# Patient Record
Sex: FEMALE | Race: WHITE | NOT HISPANIC OR LATINO | Employment: UNEMPLOYED | URBAN - NONMETROPOLITAN AREA
[De-identification: names, ages, dates, MRNs, and addresses within clinical notes are randomized per-mention and may not be internally consistent; named-entity substitution may affect disease eponyms.]

---

## 2018-01-28 ENCOUNTER — HOSPITAL ENCOUNTER (EMERGENCY)
Facility: HOSPITAL | Age: 68
Discharge: HOME | End: 2018-01-28
Attending: FAMILY MEDICINE | Admitting: FAMILY MEDICINE

## 2018-01-28 ENCOUNTER — APPOINTMENT (OUTPATIENT)
Dept: GENERAL RADIOLOGY | Facility: HOSPITAL | Age: 68
End: 2018-01-28

## 2018-01-28 VITALS
TEMPERATURE: 97.6 F | BODY MASS INDEX: 50.98 KG/M2 | WEIGHT: 270 LBS | OXYGEN SATURATION: 98 % | HEART RATE: 85 BPM | RESPIRATION RATE: 18 BRPM | SYSTOLIC BLOOD PRESSURE: 167 MMHG | HEIGHT: 61 IN | DIASTOLIC BLOOD PRESSURE: 94 MMHG

## 2018-01-28 DIAGNOSIS — M10.062 ACUTE IDIOPATHIC GOUT OF LEFT KNEE: Primary | ICD-10-CM

## 2018-01-28 LAB
ALBUMIN SERPL-MCNC: 4.3 G/DL (ref 3.4–4.8)
ALBUMIN/GLOB SERPL: 1.3 G/DL (ref 1.5–2.5)
ALP SERPL-CCNC: 88 U/L (ref 35–104)
ALT SERPL W P-5'-P-CCNC: 22 U/L (ref 10–36)
ANION GAP SERPL CALCULATED.3IONS-SCNC: 10.3 MMOL/L (ref 3.6–11.2)
AST SERPL-CCNC: 27 U/L (ref 10–30)
BASOPHILS # BLD AUTO: 0.03 10*3/MM3 (ref 0–0.3)
BASOPHILS NFR BLD AUTO: 0.2 % (ref 0–2)
BILIRUB SERPL-MCNC: 1.2 MG/DL (ref 0.2–1.8)
BUN BLD-MCNC: 17 MG/DL (ref 7–21)
BUN/CREAT SERPL: 24.3 (ref 7–25)
CALCIUM SPEC-SCNC: 9.4 MG/DL (ref 7.7–10)
CHLORIDE SERPL-SCNC: 105 MMOL/L (ref 99–112)
CO2 SERPL-SCNC: 25.7 MMOL/L (ref 24.3–31.9)
CREAT BLD-MCNC: 0.7 MG/DL (ref 0.43–1.29)
CRP SERPL-MCNC: 5.4 MG/DL (ref 0–0.99)
DEPRECATED RDW RBC AUTO: 42.2 FL (ref 37–54)
EOSINOPHIL # BLD AUTO: 0.07 10*3/MM3 (ref 0–0.7)
EOSINOPHIL NFR BLD AUTO: 0.4 % (ref 0–7)
ERYTHROCYTE [DISTWIDTH] IN BLOOD BY AUTOMATED COUNT: 13 % (ref 11.5–14.5)
ERYTHROCYTE [SEDIMENTATION RATE] IN BLOOD: 29 MM/HR (ref 0–30)
GFR SERPL CREATININE-BSD FRML MDRD: 83 ML/MIN/1.73
GLOBULIN UR ELPH-MCNC: 3.4 GM/DL
GLUCOSE BLD-MCNC: 142 MG/DL (ref 70–110)
HCT VFR BLD AUTO: 41.1 % (ref 37–47)
HGB BLD-MCNC: 14 G/DL (ref 12–16)
IMM GRANULOCYTES # BLD: 0.04 10*3/MM3 (ref 0–0.03)
IMM GRANULOCYTES NFR BLD: 0.2 % (ref 0–0.5)
LYMPHOCYTES # BLD AUTO: 3.47 10*3/MM3 (ref 1–3)
LYMPHOCYTES NFR BLD AUTO: 21.3 % (ref 16–46)
MCH RBC QN AUTO: 30.8 PG (ref 27–33)
MCHC RBC AUTO-ENTMCNC: 34.1 G/DL (ref 33–37)
MCV RBC AUTO: 90.5 FL (ref 80–94)
MONOCYTES # BLD AUTO: 0.8 10*3/MM3 (ref 0.1–0.9)
MONOCYTES NFR BLD AUTO: 4.9 % (ref 0–12)
NEUTROPHILS # BLD AUTO: 11.9 10*3/MM3 (ref 1.4–6.5)
NEUTROPHILS NFR BLD AUTO: 73 % (ref 40–75)
OSMOLALITY SERPL CALC.SUM OF ELEC: 285.2 MOSM/KG (ref 273–305)
PLATELET # BLD AUTO: 212 10*3/MM3 (ref 130–400)
PMV BLD AUTO: 10.4 FL (ref 6–10)
POTASSIUM BLD-SCNC: 3.7 MMOL/L (ref 3.5–5.3)
PROT SERPL-MCNC: 7.7 G/DL (ref 6–8)
RBC # BLD AUTO: 4.54 10*6/MM3 (ref 4.2–5.4)
SODIUM BLD-SCNC: 141 MMOL/L (ref 135–153)
URATE SERPL-MCNC: 7.4 MG/DL (ref 2.4–5.7)
WBC NRBC COR # BLD: 16.31 10*3/MM3 (ref 4.5–12.5)

## 2018-01-28 PROCEDURE — 96372 THER/PROPH/DIAG INJ SC/IM: CPT

## 2018-01-28 PROCEDURE — 85025 COMPLETE CBC W/AUTO DIFF WBC: CPT | Performed by: PHYSICIAN ASSISTANT

## 2018-01-28 PROCEDURE — 80053 COMPREHEN METABOLIC PANEL: CPT | Performed by: PHYSICIAN ASSISTANT

## 2018-01-28 PROCEDURE — 73560 X-RAY EXAM OF KNEE 1 OR 2: CPT | Performed by: RADIOLOGY

## 2018-01-28 PROCEDURE — 25010000002 KETOROLAC TROMETHAMINE PER 15 MG: Performed by: PHYSICIAN ASSISTANT

## 2018-01-28 PROCEDURE — 85652 RBC SED RATE AUTOMATED: CPT | Performed by: PHYSICIAN ASSISTANT

## 2018-01-28 PROCEDURE — 86140 C-REACTIVE PROTEIN: CPT | Performed by: PHYSICIAN ASSISTANT

## 2018-01-28 PROCEDURE — 99284 EMERGENCY DEPT VISIT MOD MDM: CPT

## 2018-01-28 PROCEDURE — 84550 ASSAY OF BLOOD/URIC ACID: CPT | Performed by: PHYSICIAN ASSISTANT

## 2018-01-28 PROCEDURE — 25010000002 METHYLPREDNISOLONE PER 125 MG: Performed by: PHYSICIAN ASSISTANT

## 2018-01-28 PROCEDURE — 36415 COLL VENOUS BLD VENIPUNCTURE: CPT

## 2018-01-28 PROCEDURE — 73560 X-RAY EXAM OF KNEE 1 OR 2: CPT

## 2018-01-28 RX ORDER — INDOMETHACIN 25 MG/1
25 CAPSULE ORAL 3 TIMES DAILY PRN
Qty: 15 CAPSULE | Refills: 0 | Status: ON HOLD | OUTPATIENT
Start: 2018-01-28 | End: 2020-10-27

## 2018-01-28 RX ORDER — HYDROCODONE BITARTRATE AND ACETAMINOPHEN 7.5; 325 MG/1; MG/1
1 TABLET ORAL ONCE
Status: COMPLETED | OUTPATIENT
Start: 2018-01-28 | End: 2018-01-28

## 2018-01-28 RX ORDER — PREDNISONE 20 MG/1
20 TABLET ORAL DAILY
Qty: 5 TABLET | Refills: 0 | OUTPATIENT
Start: 2018-01-28 | End: 2019-12-28

## 2018-01-28 RX ORDER — METHYLPREDNISOLONE SODIUM SUCCINATE 125 MG/2ML
125 INJECTION, POWDER, LYOPHILIZED, FOR SOLUTION INTRAMUSCULAR; INTRAVENOUS ONCE
Status: COMPLETED | OUTPATIENT
Start: 2018-01-28 | End: 2018-01-28

## 2018-01-28 RX ORDER — KETOROLAC TROMETHAMINE 30 MG/ML
60 INJECTION, SOLUTION INTRAMUSCULAR; INTRAVENOUS ONCE
Status: COMPLETED | OUTPATIENT
Start: 2018-01-28 | End: 2018-01-28

## 2018-01-28 RX ORDER — ALLOPURINOL 100 MG/1
100 TABLET ORAL
Status: ON HOLD | COMMUNITY
End: 2020-10-27

## 2018-01-28 RX ORDER — OXYCODONE AND ACETAMINOPHEN 10; 325 MG/1; MG/1
1 TABLET ORAL EVERY 6 HOURS PRN
Status: ON HOLD | COMMUNITY
End: 2020-12-01 | Stop reason: SDUPTHER

## 2018-01-28 RX ORDER — DIAZEPAM 10 MG/1
5 TABLET ORAL 2 TIMES DAILY PRN
Status: ON HOLD | COMMUNITY
End: 2020-11-27

## 2018-01-28 RX ADMIN — HYDROCODONE BITARTRATE AND ACETAMINOPHEN 1 TABLET: 7.5; 325 TABLET ORAL at 13:22

## 2018-01-28 RX ADMIN — KETOROLAC TROMETHAMINE 60 MG: 60 INJECTION, SOLUTION INTRAMUSCULAR at 12:32

## 2018-01-28 RX ADMIN — METHYLPREDNISOLONE SODIUM SUCCINATE 125 MG: 125 INJECTION, POWDER, FOR SOLUTION INTRAMUSCULAR; INTRAVENOUS at 12:31

## 2019-12-28 ENCOUNTER — HOSPITAL ENCOUNTER (EMERGENCY)
Facility: HOSPITAL | Age: 69
Discharge: HOME | End: 2019-12-28
Attending: EMERGENCY MEDICINE | Admitting: EMERGENCY MEDICINE

## 2019-12-28 ENCOUNTER — APPOINTMENT (OUTPATIENT)
Dept: GENERAL RADIOLOGY | Facility: HOSPITAL | Age: 69
End: 2019-12-28

## 2019-12-28 VITALS
BODY MASS INDEX: 56.15 KG/M2 | WEIGHT: 286 LBS | HEIGHT: 60 IN | RESPIRATION RATE: 19 BRPM | OXYGEN SATURATION: 97 % | SYSTOLIC BLOOD PRESSURE: 148 MMHG | DIASTOLIC BLOOD PRESSURE: 76 MMHG | TEMPERATURE: 97.8 F | HEART RATE: 79 BPM

## 2019-12-28 DIAGNOSIS — J06.9 UPPER RESPIRATORY TRACT INFECTION, UNSPECIFIED TYPE: Primary | ICD-10-CM

## 2019-12-28 LAB
FLUAV AG NPH QL: NEGATIVE
FLUBV AG NPH QL IA: NEGATIVE

## 2019-12-28 PROCEDURE — 71045 X-RAY EXAM CHEST 1 VIEW: CPT

## 2019-12-28 PROCEDURE — 94640 AIRWAY INHALATION TREATMENT: CPT

## 2019-12-28 PROCEDURE — 99284 EMERGENCY DEPT VISIT MOD MDM: CPT

## 2019-12-28 PROCEDURE — 87804 INFLUENZA ASSAY W/OPTIC: CPT | Performed by: NURSE PRACTITIONER

## 2019-12-28 RX ORDER — ACETAMINOPHEN 325 MG/1
650 TABLET ORAL ONCE
Status: COMPLETED | OUTPATIENT
Start: 2019-12-28 | End: 2019-12-28

## 2019-12-28 RX ORDER — BENZONATATE 100 MG/1
100 CAPSULE ORAL ONCE
Status: COMPLETED | OUTPATIENT
Start: 2019-12-28 | End: 2019-12-28

## 2019-12-28 RX ORDER — BENZONATATE 100 MG/1
100 CAPSULE ORAL 3 TIMES DAILY PRN
Qty: 15 CAPSULE | Refills: 0 | Status: SHIPPED | OUTPATIENT
Start: 2019-12-28 | End: 2019-12-28 | Stop reason: SDUPTHER

## 2019-12-28 RX ORDER — IPRATROPIUM BROMIDE AND ALBUTEROL SULFATE 2.5; .5 MG/3ML; MG/3ML
3 SOLUTION RESPIRATORY (INHALATION) ONCE
Status: COMPLETED | OUTPATIENT
Start: 2019-12-28 | End: 2019-12-28

## 2019-12-28 RX ORDER — BENZONATATE 100 MG/1
100 CAPSULE ORAL 3 TIMES DAILY PRN
Qty: 15 CAPSULE | Refills: 0 | Status: ON HOLD | OUTPATIENT
Start: 2019-12-28 | End: 2020-10-27

## 2019-12-28 RX ORDER — BENZONATATE 100 MG/1
100 CAPSULE ORAL 3 TIMES DAILY PRN
Qty: 15 CAPSULE | Refills: 0 | Status: SHIPPED | OUTPATIENT
Start: 2019-12-28 | End: 2019-12-28

## 2019-12-28 RX ORDER — ALBUTEROL SULFATE 90 UG/1
2 AEROSOL, METERED RESPIRATORY (INHALATION) ONCE
Status: COMPLETED | OUTPATIENT
Start: 2019-12-28 | End: 2019-12-28

## 2019-12-28 RX ORDER — IPRATROPIUM BROMIDE AND ALBUTEROL SULFATE 2.5; .5 MG/3ML; MG/3ML
3 SOLUTION RESPIRATORY (INHALATION) ONCE
Status: DISCONTINUED | OUTPATIENT
Start: 2019-12-28 | End: 2019-12-28

## 2019-12-28 RX ADMIN — IPRATROPIUM BROMIDE AND ALBUTEROL SULFATE 3 ML: .5; 3 SOLUTION RESPIRATORY (INHALATION) at 22:46

## 2019-12-28 RX ADMIN — ALBUTEROL SULFATE 2 PUFF: 90 AEROSOL, METERED RESPIRATORY (INHALATION) at 22:58

## 2019-12-28 RX ADMIN — BENZONATATE 100 MG: 100 CAPSULE ORAL at 22:59

## 2019-12-28 RX ADMIN — ACETAMINOPHEN 650 MG: 325 TABLET ORAL at 22:32

## 2020-10-27 ENCOUNTER — APPOINTMENT (OUTPATIENT)
Dept: GENERAL RADIOLOGY | Facility: HOSPITAL | Age: 70
End: 2020-10-27

## 2020-10-27 ENCOUNTER — HOSPITAL ENCOUNTER (INPATIENT)
Facility: HOSPITAL | Age: 70
LOS: 8 days | Discharge: LEFT BEFORE DISCHARGE | End: 2020-11-04
Attending: FAMILY MEDICINE | Admitting: INTERNAL MEDICINE

## 2020-10-27 ENCOUNTER — APPOINTMENT (OUTPATIENT)
Dept: ULTRASOUND IMAGING | Facility: HOSPITAL | Age: 70
End: 2020-10-27

## 2020-10-27 DIAGNOSIS — M10.9 ACUTE GOUT OF RIGHT KNEE, UNSPECIFIED CAUSE: ICD-10-CM

## 2020-10-27 DIAGNOSIS — N39.0 ACUTE UTI: ICD-10-CM

## 2020-10-27 DIAGNOSIS — I48.91 ATRIAL FIBRILLATION WITH RVR (HCC): Primary | ICD-10-CM

## 2020-10-27 LAB
A-A DO2: 44.4 MMHG (ref 0–300)
ALBUMIN SERPL-MCNC: 3.98 G/DL (ref 3.5–5.2)
ALBUMIN/GLOB SERPL: 1.1 G/DL
ALP SERPL-CCNC: 83 U/L (ref 39–117)
ALT SERPL W P-5'-P-CCNC: 17 U/L (ref 1–33)
ANION GAP SERPL CALCULATED.3IONS-SCNC: 12 MMOL/L (ref 5–15)
APTT PPP: 32.1 SECONDS (ref 25.6–35.3)
ARTERIAL PATENCY WRIST A: ABNORMAL
AST SERPL-CCNC: 20 U/L (ref 1–32)
ATMOSPHERIC PRESS: 732 MMHG
BACTERIA UR QL AUTO: ABNORMAL /HPF
BASE EXCESS BLDA CALC-SCNC: -0.5 MMOL/L (ref 0–2)
BASOPHILS # BLD AUTO: 0.05 10*3/MM3 (ref 0–0.2)
BASOPHILS NFR BLD AUTO: 0.4 % (ref 0–1.5)
BDY SITE: ABNORMAL
BILIRUB SERPL-MCNC: 0.9 MG/DL (ref 0–1.2)
BILIRUB UR QL STRIP: NEGATIVE
BODY TEMPERATURE: 0 C
BUN SERPL-MCNC: 23 MG/DL (ref 8–23)
BUN/CREAT SERPL: 23.7 (ref 7–25)
CALCIUM SPEC-SCNC: 9.6 MG/DL (ref 8.6–10.5)
CHLORIDE SERPL-SCNC: 106 MMOL/L (ref 98–107)
CLARITY UR: ABNORMAL
CO2 BLDA-SCNC: 25.9 MMOL/L (ref 22–33)
CO2 SERPL-SCNC: 21 MMOL/L (ref 22–29)
COHGB MFR BLD: 1.4 % (ref 0–5)
COLOR UR: ABNORMAL
CREAT SERPL-MCNC: 0.97 MG/DL (ref 0.57–1)
CRP SERPL-MCNC: 7.51 MG/DL (ref 0–0.5)
DEPRECATED RDW RBC AUTO: 46.3 FL (ref 37–54)
EOSINOPHIL # BLD AUTO: 0.05 10*3/MM3 (ref 0–0.4)
EOSINOPHIL NFR BLD AUTO: 0.4 % (ref 0.3–6.2)
ERYTHROCYTE [DISTWIDTH] IN BLOOD BY AUTOMATED COUNT: 13.6 % (ref 12.3–15.4)
ERYTHROCYTE [SEDIMENTATION RATE] IN BLOOD: 34 MM/HR (ref 0–30)
GFR SERPL CREATININE-BSD FRML MDRD: 57 ML/MIN/1.73
GLOBULIN UR ELPH-MCNC: 3.5 GM/DL
GLUCOSE BLDC GLUCOMTR-MCNC: 227 MG/DL (ref 70–130)
GLUCOSE SERPL-MCNC: 225 MG/DL (ref 65–99)
GLUCOSE UR STRIP-MCNC: NEGATIVE MG/DL
HBA1C MFR BLD: 8.4 % (ref 4.8–5.6)
HCO3 BLDA-SCNC: 24.7 MMOL/L (ref 20–26)
HCT VFR BLD AUTO: 36.5 % (ref 34–46.6)
HCT VFR BLD CALC: 36.8 % (ref 38–51)
HGB BLD-MCNC: 11.6 G/DL (ref 12–15.9)
HGB BLDA-MCNC: 12 G/DL (ref 13.5–17.5)
HGB UR QL STRIP.AUTO: NEGATIVE
HOLD SPECIMEN: NORMAL
HOLD SPECIMEN: NORMAL
HYALINE CASTS UR QL AUTO: ABNORMAL /LPF
IMM GRANULOCYTES # BLD AUTO: 0.04 10*3/MM3 (ref 0–0.05)
IMM GRANULOCYTES NFR BLD AUTO: 0.3 % (ref 0–0.5)
INHALED O2 CONCENTRATION: 21 %
INR PPP: 1.25 (ref 0.9–1.1)
KETONES UR QL STRIP: NEGATIVE
LEUKOCYTE ESTERASE UR QL STRIP.AUTO: ABNORMAL
LYMPHOCYTES # BLD AUTO: 1.77 10*3/MM3 (ref 0.7–3.1)
LYMPHOCYTES NFR BLD AUTO: 14.4 % (ref 19.6–45.3)
Lab: ABNORMAL
Lab: ABNORMAL
MAGNESIUM SERPL-MCNC: 1.5 MG/DL (ref 1.6–2.4)
MCH RBC QN AUTO: 29.5 PG (ref 26.6–33)
MCHC RBC AUTO-ENTMCNC: 31.8 G/DL (ref 31.5–35.7)
MCV RBC AUTO: 92.9 FL (ref 79–97)
METHGB BLD QL: 0.4 % (ref 0–3)
MODALITY: ABNORMAL
MONOCYTES # BLD AUTO: 0.68 10*3/MM3 (ref 0.1–0.9)
MONOCYTES NFR BLD AUTO: 5.5 % (ref 5–12)
NEUTROPHILS NFR BLD AUTO: 79 % (ref 42.7–76)
NEUTROPHILS NFR BLD AUTO: 9.69 10*3/MM3 (ref 1.7–7)
NITRITE UR QL STRIP: POSITIVE
NOTE: ABNORMAL
NOTIFIED BY: ABNORMAL
NOTIFIED WHO: ABNORMAL
NRBC BLD AUTO-RTO: 0 /100 WBC (ref 0–0.2)
NT-PROBNP SERPL-MCNC: 1479 PG/ML (ref 0–900)
OXYHGB MFR BLDV: 84.8 % (ref 94–99)
PCO2 BLDA: 41.5 MM HG (ref 35–45)
PCO2 TEMP ADJ BLD: ABNORMAL MM[HG]
PH BLDA: 7.38 PH UNITS (ref 7.35–7.45)
PH UR STRIP.AUTO: <=5 [PH] (ref 5–8)
PH, TEMP CORRECTED: ABNORMAL
PLATELET # BLD AUTO: 154 10*3/MM3 (ref 140–450)
PMV BLD AUTO: 11.1 FL (ref 6–12)
PO2 BLDA: 52.6 MM HG (ref 83–108)
PO2 TEMP ADJ BLD: ABNORMAL MM[HG]
POTASSIUM SERPL-SCNC: 4.3 MMOL/L (ref 3.5–5.2)
PROT SERPL-MCNC: 7.5 G/DL (ref 6–8.5)
PROT UR QL STRIP: ABNORMAL
PROTHROMBIN TIME: 15.5 SECONDS (ref 11.9–14.1)
QT INTERVAL: 302 MS
QTC INTERVAL: 457 MS
RBC # BLD AUTO: 3.93 10*6/MM3 (ref 3.77–5.28)
RBC # UR: ABNORMAL /HPF
REF LAB TEST METHOD: ABNORMAL
SAO2 % BLDCOA: 86.4 % (ref 94–99)
SARS-COV-2 RDRP RESP QL NAA+PROBE: NOT DETECTED
SODIUM SERPL-SCNC: 139 MMOL/L (ref 136–145)
SP GR UR STRIP: 1.02 (ref 1–1.03)
SQUAMOUS #/AREA URNS HPF: ABNORMAL /HPF
TROPONIN T SERPL-MCNC: <0.01 NG/ML (ref 0–0.03)
TSH SERPL DL<=0.05 MIU/L-ACNC: 0.57 UIU/ML (ref 0.27–4.2)
URATE SERPL-MCNC: 6.3 MG/DL (ref 2.4–5.7)
UROBILINOGEN UR QL STRIP: ABNORMAL
VENTILATOR MODE: ABNORMAL
WBC # BLD AUTO: 12.28 10*3/MM3 (ref 3.4–10.8)
WBC UR QL AUTO: ABNORMAL /HPF
WHOLE BLOOD HOLD SPECIMEN: NORMAL
WHOLE BLOOD HOLD SPECIMEN: NORMAL

## 2020-10-27 PROCEDURE — 84484 ASSAY OF TROPONIN QUANT: CPT | Performed by: PHYSICIAN ASSISTANT

## 2020-10-27 PROCEDURE — 99285 EMERGENCY DEPT VISIT HI MDM: CPT

## 2020-10-27 PROCEDURE — 71045 X-RAY EXAM CHEST 1 VIEW: CPT

## 2020-10-27 PROCEDURE — 93971 EXTREMITY STUDY: CPT | Performed by: RADIOLOGY

## 2020-10-27 PROCEDURE — 83050 HGB METHEMOGLOBIN QUAN: CPT

## 2020-10-27 PROCEDURE — 87186 SC STD MICRODIL/AGAR DIL: CPT | Performed by: HOSPITALIST

## 2020-10-27 PROCEDURE — 82375 ASSAY CARBOXYHB QUANT: CPT

## 2020-10-27 PROCEDURE — 25010000002 VANCOMYCIN 5 G RECONSTITUTED SOLUTION: Performed by: HOSPITALIST

## 2020-10-27 PROCEDURE — 73562 X-RAY EXAM OF KNEE 3: CPT

## 2020-10-27 PROCEDURE — 85730 THROMBOPLASTIN TIME PARTIAL: CPT | Performed by: HOSPITALIST

## 2020-10-27 PROCEDURE — 84484 ASSAY OF TROPONIN QUANT: CPT | Performed by: HOSPITALIST

## 2020-10-27 PROCEDURE — 82805 BLOOD GASES W/O2 SATURATION: CPT

## 2020-10-27 PROCEDURE — 73562 X-RAY EXAM OF KNEE 3: CPT | Performed by: RADIOLOGY

## 2020-10-27 PROCEDURE — 94799 UNLISTED PULMONARY SVC/PX: CPT

## 2020-10-27 PROCEDURE — 84443 ASSAY THYROID STIM HORMONE: CPT | Performed by: HOSPITALIST

## 2020-10-27 PROCEDURE — 93010 ELECTROCARDIOGRAM REPORT: CPT | Performed by: INTERNAL MEDICINE

## 2020-10-27 PROCEDURE — 36600 WITHDRAWAL OF ARTERIAL BLOOD: CPT

## 2020-10-27 PROCEDURE — 83036 HEMOGLOBIN GLYCOSYLATED A1C: CPT | Performed by: HOSPITALIST

## 2020-10-27 PROCEDURE — 85610 PROTHROMBIN TIME: CPT | Performed by: HOSPITALIST

## 2020-10-27 PROCEDURE — 84550 ASSAY OF BLOOD/URIC ACID: CPT | Performed by: PHYSICIAN ASSISTANT

## 2020-10-27 PROCEDURE — 86140 C-REACTIVE PROTEIN: CPT | Performed by: HOSPITALIST

## 2020-10-27 PROCEDURE — 87040 BLOOD CULTURE FOR BACTERIA: CPT | Performed by: HOSPITALIST

## 2020-10-27 PROCEDURE — 80053 COMPREHEN METABOLIC PANEL: CPT | Performed by: PHYSICIAN ASSISTANT

## 2020-10-27 PROCEDURE — 87635 SARS-COV-2 COVID-19 AMP PRB: CPT | Performed by: PHYSICIAN ASSISTANT

## 2020-10-27 PROCEDURE — 99223 1ST HOSP IP/OBS HIGH 75: CPT | Performed by: HOSPITALIST

## 2020-10-27 PROCEDURE — 81001 URINALYSIS AUTO W/SCOPE: CPT | Performed by: PHYSICIAN ASSISTANT

## 2020-10-27 PROCEDURE — 71045 X-RAY EXAM CHEST 1 VIEW: CPT | Performed by: RADIOLOGY

## 2020-10-27 PROCEDURE — 25010000002 CEFTRIAXONE PER 250 MG: Performed by: PHYSICIAN ASSISTANT

## 2020-10-27 PROCEDURE — 83880 ASSAY OF NATRIURETIC PEPTIDE: CPT | Performed by: PHYSICIAN ASSISTANT

## 2020-10-27 PROCEDURE — 93005 ELECTROCARDIOGRAM TRACING: CPT | Performed by: PHYSICIAN ASSISTANT

## 2020-10-27 PROCEDURE — 25010000002 DIGOXIN PER 500 MCG: Performed by: PHYSICIAN ASSISTANT

## 2020-10-27 PROCEDURE — 36415 COLL VENOUS BLD VENIPUNCTURE: CPT

## 2020-10-27 PROCEDURE — 83735 ASSAY OF MAGNESIUM: CPT | Performed by: HOSPITALIST

## 2020-10-27 PROCEDURE — 25010000002 HYDROMORPHONE PER 4 MG: Performed by: FAMILY MEDICINE

## 2020-10-27 PROCEDURE — 85025 COMPLETE CBC W/AUTO DIFF WBC: CPT | Performed by: PHYSICIAN ASSISTANT

## 2020-10-27 PROCEDURE — 85652 RBC SED RATE AUTOMATED: CPT | Performed by: PHYSICIAN ASSISTANT

## 2020-10-27 PROCEDURE — 87086 URINE CULTURE/COLONY COUNT: CPT | Performed by: HOSPITALIST

## 2020-10-27 PROCEDURE — 93971 EXTREMITY STUDY: CPT

## 2020-10-27 PROCEDURE — 25010000002 BUTORPHANOL PER 1 MG: Performed by: FAMILY MEDICINE

## 2020-10-27 PROCEDURE — 87077 CULTURE AEROBIC IDENTIFY: CPT | Performed by: HOSPITALIST

## 2020-10-27 PROCEDURE — 82962 GLUCOSE BLOOD TEST: CPT

## 2020-10-27 RX ORDER — HYDROMORPHONE HYDROCHLORIDE 1 MG/ML
0.5 INJECTION, SOLUTION INTRAMUSCULAR; INTRAVENOUS; SUBCUTANEOUS ONCE
Status: COMPLETED | OUTPATIENT
Start: 2020-10-27 | End: 2020-10-27

## 2020-10-27 RX ORDER — PAROXETINE 10 MG/1
10 TABLET, FILM COATED ORAL EVERY MORNING
Status: ON HOLD | COMMUNITY
End: 2022-09-21

## 2020-10-27 RX ORDER — HEPARIN SODIUM 10000 [USP'U]/100ML
8 INJECTION, SOLUTION INTRAVENOUS
Status: DISCONTINUED | OUTPATIENT
Start: 2020-10-27 | End: 2020-10-28

## 2020-10-27 RX ORDER — ALLOPURINOL 100 MG/1
100 TABLET ORAL DAILY
Status: ON HOLD | COMMUNITY
End: 2022-09-21

## 2020-10-27 RX ORDER — OXYBUTYNIN CHLORIDE 15 MG/1
15 TABLET, EXTENDED RELEASE ORAL DAILY
COMMUNITY
End: 2020-12-01 | Stop reason: HOSPADM

## 2020-10-27 RX ORDER — SODIUM CHLORIDE 0.9 % (FLUSH) 0.9 %
10 SYRINGE (ML) INJECTION AS NEEDED
Status: DISCONTINUED | OUTPATIENT
Start: 2020-10-27 | End: 2020-11-04 | Stop reason: HOSPADM

## 2020-10-27 RX ORDER — PAROXETINE HYDROCHLORIDE 20 MG/1
10 TABLET, FILM COATED ORAL EVERY MORNING
Status: CANCELLED | OUTPATIENT
Start: 2020-10-28

## 2020-10-27 RX ORDER — OXYCODONE AND ACETAMINOPHEN 10; 325 MG/1; MG/1
1 TABLET ORAL EVERY 6 HOURS PRN
Status: CANCELLED | OUTPATIENT
Start: 2020-10-27

## 2020-10-27 RX ORDER — MAGNESIUM SULFATE HEPTAHYDRATE 40 MG/ML
4 INJECTION, SOLUTION INTRAVENOUS AS NEEDED
Status: DISCONTINUED | OUTPATIENT
Start: 2020-10-27 | End: 2020-11-04 | Stop reason: HOSPADM

## 2020-10-27 RX ORDER — NICOTINE POLACRILEX 4 MG
15 LOZENGE BUCCAL
Status: DISCONTINUED | OUTPATIENT
Start: 2020-10-27 | End: 2020-11-04 | Stop reason: HOSPADM

## 2020-10-27 RX ORDER — HEPARIN SODIUM 5000 [USP'U]/ML
5000 INJECTION, SOLUTION INTRAVENOUS; SUBCUTANEOUS AS NEEDED
Status: DISCONTINUED | OUTPATIENT
Start: 2020-10-27 | End: 2020-10-28

## 2020-10-27 RX ORDER — MORPHINE SULFATE 2 MG/ML
2 INJECTION, SOLUTION INTRAMUSCULAR; INTRAVENOUS ONCE
Status: DISCONTINUED | OUTPATIENT
Start: 2020-10-27 | End: 2020-10-27

## 2020-10-27 RX ORDER — SODIUM CHLORIDE 0.9 % (FLUSH) 0.9 %
10 SYRINGE (ML) INJECTION EVERY 12 HOURS SCHEDULED
Status: DISCONTINUED | OUTPATIENT
Start: 2020-10-27 | End: 2020-11-04 | Stop reason: HOSPADM

## 2020-10-27 RX ORDER — DIGOXIN 0.25 MG/ML
250 INJECTION INTRAMUSCULAR; INTRAVENOUS ONCE
Status: COMPLETED | OUTPATIENT
Start: 2020-10-27 | End: 2020-10-27

## 2020-10-27 RX ORDER — HEPARIN SODIUM 5000 [USP'U]/ML
2500 INJECTION, SOLUTION INTRAVENOUS; SUBCUTANEOUS AS NEEDED
Status: DISCONTINUED | OUTPATIENT
Start: 2020-10-27 | End: 2020-10-28

## 2020-10-27 RX ORDER — COLCHICINE 0.6 MG/1
0.6 TABLET ORAL ONCE
Status: COMPLETED | OUTPATIENT
Start: 2020-10-27 | End: 2020-10-27

## 2020-10-27 RX ORDER — MAGNESIUM SULFATE HEPTAHYDRATE 40 MG/ML
2 INJECTION, SOLUTION INTRAVENOUS AS NEEDED
Status: DISCONTINUED | OUTPATIENT
Start: 2020-10-27 | End: 2020-11-04 | Stop reason: HOSPADM

## 2020-10-27 RX ORDER — DILTIAZEM HYDROCHLORIDE 5 MG/ML
10 INJECTION INTRAVENOUS ONCE
Status: COMPLETED | OUTPATIENT
Start: 2020-10-27 | End: 2020-10-27

## 2020-10-27 RX ORDER — OXYCODONE AND ACETAMINOPHEN 10; 325 MG/1; MG/1
1 TABLET ORAL EVERY 12 HOURS PRN
Status: DISCONTINUED | OUTPATIENT
Start: 2020-10-27 | End: 2020-10-28

## 2020-10-27 RX ORDER — MAGNESIUM SULFATE 1 G/100ML
1 INJECTION INTRAVENOUS AS NEEDED
Status: DISCONTINUED | OUTPATIENT
Start: 2020-10-27 | End: 2020-11-04 | Stop reason: HOSPADM

## 2020-10-27 RX ORDER — LISINOPRIL 20 MG/1
20 TABLET ORAL DAILY
COMMUNITY
End: 2020-11-27

## 2020-10-27 RX ORDER — DEXTROSE MONOHYDRATE 25 G/50ML
25 INJECTION, SOLUTION INTRAVENOUS
Status: DISCONTINUED | OUTPATIENT
Start: 2020-10-27 | End: 2020-11-04 | Stop reason: HOSPADM

## 2020-10-27 RX ORDER — HEPARIN SODIUM 5000 [USP'U]/ML
5000 INJECTION, SOLUTION INTRAVENOUS; SUBCUTANEOUS EVERY 8 HOURS SCHEDULED
Status: DISCONTINUED | OUTPATIENT
Start: 2020-10-27 | End: 2020-10-27

## 2020-10-27 RX ORDER — HEPARIN SODIUM 5000 [USP'U]/ML
5000 INJECTION, SOLUTION INTRAVENOUS; SUBCUTANEOUS ONCE
Status: COMPLETED | OUTPATIENT
Start: 2020-10-27 | End: 2020-10-28

## 2020-10-27 RX ADMIN — COLCHICINE 0.6 MG: 0.6 TABLET, FILM COATED ORAL at 22:43

## 2020-10-27 RX ADMIN — SODIUM CHLORIDE 2 G: 900 INJECTION INTRAVENOUS at 16:42

## 2020-10-27 RX ADMIN — OXYCODONE HYDROCHLORIDE AND ACETAMINOPHEN 1 TABLET: 10; 325 TABLET ORAL at 22:32

## 2020-10-27 RX ADMIN — BUTORPHANOL TARTRATE 2 MG: 2 INJECTION, SOLUTION INTRAMUSCULAR; INTRAVENOUS at 13:18

## 2020-10-27 RX ADMIN — DILTIAZEM HYDROCHLORIDE 10 MG: 5 INJECTION INTRAVENOUS at 12:31

## 2020-10-27 RX ADMIN — VANCOMYCIN HYDROCHLORIDE 2000 MG: 5 INJECTION, POWDER, LYOPHILIZED, FOR SOLUTION INTRAVENOUS at 22:43

## 2020-10-27 RX ADMIN — SODIUM CHLORIDE 15 MG/HR: 900 INJECTION, SOLUTION INTRAVENOUS at 22:14

## 2020-10-27 RX ADMIN — SODIUM CHLORIDE 5 MG/HR: 900 INJECTION, SOLUTION INTRAVENOUS at 14:01

## 2020-10-27 RX ADMIN — HYDROMORPHONE HYDROCHLORIDE 0.5 MG: 1 INJECTION, SOLUTION INTRAMUSCULAR; INTRAVENOUS; SUBCUTANEOUS at 16:42

## 2020-10-27 RX ADMIN — DIGOXIN 250 MCG: 0.25 INJECTION INTRAMUSCULAR; INTRAVENOUS at 15:48

## 2020-10-27 RX ADMIN — SODIUM CHLORIDE, PRESERVATIVE FREE 10 ML: 5 INJECTION INTRAVENOUS at 21:10

## 2020-10-27 RX ADMIN — SODIUM CHLORIDE 500 ML: 9 INJECTION, SOLUTION INTRAVENOUS at 13:18

## 2020-10-28 ENCOUNTER — APPOINTMENT (OUTPATIENT)
Dept: GENERAL RADIOLOGY | Facility: HOSPITAL | Age: 70
End: 2020-10-28

## 2020-10-28 ENCOUNTER — APPOINTMENT (OUTPATIENT)
Dept: CARDIOLOGY | Facility: HOSPITAL | Age: 70
End: 2020-10-28

## 2020-10-28 LAB
A-A DO2: 123.2 MMHG (ref 0–300)
APTT PPP: 41.1 SECONDS (ref 25.6–35.3)
ARTERIAL PATENCY WRIST A: ABNORMAL
ATMOSPHERIC PRESS: 729 MMHG
BASE EXCESS BLDA CALC-SCNC: 1.2 MMOL/L (ref 0–2)
BDY SITE: ABNORMAL
BODY TEMPERATURE: 0 C
CO2 BLDA-SCNC: 26.6 MMOL/L (ref 22–33)
COHGB MFR BLD: 1.7 % (ref 0–5)
GAS FLOW AIRWAY: 3 LPM
GLUCOSE BLDC GLUCOMTR-MCNC: 194 MG/DL (ref 70–130)
GLUCOSE BLDC GLUCOMTR-MCNC: 199 MG/DL (ref 70–130)
GLUCOSE BLDC GLUCOMTR-MCNC: 216 MG/DL (ref 70–130)
GLUCOSE BLDC GLUCOMTR-MCNC: 229 MG/DL (ref 70–130)
HCO3 BLDA-SCNC: 25.5 MMOL/L (ref 20–26)
HCT VFR BLD CALC: 33.9 % (ref 38–51)
HGB BLDA-MCNC: 11 G/DL (ref 13.5–17.5)
INHALED O2 CONCENTRATION: 32 %
Lab: ABNORMAL
Lab: ABNORMAL
METHGB BLD QL: 0.2 % (ref 0–3)
MODALITY: ABNORMAL
NOTE: ABNORMAL
NOTIFIED BY: ABNORMAL
NOTIFIED WHO: ABNORMAL
OXYHGB MFR BLDV: 87.3 % (ref 94–99)
PCO2 BLDA: 38.2 MM HG (ref 35–45)
PCO2 TEMP ADJ BLD: ABNORMAL MM[HG]
PH BLDA: 7.43 PH UNITS (ref 7.35–7.45)
PH, TEMP CORRECTED: ABNORMAL
PO2 BLDA: 52.6 MM HG (ref 83–108)
PO2 TEMP ADJ BLD: ABNORMAL MM[HG]
QT INTERVAL: 286 MS
QTC INTERVAL: 410 MS
SAO2 % BLDCOA: 89 % (ref 94–99)
TROPONIN T SERPL-MCNC: <0.01 NG/ML (ref 0–0.03)
VENTILATOR MODE: ABNORMAL

## 2020-10-28 PROCEDURE — 73610 X-RAY EXAM OF ANKLE: CPT | Performed by: RADIOLOGY

## 2020-10-28 PROCEDURE — 82805 BLOOD GASES W/O2 SATURATION: CPT

## 2020-10-28 PROCEDURE — 25010000002 MORPHINE PER 10 MG: Performed by: HOSPITALIST

## 2020-10-28 PROCEDURE — 25010000002 HEPARIN (PORCINE) PER 1000 UNITS: Performed by: HOSPITALIST

## 2020-10-28 PROCEDURE — 99233 SBSQ HOSP IP/OBS HIGH 50: CPT | Performed by: INTERNAL MEDICINE

## 2020-10-28 PROCEDURE — 25010000002 CEFTRIAXONE PER 250 MG: Performed by: INTERNAL MEDICINE

## 2020-10-28 PROCEDURE — 85730 THROMBOPLASTIN TIME PARTIAL: CPT | Performed by: INTERNAL MEDICINE

## 2020-10-28 PROCEDURE — 25010000002 VANCOMYCIN: Performed by: HOSPITALIST

## 2020-10-28 PROCEDURE — 73610 X-RAY EXAM OF ANKLE: CPT

## 2020-10-28 PROCEDURE — 93306 TTE W/DOPPLER COMPLETE: CPT

## 2020-10-28 PROCEDURE — 84484 ASSAY OF TROPONIN QUANT: CPT | Performed by: HOSPITALIST

## 2020-10-28 PROCEDURE — 93306 TTE W/DOPPLER COMPLETE: CPT | Performed by: INTERNAL MEDICINE

## 2020-10-28 PROCEDURE — 82375 ASSAY CARBOXYHB QUANT: CPT

## 2020-10-28 PROCEDURE — 36600 WITHDRAWAL OF ARTERIAL BLOOD: CPT

## 2020-10-28 PROCEDURE — 94799 UNLISTED PULMONARY SVC/PX: CPT

## 2020-10-28 PROCEDURE — 25010000002 MAGNESIUM SULFATE IN D5W 1G/100ML (PREMIX) 1-5 GM/100ML-% SOLUTION: Performed by: HOSPITALIST

## 2020-10-28 PROCEDURE — 83050 HGB METHEMOGLOBIN QUAN: CPT

## 2020-10-28 PROCEDURE — 82962 GLUCOSE BLOOD TEST: CPT

## 2020-10-28 RX ORDER — PAROXETINE HYDROCHLORIDE 20 MG/1
10 TABLET, FILM COATED ORAL DAILY
Status: DISCONTINUED | OUTPATIENT
Start: 2020-10-28 | End: 2020-11-04 | Stop reason: HOSPADM

## 2020-10-28 RX ORDER — ALLOPURINOL 100 MG/1
100 TABLET ORAL DAILY
Status: DISCONTINUED | OUTPATIENT
Start: 2020-10-28 | End: 2020-11-04 | Stop reason: HOSPADM

## 2020-10-28 RX ORDER — ACETAMINOPHEN 325 MG/1
650 TABLET ORAL ONCE
Status: COMPLETED | OUTPATIENT
Start: 2020-10-28 | End: 2020-10-28

## 2020-10-28 RX ORDER — MAGNESIUM SULFATE 1 G/100ML
1 INJECTION INTRAVENOUS
Status: COMPLETED | OUTPATIENT
Start: 2020-10-28 | End: 2020-10-28

## 2020-10-28 RX ORDER — OXYCODONE AND ACETAMINOPHEN 10; 325 MG/1; MG/1
1 TABLET ORAL EVERY 6 HOURS PRN
Status: DISCONTINUED | OUTPATIENT
Start: 2020-10-28 | End: 2020-11-04 | Stop reason: HOSPADM

## 2020-10-28 RX ORDER — DIAZEPAM 5 MG/1
5 TABLET ORAL NIGHTLY PRN
Status: DISCONTINUED | OUTPATIENT
Start: 2020-10-28 | End: 2020-11-01

## 2020-10-28 RX ORDER — CHOLECALCIFEROL (VITAMIN D3) 125 MCG
5 CAPSULE ORAL NIGHTLY
Status: DISCONTINUED | OUTPATIENT
Start: 2020-10-28 | End: 2020-11-04 | Stop reason: HOSPADM

## 2020-10-28 RX ORDER — LISINOPRIL 10 MG/1
20 TABLET ORAL DAILY
Status: CANCELLED | OUTPATIENT
Start: 2020-10-28

## 2020-10-28 RX ORDER — OXYBUTYNIN CHLORIDE 5 MG/1
15 TABLET, EXTENDED RELEASE ORAL DAILY
Status: DISCONTINUED | OUTPATIENT
Start: 2020-10-28 | End: 2020-11-04 | Stop reason: HOSPADM

## 2020-10-28 RX ORDER — MORPHINE SULFATE 2 MG/ML
2 INJECTION, SOLUTION INTRAMUSCULAR; INTRAVENOUS ONCE
Status: COMPLETED | OUTPATIENT
Start: 2020-10-28 | End: 2020-10-28

## 2020-10-28 RX ADMIN — METOPROLOL TARTRATE 12.5 MG: 25 TABLET, FILM COATED ORAL at 21:01

## 2020-10-28 RX ADMIN — OXYCODONE HYDROCHLORIDE AND ACETAMINOPHEN 1 TABLET: 10; 325 TABLET ORAL at 14:11

## 2020-10-28 RX ADMIN — HEPARIN SODIUM 8 UNITS/KG/HR: 10000 INJECTION, SOLUTION INTRAVENOUS at 00:13

## 2020-10-28 RX ADMIN — Medication 5 MG: at 20:58

## 2020-10-28 RX ADMIN — OXYCODONE HYDROCHLORIDE AND ACETAMINOPHEN 1 TABLET: 10; 325 TABLET ORAL at 21:36

## 2020-10-28 RX ADMIN — APIXABAN 5 MG: 5 TABLET, FILM COATED ORAL at 12:14

## 2020-10-28 RX ADMIN — CEFTRIAXONE 1 G: 1 INJECTION, POWDER, FOR SOLUTION INTRAMUSCULAR; INTRAVENOUS at 15:26

## 2020-10-28 RX ADMIN — HEPARIN SODIUM 5000 UNITS: 5000 INJECTION INTRAVENOUS; SUBCUTANEOUS at 00:09

## 2020-10-28 RX ADMIN — PAROXETINE HYDROCHLORIDE 10 MG: 20 TABLET, FILM COATED ORAL at 12:14

## 2020-10-28 RX ADMIN — SODIUM CHLORIDE, PRESERVATIVE FREE 10 ML: 5 INJECTION INTRAVENOUS at 20:58

## 2020-10-28 RX ADMIN — APIXABAN 5 MG: 5 TABLET, FILM COATED ORAL at 20:58

## 2020-10-28 RX ADMIN — SODIUM CHLORIDE 15 MG/HR: 900 INJECTION, SOLUTION INTRAVENOUS at 15:19

## 2020-10-28 RX ADMIN — METOPROLOL TARTRATE 12.5 MG: 25 TABLET, FILM COATED ORAL at 12:14

## 2020-10-28 RX ADMIN — ALLOPURINOL 100 MG: 100 TABLET ORAL at 12:14

## 2020-10-28 RX ADMIN — SODIUM CHLORIDE, PRESERVATIVE FREE 10 ML: 5 INJECTION INTRAVENOUS at 08:16

## 2020-10-28 RX ADMIN — ACETAMINOPHEN 650 MG: 325 TABLET ORAL at 13:02

## 2020-10-28 RX ADMIN — OXYCODONE HYDROCHLORIDE AND ACETAMINOPHEN 1 TABLET: 10; 325 TABLET ORAL at 08:16

## 2020-10-28 RX ADMIN — MORPHINE SULFATE 2 MG: 2 INJECTION, SOLUTION INTRAMUSCULAR; INTRAVENOUS at 05:15

## 2020-10-28 RX ADMIN — VANCOMYCIN HYDROCHLORIDE 1500 MG: 10 INJECTION, POWDER, LYOPHILIZED, FOR SOLUTION INTRAVENOUS at 23:30

## 2020-10-28 RX ADMIN — MAGNESIUM SULFATE IN DEXTROSE 1 G: 10 INJECTION, SOLUTION INTRAVENOUS at 13:10

## 2020-10-28 RX ADMIN — OXYBUTYNIN CHLORIDE 15 MG: 5 TABLET, EXTENDED RELEASE ORAL at 12:14

## 2020-10-28 RX ADMIN — MAGNESIUM SULFATE IN DEXTROSE 1 G: 10 INJECTION, SOLUTION INTRAVENOUS at 14:15

## 2020-10-28 RX ADMIN — SODIUM CHLORIDE 15 MG/HR: 900 INJECTION, SOLUTION INTRAVENOUS at 04:49

## 2020-10-28 RX ADMIN — MAGNESIUM SULFATE IN DEXTROSE 1 G: 10 INJECTION, SOLUTION INTRAVENOUS at 12:09

## 2020-10-29 LAB
ANION GAP SERPL CALCULATED.3IONS-SCNC: 10.5 MMOL/L (ref 5–15)
BACTERIA SPEC AEROBE CULT: ABNORMAL
BH CV ECHO MEAS - ACS: 1.3 CM
BH CV ECHO MEAS - AO MAX PG (FULL): 3.2 MMHG
BH CV ECHO MEAS - AO MAX PG: 6.6 MMHG
BH CV ECHO MEAS - AO MEAN PG (FULL): 2 MMHG
BH CV ECHO MEAS - AO MEAN PG: 4 MMHG
BH CV ECHO MEAS - AO ROOT AREA (BSA CORRECTED): 1.4
BH CV ECHO MEAS - AO ROOT AREA: 7.1 CM^2
BH CV ECHO MEAS - AO ROOT DIAM: 3 CM
BH CV ECHO MEAS - AO V2 MAX: 128.5 CM/SEC
BH CV ECHO MEAS - AO V2 MEAN: 92.5 CM/SEC
BH CV ECHO MEAS - AO V2 VTI: 20 CM
BH CV ECHO MEAS - ASC AORTA: 3.3 CM
BH CV ECHO MEAS - BSA(HAYCOCK): 2.4 M^2
BH CV ECHO MEAS - BSA: 2.2 M^2
BH CV ECHO MEAS - BZI_BMI: 52.2 KILOGRAMS/M^2
BH CV ECHO MEAS - BZI_METRIC_HEIGHT: 154.9 CM
BH CV ECHO MEAS - BZI_METRIC_WEIGHT: 125.2 KG
BH CV ECHO MEAS - EDV(CUBED): 164.6 ML
BH CV ECHO MEAS - EDV(MOD-SP2): 55.8 ML
BH CV ECHO MEAS - EDV(MOD-SP4): 49.6 ML
BH CV ECHO MEAS - EDV(TEICH): 146.2 ML
BH CV ECHO MEAS - EF(CUBED): 56.6 %
BH CV ECHO MEAS - EF(MOD-SP2): 60 %
BH CV ECHO MEAS - EF(MOD-SP4): 42.1 %
BH CV ECHO MEAS - EF(TEICH): 47.7 %
BH CV ECHO MEAS - ESV(CUBED): 71.5 ML
BH CV ECHO MEAS - ESV(MOD-SP2): 22.3 ML
BH CV ECHO MEAS - ESV(MOD-SP4): 28.7 ML
BH CV ECHO MEAS - ESV(TEICH): 76.4 ML
BH CV ECHO MEAS - FS: 24.3 %
BH CV ECHO MEAS - IVS/LVPW: 1.1
BH CV ECHO MEAS - IVSD: 0.93 CM
BH CV ECHO MEAS - LA DIMENSION: 3.2 CM
BH CV ECHO MEAS - LA/AO: 1.1
BH CV ECHO MEAS - LV DIASTOLIC VOL/BSA (35-75): 22.9 ML/M^2
BH CV ECHO MEAS - LV MASS(C)D: 181.5 GRAMS
BH CV ECHO MEAS - LV MASS(C)DI: 83.8 GRAMS/M^2
BH CV ECHO MEAS - LV MAX PG: 3.4 MMHG
BH CV ECHO MEAS - LV MEAN PG: 2 MMHG
BH CV ECHO MEAS - LV SYSTOLIC VOL/BSA (12-30): 13.2 ML/M^2
BH CV ECHO MEAS - LV V1 MAX: 92.4 CM/SEC
BH CV ECHO MEAS - LV V1 MEAN: 59.3 CM/SEC
BH CV ECHO MEAS - LV V1 VTI: 14.5 CM
BH CV ECHO MEAS - LVIDD: 5.5 CM
BH CV ECHO MEAS - LVIDS: 4.2 CM
BH CV ECHO MEAS - LVLD AP2: 6.5 CM
BH CV ECHO MEAS - LVLD AP4: 7.7 CM
BH CV ECHO MEAS - LVLS AP2: 6 CM
BH CV ECHO MEAS - LVLS AP4: 6.4 CM
BH CV ECHO MEAS - LVPWD: 0.84 CM
BH CV ECHO MEAS - MR MAX PG: 69.2 MMHG
BH CV ECHO MEAS - MR MAX VEL: 416 CM/SEC
BH CV ECHO MEAS - MV DEC SLOPE: 614.5 CM/SEC^2
BH CV ECHO MEAS - MV DEC TIME: 0.19 SEC
BH CV ECHO MEAS - MV E MAX VEL: 116 CM/SEC
BH CV ECHO MEAS - PA MAX PG: 3.1 MMHG
BH CV ECHO MEAS - PA MEAN PG: 2 MMHG
BH CV ECHO MEAS - PA V2 MAX: 87.5 CM/SEC
BH CV ECHO MEAS - PA V2 MEAN: 61.7 CM/SEC
BH CV ECHO MEAS - PA V2 VTI: 13.2 CM
BH CV ECHO MEAS - RAP SYSTOLE: 10 MMHG
BH CV ECHO MEAS - RVSP: 43.6 MMHG
BH CV ECHO MEAS - SI(AO): 65.3 ML/M^2
BH CV ECHO MEAS - SI(CUBED): 43 ML/M^2
BH CV ECHO MEAS - SI(MOD-SP2): 15.5 ML/M^2
BH CV ECHO MEAS - SI(MOD-SP4): 9.6 ML/M^2
BH CV ECHO MEAS - SI(TEICH): 32.2 ML/M^2
BH CV ECHO MEAS - SV(AO): 141.4 ML
BH CV ECHO MEAS - SV(CUBED): 93.1 ML
BH CV ECHO MEAS - SV(MOD-SP2): 33.5 ML
BH CV ECHO MEAS - SV(MOD-SP4): 20.9 ML
BH CV ECHO MEAS - SV(TEICH): 69.8 ML
BH CV ECHO MEAS - TR MAX VEL: 290 CM/SEC
BUN SERPL-MCNC: 32 MG/DL (ref 8–23)
BUN/CREAT SERPL: 27.4 (ref 7–25)
CALCIUM SPEC-SCNC: 8.8 MG/DL (ref 8.6–10.5)
CHLORIDE SERPL-SCNC: 104 MMOL/L (ref 98–107)
CO2 SERPL-SCNC: 21.5 MMOL/L (ref 22–29)
CREAT SERPL-MCNC: 1.17 MG/DL (ref 0.57–1)
GFR SERPL CREATININE-BSD FRML MDRD: 46 ML/MIN/1.73
GLUCOSE BLDC GLUCOMTR-MCNC: 173 MG/DL (ref 70–130)
GLUCOSE BLDC GLUCOMTR-MCNC: 179 MG/DL (ref 70–130)
GLUCOSE BLDC GLUCOMTR-MCNC: 190 MG/DL (ref 70–130)
GLUCOSE BLDC GLUCOMTR-MCNC: 200 MG/DL (ref 70–130)
GLUCOSE SERPL-MCNC: 207 MG/DL (ref 65–99)
LV EF 2D ECHO EST: 60 %
MAGNESIUM SERPL-MCNC: 2.2 MG/DL (ref 1.6–2.4)
MAXIMAL PREDICTED HEART RATE: 149 BPM
POTASSIUM SERPL-SCNC: 4.5 MMOL/L (ref 3.5–5.2)
SODIUM SERPL-SCNC: 136 MMOL/L (ref 136–145)
STRESS TARGET HR: 127 BPM

## 2020-10-29 PROCEDURE — 82962 GLUCOSE BLOOD TEST: CPT

## 2020-10-29 PROCEDURE — 80048 BASIC METABOLIC PNL TOTAL CA: CPT | Performed by: INTERNAL MEDICINE

## 2020-10-29 PROCEDURE — 83735 ASSAY OF MAGNESIUM: CPT | Performed by: INTERNAL MEDICINE

## 2020-10-29 PROCEDURE — 99233 SBSQ HOSP IP/OBS HIGH 50: CPT | Performed by: INTERNAL MEDICINE

## 2020-10-29 PROCEDURE — 25010000002 CEFEPIME PER 500 MG: Performed by: INTERNAL MEDICINE

## 2020-10-29 PROCEDURE — 94799 UNLISTED PULMONARY SVC/PX: CPT

## 2020-10-29 PROCEDURE — 99232 SBSQ HOSP IP/OBS MODERATE 35: CPT | Performed by: NURSE PRACTITIONER

## 2020-10-29 PROCEDURE — 25010000002 VANCOMYCIN: Performed by: HOSPITALIST

## 2020-10-29 RX ORDER — L.ACID,PARA/B.BIFIDUM/S.THERM 8B CELL
1 CAPSULE ORAL DAILY
Status: DISCONTINUED | OUTPATIENT
Start: 2020-10-29 | End: 2020-11-04 | Stop reason: HOSPADM

## 2020-10-29 RX ADMIN — APIXABAN 5 MG: 5 TABLET, FILM COATED ORAL at 21:23

## 2020-10-29 RX ADMIN — OXYCODONE HYDROCHLORIDE AND ACETAMINOPHEN 1 TABLET: 10; 325 TABLET ORAL at 11:25

## 2020-10-29 RX ADMIN — Medication: at 21:24

## 2020-10-29 RX ADMIN — METOPROLOL TARTRATE 12.5 MG: 25 TABLET, FILM COATED ORAL at 09:15

## 2020-10-29 RX ADMIN — Medication 1 CAPSULE: at 17:11

## 2020-10-29 RX ADMIN — OXYBUTYNIN CHLORIDE 15 MG: 5 TABLET, EXTENDED RELEASE ORAL at 09:15

## 2020-10-29 RX ADMIN — PAROXETINE HYDROCHLORIDE 10 MG: 20 TABLET, FILM COATED ORAL at 09:15

## 2020-10-29 RX ADMIN — DILTIAZEM HYDROCHLORIDE 30 MG: 30 TABLET, FILM COATED ORAL at 17:11

## 2020-10-29 RX ADMIN — Medication 5 MG: at 21:23

## 2020-10-29 RX ADMIN — APIXABAN 5 MG: 5 TABLET, FILM COATED ORAL at 09:15

## 2020-10-29 RX ADMIN — CEFEPIME 2 G: 2 INJECTION, POWDER, FOR SOLUTION INTRAVENOUS at 18:31

## 2020-10-29 RX ADMIN — DILTIAZEM HYDROCHLORIDE 30 MG: 30 TABLET, FILM COATED ORAL at 23:35

## 2020-10-29 RX ADMIN — SODIUM CHLORIDE, PRESERVATIVE FREE 10 ML: 5 INJECTION INTRAVENOUS at 21:24

## 2020-10-29 RX ADMIN — ALLOPURINOL 100 MG: 100 TABLET ORAL at 09:15

## 2020-10-29 RX ADMIN — CEFEPIME 2 G: 2 INJECTION, POWDER, FOR SOLUTION INTRAVENOUS at 11:26

## 2020-10-29 RX ADMIN — OXYCODONE HYDROCHLORIDE AND ACETAMINOPHEN 1 TABLET: 10; 325 TABLET ORAL at 05:55

## 2020-10-29 RX ADMIN — Medication: at 17:10

## 2020-10-29 RX ADMIN — SODIUM CHLORIDE 5 MG/HR: 900 INJECTION, SOLUTION INTRAVENOUS at 09:15

## 2020-10-29 RX ADMIN — SODIUM CHLORIDE, PRESERVATIVE FREE 10 ML: 5 INJECTION INTRAVENOUS at 09:15

## 2020-10-29 RX ADMIN — OXYCODONE HYDROCHLORIDE AND ACETAMINOPHEN 1 TABLET: 10; 325 TABLET ORAL at 21:23

## 2020-10-29 RX ADMIN — VANCOMYCIN HYDROCHLORIDE 1500 MG: 10 INJECTION, POWDER, LYOPHILIZED, FOR SOLUTION INTRAVENOUS at 21:30

## 2020-10-30 ENCOUNTER — APPOINTMENT (OUTPATIENT)
Dept: GENERAL RADIOLOGY | Facility: HOSPITAL | Age: 70
End: 2020-10-30

## 2020-10-30 LAB
ANION GAP SERPL CALCULATED.3IONS-SCNC: 10.6 MMOL/L (ref 5–15)
BUN SERPL-MCNC: 29 MG/DL (ref 8–23)
BUN/CREAT SERPL: 29 (ref 7–25)
CALCIUM SPEC-SCNC: 8.8 MG/DL (ref 8.6–10.5)
CHLORIDE SERPL-SCNC: 105 MMOL/L (ref 98–107)
CO2 SERPL-SCNC: 20.4 MMOL/L (ref 22–29)
CREAT SERPL-MCNC: 1 MG/DL (ref 0.57–1)
DEPRECATED RDW RBC AUTO: 49.4 FL (ref 37–54)
ERYTHROCYTE [DISTWIDTH] IN BLOOD BY AUTOMATED COUNT: 13.5 % (ref 12.3–15.4)
GFR SERPL CREATININE-BSD FRML MDRD: 55 ML/MIN/1.73
GLUCOSE BLDC GLUCOMTR-MCNC: 137 MG/DL (ref 70–130)
GLUCOSE BLDC GLUCOMTR-MCNC: 149 MG/DL (ref 70–130)
GLUCOSE BLDC GLUCOMTR-MCNC: 154 MG/DL (ref 70–130)
GLUCOSE BLDC GLUCOMTR-MCNC: 167 MG/DL (ref 70–130)
GLUCOSE SERPL-MCNC: 150 MG/DL (ref 65–99)
HCT VFR BLD AUTO: 36.7 % (ref 34–46.6)
HGB BLD-MCNC: 10.9 G/DL (ref 12–15.9)
MCH RBC QN AUTO: 29.5 PG (ref 26.6–33)
MCHC RBC AUTO-ENTMCNC: 29.7 G/DL (ref 31.5–35.7)
MCV RBC AUTO: 99.5 FL (ref 79–97)
PLATELET # BLD AUTO: 139 10*3/MM3 (ref 140–450)
PMV BLD AUTO: 11.2 FL (ref 6–12)
POTASSIUM SERPL-SCNC: 4.4 MMOL/L (ref 3.5–5.2)
RBC # BLD AUTO: 3.69 10*6/MM3 (ref 3.77–5.28)
SODIUM SERPL-SCNC: 136 MMOL/L (ref 136–145)
WBC # BLD AUTO: 11.87 10*3/MM3 (ref 3.4–10.8)

## 2020-10-30 PROCEDURE — 99232 SBSQ HOSP IP/OBS MODERATE 35: CPT | Performed by: NURSE PRACTITIONER

## 2020-10-30 PROCEDURE — 99233 SBSQ HOSP IP/OBS HIGH 50: CPT | Performed by: INTERNAL MEDICINE

## 2020-10-30 PROCEDURE — 25010000002 CEFEPIME PER 500 MG: Performed by: INTERNAL MEDICINE

## 2020-10-30 PROCEDURE — 71045 X-RAY EXAM CHEST 1 VIEW: CPT | Performed by: RADIOLOGY

## 2020-10-30 PROCEDURE — 82962 GLUCOSE BLOOD TEST: CPT

## 2020-10-30 PROCEDURE — 80048 BASIC METABOLIC PNL TOTAL CA: CPT | Performed by: INTERNAL MEDICINE

## 2020-10-30 PROCEDURE — 25010000002 DIGOXIN PER 500 MCG: Performed by: NURSE PRACTITIONER

## 2020-10-30 PROCEDURE — 85027 COMPLETE CBC AUTOMATED: CPT | Performed by: INTERNAL MEDICINE

## 2020-10-30 PROCEDURE — 71045 X-RAY EXAM CHEST 1 VIEW: CPT

## 2020-10-30 RX ORDER — DIGOXIN 0.25 MG/ML
500 INJECTION INTRAMUSCULAR; INTRAVENOUS ONCE
Status: COMPLETED | OUTPATIENT
Start: 2020-10-30 | End: 2020-10-30

## 2020-10-30 RX ADMIN — Medication: at 21:37

## 2020-10-30 RX ADMIN — OXYCODONE HYDROCHLORIDE AND ACETAMINOPHEN 1 TABLET: 10; 325 TABLET ORAL at 21:37

## 2020-10-30 RX ADMIN — OXYBUTYNIN CHLORIDE 15 MG: 5 TABLET, EXTENDED RELEASE ORAL at 08:19

## 2020-10-30 RX ADMIN — APIXABAN 5 MG: 5 TABLET, FILM COATED ORAL at 21:37

## 2020-10-30 RX ADMIN — OXYCODONE HYDROCHLORIDE AND ACETAMINOPHEN 1 TABLET: 10; 325 TABLET ORAL at 09:05

## 2020-10-30 RX ADMIN — PAROXETINE HYDROCHLORIDE 10 MG: 20 TABLET, FILM COATED ORAL at 08:19

## 2020-10-30 RX ADMIN — SODIUM CHLORIDE, PRESERVATIVE FREE 10 ML: 5 INJECTION INTRAVENOUS at 08:16

## 2020-10-30 RX ADMIN — SODIUM CHLORIDE, PRESERVATIVE FREE 10 ML: 5 INJECTION INTRAVENOUS at 21:38

## 2020-10-30 RX ADMIN — DIGOXIN 500 MCG: 0.25 INJECTION INTRAMUSCULAR; INTRAVENOUS at 15:10

## 2020-10-30 RX ADMIN — DILTIAZEM HYDROCHLORIDE 30 MG: 30 TABLET, FILM COATED ORAL at 12:18

## 2020-10-30 RX ADMIN — Medication: at 12:17

## 2020-10-30 RX ADMIN — APIXABAN 5 MG: 5 TABLET, FILM COATED ORAL at 08:19

## 2020-10-30 RX ADMIN — METOPROLOL TARTRATE 12.5 MG: 25 TABLET, FILM COATED ORAL at 08:20

## 2020-10-30 RX ADMIN — ALLOPURINOL 100 MG: 100 TABLET ORAL at 08:19

## 2020-10-30 RX ADMIN — Medication 1 CAPSULE: at 08:19

## 2020-10-30 RX ADMIN — Medication: at 17:49

## 2020-10-30 RX ADMIN — DIAZEPAM 5 MG: 5 TABLET ORAL at 23:09

## 2020-10-30 RX ADMIN — DILTIAZEM HYDROCHLORIDE 30 MG: 30 TABLET, FILM COATED ORAL at 17:49

## 2020-10-30 RX ADMIN — METOPROLOL TARTRATE 25 MG: 25 TABLET, FILM COATED ORAL at 21:37

## 2020-10-30 RX ADMIN — DILTIAZEM HYDROCHLORIDE 30 MG: 30 TABLET, FILM COATED ORAL at 05:47

## 2020-10-30 RX ADMIN — Medication: at 08:19

## 2020-10-30 RX ADMIN — OXYCODONE HYDROCHLORIDE AND ACETAMINOPHEN 1 TABLET: 10; 325 TABLET ORAL at 15:10

## 2020-10-30 RX ADMIN — CEFEPIME 2 G: 2 INJECTION, POWDER, FOR SOLUTION INTRAVENOUS at 19:00

## 2020-10-30 RX ADMIN — DILTIAZEM HYDROCHLORIDE 30 MG: 30 TABLET, FILM COATED ORAL at 23:09

## 2020-10-30 RX ADMIN — CEFEPIME 2 G: 2 INJECTION, POWDER, FOR SOLUTION INTRAVENOUS at 05:47

## 2020-10-30 RX ADMIN — Medication 5 MG: at 21:37

## 2020-10-30 RX ADMIN — OXYCODONE HYDROCHLORIDE AND ACETAMINOPHEN 1 TABLET: 10; 325 TABLET ORAL at 03:12

## 2020-10-31 LAB
ANION GAP SERPL CALCULATED.3IONS-SCNC: 9.2 MMOL/L (ref 5–15)
BUN SERPL-MCNC: 22 MG/DL (ref 8–23)
BUN/CREAT SERPL: 27.2 (ref 7–25)
CALCIUM SPEC-SCNC: 9.3 MG/DL (ref 8.6–10.5)
CHLORIDE SERPL-SCNC: 103 MMOL/L (ref 98–107)
CO2 SERPL-SCNC: 25.8 MMOL/L (ref 22–29)
CREAT SERPL-MCNC: 0.81 MG/DL (ref 0.57–1)
CRP SERPL-MCNC: 17.39 MG/DL (ref 0–0.5)
DEPRECATED RDW RBC AUTO: 45.5 FL (ref 37–54)
ERYTHROCYTE [DISTWIDTH] IN BLOOD BY AUTOMATED COUNT: 13.2 % (ref 12.3–15.4)
GFR SERPL CREATININE-BSD FRML MDRD: 70 ML/MIN/1.73
GLUCOSE BLDC GLUCOMTR-MCNC: 147 MG/DL (ref 70–130)
GLUCOSE BLDC GLUCOMTR-MCNC: 148 MG/DL (ref 70–130)
GLUCOSE BLDC GLUCOMTR-MCNC: 172 MG/DL (ref 70–130)
GLUCOSE BLDC GLUCOMTR-MCNC: 201 MG/DL (ref 70–130)
GLUCOSE SERPL-MCNC: 167 MG/DL (ref 65–99)
HCT VFR BLD AUTO: 35.5 % (ref 34–46.6)
HGB BLD-MCNC: 11.1 G/DL (ref 12–15.9)
MCH RBC QN AUTO: 29.4 PG (ref 26.6–33)
MCHC RBC AUTO-ENTMCNC: 31.3 G/DL (ref 31.5–35.7)
MCV RBC AUTO: 93.9 FL (ref 79–97)
PLATELET # BLD AUTO: 186 10*3/MM3 (ref 140–450)
PMV BLD AUTO: 10.1 FL (ref 6–12)
POTASSIUM SERPL-SCNC: 4.4 MMOL/L (ref 3.5–5.2)
RBC # BLD AUTO: 3.78 10*6/MM3 (ref 3.77–5.28)
SODIUM SERPL-SCNC: 138 MMOL/L (ref 136–145)
VANCOMYCIN SERPL-MCNC: 7.4 MCG/ML (ref 5–40)
WBC # BLD AUTO: 10.53 10*3/MM3 (ref 3.4–10.8)

## 2020-10-31 PROCEDURE — 85027 COMPLETE CBC AUTOMATED: CPT | Performed by: INTERNAL MEDICINE

## 2020-10-31 PROCEDURE — 25010000002 CEFEPIME PER 500 MG: Performed by: INTERNAL MEDICINE

## 2020-10-31 PROCEDURE — 99233 SBSQ HOSP IP/OBS HIGH 50: CPT | Performed by: INTERNAL MEDICINE

## 2020-10-31 PROCEDURE — 94799 UNLISTED PULMONARY SVC/PX: CPT

## 2020-10-31 PROCEDURE — 86140 C-REACTIVE PROTEIN: CPT | Performed by: INTERNAL MEDICINE

## 2020-10-31 PROCEDURE — 25010000002 VANCOMYCIN: Performed by: HOSPITALIST

## 2020-10-31 PROCEDURE — 82962 GLUCOSE BLOOD TEST: CPT

## 2020-10-31 PROCEDURE — 80202 ASSAY OF VANCOMYCIN: CPT

## 2020-10-31 PROCEDURE — 80048 BASIC METABOLIC PNL TOTAL CA: CPT | Performed by: INTERNAL MEDICINE

## 2020-10-31 RX ADMIN — OXYCODONE HYDROCHLORIDE AND ACETAMINOPHEN 1 TABLET: 10; 325 TABLET ORAL at 04:09

## 2020-10-31 RX ADMIN — Medication: at 11:26

## 2020-10-31 RX ADMIN — DILTIAZEM HYDROCHLORIDE 30 MG: 30 TABLET, FILM COATED ORAL at 16:36

## 2020-10-31 RX ADMIN — APIXABAN 5 MG: 5 TABLET, FILM COATED ORAL at 08:42

## 2020-10-31 RX ADMIN — CEFEPIME 2 G: 2 INJECTION, POWDER, FOR SOLUTION INTRAVENOUS at 05:30

## 2020-10-31 RX ADMIN — VANCOMYCIN HYDROCHLORIDE 1500 MG: 10 INJECTION, POWDER, LYOPHILIZED, FOR SOLUTION INTRAVENOUS at 11:25

## 2020-10-31 RX ADMIN — Medication 5 MG: at 20:19

## 2020-10-31 RX ADMIN — OXYCODONE HYDROCHLORIDE AND ACETAMINOPHEN 1 TABLET: 10; 325 TABLET ORAL at 23:37

## 2020-10-31 RX ADMIN — Medication 1 CAPSULE: at 08:42

## 2020-10-31 RX ADMIN — DILTIAZEM HYDROCHLORIDE 30 MG: 30 TABLET, FILM COATED ORAL at 05:26

## 2020-10-31 RX ADMIN — DILTIAZEM HYDROCHLORIDE 30 MG: 30 TABLET, FILM COATED ORAL at 11:33

## 2020-10-31 RX ADMIN — Medication: at 16:38

## 2020-10-31 RX ADMIN — METRONIDAZOLE 500 MG: 500 INJECTION, SOLUTION INTRAVENOUS at 17:17

## 2020-10-31 RX ADMIN — METOPROLOL TARTRATE 25 MG: 25 TABLET, FILM COATED ORAL at 20:18

## 2020-10-31 RX ADMIN — Medication: at 08:43

## 2020-10-31 RX ADMIN — OXYBUTYNIN CHLORIDE 15 MG: 5 TABLET, EXTENDED RELEASE ORAL at 08:42

## 2020-10-31 RX ADMIN — METOPROLOL TARTRATE 25 MG: 25 TABLET, FILM COATED ORAL at 08:42

## 2020-10-31 RX ADMIN — OXYCODONE HYDROCHLORIDE AND ACETAMINOPHEN 1 TABLET: 10; 325 TABLET ORAL at 10:17

## 2020-10-31 RX ADMIN — DILTIAZEM HYDROCHLORIDE 30 MG: 30 TABLET, FILM COATED ORAL at 23:37

## 2020-10-31 RX ADMIN — ALLOPURINOL 100 MG: 100 TABLET ORAL at 08:42

## 2020-10-31 RX ADMIN — PAROXETINE HYDROCHLORIDE 10 MG: 20 TABLET, FILM COATED ORAL at 08:42

## 2020-10-31 RX ADMIN — OXYCODONE HYDROCHLORIDE AND ACETAMINOPHEN 1 TABLET: 10; 325 TABLET ORAL at 16:37

## 2020-10-31 RX ADMIN — CEFEPIME 2 G: 2 INJECTION, POWDER, FOR SOLUTION INTRAVENOUS at 16:37

## 2020-10-31 RX ADMIN — SODIUM CHLORIDE, PRESERVATIVE FREE 10 ML: 5 INJECTION INTRAVENOUS at 08:42

## 2020-10-31 RX ADMIN — SODIUM CHLORIDE, PRESERVATIVE FREE 10 ML: 5 INJECTION INTRAVENOUS at 20:19

## 2020-10-31 RX ADMIN — APIXABAN 5 MG: 5 TABLET, FILM COATED ORAL at 20:18

## 2020-10-31 RX ADMIN — METRONIDAZOLE 500 MG: 500 INJECTION, SOLUTION INTRAVENOUS at 11:25

## 2020-10-31 RX ADMIN — DIAZEPAM 5 MG: 5 TABLET ORAL at 20:18

## 2020-10-31 RX ADMIN — Medication: at 20:19

## 2020-11-01 LAB
BACTERIA SPEC AEROBE CULT: NORMAL
BACTERIA SPEC AEROBE CULT: NORMAL
GLUCOSE BLDC GLUCOMTR-MCNC: 151 MG/DL (ref 70–130)
GLUCOSE BLDC GLUCOMTR-MCNC: 183 MG/DL (ref 70–130)

## 2020-11-01 PROCEDURE — 99232 SBSQ HOSP IP/OBS MODERATE 35: CPT | Performed by: INTERNAL MEDICINE

## 2020-11-01 PROCEDURE — 82962 GLUCOSE BLOOD TEST: CPT

## 2020-11-01 PROCEDURE — 99233 SBSQ HOSP IP/OBS HIGH 50: CPT | Performed by: INTERNAL MEDICINE

## 2020-11-01 PROCEDURE — 25010000002 VANCOMYCIN: Performed by: HOSPITALIST

## 2020-11-01 PROCEDURE — 25010000002 CEFEPIME PER 500 MG: Performed by: INTERNAL MEDICINE

## 2020-11-01 PROCEDURE — 94799 UNLISTED PULMONARY SVC/PX: CPT

## 2020-11-01 RX ORDER — AMIODARONE HYDROCHLORIDE 200 MG/1
200 TABLET ORAL
Status: DISCONTINUED | OUTPATIENT
Start: 2020-11-01 | End: 2020-11-03

## 2020-11-01 RX ORDER — DILTIAZEM HYDROCHLORIDE 180 MG/1
180 CAPSULE, COATED, EXTENDED RELEASE ORAL
Status: DISCONTINUED | OUTPATIENT
Start: 2020-11-01 | End: 2020-11-04 | Stop reason: HOSPADM

## 2020-11-01 RX ORDER — DIAZEPAM 5 MG/1
5 TABLET ORAL EVERY 12 HOURS PRN
Status: DISCONTINUED | OUTPATIENT
Start: 2020-11-01 | End: 2020-11-04 | Stop reason: HOSPADM

## 2020-11-01 RX ADMIN — Medication 1 CAPSULE: at 08:09

## 2020-11-01 RX ADMIN — Medication: at 18:28

## 2020-11-01 RX ADMIN — DILTIAZEM HYDROCHLORIDE 180 MG: 180 CAPSULE, EXTENDED RELEASE ORAL at 18:39

## 2020-11-01 RX ADMIN — DILTIAZEM HYDROCHLORIDE 30 MG: 30 TABLET, FILM COATED ORAL at 05:22

## 2020-11-01 RX ADMIN — CEFEPIME 2 G: 2 INJECTION, POWDER, FOR SOLUTION INTRAVENOUS at 05:22

## 2020-11-01 RX ADMIN — APIXABAN 5 MG: 5 TABLET, FILM COATED ORAL at 08:10

## 2020-11-01 RX ADMIN — Medication: at 21:04

## 2020-11-01 RX ADMIN — CEFEPIME 2 G: 2 INJECTION, POWDER, FOR SOLUTION INTRAVENOUS at 18:38

## 2020-11-01 RX ADMIN — OXYCODONE HYDROCHLORIDE AND ACETAMINOPHEN 1 TABLET: 10; 325 TABLET ORAL at 16:15

## 2020-11-01 RX ADMIN — OXYCODONE HYDROCHLORIDE AND ACETAMINOPHEN 1 TABLET: 10; 325 TABLET ORAL at 08:15

## 2020-11-01 RX ADMIN — OXYBUTYNIN CHLORIDE 15 MG: 5 TABLET, EXTENDED RELEASE ORAL at 08:09

## 2020-11-01 RX ADMIN — DIAZEPAM 5 MG: 5 TABLET ORAL at 21:03

## 2020-11-01 RX ADMIN — METRONIDAZOLE 500 MG: 500 INJECTION, SOLUTION INTRAVENOUS at 10:51

## 2020-11-01 RX ADMIN — Medication 5 MG: at 21:03

## 2020-11-01 RX ADMIN — APIXABAN 5 MG: 5 TABLET, FILM COATED ORAL at 21:03

## 2020-11-01 RX ADMIN — METOPROLOL TARTRATE 37.5 MG: 25 TABLET, FILM COATED ORAL at 21:02

## 2020-11-01 RX ADMIN — METRONIDAZOLE 500 MG: 500 INJECTION, SOLUTION INTRAVENOUS at 18:38

## 2020-11-01 RX ADMIN — METOPROLOL TARTRATE 25 MG: 25 TABLET, FILM COATED ORAL at 08:09

## 2020-11-01 RX ADMIN — VANCOMYCIN HYDROCHLORIDE 1500 MG: 10 INJECTION, POWDER, LYOPHILIZED, FOR SOLUTION INTRAVENOUS at 08:08

## 2020-11-01 RX ADMIN — DILTIAZEM HYDROCHLORIDE 30 MG: 30 TABLET, FILM COATED ORAL at 12:46

## 2020-11-01 RX ADMIN — AMIODARONE HYDROCHLORIDE 200 MG: 200 TABLET ORAL at 18:38

## 2020-11-01 RX ADMIN — SODIUM CHLORIDE, PRESERVATIVE FREE 10 ML: 5 INJECTION INTRAVENOUS at 08:10

## 2020-11-01 RX ADMIN — METRONIDAZOLE 500 MG: 500 INJECTION, SOLUTION INTRAVENOUS at 01:42

## 2020-11-01 RX ADMIN — ALLOPURINOL 100 MG: 100 TABLET ORAL at 08:09

## 2020-11-01 RX ADMIN — SODIUM CHLORIDE, PRESERVATIVE FREE 10 ML: 5 INJECTION INTRAVENOUS at 21:03

## 2020-11-01 NOTE — PROGRESS NOTES
Patient Identification:  Name:  Anika Neri  Age:  70 y.o.  Sex:  female  :  1950  MRN:  [id number]  Visit Number:  [id number]    Chief Complaint:   Palpitations and atrial fibrillation    Subjective:    Patient has remained well and reports no chest pain or shortness of breath.  She still has complaints for leg pain.  Her heart rhythm has remained stable    ----------------------------------------------------------------------------------------------------------------------  Current Hospital Meds:  allopurinol, 100 mg, Oral, Daily  apixaban, 5 mg, Oral, Q12H  cefepime, 2 g, Intravenous, Q12H  dilTIAZem, 30 mg, Oral, Q6H  lactobacillus acidophilus, 1 capsule, Oral, Daily  magic barrier cream, , Topical, 4x Daily  melatonin, 5 mg, Oral, Nightly  metoprolol tartrate, 37.5 mg, Oral, Q12H  metroNIDAZOLE, 500 mg, Intravenous, Q8H  oxybutynin XL, 15 mg, Oral, Daily  PARoxetine, 10 mg, Oral, Daily  sodium chloride, 10 mL, Intravenous, Q12H  vancomycin, 1,500 mg, Intravenous, Q24H      Pharmacy Consult,       ----------------------------------------------------------------------------------------------------------------------  Vital Signs:  Temp:  [97.5 °F (36.4 °C)-98.1 °F (36.7 °C)] 97.7 °F (36.5 °C)  Heart Rate:  [] 79  Resp:  [14-23] 20  BP: (107-172)/() 111/53      10/30/20  0400 10/31/20  04020   Weight: 125 kg (276 lb 8 oz) 126 kg (277 lb 8 oz) 126 kg (278 lb 4.8 oz)     Body mass index is 52.58 kg/m².    Intake/Output Summary (Last 24 hours) at 2020 1616  Last data filed at 2020 1051  Gross per 24 hour   Intake 2396 ml   Output 2800 ml   Net -404 ml     Diet Regular; Cardiac  ----------------------------------------------------------------------------------------------------------------------  Physical exam:  Constitutional:  Well-developed and well-nourished.  No respiratory distress.      HENT:  Head:  Normocephalic and atraumatic.  Mouth:  Moist mucous membranes.     Eyes:  Conjunctivae and EOM are normal.  Pupils are equal, round, and reactive to light.  No scleral icterus.    Neck:  Neck supple.  No JVD present.    Cardiovascular:  Normal rate, regular rhythm and normal heart sounds with no murmur.  Pulmonary/Chest:  No respiratory distress, no wheezes, no crackles, with normal breath sounds and good air movement.  Abdominal:  Soft.  Bowel sounds are normal.  No distension and no tenderness.   Musculoskeletal:  No edema, no tenderness, and no deformity.  No red or swollen joints anywhere.    Neurological:  Alert and oriented to person, place, and time.  No cranial nerve deficit.  No tongue deviation.  No facial droop.  No slurred speech.   Skin:  Skin is warm and dry. No rash noted. No pallor.   Peripheral vascular:  Strong pulses in all 4 extremities with no clubbing, no cyanosis, no edema.  ----------------------------------------------------------------------------------------------------------------------  Tele: Sinus rhythm at 80  ----------------------------------------------------------------------------------------------------------------------  Results from last 7 days   Lab Units 10/28/20  0507 10/27/20  2212 10/27/20  1504   TROPONIN T ng/mL <0.010 <0.010 <0.010     Results from last 7 days   Lab Units 10/31/20  0806 10/30/20  0148 10/27/20  2212 10/27/20  1216   CRP mg/dL 17.39*  --  7.51*  --    WBC 10*3/mm3 10.53 11.87*  --  12.28*   HEMOGLOBIN g/dL 11.1* 10.9*  --  11.6*   HEMATOCRIT % 35.5 36.7  --  36.5   MCV fL 93.9 99.5*  --  92.9   MCHC g/dL 31.3* 29.7*  --  31.8   PLATELETS 10*3/mm3 186 139*  --  154   INR   --   --  1.25*  --      Results from last 7 days   Lab Units 10/28/20  0435   PH, ARTERIAL pH units 7.432   PO2 ART mm Hg 52.6*   PCO2, ARTERIAL mm Hg 38.2   HCO3 ART mmol/L 25.5     Results from last 7 days   Lab Units 10/31/20  0806 10/30/20  0148 10/29/20  1015 10/29/20  0117 10/27/20  2212 10/27/20  1248   SODIUM mmol/L 138 136 136  --   --  139    POTASSIUM mmol/L 4.4 4.4 4.5  --   --  4.3   MAGNESIUM mg/dL  --   --   --  2.2 1.5*  --    CHLORIDE mmol/L 103 105 104  --   --  106   CO2 mmol/L 25.8 20.4* 21.5*  --   --  21.0*   BUN mg/dL 22 29* 32*  --   --  23   CREATININE mg/dL 0.81 1.00 1.17*  --   --  0.97   EGFR IF NONAFRICN AM mL/min/1.73 70 55* 46*  --   --  57*   CALCIUM mg/dL 9.3 8.8 8.8  --   --  9.6   GLUCOSE mg/dL 167* 150* 207*  --   --  225*   ALBUMIN g/dL  --   --   --   --   --  3.98   BILIRUBIN mg/dL  --   --   --   --   --  0.9   ALK PHOS U/L  --   --   --   --   --  83   AST (SGOT) U/L  --   --   --   --   --  20   ALT (SGPT) U/L  --   --   --   --   --  17   Estimated Creatinine Clearance: 79.5 mL/min (by C-G formula based on SCr of 0.81 mg/dL).      Blood Culture   Date Value Ref Range Status   10/27/2020 No growth at 4 days  Preliminary   10/27/2020 No growth at 4 days  Preliminary     Urine Culture   Date Value Ref Range Status   10/27/2020 >100,000 CFU/mL Klebsiella aerogenes (A)  Final     Cardiovascular system:  BP Readings from Last 3 Encounters:   11/01/20 111/53   12/28/19 148/76   01/28/18 167/94     Lactate trend      PROBnp trend  Results from last 7 days   Lab Units 10/27/20  1248   PROBNP pg/mL 1,479.0*     Cardiac enzymes with trend   Results from last 7 days   Lab Units 10/28/20  0507 10/27/20  2212 10/27/20  1504   TROPONIN T ng/mL <0.010 <0.010 <0.010     EKG  ECG 12 Lead   Final Result   Test Reason : Afib with RVR   Blood Pressure : **/** mmHG   Vent. Rate : 124 BPM     Atrial Rate : 133 BPM      P-R Int : 000 ms          QRS Dur : 086 ms       QT Int : 286 ms       P-R-T Axes : 000 035 071 degrees      QTc Int : 410 ms      Atrial fibrillation with rapid ventricular response   Nonspecific T wave abnormality   Abnormal ECG   When compared with ECG of 27-OCT-2020 12:00,   Nonspecific T wave abnormality now evident in Lateral leads   Confirmed by Hesham Diane ([id number]) on 10/28/2020 9:52:03 AM      Referred By:  SARINA            Confirmed By:Hesham Diane      ECG 12 Lead   Final Result   Test Reason : tachycardia   Blood Pressure : **/** mmHG   Vent. Rate : 138 BPM     Atrial Rate : 144 BPM      P-R Int : 000 ms          QRS Dur : 080 ms       QT Int : 302 ms       P-R-T Axes : 000 026 060 degrees      QTc Int : 457 ms      Atrial fibrillation with rapid ventricular response   Low voltage QRS   Abnormal ECG   No previous ECGs available   Confirmed by Hesham Diane ([id number]) on 10/27/2020 1:39:33 PM      Referred By:  SARINA           Confirmed By:Hesham Diane        ECHO  Results for orders placed during the hospital encounter of 10/27/20   Adult Transthoracic Echo Complete W/ Cont if Necessary Per Protocol    Narrative · Estimated left ventricular EF = 60% Left ventricular ejection fraction   appears to be 56 - 60%. Left ventricular systolic function is normal.  · Moderate to severe aortic sclerosis without stenosis  · Mild mitral valve regurgitation is present.  · No previous study to compare  · Mild tricuspid valve regurgitation is present.  · Estimated right ventricular systolic pressure from tricuspid   regurgitation is mildly elevated (35-45 mmHg).                   I have personally looked at the labs and they are summarized above.  ----------------------------------------------------------------------------------------------------------------------  Imaging Results (Last 24 Hours)     ** No results found for the last 24 hours. **        ----------------------------------------------------------------------------------------------------------------------    Assessment:  Atrial fibrillation with rapid ventricular response which was not previously responding to triple therapy has finally remained stable after elective cardioversion.  Leg pain possibly due to gout for which patient is taking allopurinol      Plan:  We can now increase Cardizem and switch to  mg once a day  Continue metoprolol 37-1/2 twice a  day  Amiodarone 200 mg once a day for rhythm control        Adriel Guevara MD  11/01/20  16:16 EST        Director, Cardiac Cath Lab

## 2020-11-01 NOTE — PROGRESS NOTES
[facility] HOSPITALIST PROGRESS NOTE     Patient Identification:  Name:  Anika Neri  Age:  70 y.o.  Sex:  female  :  1950  MRN:  [id number]  Visit Number:  [id number]  ROOM: [place] [id number]     Primary Care Provider:  Provider, No Known    Length of stay in inpatient status:  5    Subjective     Chief Compliant:    Chief Complaint   Patient presents with   • Leg Pain     History of Presenting Illness:    Patient remains ill though stable, no acute events overnight, still complaining of knee pain, still wearing 4LNC, labs now showing increased CRP to 17, patient has frequently been refusing labs draws, Abx and testing, has continued to defer arthrocentesis which I believe is important to rule out septic joint, she was asking to be discharged today and we discussed her not being medically stable, requires further workup and broad spectrum Abx at this time due to multiple infectious etiologies, she reported she wanted to sign out AMA but later alerted nursing staff that she was going to stay because she didn't have a ride, we discussed how her continued refusal of warranted medical workup and monitoring is hindering her overall care and it made it difficult to give her a definitive timeline on when she could possibly be discharged. HR has still been elevated and cards hasn't seen since Friday but I have increased metoprolol dose today, she denies any fevers or chills.   Objective     Current Hospital Meds:allopurinol, 100 mg, Oral, Daily  apixaban, 5 mg, Oral, Q12H  cefepime, 2 g, Intravenous, Q12H  dilTIAZem, 30 mg, Oral, Q6H  lactobacillus acidophilus, 1 capsule, Oral, Daily  magic barrier cream, , Topical, 4x Daily  melatonin, 5 mg, Oral, Nightly  metoprolol tartrate, 37.5 mg, Oral, Q12H  metroNIDAZOLE, 500 mg, Intravenous, Q8H  oxybutynin XL, 15 mg, Oral, Daily  PARoxetine, 10 mg, Oral, Daily  sodium chloride, 10 mL, Intravenous, Q12H  vancomycin, 1,500 mg, Intravenous, Q24H    Pharmacy Consult,        Current Antimicrobial Therapy:  Anti-Infectives (From admission, onward)    Ordered     Dose/Rate Route Frequency Start Stop    10/31/20 0848  metroNIDAZOLE (FLAGYL) 500 mg/100mL IVPB     Ordering Provider: Ezekiel Roe MD    500 mg  over 60 Minutes Intravenous Every 8 Hours 10/31/20 1000 20 0959    10/29/20 1759  cefepime (MAXIPIME) 2 g/100 mL 0.9% NS (mbp)     Estefany He, PharmD reviewed the order on 10/30/20 1213.   Ordering Provider: Ezekiel Roe MD    2 g  over 4 Hours Intravenous Every 12 Hours 10/30/20 0600 20 1759    10/29/20 1008  cefepime (MAXIPIME) 2 g/100 mL 0.9% NS (mbp)     Ronald Meza, PharmD reviewed the order on 10/29/20 1759.   Ordering Provider: Ezekiel Roe MD    2 g  over 4 Hours Intravenous Every 8 Hours 10/29/20 1800 10/29/20 2200    10/29/20 1008  cefepime (MAXIPIME) 2 g/100 mL 0.9% NS (mbp)     Ordering Provider: Ezekiel Roe MD    2 g  200 mL/hr over 30 Minutes Intravenous Once 10/29/20 1100 10/29/20 1156    10/28/20 0438  vancomycin 1500 mg/500 mL 0.9% NS IVPB ([facility])     Estefany He, PharmD let the order  on 10/30/20 1213.   Ordering Provider: Wayne Macedo MD    1,500 mg  over 120 Minutes Intravenous Every 24 Hours 10/28/20 2230 20 0859    10/27/20 2214  vancomycin 2000 mg/500 mL 0.9% NS IVPB ([facility])     Ordering Provider: Wayne Macedo MD    2,000 mg  over 120 Minutes Intravenous Once 10/27/20 2300 10/28/20 0043    10/27/20 1543  cefTRIAXone (ROCEPHIN) 2 g/100 mL 0.9% NS IVPB ([facility])     Ordering Provider: Oksana Pace PA    2 g  over 30 Minutes Intravenous Once 10/27/20 1545 10/27/20 1712        Current Diuretic Therapy:  Diuretics (From admission, onward)    None        ----------------------------------------------------------------------------------------------------------------------  Vital Signs:  Temp:  [97.5 °F (36.4 °C)-98.2 °F (36.8 °C)] 98.1 °F (36.7 °C)  Heart Rate:  [] 116  Resp:   [12-26] 21  BP: (107-172)/() 149/99  SpO2:  [91 %-98 %] 94 %  on  Flow (L/min):  [4] 4;   Device (Oxygen Therapy): nasal cannula;humidified  Body mass index is 52.58 kg/m².    Wt Readings from Last 3 Encounters:   11/01/20 126 kg (278 lb 4.8 oz)   12/28/19 130 kg (286 lb)   01/28/18 122 kg (270 lb)     Intake & Output (last 3 days)       10/29 0701 - 10/30 0700 10/30 0701 - 10/31 0700 10/31 0701 - 11/01 0700 11/01 0701 - 11/02 0700    P.O.  1176 1040     I.V. (mL/kg) 820.5 (6.6) 207.4 (1.6) 996 (7.9)     IV Piggyback    500    Total Intake(mL/kg) 820.5 (6.6) 1383.4 (11) 2036 (16.2) 500 (4)    Urine (mL/kg/hr) 900 (0.3) 2200 (0.7) 3650 (1.2)     Total Output 900 2200 3650     Net -79.5 -816.6 -1614 +500                Diet Regular; Cardiac  ----------------------------------------------------------------------------------------------------------------------  Physical exam:  Constitutional:  Elderly, well nourished, obese, no acute distress.      HENT:  Head:  Normocephalic and atraumatic.  Mouth:  Moist mucous membranes.    Eyes:  Conjunctivae and EOM are normal. No scleral icterus.    Neck:  Neck supple.  No JVD present.    Cardiovascular:  tachycardic still, regular rhythm and normal heart sounds  Pulmonary/Chest:  No respiratory distress, no wheezes, stable on 2-4LNC  Abdominal:  Soft. No distension and no tenderness.   Musculoskeletal:  R knee swollen, tense, no erythema, tender still, unchanged  Neurological:  Alert and oriented to person, place, and time.  No gross focal deficits  Skin:  Skin is warm and dry. No rash noted. No pallor.   Peripheral vascular:  no clubbing, no cyanosis, no edema.  ----------------------------------------------------------------------------------------------------------------------  Results from last 7 days   Lab Units 10/31/20  0806 10/30/20  0148 10/27/20  2212 10/27/20  1216   CRP mg/dL 17.39*  --  7.51*  --    WBC 10*3/mm3 10.53 11.87*  --  12.28*   HEMOGLOBIN g/dL 11.1*  10.9*  --  11.6*   HEMATOCRIT % 35.5 36.7  --  36.5   MCV fL 93.9 99.5*  --  92.9   MCHC g/dL 31.3* 29.7*  --  31.8   PLATELETS 10*3/mm3 186 139*  --  154   INR   --   --  1.25*  --      Results from last 7 days   Lab Units 10/28/20  0435   PH, ARTERIAL pH units 7.432   PO2 ART mm Hg 52.6*   PCO2, ARTERIAL mm Hg 38.2   HCO3 ART mmol/L 25.5     Results from last 7 days   Lab Units 10/31/20  0806 10/30/20  0148 10/29/20  1015 10/29/20  0117 10/27/20  2212 10/27/20  1248   SODIUM mmol/L 138 136 136  --   --  139   POTASSIUM mmol/L 4.4 4.4 4.5  --   --  4.3   MAGNESIUM mg/dL  --   --   --  2.2 1.5*  --    CHLORIDE mmol/L 103 105 104  --   --  106   CO2 mmol/L 25.8 20.4* 21.5*  --   --  21.0*   BUN mg/dL 22 29* 32*  --   --  23   CREATININE mg/dL 0.81 1.00 1.17*  --   --  0.97   EGFR IF NONAFRICN AM mL/min/1.73 70 55* 46*  --   --  57*   CALCIUM mg/dL 9.3 8.8 8.8  --   --  9.6   GLUCOSE mg/dL 167* 150* 207*  --   --  225*   ALBUMIN g/dL  --   --   --   --   --  3.98   BILIRUBIN mg/dL  --   --   --   --   --  0.9   ALK PHOS U/L  --   --   --   --   --  83   AST (SGOT) U/L  --   --   --   --   --  20   ALT (SGPT) U/L  --   --   --   --   --  17   Estimated Creatinine Clearance: 79.5 mL/min (by C-G formula based on SCr of 0.81 mg/dL).  No results found for: AMMONIA  Results from last 7 days   Lab Units 10/28/20  0507 10/27/20  2212 10/27/20  1504   TROPONIN T ng/mL <0.010 <0.010 <0.010     Results from last 7 days   Lab Units 10/27/20  1248   PROBNP pg/mL 1,479.0*         Glucose   Date/Time Value Ref Range Status   10/31/2020 2023 172 (H) 70 - 130 mg/dL Final   10/31/2020 1629 201 (H) 70 - 130 mg/dL Final   10/31/2020 1123 148 (H) 70 - 130 mg/dL Final   10/31/2020 0540 147 (H) 70 - 130 mg/dL Final   10/30/2020 2001 137 (H) 70 - 130 mg/dL Final   10/30/2020 1748 149 (H) 70 - 130 mg/dL Final   10/30/2020 1215 154 (H) 70 - 130 mg/dL Final   10/30/2020 0554 167 (H) 70 - 130 mg/dL Final     Lab Results   Component Value Date     TSH 0.574 10/27/2020     No results found for: PREGTESTUR, PREGSERUM, HCG, HCGQUANT  Pain Management Panel     There is no flowsheet data to display.        Brief Urine Lab Results  (Last result in the past 365 days)      Color   Clarity   Blood   Leuk Est   Nitrite   Protein   CREAT   Urine HCG        10/27/20 1517 Dark Yellow Cloudy Negative Small (1+) Positive Trace             Blood Culture   Date Value Ref Range Status   10/27/2020 No growth at 4 days  Preliminary   10/27/2020 No growth at 4 days  Preliminary     Urine Culture   Date Value Ref Range Status   10/27/2020 >100,000 CFU/mL Klebsiella aerogenes (A)  Final     No results found for: WOUNDCX  No results found for: STOOLCX  No results found for: RESPCX  No results found for: AFBCX  Results from last 7 days   Lab Units 10/31/20  0806 10/27/20  2212 10/27/20  1216   SED RATE mm/hr  --   --  34*   CRP mg/dL 17.39* 7.51*  --      I have personally looked at the labs and they are summarized above.  ----------------------------------------------------------------------------------------------------------------------  Detailed radiology reports for the last 24 hours:    Imaging Results (Last 24 Hours)     ** No results found for the last 24 hours. **        Assessment & Plan    70F Morbidly Obese PMH HTN, DM, gout and osteoarthritis who presents with complaints of severe right knee and ankle pain x 1 day.    #Acute onset R knee pain/swelling in setting of Hx Gout  - Patient w/ acute knee pain and swelling, Uric Acid 6.3, CRP 7  - Knee Xray showed extensive degenerative changes, multiple spurs, chondrocalcinosis  - Ortho consulted for possible aspiration to rule out septic joint  - Continue Colchicine as started on admission  - Continue IV Vanc and Cefepime  - Patient has deferred arthrocentesis at this time, continue to follow clinically, still swollen and tender, now CRP 17.  Discussed again need for arthrocentesis which she has deferred, she expressed this AM  that she wanted to leave and was going to sign out AMA, for now has decided to stay as she does not have a ride.    #New onset Afib w/ RVR (CHADsVasc = 4) - Persisting  #HTN  #Possible Mild CHF  #Acute Hypoxic Respiratory Failure 2/2 mild CHF vs tachy induced pulm edema vs undiagnosed DAVE/OHS  - HR up to 140's on admission, EKG showing Afib, CXR w/ mild CHF changes, trops negative, TSH NML, proBNP 1479  - Echo showed LVEF 56-60%, NML LV function, mod-severe Aortic sclerosis w/o stenosis, mild MR, mild TR, RVSP 35-45mmHg  - Cardiology consulted; Following, have added low dose metoprolol, have not seen this weekend.  - Continue PO Cardizem, PO Metoprolol which I have titrated up to 37.5mg BID today.  - Continue Eliquis  - Holding home ACEI due to lower Bps, continue to monitor on tele, wean oxygen as able.    #Acute Uncomplicated UTI, treating for MDR organism  - UA c/f infection, cultures grew Klebsiella sp R to first gen cephalosporins; Continue Cefepime x 7 days    #PNA, Bacterial, treating for CAP but covered for MDR organisms   - Patient noted to have increased oxygen requirement, CXR showed RLL PNA  - Continue Vanc, Cefepime now Flagyl    #NIDDM Type II, uncontrolled, unknown complications  - Hgb A1c = 8.4%  - Holding home oral agents, cover FSBG and SSI for now    #Anxiety/Depression  - Continue home SSRI, continue home Valium but make qhs and with holding parameters.    #Chronic Pain  - Continue home Percocet w/ holding parameters, confirmed per pharmacy    #Morbid Obesity  - BMI 52, complicates all aspects of care    F: Oral  E: Monitor & Replace PRN  N: DM Diet  Ppx: on Eliquis  Code: Full Code    Dispo: Pending clinical improvement, PT/OT following, difficult to say due to frequent refusal of labs, Abx and diagnostic workup    *This patient is considered high risk due to Afib w/ RVR, acute UTI, possible septic knee vs gout flare.    VTE Prophylaxis:   Mechanical Order History:     None      Pharmalogical  Order History:      Ordered     Dose Route Frequency Stop    10/28/20 1049  apixaban (ELIQUIS) tablet 5 mg      5 mg PO Every 12 Hours Scheduled --    10/27/20 2155  heparin (porcine) 5000 UNIT/ML injection 5,000 Units      5,000 Units IV Once 10/28/20 0009    10/27/20 2155  heparin 69346 units/250 mL (100 units/mL) in 0.45 % NaCl infusion  10 mL/hr,   Status:  Discontinued      8 Units/kg/hr IV Titrated 10/28/20 1049    10/27/20 1938  heparin (porcine) 5000 UNIT/ML injection 5,000 Units  Status:  Discontinued      5,000 Units SC Every 8 Hours Scheduled 10/27/20 2155    10/27/20 2155  heparin (porcine) 5000 UNIT/ML injection 5,000 Units  Status:  Discontinued      5,000 Units IV As Needed 10/28/20 1049    10/27/20 2155  heparin (porcine) 5000 UNIT/ML injection 2,500 Units  Status:  Discontinued      2,500 Units IV As Needed 10/28/20 1049              Ezekiel Roe MD  [facility]  11/01/20  09:49 EST

## 2020-11-01 NOTE — NURSING NOTE
"Patient refused paxil this am.  Also refused to allow me to apply magic barrier cream to ccx/btx.  Patient states \"I will not let them take anymore blood from me either.\"  Patient educated on importance of medication, wound care, and lab work.  Patient states \"I don't care\" and continues to refuse.  Dr. Roe notified.    "

## 2020-11-01 NOTE — PLAN OF CARE
Goal Outcome Evaluation:  Plan of Care Reviewed With: patient  Progress: improving  Outcome Summary: VSS. Pt is on 4L NC. PRN pain med given as needed for right knee pain. Patient remains in afib. Will continue to monitor.

## 2020-11-01 NOTE — NURSING NOTE
Patient refused labs and accucheck this morning. Patient was educated on the importance of lab work and glucose monitoring. She verbalized understanding and still continued to refuse. Will try again later.

## 2020-11-02 LAB
ANION GAP SERPL CALCULATED.3IONS-SCNC: 8.9 MMOL/L (ref 5–15)
BUN SERPL-MCNC: 19 MG/DL (ref 8–23)
BUN/CREAT SERPL: 23.5 (ref 7–25)
CALCIUM SPEC-SCNC: 9.1 MG/DL (ref 8.6–10.5)
CHLORIDE SERPL-SCNC: 106 MMOL/L (ref 98–107)
CO2 SERPL-SCNC: 24.1 MMOL/L (ref 22–29)
CREAT SERPL-MCNC: 0.81 MG/DL (ref 0.57–1)
CRP SERPL-MCNC: 5.96 MG/DL (ref 0–0.5)
DEPRECATED RDW RBC AUTO: 45 FL (ref 37–54)
ERYTHROCYTE [DISTWIDTH] IN BLOOD BY AUTOMATED COUNT: 13.2 % (ref 12.3–15.4)
GFR SERPL CREATININE-BSD FRML MDRD: 70 ML/MIN/1.73
GLUCOSE BLDC GLUCOMTR-MCNC: 138 MG/DL (ref 70–130)
GLUCOSE BLDC GLUCOMTR-MCNC: 145 MG/DL (ref 70–130)
GLUCOSE BLDC GLUCOMTR-MCNC: 167 MG/DL (ref 70–130)
GLUCOSE BLDC GLUCOMTR-MCNC: 188 MG/DL (ref 70–130)
GLUCOSE SERPL-MCNC: 155 MG/DL (ref 65–99)
HCT VFR BLD AUTO: 35.8 % (ref 34–46.6)
HGB BLD-MCNC: 11.4 G/DL (ref 12–15.9)
MCH RBC QN AUTO: 29.7 PG (ref 26.6–33)
MCHC RBC AUTO-ENTMCNC: 31.8 G/DL (ref 31.5–35.7)
MCV RBC AUTO: 93.2 FL (ref 79–97)
PLATELET # BLD AUTO: 199 10*3/MM3 (ref 140–450)
PMV BLD AUTO: 10 FL (ref 6–12)
POTASSIUM SERPL-SCNC: 4 MMOL/L (ref 3.5–5.2)
QT INTERVAL: 332 MS
QTC INTERVAL: 426 MS
RBC # BLD AUTO: 3.84 10*6/MM3 (ref 3.77–5.28)
SODIUM SERPL-SCNC: 139 MMOL/L (ref 136–145)
WBC # BLD AUTO: 9.52 10*3/MM3 (ref 3.4–10.8)

## 2020-11-02 PROCEDURE — 82962 GLUCOSE BLOOD TEST: CPT

## 2020-11-02 PROCEDURE — 85027 COMPLETE CBC AUTOMATED: CPT | Performed by: INTERNAL MEDICINE

## 2020-11-02 PROCEDURE — 93005 ELECTROCARDIOGRAM TRACING: CPT | Performed by: PHYSICIAN ASSISTANT

## 2020-11-02 PROCEDURE — 25010000002 CEFEPIME PER 500 MG: Performed by: INTERNAL MEDICINE

## 2020-11-02 PROCEDURE — 97162 PT EVAL MOD COMPLEX 30 MIN: CPT

## 2020-11-02 PROCEDURE — 99233 SBSQ HOSP IP/OBS HIGH 50: CPT | Performed by: PHYSICIAN ASSISTANT

## 2020-11-02 PROCEDURE — 80048 BASIC METABOLIC PNL TOTAL CA: CPT | Performed by: INTERNAL MEDICINE

## 2020-11-02 PROCEDURE — 97165 OT EVAL LOW COMPLEX 30 MIN: CPT

## 2020-11-02 PROCEDURE — 25010000002 CEFTRIAXONE PER 250 MG: Performed by: NURSE PRACTITIONER

## 2020-11-02 PROCEDURE — 25010000002 VANCOMYCIN 5 G RECONSTITUTED SOLUTION: Performed by: PHYSICIAN ASSISTANT

## 2020-11-02 PROCEDURE — 93010 ELECTROCARDIOGRAM REPORT: CPT | Performed by: INTERNAL MEDICINE

## 2020-11-02 PROCEDURE — 86140 C-REACTIVE PROTEIN: CPT | Performed by: INTERNAL MEDICINE

## 2020-11-02 RX ORDER — METOPROLOL TARTRATE 5 MG/5ML
5 INJECTION INTRAVENOUS ONCE
Status: COMPLETED | OUTPATIENT
Start: 2020-11-02 | End: 2020-11-02

## 2020-11-02 RX ORDER — FAMOTIDINE 20 MG/1
20 TABLET, FILM COATED ORAL DAILY
Status: DISCONTINUED | OUTPATIENT
Start: 2020-11-02 | End: 2020-11-04 | Stop reason: HOSPADM

## 2020-11-02 RX ADMIN — OXYCODONE HYDROCHLORIDE AND ACETAMINOPHEN 1 TABLET: 10; 325 TABLET ORAL at 03:06

## 2020-11-02 RX ADMIN — METRONIDAZOLE 500 MG: 500 INJECTION, SOLUTION INTRAVENOUS at 10:15

## 2020-11-02 RX ADMIN — Medication: at 12:32

## 2020-11-02 RX ADMIN — METOPROLOL TARTRATE 37.5 MG: 25 TABLET, FILM COATED ORAL at 21:23

## 2020-11-02 RX ADMIN — DOXYCYCLINE 100 MG: 100 INJECTION, POWDER, LYOPHILIZED, FOR SOLUTION INTRAVENOUS at 16:46

## 2020-11-02 RX ADMIN — Medication: at 20:05

## 2020-11-02 RX ADMIN — OXYCODONE HYDROCHLORIDE AND ACETAMINOPHEN 1 TABLET: 10; 325 TABLET ORAL at 09:55

## 2020-11-02 RX ADMIN — PAROXETINE HYDROCHLORIDE 10 MG: 20 TABLET, FILM COATED ORAL at 09:54

## 2020-11-02 RX ADMIN — CEFTRIAXONE 2 G: 2 INJECTION, POWDER, FOR SOLUTION INTRAMUSCULAR; INTRAVENOUS at 15:37

## 2020-11-02 RX ADMIN — METRONIDAZOLE 500 MG: 500 INJECTION, SOLUTION INTRAVENOUS at 02:11

## 2020-11-02 RX ADMIN — VANCOMYCIN HYDROCHLORIDE 1500 MG: 5 INJECTION, POWDER, LYOPHILIZED, FOR SOLUTION INTRAVENOUS at 12:32

## 2020-11-02 RX ADMIN — DILTIAZEM HYDROCHLORIDE 180 MG: 180 CAPSULE, EXTENDED RELEASE ORAL at 09:58

## 2020-11-02 RX ADMIN — METOPROLOL TARTRATE 5 MG: 1 INJECTION, SOLUTION INTRAVENOUS at 20:04

## 2020-11-02 RX ADMIN — OXYBUTYNIN CHLORIDE 15 MG: 5 TABLET, EXTENDED RELEASE ORAL at 09:54

## 2020-11-02 RX ADMIN — SODIUM CHLORIDE, PRESERVATIVE FREE 10 ML: 5 INJECTION INTRAVENOUS at 20:04

## 2020-11-02 RX ADMIN — Medication 5 MG: at 20:04

## 2020-11-02 RX ADMIN — OXYCODONE HYDROCHLORIDE AND ACETAMINOPHEN 1 TABLET: 10; 325 TABLET ORAL at 20:04

## 2020-11-02 RX ADMIN — Medication 1 CAPSULE: at 09:54

## 2020-11-02 RX ADMIN — CEFEPIME 2 G: 2 INJECTION, POWDER, FOR SOLUTION INTRAVENOUS at 06:22

## 2020-11-02 RX ADMIN — APIXABAN 5 MG: 5 TABLET, FILM COATED ORAL at 20:04

## 2020-11-02 RX ADMIN — AMIODARONE HYDROCHLORIDE 200 MG: 200 TABLET ORAL at 09:59

## 2020-11-02 RX ADMIN — FAMOTIDINE 20 MG: 20 TABLET, FILM COATED ORAL at 12:32

## 2020-11-02 RX ADMIN — APIXABAN 5 MG: 5 TABLET, FILM COATED ORAL at 09:55

## 2020-11-02 RX ADMIN — ALLOPURINOL 100 MG: 100 TABLET ORAL at 09:54

## 2020-11-02 RX ADMIN — METOPROLOL TARTRATE 37.5 MG: 25 TABLET, FILM COATED ORAL at 09:59

## 2020-11-02 NOTE — PLAN OF CARE
Goal Outcome Evaluation:  Plan of Care Reviewed With: patient  Progress: no change    Patient has had complaints of knee pain this shift, relieved by prn pain medication. Patient is currently resting comfortably in bed. Took O2 off patient around 1100 this morning and O2 saturation has stayed above 95%. Patient has no complaints at this time. No s/s of acute distress noted. Will continue to monitor and provide care.

## 2020-11-02 NOTE — THERAPY DISCHARGE NOTE
Acute Care - Physical Therapy Initial Evaluation/Discharge  RANDI Padilla     Patient Name: Anika Neri  : 1950  MRN: [id number]  Today's Date: 2020   Onset of Illness/Injury or Date of Surgery: 10/27/20     Referring Physician: Bhupinder      Admit Date: 10/27/2020    Visit Dx:    ICD-10-CM ICD-9-CM   1. Atrial fibrillation with RVR (CMS/[facility])  I48.91 427.31   2. Acute gout of right knee, unspecified cause  M10.9 274.01   3. Acute UTI  N39.0 599.0     Patient Active Problem List   Diagnosis   • A-fib (CMS/[facility])     Past Medical History:   Diagnosis Date   • Arthritis    • Diabetes mellitus (CMS/[facility])    • Gout    • Hypertension      Past Surgical History:   Procedure Laterality Date   • CHOLECYSTECTOMY            PT Assessment (last 12 hours)      PT Evaluation and Treatment     Row Name 20 1313          Physical Therapy Time and Intention    Subjective Information  no complaints  -CT     Document Type  discharge evaluation/summary  -CT     Mode of Treatment  physical therapy  -CT     Patient Effort  good  -CT     Comment  Pt agreeable to therapy evaluation but declines need for further therapy interventions and treatment in future. Pt reports she has not issues with mobility and just wants to go home. Pt lives at home alone and uses a rollatory occasionally for mobility.   -CT     Row Name 20 1313          General Information    Patient Profile Reviewed  yes  -CT     Onset of Illness/Injury or Date of Surgery  10/27/20 admit date  -CT     Referring Physician  Bhupinder  -CT     Patient Observations  alert;cooperative;agree to therapy  -CT     Prior Level of Function  independent:  -CT     Equipment Currently Used at Home  rollator  -CT     Existing Precautions/Restrictions  fall  -CT     Equipment Issued to Patient  gait belt  -CT     Risks Reviewed  patient:;LOB;nausea/vomiting;dizziness;increased discomfort;change in vital signs;increased drainage;lines disloged  -CT     Benefits Reviewed   patient:;improve function;increase independence;increase strength;increase balance;decrease pain;decrease risk of DVT;improve skin integrity;increase knowledge  -CT     Row Name 11/02/20 1313          Previous Level of Function/Home Environm    Household Ambulation, Premorbid Functional Level  needs assistive device for safe performance  -CT     Community Ambulation, Premorbid Functional Level  needs assistive device for safe performance  -CT     Row Name 11/02/20 1313          Living Environment    Current Living Arrangements  home/apartment/condo  -CT     Row Name 11/02/20 1313          Home Use of Assistive/Adaptive Equipment    Equipment Currently Used at Home  rollator  -CT     Row Name 11/02/20 1313          Cognition    Follows Commands (Cognition)  WNL  -CT     Row Name 11/02/20 1313          Range of Motion Comprehensive    Comment, General Range of Motion  limited d/t obesity but functional for pt  -CT     Row Name 11/02/20 1313          Strength Comprehensive (MMT)    Comment, General Manual Muscle Testing (MMT) Assessment  not formally tested but functional for transfer  -CT     Row Name 11/02/20 1313          Bed Mobility    Bed Mobility  bed mobility (all) activities  -CT     All Activities, [place] (Bed Mobility)  standby assist  -CT     Assistive Device (Bed Mobility)  bed rails  -CT     Row Name 11/02/20 1313          Transfers    Transfers  sit-stand transfer;stand-sit transfer;bed-chair transfer  -CT     Bed-Chair [place] (Transfers)  standby assist  -CT     Assistive Device (Bed-Chair Transfers)  walker, front-wheeled rollator  -CT     Sit-Stand [place] (Transfers)  standby assist  -CT     Stand-Sit [place] (Transfers)  standby assist  -CT     Row Name 11/02/20 1313          Sit-Stand Transfer    Assistive Device (Sit-Stand Transfers)  walker, 4-wheeled rollator  -CT     Row Name 11/02/20 1313          Stand-Sit Transfer    Assistive Device (Stand-Sit Transfers)  walker,  "front-wheeled rollator  -CT     Row Name 11/02/20 1313          Gait/Stairs (Locomotion)    Comment (Gait/Stairs)  declined ambulation but reports \"I have walk in the room\"  -CT     Row Name             Wound 10/29/20 0840 midline coccyx Fissure    Wound - Properties Group Placement Date: 10/29/20  -KA Placement Time: 0840  -KA Present on Hospital Admission: Y  -WILFREDO, pt reports she has always kept a \"spot\" here off and on  Orientation: midline  -KA Location: coccyx  -KA Primary Wound Type: Fissure  -KA    Retired Wound - Properties Group Date first assessed: 10/29/20  -KA Time first assessed: 0840  -KA Present on Hospital Admission: Y  MIKY, pt reports she has always kept a \"spot\" here off and on  Location: coccyx  -KA Primary Wound Type: Fissure  -KA    Row Name 11/02/20 1313          Coping    Observed Emotional State  calm;cooperative  -CT     Row Name 11/02/20 1313          Plan of Care Review    Plan of Care Reviewed With  patient  -CT     Row Name 11/02/20 1313          Positioning and Restraints    Pre-Treatment Position  in bed  -CT     Post Treatment Position  chair  -CT     In Chair  sitting;call light within reach;encouraged to call for assist;with nsg  -CT     Row Name 11/02/20 1313          Therapy Assessment/Plan (PT)    Patient/Family Therapy Goals Statement (PT)  Pt only wishes to be discharged from hospital and declines need for therapy  -CT     Functional Level at Time of Evaluation (PT)  standby assist  -CT     Criteria for Skilled Interventions Met (PT)  no;patient/family refuse skilled intervention at this time  -CT       User Key  (r) = Recorded By, (t) = Taken By, (c) = Cosigned By    Initials Name Provider Type    CT Maxine Gant, PT Physical Therapist    Bonny Ramos, RN Registered Nurse          Physical Therapy Education                 Title: PT OT SLP Therapies (Resolved)     Topic: Physical Therapy (Resolved)     Point: Mobility training (Resolved)     Learning Progress Summary  "          Patient Acceptance, E,TB, VU by CT at 11/2/2020 1327                   Point: Home exercise program (Resolved)     Learning Progress Summary           Patient Acceptance, E,TB, VU by CT at 11/2/2020 1327                   Point: Body mechanics (Resolved)     Learning Progress Summary           Patient Acceptance, E,TB, VU by CT at 11/2/2020 1327                   Point: Precautions (Resolved)     Learning Progress Summary           Patient Acceptance, E,TB, VU by CT at 11/2/2020 1327                               User Key     Initials Effective Dates Name Provider Type Discipline    CT 04/03/18 -  Maxine Gant, PT Physical Therapist PT                PT Recommendation and Plan  Anticipated Discharge Disposition (PT): home  Therapy Frequency (PT): evaluation only  Plan of Care Reviewed With: patient         Time Calculation:   PT Charges     Row Name 11/02/20 1330             Time Calculation    PT Received On  11/02/20  -CT        User Key  (r) = Recorded By, (t) = Taken By, (c) = Cosigned By    Initials Name Provider Type    CT Maxine Gant, MIS Physical Therapist        Therapy Charges for Today     Code Description Service Date Service Provider Modifiers Qty    20733799414 HC PT EVAL MOD COMPLEXITY 4 11/2/2020 Maxine Gant, PT GP 1               PT Discharge Summary  Anticipated Discharge Disposition (PT): home    Maxine Gant PT  11/2/2020

## 2020-11-02 NOTE — CONSULTS
INFECTIOUS DISEASE CONSULTATION REPORT        Patient Identification:  Name:  Anika Neri  Age:  70 y.o.  Sex:  female  :  1950  MRN:  [id number]   Visit Number:  [id number]  Primary Care Physician:  Provider, No Known       LOS: 6 days        Subjective       Subjective     History of present illness:      Thank you Dr. Romano for allowing us to participate in the care of your patient.  As you well know, Ms. Anika Neri is a 70 y.o. female with past medical history significant for diabetes, obesity, gout, osteoarthritis, who presented to [facility] Emergency Department on 10/27/2020 for severe right knee pain and ankle pain, urinary frequency.  Patient currently on 4 L per nasal cannula with no apparent distress.  Patient reports she does not wear oxygen at home.  Upon discussing knee patient has good range of motion with knee able to bend knee however is painful with movement.  Patient reports she was recommended to have knee replacement surgery to the right knee and she has gout flares at times.  Afebrile, no diarrhea.  WBC normal.  CRP improving at 5.96.  Urinalysis positive with culture finalizing as Klebsiella aerogenes.  Blood cultures show no growth thus far.  COVID-19 PCR negative.  Most recent chest x-ray from 10/30/2020 reports right-sided consolidation.  X-ray of the right ankle reports arthritic change and soft tissue swelling.  X-ray of the right knee reports no acute fracture, extensive degenerative osteoarthritis.  Venous duplex of the right lower extremity reports no evidence of DVT.      Infectious Disease consultation was requested for antimicrobial management.      ---------------------------------------------------------------------------------------------------------------------     Review Of Systems:    Constitutional: no fever, chills and night sweats. No appetite change or unexpected weight change. No fatigue.  Eyes: no eye drainage, itching or  redness.  HEENT: no mouth sores, dysphagia or nose bleed.  Respiratory: Positive shortness of breath, cough. No production of sputum.  Cardiovascular: no chest pain, no palpitations, no orthopnea.  Gastrointestinal: no nausea, vomiting or diarrhea. No abdominal pain, hematemesis or rectal bleeding.  Genitourinary: no dysuria or polyuria.  Hematologic/lymphatic: no lymph node abnormalities, no easy bruising or easy bleeding.  Musculoskeletal: Right knee pain.  Skin: No rash and no itching.  Neurological: no loss of consciousness, no seizure, no headache.  Behavioral/Psych: no depression or suicidal ideation.  Endocrine: no hot flashes.  Immunologic: negative.    ---------------------------------------------------------------------------------------------------------------------     Past Medical History    Past Medical History:   Diagnosis Date   • Arthritis    • Diabetes mellitus (CMS/[facility])    • Gout    • Hypertension        Past Surgical History    Past Surgical History:   Procedure Laterality Date   • CHOLECYSTECTOMY         Family History    History reviewed. No pertinent family history.    Social History    Social History     Tobacco Use   • Smoking status: Never Smoker   • Smokeless tobacco: Never Used   Substance Use Topics   • Alcohol use: No   • Drug use: No       Allergies    Patient has no known allergies.  ---------------------------------------------------------------------------------------------------------------------     Home Medications:    Prior to Admission Medications     Prescriptions Last Dose Informant Patient Reported? Taking?    allopurinol (ZYLOPRIM) 100 MG tablet 10/27/2020 Self Yes Yes    Take 100 mg by mouth Daily.    diazePAM (VALIUM) 10 MG tablet 10/27/2020 Self Yes Yes    Take 5 mg by mouth 2 (Two) Times a Day As Needed for Anxiety or Sedation.    lisinopril (PRINIVIL,ZESTRIL) 20 MG tablet 10/27/2020 Self Yes Yes    Take 20 mg by mouth Daily.    oxybutynin XL (DITROPAN XL) 15 MG 24 hr  tablet 10/27/2020 Self Yes Yes    Take 15 mg by mouth Daily.    oxyCODONE-acetaminophen (PERCOCET)  MG per tablet 10/27/2020 Self Yes Yes    Take 1 tablet by mouth Every 6 (Six) Hours As Needed for Moderate Pain .    PARoxetine (PAXIL) 10 MG tablet 10/27/2020 Self Yes Yes    Take 10 mg by mouth Every Morning.    SITagliptin (JANUVIA) 100 MG tablet 10/27/2020 Self Yes Yes    Take 100 mg by mouth Daily.        ---------------------------------------------------------------------------------------------------------------------    Objective       Objective     Hospital Scheduled Meds:  allopurinol, 100 mg, Oral, Daily  amiodarone, 200 mg, Oral, Q24H  apixaban, 5 mg, Oral, Q12H  cefTRIAXone, 2 g, Intravenous, Q24H  dilTIAZem CD, 180 mg, Oral, Q24H  doxycycline, 100 mg, Intravenous, Q12H  famotidine, 20 mg, Oral, Daily  lactobacillus acidophilus, 1 capsule, Oral, Daily  magic barrier cream, , Topical, 4x Daily  melatonin, 5 mg, Oral, Nightly  metoprolol tartrate, 37.5 mg, Oral, Q12H  oxybutynin XL, 15 mg, Oral, Daily  PARoxetine, 10 mg, Oral, Daily  sodium chloride, 10 mL, Intravenous, Q12H      Pharmacy Consult,       ---------------------------------------------------------------------------------------------------------------------   Vital Signs:  Temp:  [97 °F (36.1 °C)-98 °F (36.7 °C)] 97.8 °F (36.6 °C)  Heart Rate:  [] 99  Resp:  [20] 20  BP: (111-171)/(53-85) 171/85  Mean Arterial Pressure (Non-Invasive) for the past 24 hrs (Last 3 readings):   Noninvasive MAP (mmHg)   11/02/20 0700 108   11/02/20 0259 82   11/01/20 2101 86     SpO2 Percentage    11/02/20 0259 11/02/20 0700 11/02/20 1114   SpO2: 90% 91% 94%     SpO2:  [90 %-94 %] 94 %  on  Flow (L/min):  [4] 4;   Device (Oxygen Therapy): room air    Body mass index is 53.77 kg/m².  Wt Readings from Last 3 Encounters:   11/02/20 129 kg (284 lb 9.6 oz)   12/28/19 130 kg (286 lb)   01/28/18 122 kg (270 lb)      ---------------------------------------------------------------------------------------------------------------------     Physical Exam:    Constitutional:  Well-developed and well-nourished.  No respiratory distress.  Morbidly obese.     HENT:  Head: Normocephalic and atraumatic.  Mouth:  Moist mucous membranes.    Eyes:  Conjunctivae and EOM are normal.  No scleral icterus.  Neck:  Neck supple.  No JVD present.    Cardiovascular:  Normal rate, regular rhythm and normal heart sounds with no murmur. No edema.  Pulmonary/Chest:  No respiratory distress, no wheezes, no crackles, with normal breath sounds and good air movement.  Abdominal:  Soft.  Bowel sounds are normal.  No distension and no tenderness.   Musculoskeletal: Right knee pain with movement.  Neurological:  Alert and oriented to person, place, and time.  No facial droop.  No slurred speech.   Skin:  Skin is warm and dry.  No rash noted.  No pallor.   Psychiatric:  Normal mood and affect.  Behavior is normal.    ---------------------------------------------------------------------------------------------------------------------    Results from last 7 days   Lab Units 10/28/20  0507 10/27/20  2212 10/27/20  1504   TROPONIN T ng/mL <0.010 <0.010 <0.010     Results from last 7 days   Lab Units 10/27/20  1248   PROBNP pg/mL 1,479.0*       Results from last 7 days   Lab Units 10/28/20  0435   PH, ARTERIAL pH units 7.432   PO2 ART mm Hg 52.6*   PCO2, ARTERIAL mm Hg 38.2   HCO3 ART mmol/L 25.5     Results from last 7 days   Lab Units 11/02/20  0119 10/31/20  0806 10/30/20  0148 10/27/20  2212   CRP mg/dL 5.96* 17.39*  --  7.51*   WBC 10*3/mm3 9.52 10.53 11.87*  --    HEMOGLOBIN g/dL 11.4* 11.1* 10.9*  --    HEMATOCRIT % 35.8 35.5 36.7  --    MCV fL 93.2 93.9 99.5*  --    MCHC g/dL 31.8 31.3* 29.7*  --    PLATELETS 10*3/mm3 199 186 139*  --    INR   --   --   --  1.25*     Results from last 7 days   Lab Units 11/02/20  0119 10/31/20  0806 10/30/20  0148   10/29/20  0117 10/27/20  2212 10/27/20  1248   SODIUM mmol/L 139 138 136   < >  --   --  139   POTASSIUM mmol/L 4.0 4.4 4.4   < >  --   --  4.3   MAGNESIUM mg/dL  --   --   --   --  2.2 1.5*  --    CHLORIDE mmol/L 106 103 105   < >  --   --  106   CO2 mmol/L 24.1 25.8 20.4*   < >  --   --  21.0*   BUN mg/dL 19 22 29*   < >  --   --  23   CREATININE mg/dL 0.81 0.81 1.00   < >  --   --  0.97   EGFR IF NONAFRICN AM mL/min/1.73 70 70 55*   < >  --   --  57*   CALCIUM mg/dL 9.1 9.3 8.8   < >  --   --  9.6   GLUCOSE mg/dL 155* 167* 150*   < >  --   --  225*   ALBUMIN g/dL  --   --   --   --   --   --  3.98   BILIRUBIN mg/dL  --   --   --   --   --   --  0.9   ALK PHOS U/L  --   --   --   --   --   --  83   AST (SGOT) U/L  --   --   --   --   --   --  20   ALT (SGPT) U/L  --   --   --   --   --   --  17    < > = values in this interval not displayed.   Estimated Creatinine Clearance: 80.8 mL/min (by C-G formula based on SCr of 0.81 mg/dL).  No results found for: AMMONIA    Glucose   Date/Time Value Ref Range Status   11/02/2020 1112 188 (H) 70 - 130 mg/dL Final   11/02/2020 0813 145 (H) 70 - 130 mg/dL Final   11/01/2020 1933 183 (H) 70 - 130 mg/dL Final   11/01/2020 1059 151 (H) 70 - 130 mg/dL Final   10/31/2020 2023 172 (H) 70 - 130 mg/dL Final   10/31/2020 1629 201 (H) 70 - 130 mg/dL Final   10/31/2020 1123 148 (H) 70 - 130 mg/dL Final   10/31/2020 0540 147 (H) 70 - 130 mg/dL Final     Lab Results   Component Value Date    HGBA1C 8.40 (H) 10/27/2020     Lab Results   Component Value Date    TSH 0.574 10/27/2020       Blood Culture   Date Value Ref Range Status   10/27/2020 No growth at 5 days  Final   10/27/2020 No growth at 5 days  Final     Urine Culture   Date Value Ref Range Status   10/27/2020 >100,000 CFU/mL Klebsiella aerogenes (A)  Final     No results found for: WOUNDCX  No results found for: STOOLCX  No results found for: RESPCX  Pain Management Panel     There is no flowsheet data to display.        I have  personally reviewed the above laboratory results.   ---------------------------------------------------------------------------------------------------------------------  Imaging Results (Last 7 Days)     Procedure Component Value Units Date/Time    XR Chest 1 View [[id number]] Collected: 10/30/20 1141     Updated: 10/30/20 1204    Narrative:      XR CHEST 1 VW-     CLINICAL INDICATION: hypoxia, evaluate CHF status; I48.91-Unspecified  atrial fibrillation; M10.9-Gout, unspecified; N39.0-Urinary tract  infection, site not specified        COMPARISON: 10/27/2020      TECHNIQUE: Single frontal view of the chest.     FINDINGS:     Right-sided consolidation  The cardiac silhouette is normal. The pulmonary vasculature is  unremarkable.  There is no evidence of an acute osseous abnormality.   There are no suspicious-appearing parenchymal soft tissue nodules.          Impression:      Right-sided consolidation     This report was finalized on 10/30/2020 11:41 AM by Dr. Sarbjit John MD.       XR Ankle 3+ View Right [[id number]] Collected: 10/28/20 2031     Updated: 10/28/20 2034    Narrative:      EXAMINATION: XR ANKLE 3+ VW RIGHT-      CLINICAL INDICATION: right ankle pain; I48.91-Unspecified atrial  fibrillation; M10.9-Gout, unspecified; N39.0-Urinary tract infection,  site not specified        COMPARISON: None available     FINDINGS: 3 views of the right ankle demonstrate diffuse soft tissue  swelling     There is arthritic change     No fracture is seen       Impression:      Arthritic change and soft tissue swelling      This report was finalized on 10/28/2020 8:32 PM by Dr. Sarbjit John MD.       XR Knee 3 View Right [[id number]] Collected: 10/27/20 1405     Updated: 10/27/20 1417    Narrative:      EXAMINATION: XR KNEE 3 VW RIGHT-      CLINICAL INDICATION: knee pain        COMPARISON: None immediately available     FINDINGS: 3 views of the right knee show extensive degenerative  osteoarthritis.     There is  chondrocalcinosis     Multiple spurs are present       Impression:      1. No acute fracture  2. Extensive degenerative osteoarthritis      This report was finalized on 10/27/2020 2:05 PM by Dr. Sarbjit John MD.       US Venous Doppler Lower Extremity Right (duplex) [[id number]] Collected: 10/27/20 1312     Updated: 10/27/20 1314    Narrative:      EXAM:    US Duplex Right Lower Extremity Veins     EXAM DATE:    10/27/2020 12:13 PM     CLINICAL HISTORY:    leg swelling and pain     TECHNIQUE:    Real-time duplex ultrasound scan of the right lower extremity veins  integrating B-mode two-dimensional vascular structure, Doppler spectral  analysis, color flow Doppler imaging and compression.     COMPARISON:    No relevant prior studies available.     FINDINGS:    Deep veins:  Unremarkable.  No DVT in the visualized common femoral,  femoral, proximal deep femoral or popliteal veins.  The veins  demonstrate normal color flow, are normally compressible, with normal  phasic flow and/or augmentation response.    Soft tissues:  No acute findings.  No popliteal cyst.       Impression:        Normal right lower extremity duplex venous ultrasound.     This report was finalized on 10/27/2020 1:12 PM by Dr. Jesus Chavez MD.       XR Chest 1 View [[id number]] Collected: 10/27/20 1256     Updated: 10/27/20 1259    Narrative:      XR CHEST 1 VW-     CLINICAL INDICATION: tachycardia        COMPARISON: 12/28/2019      TECHNIQUE: Single frontal view of the chest.     FINDINGS:     There is no focal alveolar infiltrate or effusion.  Mild CHF  There is no evidence of an acute osseous abnormality.   There are no suspicious-appearing parenchymal soft tissue nodules.          Impression:      Mild CHF     This report was finalized on 10/27/2020 12:57 PM by Dr. Sarbjit John MD.           I have personally reviewed the above radiology results.    ---------------------------------------------------------------------------------------------------------------------      Assessment & Plan        Assessment/Plan       ASSESSMENT:    1.  Septic arthritis versus gout  2.  Pneumonia  3.  UTI    PLAN:    Patient presents with severe right knee pain and ankle pain, urinary frequency.  Patient currently on 4 L per nasal cannula with no apparent distress.  Patient reports she does not wear oxygen at home.  Upon discussing knee patient has good range of motion with knee able to bend knee however is painful with movement.  Patient reports she was recommended to have knee replacement surgery to the right knee and she has gout flares at times.  Afebrile, no diarrhea.  WBC normal.  CRP improving at 5.96.  Urinalysis positive with culture finalizing as Klebsiella aerogenes.  Blood cultures show no growth thus far.  COVID-19 PCR negative.  Most recent chest x-ray from 10/30/2020 reports right-sided consolidation.  X-ray of the right ankle reports arthritic change and soft tissue swelling.  X-ray of the right knee reports no acute fracture, extensive degenerative osteoarthritis.  Venous duplex of the right lower extremity reports no evidence of DVT.    Due to patient's range of motion abilities it is less likely that patient is suffering from septic arthritis, but rather gout flareup along with extensive osteoarthritis.  Recommend orthopedic reevaluation.      Based on finalization of culture results antibiotic therapy was changed to Rocephin 2 g IV every 24 hours and doxycycline 100 mg IV every 12 hours.  Flagyl was discontinued.  We will continue to follow closely and adjust antibiotic therapy as needed.    Again, thank you Dr. Romano for allowing us to participate in the care of your patient and please feel free to call for any questions you may have.        Code Status:   Code Status and Medical Interventions:   Ordered at: 10/27/20 9104     Code Status:    CPR      Medical Interventions (Level of Support Prior to Arrest):    Full           Yomaira Gao, MILLER  11/02/20  13:35 EST

## 2020-11-02 NOTE — PLAN OF CARE
Goal Outcome Evaluation:  Plan of Care Reviewed With: patient  Progress: no change  Outcome Summary: Pt resting in bed. Pt complained of generalized pain and PRN pain meds administered. VSS. No acute events. No other complaints. Will continue to monitor and follow plan of care.

## 2020-11-02 NOTE — PROGRESS NOTES
Patient Identification:  Name:  Anika Neri  Age:  70 y.o.  Sex:  female  :  1950  MRN:  [id number]  Visit Number:  [id number]  Primary Care Provider:  Provider, No Known    Length of stay:  6    Subjective/Interval History/Consultants/Procedures     Chief complaint: Follow-up  New onset atrial fibrillation, pneumonia, UTI, gout vs. Septic arthritis    HPI/Hospital course:     Patient is a 70 y.o. female with history of HTN, DM, obesity, gout and osteoarthritis who presents with complaints of severe right knee and ankle pain that started on the day of presentation.  She was found to have new onset Afib with RVR in the ED in addition to UTI and right knee pain with concern for gout flair vs. Septic arthritis.  Please see admitting H&P for further details.     Consultants:   Consults     Date and Time Order Name Status Description    2020 1042 Inpatient Infectious Diseases Consult      10/27/2020 2156 Inpatient Orthopedic Surgery Consult Completed     10/27/2020 1939 Inpatient Cardiology Consult Completed           Subjective:      Mrs. Neri is sitting on her rolling walker combing her hair. She reports her knee pain has significantly improved.  She denies chest pain, dyspnea, cough, and wheeze.  She is on room air and doing well.     Discussed with AM ALEJANDRO Perkins with reports of dyspnea yesterday and refusal to work with therapy today.  She is on room air to my evaluation with her rolling walker.  ----------------------------------------------------------------------------------------------------------------------  Current [facility] Meds:  allopurinol, 100 mg, Oral, Daily  amiodarone, 200 mg, Oral, Q24H  apixaban, 5 mg, Oral, Q12H  cefepime, 2 g, Intravenous, Q12H  dilTIAZem CD, 180 mg, Oral, Q24H  famotidine, 20 mg, Oral, Daily  lactobacillus acidophilus, 1 capsule, Oral, Daily  magic barrier cream, , Topical, 4x Daily  melatonin, 5 mg, Oral, Nightly  metoprolol tartrate, 37.5 mg, Oral,  Q12H  metroNIDAZOLE, 500 mg, Intravenous, Q8H  oxybutynin XL, 15 mg, Oral, Daily  PARoxetine, 10 mg, Oral, Daily  sodium chloride, 10 mL, Intravenous, Q12H  vancomycin, 1,500 mg, Intravenous, Q24H      Pharmacy Consult,       ----------------------------------------------------------------------------------------------------------------------      Objective     Vital Signs:  Temp:  [97 °F (36.1 °C)-98 °F (36.7 °C)] 97.8 °F (36.6 °C)  Heart Rate:  [] 99  Resp:  [20] 20  BP: (111-171)/(53-85) 171/85      10/31/20  0400 11/01/20  0400 11/02/20  0500   Weight: 126 kg (277 lb 8 oz) 126 kg (278 lb 4.8 oz) 129 kg (284 lb 9.6 oz)     Body mass index is 53.77 kg/m².    Intake/Output Summary (Last 24 hours) at 11/2/2020 1121  Last data filed at 11/2/2020 0400  Gross per 24 hour   Intake 720 ml   Output 1025 ml   Net -305 ml     No intake/output data recorded.  Diet Regular; Cardiac  ----------------------------------------------------------------------------------------------------------------------    Physical Exam  Vitals signs and nursing note reviewed.   Constitutional:       General: She is awake.      Appearance: Normal appearance. She is well-developed. She is obese.   HENT:      Head: Normocephalic and atraumatic.   Eyes:      General: Lids are normal.         Right eye: No foreign body.         Left eye: No foreign body.   Neck:      Musculoskeletal: Normal range of motion.   Cardiovascular:      Rate and Rhythm: Normal rate.      Heart sounds: S1 normal and S2 normal.   Pulmonary:      Breath sounds: No decreased breath sounds, wheezing, rhonchi or rales.   Chest:      Chest wall: No swelling.   Abdominal:      General: Bowel sounds are normal.      Palpations: Abdomen is soft.   Musculoskeletal:      Right lower leg: Edema present.      Left lower leg: Edema present.      Comments: Right knee without significant redness or swelling    Skin:     General: Skin is warm and dry.   Neurological:      Mental  Status: She is alert.   Psychiatric:         Attention and Perception: Attention normal.         Behavior: Behavior is cooperative.              ----------------------------------------------------------------------------------------------------------------------  Tele:        ----------------------------------------------------------------------------------------------------------------------  Results from last 7 days   Lab Units 10/28/20  0507 10/27/20  2212 10/27/20  1504 10/27/20  1248   TROPONIN T ng/mL <0.010 <0.010 <0.010 <0.010   PROBNP pg/mL  --   --   --  1,479.0*     Results from last 7 days   Lab Units 11/02/20  0119 10/31/20  0806 10/30/20  0148 10/27/20  2212   CRP mg/dL 5.96* 17.39*  --  7.51*   WBC 10*3/mm3 9.52 10.53 11.87*  --    HEMOGLOBIN g/dL 11.4* 11.1* 10.9*  --    HEMATOCRIT % 35.8 35.5 36.7  --    MCV fL 93.2 93.9 99.5*  --    MCHC g/dL 31.8 31.3* 29.7*  --    PLATELETS 10*3/mm3 199 186 139*  --    INR   --   --   --  1.25*     Results from last 7 days   Lab Units 10/28/20  0435   PH, ARTERIAL pH units 7.432   PO2 ART mm Hg 52.6*   PCO2, ARTERIAL mm Hg 38.2   HCO3 ART mmol/L 25.5     Results from last 7 days   Lab Units 11/02/20  0119 10/31/20  0806 10/30/20  0148  10/29/20  0117 10/27/20  2212 10/27/20  1248   SODIUM mmol/L 139 138 136   < >  --   --  139   POTASSIUM mmol/L 4.0 4.4 4.4   < >  --   --  4.3   MAGNESIUM mg/dL  --   --   --   --  2.2 1.5*  --    CHLORIDE mmol/L 106 103 105   < >  --   --  106   CO2 mmol/L 24.1 25.8 20.4*   < >  --   --  21.0*   BUN mg/dL 19 22 29*   < >  --   --  23   CREATININE mg/dL 0.81 0.81 1.00   < >  --   --  0.97   EGFR IF NONAFRICN AM mL/min/1.73 70 70 55*   < >  --   --  57*   CALCIUM mg/dL 9.1 9.3 8.8   < >  --   --  9.6   GLUCOSE mg/dL 155* 167* 150*   < >  --   --  225*   ALBUMIN g/dL  --   --   --   --   --   --  3.98   BILIRUBIN mg/dL  --   --   --   --   --   --  0.9   ALK PHOS U/L  --   --   --   --   --   --  83   AST (SGOT) U/L  --   --   --    --   --   --  20   ALT (SGPT) U/L  --   --   --   --   --   --  17    < > = values in this interval not displayed.   Estimated Creatinine Clearance: 80.8 mL/min (by C-G formula based on SCr of 0.81 mg/dL).  No results found for: AMMONIA      Blood Culture   Date Value Ref Range Status   10/27/2020 No growth at 5 days  Final   10/27/2020 No growth at 5 days  Final     Urine Culture   Date Value Ref Range Status   10/27/2020 >100,000 CFU/mL Klebsiella aerogenes (A)  Final       Microbiology Results (last 10 days)     Procedure Component Value - Date/Time    Blood Culture - Blood, Hand, Left [[id number]] Collected: 10/27/20 2227    Lab Status: Final result Specimen: Blood from Hand, Left Updated: 11/01/20 2145     Blood Culture No growth at 5 days    Blood Culture - Blood, Arm, Left [[id number]] Collected: 10/27/20 2226    Lab Status: Final result Specimen: Blood from Arm, Left Updated: 11/01/20 2145     Blood Culture No growth at 5 days    COVID-19, [facility] IN-HOUSE,NP Swab (NO TRANSPORT MEDIA) 2 HR TAT - Swab, Nasopharynx [[id number]]  (Normal) Collected: 10/27/20 1753    Lab Status: Final result Specimen: Swab from Nasopharynx Updated: 10/27/20 1817     COVID19 Not Detected    Narrative:      Fact sheet for providers: https://www.fda.gov/media/525788/download     Fact sheet for patients: https://www.fda.gov/media/283200/download    Urine Culture - Urine, Urine, Clean Catch [[id number]]  (Abnormal)  (Susceptibility) Collected: 10/27/20 1517    Lab Status: Final result Specimen: Urine, Clean Catch Updated: 10/29/20 0349     Urine Culture >100,000 CFU/mL Klebsiella aerogenes    Susceptibility      Klebsiella aerogenes     CHRISTOPHE     Cefazolin Resistant     Cefepime Susceptible     Ceftazidime Susceptible     Ceftriaxone Susceptible     Gentamicin Susceptible     Levofloxacin Susceptible     Nitrofurantoin Resistant     Piperacillin + Tazobactam Susceptible     Tetracycline Resistant     Trimethoprim + Sulfamethoxazole  Resistant                            No results found for: WOUNDCX  No results found for: STOOLCX  ----------------------------------------------------------------------------------------------------------------------  Imaging Results (Last 24 Hours)     ** No results found for the last 24 hours. **        ----------------------------------------------------------------------------------------------------------------------   I have reviewed the above laboratory values for 11/02/20    Assessment/Plan     Active Hospital Problems    Diagnosis POA   • A-fib (CMS/[facility]) [I48.91] Yes         ASSESSMENT/PLAN:  · Acute onset R knee pain/swelling in setting of Hx Gout:    -Knee Xray showed extensive degenerative changes, multiple spurs, chondrocalcinosis  - Ortho consulted for possible aspiration to rule out septic joint; however, she has deferred arthrocentesis in spite of multiple discussions.  She considered signing out AMA previously during admission but resigned herself to stay. Continue Colchicine as started on admission. Continue IV Vanc and Cefepime. Ortho & ID input appreciated.   -C-RP improved significantly on 11/2 with current IV abx regimen. Consult ID for further guidance as she is likely nearing discharge and never agreed for arthroscentesis.     · New onset Afib w/ RVR (CHADsVasc = 4):   -Cardiology on board.    -- HR up to 140's on admission, EKG showing Afib, CXR w/ mild CHF changes, trops negative, TSH NML, proBNP 1479  -Low dose Metoprolol on board cpuled with Metoprolol that has been titrated up throughout admission.   -Amiodarone initiated on 11/1/2020 with EKG on 11/2/2020 ordered to eval QTc.   - Continue Eliquis.   - Echocardiogram report reviewed.       · Essential HTN:   -Continue Metoprolol & Cardizem. Continue vitals per hospital protocol. Home ACEI held due to lower blood presssurs during admission.     · Acute Hypoxic Respiratory Failure 2/2 mild CHF vs tachy induced pulm edema vs undiagnosed  DAVE/OHS:  - HR up to 140's on admission, EKG showing Afib, CXR w/ mild CHF changes, trops negative, TSH NML, proBNP 1479  - Echo showed LVEF 56-60%, NML LV function, mod-severe Aortic sclerosis w/o stenosis, mild MR, mild TR, RVSP 35-45mmHg  - Cardiology consulted; Following, have added low dose metoprolol, did not see over weeked.  -Orders placed to titrate oxygen saturation greater than or equal to 90%.        · Acute Uncomplicated UTI, treating for MDR organism:   - UA c/f infection, cultures grew Klebsiella sp R to first gen cephalosporins; Continue Cefepime x 7 days.     · PNA, Bacterial, treating for CAP but covered for MDR organisms:    - Patient noted to have increased oxygen requirement, CXR showed RLL PNA  - Continue Vanc, Cefepime now Flagyl.    · NIDDM Type II, uncontrolled, unknown complications  - Hgb A1c = 8.4%  - Holding home oral agents, cover FSBG and SSI for now    · Anxiety/Depression  - Continue home SSRI, continue home Valium but make qhs and with holding parameters.    · Chronic Pain  - Continue home Percocet w/ holding parameters, confirmed per pharmacy    · Morbid Obesity  - BMI 52, complicates all aspects of care         -----------  -DVT prophylaxis: Eliquis  -GI prophylaxis: Pepcid/Lactobacillus  -Activity: PT/OT  -Disposition: Remains hospitalized for further abx  -Diet:   Dietary Orders (From admission, onward)     Start     Ordered    10/27/20 1939  Diet Regular; Cardiac  Diet Effective Now     Question Answer Comment   Diet Texture / Consistency Regular    Common Modifiers Cardiac        10/27/20 1938                -Home supplemental O2: N/A: Checking room air sat today      The patient is high risk due to the following diagnoses/reasons:  UTI, pneumonia, gout vs. Septic arthritis, new afib    Elif Robles PA-C  11/02/20  11:21 EST  Pager # [phone]

## 2020-11-02 NOTE — THERAPY EVALUATION
Acute Care - Occupational Therapy Initial Evaluation   [place]     Patient Name: Anika Neri  : 1950  MRN: [id number]  Today's Date: 2020  Onset of Illness/Injury or Date of Surgery: 10/27/20     Referring Physician: Bhupinder    Admit Date: 10/27/2020       ICD-10-CM ICD-9-CM   1. Atrial fibrillation with RVR (CMS/[facility])  I48.91 427.31   2. Acute gout of right knee, unspecified cause  M10.9 274.01   3. Acute UTI  N39.0 599.0     Patient Active Problem List   Diagnosis   • A-fib (CMS/[facility])     Past Medical History:   Diagnosis Date   • Arthritis    • Diabetes mellitus (CMS/[facility])    • Gout    • Hypertension      Past Surgical History:   Procedure Laterality Date   • CHOLECYSTECTOMY            OT ASSESSMENT FLOWSHEET (last 12 hours)      OT Evaluation and Treatment     Row Name 20 1323                   OT Time and Intention    Subjective Information  complains of;weakness;fatigue;pain  -LM        Document Type  evaluation  -LM        Mode of Treatment  occupational therapy  -LM        Patient Effort  good  -LM        Comment  Patient agreeable to OT evaluation, declined OT services  -LM           General Information    Patient Profile Reviewed  yes  -LM        Onset of Illness/Injury or Date of Surgery  10/27/20  -LM        Prior Level of Function  independent:  -LM        Equipment Currently Used at Home  rollator  -LM        Pertinent History of Current Functional Problem  Admitted with afib, right knee and ankle pain.  PMH:  htn, dm, obesity, gout, oa.  -LM        Existing Precautions/Restrictions  fall  -LM        Benefits Reviewed  patient:;improve function;increase strength;increase balance;increase independence  -LM           Previous Level of Function/Home Environm    BADLs, Premorbid Functional Level  needs assistive device for safe performance  -LM           Living Environment    Current Living Arrangements  home/apartment/condo  -LM        Lives With  child(mak), adult  -LM            "Cognition    Affect/Mental Status (Cognitive)  WFL  -LM        Orientation Status (Cognition)  oriented x 4  -LM        Follows Commands (Cognition)  WFL  -LM           Range of Motion (ROM)    Range of Motion  bilateral upper extremities;ROM is WFL  -LM           Strength (Manual Muscle Testing)    Strength (Manual Muscle Testing)  strength is WFL;bilateral upper extremities  -LM           Activities of Daily Living    BADL Assessment/Intervention  bathing;upper body dressing;lower body dressing;grooming;feeding;toileting  -LM           Bathing Assessment/Intervention    [place] Level (Bathing)  minimum assist (75% patient effort)  -LM           Upper Body Dressing Assessment/Training    [place] Level (Upper Body Dressing)  set up  -LM           Lower Body Dressing Assessment/Training    [place] Level (Lower Body Dressing)  minimum assist (75% patient effort)  -LM           Grooming Assessment/Training    [place] Level (Grooming)  set up  -LM           Self-Feeding Assessment/Training    [place] Level (Feeding)  independent  -LM           Toileting Assessment/Training    [place] Level (Toileting)  minimum assist (75% patient effort);moderate assist (50% patient effort)  -LM           Wound 10/29/20 0840 midline coccyx Fissure    Wound - Properties Group Placement Date: 10/29/20  -KA Placement Time: 0840 -KA Present on Hospital Admission: MIMI BOLAÑOS pt reports she has always kept a \"spot\" here off and on  Orientation: midline  - Location: coccyx  -KA Primary Wound Type: Fissure  -KA    Retired Wound - Properties Group Date first assessed: 10/29/20  -KA Time first assessed: 0840 -KA Present on Hospital Admission: MIMI BOLAÑOS, pt reports she has always kept a \"spot\" here off and on  Location: coccyx  -KA Primary Wound Type: Fissure  -KA       Plan of Care Review    Plan of Care Reviewed With  patient  -LM           Positioning and Restraints    Post Treatment Position  chair  -LM        In " "Chair  call light within reach;encouraged to call for assist;notified nsg  -LM           Therapy Assessment/Plan (OT)    OT Diagnosis  debility  -LM        Criteria for Skilled Therapeutic Interventions Met (OT)  patient/family refuse skilled intervention at this time patient declined services, stating \"ready to go home\"  -LM           Evaluation Complexity (OT)    Overall Complexity of Evaluation (OT)  low complexity  -LM           Therapy Plan Review/Discharge Plan (OT)    Anticipated Discharge Disposition (OT)  home with assist  -LM          User Key  (r) = Recorded By, (t) = Taken By, (c) = Cosigned By    Initials Name Effective Dates    LM Melyssa Zendejas OT 03/14/16 -     Bonny Ramos RN 04/29/19 -                OT Recommendation and Plan     Plan of Care Review  Plan of Care Reviewed With: patient  Plan of Care Reviewed With: patient        Time Calculation:     Therapy Charges for Today     Code Description Service Date Service Provider Modifiers Qty    [id number]  OT EVAL LOW COMPLEXITY 4 11/2/2020 Melyssa Zendejas OT GO 1               Melyssa Zendejas OT  11/2/2020  "

## 2020-11-03 LAB
ANION GAP SERPL CALCULATED.3IONS-SCNC: 10.4 MMOL/L (ref 5–15)
ANION GAP SERPL CALCULATED.3IONS-SCNC: 11.3 MMOL/L (ref 5–15)
BUN SERPL-MCNC: 15 MG/DL (ref 8–23)
BUN SERPL-MCNC: 17 MG/DL (ref 8–23)
BUN/CREAT SERPL: 22.4 (ref 7–25)
BUN/CREAT SERPL: 23.4 (ref 7–25)
CALCIUM SPEC-SCNC: 10.1 MG/DL (ref 8.6–10.5)
CALCIUM SPEC-SCNC: 9.6 MG/DL (ref 8.6–10.5)
CHLORIDE SERPL-SCNC: 103 MMOL/L (ref 98–107)
CHLORIDE SERPL-SCNC: 103 MMOL/L (ref 98–107)
CO2 SERPL-SCNC: 23.7 MMOL/L (ref 22–29)
CO2 SERPL-SCNC: 25.6 MMOL/L (ref 22–29)
CREAT SERPL-MCNC: 0.64 MG/DL (ref 0.57–1)
CREAT SERPL-MCNC: 0.76 MG/DL (ref 0.57–1)
CRP SERPL-MCNC: 3.52 MG/DL (ref 0–0.5)
DEPRECATED RDW RBC AUTO: 45.1 FL (ref 37–54)
ERYTHROCYTE [DISTWIDTH] IN BLOOD BY AUTOMATED COUNT: 13.2 % (ref 12.3–15.4)
GFR SERPL CREATININE-BSD FRML MDRD: 75 ML/MIN/1.73
GFR SERPL CREATININE-BSD FRML MDRD: 91 ML/MIN/1.73
GLUCOSE BLDC GLUCOMTR-MCNC: 142 MG/DL (ref 70–130)
GLUCOSE BLDC GLUCOMTR-MCNC: 143 MG/DL (ref 70–130)
GLUCOSE BLDC GLUCOMTR-MCNC: 143 MG/DL (ref 70–130)
GLUCOSE BLDC GLUCOMTR-MCNC: 170 MG/DL (ref 70–130)
GLUCOSE SERPL-MCNC: 146 MG/DL (ref 65–99)
GLUCOSE SERPL-MCNC: 162 MG/DL (ref 65–99)
HCT VFR BLD AUTO: 35.9 % (ref 34–46.6)
HGB BLD-MCNC: 11.6 G/DL (ref 12–15.9)
INR PPP: 1.37 (ref 0.9–1.1)
MCH RBC QN AUTO: 30.1 PG (ref 26.6–33)
MCHC RBC AUTO-ENTMCNC: 32.3 G/DL (ref 31.5–35.7)
MCV RBC AUTO: 93 FL (ref 79–97)
PLATELET # BLD AUTO: 213 10*3/MM3 (ref 140–450)
PMV BLD AUTO: 9.9 FL (ref 6–12)
POTASSIUM SERPL-SCNC: 3.8 MMOL/L (ref 3.5–5.2)
POTASSIUM SERPL-SCNC: 3.8 MMOL/L (ref 3.5–5.2)
PROTHROMBIN TIME: 16.6 SECONDS (ref 11.9–14.1)
RBC # BLD AUTO: 3.86 10*6/MM3 (ref 3.77–5.28)
SODIUM SERPL-SCNC: 138 MMOL/L (ref 136–145)
SODIUM SERPL-SCNC: 139 MMOL/L (ref 136–145)
WBC # BLD AUTO: 10.36 10*3/MM3 (ref 3.4–10.8)

## 2020-11-03 PROCEDURE — 85027 COMPLETE CBC AUTOMATED: CPT | Performed by: INTERNAL MEDICINE

## 2020-11-03 PROCEDURE — 25010000002 CEFTRIAXONE PER 250 MG: Performed by: NURSE PRACTITIONER

## 2020-11-03 PROCEDURE — 80048 BASIC METABOLIC PNL TOTAL CA: CPT | Performed by: INTERNAL MEDICINE

## 2020-11-03 PROCEDURE — 25010000002 DIGOXIN PER 500 MCG: Performed by: INTERNAL MEDICINE

## 2020-11-03 PROCEDURE — 99232 SBSQ HOSP IP/OBS MODERATE 35: CPT | Performed by: FAMILY MEDICINE

## 2020-11-03 PROCEDURE — 99232 SBSQ HOSP IP/OBS MODERATE 35: CPT | Performed by: INTERNAL MEDICINE

## 2020-11-03 PROCEDURE — 85610 PROTHROMBIN TIME: CPT | Performed by: INTERNAL MEDICINE

## 2020-11-03 PROCEDURE — 86140 C-REACTIVE PROTEIN: CPT | Performed by: INTERNAL MEDICINE

## 2020-11-03 PROCEDURE — 82962 GLUCOSE BLOOD TEST: CPT

## 2020-11-03 RX ORDER — DIGOXIN 0.25 MG/ML
500 INJECTION INTRAMUSCULAR; INTRAVENOUS ONCE
Status: COMPLETED | OUTPATIENT
Start: 2020-11-03 | End: 2020-11-03

## 2020-11-03 RX ORDER — AMIODARONE HYDROCHLORIDE 200 MG/1
400 TABLET ORAL EVERY 12 HOURS SCHEDULED
Status: DISCONTINUED | OUTPATIENT
Start: 2020-11-03 | End: 2020-11-04 | Stop reason: HOSPADM

## 2020-11-03 RX ADMIN — Medication: at 17:14

## 2020-11-03 RX ADMIN — CEFTRIAXONE 2 G: 2 INJECTION, POWDER, FOR SOLUTION INTRAMUSCULAR; INTRAVENOUS at 14:41

## 2020-11-03 RX ADMIN — Medication 5 MG: at 21:06

## 2020-11-03 RX ADMIN — SODIUM CHLORIDE, PRESERVATIVE FREE 10 ML: 5 INJECTION INTRAVENOUS at 21:05

## 2020-11-03 RX ADMIN — Medication: at 21:05

## 2020-11-03 RX ADMIN — Medication: at 08:30

## 2020-11-03 RX ADMIN — DOXYCYCLINE 100 MG: 100 INJECTION, POWDER, LYOPHILIZED, FOR SOLUTION INTRAVENOUS at 04:26

## 2020-11-03 RX ADMIN — APIXABAN 5 MG: 5 TABLET, FILM COATED ORAL at 08:29

## 2020-11-03 RX ADMIN — OXYCODONE HYDROCHLORIDE AND ACETAMINOPHEN 1 TABLET: 10; 325 TABLET ORAL at 17:12

## 2020-11-03 RX ADMIN — DIGOXIN 500 MCG: 250 INJECTION, SOLUTION INTRAMUSCULAR; INTRAVENOUS; PARENTERAL at 12:05

## 2020-11-03 RX ADMIN — METOPROLOL TARTRATE 37.5 MG: 25 TABLET, FILM COATED ORAL at 08:29

## 2020-11-03 RX ADMIN — OXYBUTYNIN CHLORIDE 15 MG: 5 TABLET, EXTENDED RELEASE ORAL at 08:29

## 2020-11-03 RX ADMIN — AMIODARONE HYDROCHLORIDE 200 MG: 200 TABLET ORAL at 08:24

## 2020-11-03 RX ADMIN — DOXYCYCLINE 100 MG: 100 INJECTION, POWDER, LYOPHILIZED, FOR SOLUTION INTRAVENOUS at 15:31

## 2020-11-03 RX ADMIN — SODIUM CHLORIDE, PRESERVATIVE FREE 10 ML: 5 INJECTION INTRAVENOUS at 08:30

## 2020-11-03 RX ADMIN — FAMOTIDINE 20 MG: 20 TABLET, FILM COATED ORAL at 08:21

## 2020-11-03 RX ADMIN — ALLOPURINOL 100 MG: 100 TABLET ORAL at 08:29

## 2020-11-03 RX ADMIN — METOPROLOL TARTRATE 37.5 MG: 25 TABLET, FILM COATED ORAL at 21:05

## 2020-11-03 RX ADMIN — DILTIAZEM HYDROCHLORIDE 180 MG: 180 CAPSULE, EXTENDED RELEASE ORAL at 08:29

## 2020-11-03 RX ADMIN — AMIODARONE HYDROCHLORIDE 400 MG: 200 TABLET ORAL at 21:06

## 2020-11-03 RX ADMIN — PAROXETINE HYDROCHLORIDE 10 MG: 20 TABLET, FILM COATED ORAL at 08:21

## 2020-11-03 RX ADMIN — APIXABAN 5 MG: 5 TABLET, FILM COATED ORAL at 21:06

## 2020-11-03 RX ADMIN — Medication 1 CAPSULE: at 08:29

## 2020-11-03 RX ADMIN — OXYCODONE HYDROCHLORIDE AND ACETAMINOPHEN 1 TABLET: 10; 325 TABLET ORAL at 08:20

## 2020-11-03 NOTE — PLAN OF CARE
Goal Outcome Evaluation:  Plan of Care Reviewed With: patient  Progress: no change  Outcome Summary: Pt resting in bed this shift with complaints of pain in right knee. PRN pain medicine administered as ordered. Pt O2 sat has been WDL this shift on RA. Pt had high HR at beginning of shift, 120s-130s. Dr. Kruger aware and IV metoprolol given, as well as 2100 metoprolol tablet. Pt has been 90s-100s since meds were administered. No s/s of acute distress noted. Will cont to monitor and follow plan of care.

## 2020-11-03 NOTE — PROGRESS NOTES
Inpatient Progress Note  Anika Neri  Date: 11/03/20  MRN: [id number]      Subjective: Pt seen today for follow up on right knee pain by request of ID.  Pt states that she just has gout in her knee and she will be ok.  She explains it is better than what it was and she also is scheduled for a knee replacement by her doctors in [place].         Objective:    Vitals:    11/03/20 0247 11/03/20 0500 11/03/20 0637 11/03/20 0824   BP: 146/85  159/98 131/74   BP Location: Right arm  Right arm    Patient Position: Lying  Lying    Pulse: 104  99 107   Resp: 20  20    Temp: 97.7 °F (36.5 °C)  98 °F (36.7 °C)    TempSrc: Oral  Oral    SpO2: 94%  94%    Weight:  130 kg (285 lb 9.6 oz)     Height:              Physical Exam:  Constitutional:  Well-developed and well-nourished.  No respiratory distress.      HENT:  Head: Normocephalic and atraumatic.  Mouth:  Moist mucous membranes.    Eyes:  Conjunctivae and EOM are normal.  Pupils are equal, round, and reactive to light.  No scleral icterus.  Neck:  Neck supple.  No JVD present.    Cardiovascular:  Normal rate, regular rhythm and normal heart sounds with no murmur.  Pulmonary/Chest:  No respiratory distress, no wheezes, no crackles, with normal breath sounds and good air movement.  Abdominal:  Soft.  Bowel sounds are normal.  No distension and no tenderness.   Musculoskeletal:  No berna and no deformity.  No red or swollen joints anywhere. Pain is noted to the palpation to the right medial knee    Neurological:  Alert and oriented to person, place, and time.  No cranial nerve deficit.  No tongue deviation.  No facial droop.  No slurred speech.   Skin:  Skin is warm and dry.  No rash noted.  No pallor.   Psychiatric:  Normal mood and affect.  Behavior is normal.  Judgment and thought content normal.   Peripheral vascular:  No edema and strong pulses on all 4 extremities.      Labs:    Results from last 7 days   Lab Units 11/03/20  0200 11/02/20  0119 10/31/20  0806   10/27/20  2212   CRP mg/dL 3.52* 5.96* 17.39*  --  7.51*   WBC 10*3/mm3 10.36 9.52 10.53   < >  --    HEMOGLOBIN g/dL 11.6* 11.4* 11.1*   < >  --    HEMATOCRIT % 35.9 35.8 35.5   < >  --    MCV fL 93.0 93.2 93.9   < >  --    MCHC g/dL 32.3 31.8 31.3*   < >  --    PLATELETS 10*3/mm3 213 199 186   < >  --    INR   --   --   --   --  1.25*    < > = values in this interval not displayed.     Results from last 7 days   Lab Units 10/28/20  0435   PH, ARTERIAL pH units 7.432   PO2 ART mm Hg 52.6*   PCO2, ARTERIAL mm Hg 38.2   HCO3 ART mmol/L 25.5     Results from last 7 days   Lab Units 11/03/20  0200 11/02/20  0119 10/31/20  0806  10/29/20  0117 10/27/20  2212 10/27/20  1248   SODIUM mmol/L 138 139 138   < >  --   --  139   POTASSIUM mmol/L 3.8 4.0 4.4   < >  --   --  4.3   MAGNESIUM mg/dL  --   --   --   --  2.2 1.5*  --    CHLORIDE mmol/L 103 106 103   < >  --   --  106   CO2 mmol/L 23.7 24.1 25.8   < >  --   --  21.0*   BUN mg/dL 17 19 22   < >  --   --  23   CREATININE mg/dL 0.76 0.81 0.81   < >  --   --  0.97   EGFR IF NONAFRICN AM mL/min/1.73 75 70 70   < >  --   --  57*   CALCIUM mg/dL 9.6 9.1 9.3   < >  --   --  9.6   GLUCOSE mg/dL 146* 155* 167*   < >  --   --  225*   ALBUMIN g/dL  --   --   --   --   --   --  3.98   BILIRUBIN mg/dL  --   --   --   --   --   --  0.9   ALK PHOS U/L  --   --   --   --   --   --  83   AST (SGOT) U/L  --   --   --   --   --   --  20   ALT (SGPT) U/L  --   --   --   --   --   --  17    < > = values in this interval not displayed.   Estimated Creatinine Clearance: 82.2 mL/min (by C-G formula based on SCr of 0.76 mg/dL).  No results found for: AMMONIA  Results from last 7 days   Lab Units 10/28/20  0507 10/27/20  2212 10/27/20  1504   TROPONIN T ng/mL <0.010 <0.010 <0.010     Results from last 7 days   Lab Units 10/27/20  1248   PROBNP pg/mL 1,479.0*     No results found for: HGBA1C  Lab Results   Component Value Date    TSH 0.574 10/27/2020         Pain Management Panel     There is no  flowsheet data to display.          Blood Culture   Date Value Ref Range Status   10/27/2020 No growth at 5 days  Final   10/27/2020 No growth at 5 days  Final     Urine Culture   Date Value Ref Range Status   10/27/2020 >100,000 CFU/mL Klebsiella aerogenes (A)  Final     No results found for: WOUNDCX  No results found for: STOOLCX              Radiology:  Imaging Results (Last 72 Hours)     ** No results found for the last 72 hours. **            Assessment: Right knee pain with OA and possible gout          Plan: Pt is to continue her current treatment.  No emergent orthopedic need is warranted at this time.  She is to follow up with her family care provider and her orthopedic surgeons in [place].          Jesús Chavez, APRN November 3, 2020 09:59 EST

## 2020-11-03 NOTE — PROGRESS NOTES
Discharge Planning Assessment   [place]     Patient Name: Anika Neri  MRN: [id number]  Today's Date: 11/3/2020    Admit Date: 10/27/2020    Discharge Needs Assessment    No documentation.       Discharge Plan     Row Name 11/03/20 0846       Plan    Plan  CM spoke with pt on this date via phone for f/u. She is kind to speak with, states she is feeling some better, since admission and feels she is ready to go home. She lives in [place], here visiting with dtr, Esther, plans to return back to dtr's residence at [place] with dtr providing transportation.Pt has her own Rollator at bedside and has amb. in her room. She denies any anticipated dc needs and is desiring to go home. CM will cont. to follow and assist as needed.        Continued Care and Services - Admitted Since 10/27/2020    Coordination has not been started for this encounter.         Demographic Summary    No documentation.       Functional Status    No documentation.       Psychosocial    No documentation.       Abuse/Neglect    No documentation.       Legal    No documentation.       Substance Abuse    No documentation.       Patient Forms    No documentation.           Anali Fink RN

## 2020-11-03 NOTE — PROGRESS NOTES
TODAY'S DATE   11/03/20    Chief Complaint: Atrial fibrillation with fast ventricular rate, asymptomatic    SUBJECTIVE:  This is a 70-year-old white female who was admitted for painful knee being investigated at this point.  Incidental finding of atrial fibrillation with fast ventricular rate.  Echocardiogram showed ejection fraction of 60%.  Left atrial size was normal.  Patient has been put on the Eliquis.  He has been put on amiodarone 200 mg daily, metoprolol 37.5 mg twice a day and Cardizem 180 mg daily.  This morning EKG showed ventricular rate between 1 20-1 40.  Blood pressure between 1 20-1 40 systolic.  Patient is known to have hypertension and hyperlipidemia.  She is a non-smoker nondrinker.  She denies any history of CVA or myocardial infarct  PHYSICAL EXAM:    General Appearance:    Alert, cooperative, in no acute distress   Head:    Normocephalic, without obvious abnormality, atraumatic       Neck:   Supple, trachea midline, no JVD   Lungs:     Clear to auscultation,respirations regular, even and                  unlabored    Heart:   Irregular and fast with no murmur   Chest Wall:    No abnormalities observed   Abdomen:     Normal bowel sounds,  non-distended   Extremities:   Moves all extremities well, no edema, no cyanosis, no             redness   Pulses:   Pulses palpable and equal bilaterally   Skin:   No bleeding, bruising or rash   Neurologic:   A & O x 3            No Known Allergies    Past Medical History:   Diagnosis Date   • Arthritis    • Diabetes mellitus (CMS/HCC)    • Gout    • Hypertension        Past Surgical History:   Procedure Laterality Date   • CHOLECYSTECTOMY         Social History     Socioeconomic History   • Marital status:      Spouse name: Not on file   • Number of children: Not on file   • Years of education: Not on file   • Highest education level: Not on file   Tobacco Use   • Smoking status: Never Smoker   • Smokeless tobacco: Never Used   Substance and Sexual  Activity   • Alcohol use: No   • Drug use: No   • Sexual activity: Defer       History reviewed. No pertinent family history.    Patient Active Problem List   Diagnosis   • A-fib (CMS/HCC)        Vital Signs (last 24 hours)       11/02 0700  -  11/03 0659 11/03 0700  -  11/03 1125   Most Recent    Temp (°F) 97.5 -  98.1      98.3     98.3 (36.8)    Heart Rate 95 -  116    107 -  110     110    Resp   20      20     20    /61 -  171/85    131/74 -  144/91     144/91    SpO2 (%) 91 -  95      95     95          LABS:    Results from last 7 days   Lab Units 11/03/20  0200 11/02/20  0119 10/31/20  0806 10/30/20  0148   WBC 10*3/mm3 10.36 9.52 10.53 11.87*   HEMOGLOBIN g/dL 11.6* 11.4* 11.1* 10.9*   PLATELETS 10*3/mm3 213 199 186 139*     Results from last 7 days   Lab Units 11/03/20  0200 11/02/20  0119 10/31/20  0806 10/30/20  0148 10/29/20  1015 10/27/20  1248   SODIUM mmol/L 138 139 138 136 136 139   POTASSIUM mmol/L 3.8 4.0 4.4 4.4 4.5 4.3   CHLORIDE mmol/L 103 106 103 105 104 106   CO2 mmol/L 23.7 24.1 25.8 20.4* 21.5* 21.0*   BUN mg/dL 17 19 22 29* 32* 23   CREATININE mg/dL 0.76 0.81 0.81 1.00 1.17* 0.97   CALCIUM mg/dL 9.6 9.1 9.3 8.8 8.8 9.6   GLUCOSE mg/dL 146* 155* 167* 150* 207* 225*     Results from last 7 days   Lab Units 10/27/20  1248   BILIRUBIN mg/dL 0.9   ALK PHOS U/L 83   AST (SGOT) U/L 20   ALT (SGPT) U/L 17     Results from last 7 days   Lab Units 10/29/20  0117 10/27/20  2212   MAGNESIUM mg/dL 2.2 1.5*     Results from last 7 days   Lab Units 10/27/20  2212   INR  1.25*     Results from last 7 days   Lab Units 10/28/20  0507 10/27/20  2212 10/27/20  1504   TROPONIN T ng/mL <0.010 <0.010 <0.010       No results found for: HGBA1C  Glucose   Date/Time Value Ref Range Status   11/03/2020 0713 142 (H) 70 - 130 mg/dL Final   11/02/2020 1922 167 (H) 70 - 130 mg/dL Final   11/02/2020 1635 138 (H) 70 - 130 mg/dL Final   11/02/2020 1112 188 (H) 70 - 130 mg/dL Final   11/02/2020 0813 145 (H) 70 - 130  mg/dL Final   11/01/2020 1933 183 (H) 70 - 130 mg/dL Final   11/01/2020 1059 151 (H) 70 - 130 mg/dL Final   10/31/2020 2023 172 (H) 70 - 130 mg/dL Final       Hospital Medications:    allopurinol, 100 mg, Oral, Daily  amiodarone, 200 mg, Oral, Q24H  apixaban, 5 mg, Oral, Q12H  cefTRIAXone, 2 g, Intravenous, Q24H  dilTIAZem CD, 180 mg, Oral, Q24H  doxycycline, 100 mg, Intravenous, Q12H  famotidine, 20 mg, Oral, Daily  lactobacillus acidophilus, 1 capsule, Oral, Daily  magic barrier cream, , Topical, 4x Daily  melatonin, 5 mg, Oral, Nightly  metoprolol tartrate, 37.5 mg, Oral, Q12H  oxybutynin XL, 15 mg, Oral, Daily  PARoxetine, 10 mg, Oral, Daily  sodium chloride, 10 mL, Intravenous, Q12H      Pharmacy Consult,         A/P: #1 atrial fibrillation with fast ventricular rate, echocardiogram showed ejection fraction 60%.  Left atrial size was normal.  There were no significant valvular lesions seen.  Durations of the atrial fibrillation is unknown since the patient is completely asymptomatic.  I would increase the amiodarone to 400 mg twice a day.  Digoxin 0.5 mg IV 1 dose would be given.  Patient may be a candidate for JAZMÍN/cardioversion      Remington Munguia MD

## 2020-11-03 NOTE — PROGRESS NOTES
[facility] HOSPITALIST PROGRESS NOTE     Patient Identification:  Name:  Anika Neri  Age:  70 y.o.  Sex:  female  :  1950  MRN:  [id number]  Visit Number:  [id number]  ROOM: [address] [place] [address]     Primary Care Provider:  Provider, No Known    Length of stay in inpatient status:  7    Subjective     Chief Compliant:    Chief Complaint   Patient presents with   • Leg Pain       History of Presenting Illness: 70-year-old female with history of hypertension, diabetes mellitus, obesity, osteoarthritis, gout.  Patient presented with severe right knee pain and ankle pain.  Patient also had found to have new onset atrial fibrillation with RVR.  Patient did have a right lung consolidation in the bases well.  Patient states that the knee is much improved.  Patient does continue to have A. fib with RVR though she is asymptomatic.  Patient is anxious to go home at this time denies any new difficulties.  States she has only minimal cough that is nonproductive and no shortness of breath.  Objective     Current Hospital Meds:allopurinol, 100 mg, Oral, Daily  amiodarone, 400 mg, Oral, Q12H  apixaban, 5 mg, Oral, Q12H  cefTRIAXone, 2 g, Intravenous, Q24H  dilTIAZem CD, 180 mg, Oral, Q24H  doxycycline, 100 mg, Intravenous, Q12H  famotidine, 20 mg, Oral, Daily  lactobacillus acidophilus, 1 capsule, Oral, Daily  magic barrier cream, , Topical, 4x Daily  melatonin, 5 mg, Oral, Nightly  metoprolol tartrate, 37.5 mg, Oral, Q12H  oxybutynin XL, 15 mg, Oral, Daily  PARoxetine, 10 mg, Oral, Daily  sodium chloride, 10 mL, Intravenous, Q12H    Pharmacy Consult,       ----------------------------------------------------------------------------------------------------------------------  Vital Signs:  Temp:  [97.5 °F (36.4 °C)-98.3 °F (36.8 °C)] 98.3 °F (36.8 °C)  Heart Rate:  [] 115  Resp:  [20] 20  BP: (131-159)/(74-98) 159/82  SpO2:  [93 %-95 %] 95 %  on   ;   Device (Oxygen Therapy): room air  Body mass index is 53.96  kg/m².    Wt Readings from Last 3 Encounters:   11/03/20 130 kg (285 lb 9.6 oz)   12/28/19 130 kg (286 lb)   01/28/18 122 kg (270 lb)     Intake & Output (last 3 days)       10/31 0701 - 11/01 0700 11/01 0701 - 11/02 0700 11/02 0701 - 11/03 0700 11/03 0701 - 11/04 0700    P.O. 1040 960 600     I.V. (mL/kg) 996 (7.9)       IV Piggyback  600      Total Intake(mL/kg) 2036 (16.2) 1560 (12.1) 600 (4.6)     Urine (mL/kg/hr) 3650 (1.2) 1175 (0.4) 2450 (0.8)     Stool   0     Total Output 3650 1175 2450     Net -1614 +385 -1850             Urine Unmeasured Occurrence  3 x 3 x     Stool Unmeasured Occurrence   0 x         Diet Regular; Cardiac  ----------------------------------------------------------------------------------------------------------------------  Physical exam:  Constitutional:   Morbidly obese female no acute distress  HEENT: Normocephalic atraumatic  Neck: Supple   Cardiovascular: Irregular irregular, ventricular rate approximately 120  Pulmonary/Chest: Decreased breath sounds but clear  Abdominal: Positive bowel sounds soft.   Musculoskeletal: No arthropathy  Neurological: No focal deficits  Skin: No rash  Peripheral vascular:  Genitourinary:  ----------------------------------------------------------------------------------------------------------------------    Last echocardiogram:  Results for orders placed during the hospital encounter of 10/27/20   Adult Transthoracic Echo Complete W/ Cont if Necessary Per Protocol    Narrative · Estimated left ventricular EF = 60% Left ventricular ejection fraction   appears to be 56 - 60%. Left ventricular systolic function is normal.  · Moderate to severe aortic sclerosis without stenosis  · Mild mitral valve regurgitation is present.  · No previous study to compare  · Mild tricuspid valve regurgitation is present.  · Estimated right ventricular systolic pressure from tricuspid   regurgitation is mildly elevated (35-45 mmHg).         ----------------------------------------------------------------------------------------------------------------------  Results from last 7 days   Lab Units 11/03/20  0200 11/02/20  0119 10/31/20  0806  10/27/20  2212   CRP mg/dL 3.52* 5.96* 17.39*  --  7.51*   WBC 10*3/mm3 10.36 9.52 10.53   < >  --    HEMOGLOBIN g/dL 11.6* 11.4* 11.1*   < >  --    HEMATOCRIT % 35.9 35.8 35.5   < >  --    MCV fL 93.0 93.2 93.9   < >  --    MCHC g/dL 32.3 31.8 31.3*   < >  --    PLATELETS 10*3/mm3 213 199 186   < >  --    INR   --   --   --   --  1.25*    < > = values in this interval not displayed.     Results from last 7 days   Lab Units 10/28/20  0435   PH, ARTERIAL pH units 7.432   PO2 ART mm Hg 52.6*   PCO2, ARTERIAL mm Hg 38.2   HCO3 ART mmol/L 25.5     Results from last 7 days   Lab Units 11/03/20  0200 11/02/20  0119 10/31/20  0806  10/29/20  0117 10/27/20  2212   SODIUM mmol/L 138 139 138   < >  --   --    POTASSIUM mmol/L 3.8 4.0 4.4   < >  --   --    MAGNESIUM mg/dL  --   --   --   --  2.2 1.5*   CHLORIDE mmol/L 103 106 103   < >  --   --    CO2 mmol/L 23.7 24.1 25.8   < >  --   --    BUN mg/dL 17 19 22   < >  --   --    CREATININE mg/dL 0.76 0.81 0.81   < >  --   --    EGFR IF NONAFRICN AM mL/min/1.73 75 70 70   < >  --   --    CALCIUM mg/dL 9.6 9.1 9.3   < >  --   --    GLUCOSE mg/dL 146* 155* 167*   < >  --   --     < > = values in this interval not displayed.   Estimated Creatinine Clearance: 82.2 mL/min (by C-G formula based on SCr of 0.76 mg/dL).  No results found for: AMMONIA  Results from last 7 days   Lab Units 10/28/20  0507 10/27/20  2212 10/27/20  1504   TROPONIN T ng/mL <0.010 <0.010 <0.010             Glucose   Date/Time Value Ref Range Status   11/03/2020 1112 170 (H) 70 - 130 mg/dL Final   11/03/2020 0713 142 (H) 70 - 130 mg/dL Final   11/02/2020 1922 167 (H) 70 - 130 mg/dL Final   11/02/2020 1635 138 (H) 70 - 130 mg/dL Final   11/02/2020 1112 188 (H) 70 - 130 mg/dL Final   11/02/2020 0813 145 (H) 70 -  130 mg/dL Final   11/01/2020 1933 183 (H) 70 - 130 mg/dL Final   11/01/2020 1059 151 (H) 70 - 130 mg/dL Final     Lab Results   Component Value Date    TSH 0.574 10/27/2020     No results found for: PREGTESTUR, PREGSERUM, HCG, HCGQUANT  Pain Management Panel     There is no flowsheet data to display.        Brief Urine Lab Results  (Last result in the past 365 days)      Color   Clarity   Blood   Leuk Est   Nitrite   Protein   CREAT   Urine HCG        10/27/20 1517 Dark Yellow Cloudy Negative Small (1+) Positive Trace             Blood Culture   Date Value Ref Range Status   10/27/2020 No growth at 5 days  Final   10/27/2020 No growth at 5 days  Final     Results from last 7 days   Lab Units 10/27/20  1517   NITRITE UA  Positive*   WBC UA /HPF 13-20*   BACTERIA UA /HPF 2+*   SQUAM EPITHEL UA /HPF 0-2   URINECX  >100,000 CFU/mL Klebsiella aerogenes*     Urine Culture   Date Value Ref Range Status   10/27/2020 >100,000 CFU/mL Klebsiella aerogenes (A)  Final     No results found for: WOUNDCX  No results found for: STOOLCX  Results from last 7 days   Lab Units 11/03/20  0200 11/02/20  0119 10/31/20  0806 10/27/20  2212   CRP mg/dL 3.52* 5.96* 17.39* 7.51*       I have personally looked at the labs and they are summarized above.  ----------------------------------------------------------------------------------------------------------------------  Detailed radiology reports for the last 24 hours:    Imaging Results (Last 24 Hours)     ** No results found for the last 24 hours. **        Final impressions for the last 30 days of radiology reports:    Xr Knee 3 View Right    Result Date: 10/27/2020  1. No acute fracture 2. Extensive degenerative osteoarthritis  This report was finalized on 10/27/2020 2:05 PM by Dr. Sarbjit John MD.      Xr Ankle 3+ View Right    Result Date: 10/28/2020  Arthritic change and soft tissue swelling  This report was finalized on 10/28/2020 8:32 PM by Dr. Sarbjit John MD.      Xr Chest 1  View    Result Date: 10/30/2020  Right-sided consolidation  This report was finalized on 10/30/2020 11:41 AM by Dr. Sarbjit John MD.      Xr Chest 1 View    Result Date: 10/27/2020  Mild CHF  This report was finalized on 10/27/2020 12:57 PM by Dr. Sarbjit John MD.      Us Venous Doppler Lower Extremity Right (duplex)    Result Date: 10/27/2020    Normal right lower extremity duplex venous ultrasound.  This report was finalized on 10/27/2020 1:12 PM by Dr. Jesus Chavez MD.      I have personally looked at the radiology images and read the final radiology report.    Assessment & Plan    Atrial fibrillation with RVR--patient amiodarone has been increased this morning and patient also given digoxin.  Patient was given 5 mg of Lopressor IV x1 as well.  Patient is currently on Cardizem and Lopressor as well.  Continue to monitor for now appreciate cardiologist input    Gout with acute flareup of the right knee--improved    UTI--Rocephin    Right lower lobe consolidation--Rocephin and doxycycline.    VTE Prophylaxis:   Mechanical Order History:     None      Pharmalogical Order History:      Ordered     Dose Route Frequency Stop    10/28/20 1049  apixaban (ELIQUIS) tablet 5 mg      5 mg PO Every 12 Hours Scheduled --    10/27/20 2155  heparin (porcine) 5000 UNIT/ML injection 5,000 Units      5,000 Units IV Once 10/28/20 0009    10/27/20 2155  heparin 79806 units/250 mL (100 units/mL) in 0.45 % NaCl infusion  10 mL/hr,   Status:  Discontinued      8 Units/kg/hr IV Titrated 10/28/20 1049    10/27/20 1938  heparin (porcine) 5000 UNIT/ML injection 5,000 Units  Status:  Discontinued      5,000 Units SC Every 8 Hours Scheduled 10/27/20 2155    10/27/20 2155  heparin (porcine) 5000 UNIT/ML injection 5,000 Units  Status:  Discontinued      5,000 Units IV As Needed 10/28/20 1049    10/27/20 2155  heparin (porcine) 5000 UNIT/ML injection 2,500 Units  Status:  Discontinued      2,500 Units IV As Needed 10/28/20 1049                 The patient is high risk due to the following diagnoses/reasons: A. fib with RVR    Jairon Knox MD  [facility]  11/03/20  12:17 EST

## 2020-11-03 NOTE — PLAN OF CARE
"Goal Outcome Evaluation:  Plan of Care Reviewed With: patient  Progress: no change    Patient has been resting in bed today. Patients heart rate has been in the 130-140's, MD and cardiology aware (see orders). Cardiology suggested intervention (cardioversion and JAZMÍN). When going to COVID swab patient, patient stated she was not having the procedure. Patient was reeducated and was asked if she would like Dr. Munguia to come back in and discuss the surgery further and patient declined. Patient stated she was probably going to go home tomorrow regardless because she is \"ready to go\". Patient is showing no s/s of distress. Patient has no other complaints at this time. Will continue to monitor and provide care.   "

## 2020-11-03 NOTE — PROGRESS NOTES
PROGRESS NOTE         Patient Identification:  Name:  Anika Neri  Age:  70 y.o.  Sex:  female  :  1950  MRN:  [id number]  Visit Number:  [id number]  Primary Care Provider:  Provider, No Known         LOS: 7 days       ----------------------------------------------------------------------------------------------------------------------  Subjective       Chief Complaints:    Leg Pain        Interval History:      Patient resting in bed this morning.  Reports generalized pain and severe right knee pain this morning.  Afebrile, no diarrhea.  WBC normal.  CRP improving at 3.52.    Review of Systems:    Constitutional: no fever, chills and night sweats. No appetite change or unexpected weight change. No fatigue.  Eyes: no eye drainage, itching or redness.  HEENT: no mouth sores, dysphagia or nose bleed.  Respiratory: Positive shortness of breath, cough. No production of sputum.  Cardiovascular: no chest pain, no palpitations, no orthopnea.  Gastrointestinal: no nausea, vomiting or diarrhea. No abdominal pain, hematemesis or rectal bleeding.  Genitourinary: no dysuria or polyuria.  Hematologic/lymphatic: no lymph node abnormalities, no easy bruising or easy bleeding.  Musculoskeletal: Right knee pain.  Skin: No rash and no itching.  Neurological: no loss of consciousness, no seizure, no headache.  Behavioral/Psych: no depression or suicidal ideation.  Endocrine: no hot flashes.  Immunologic: negative.    ----------------------------------------------------------------------------------------------------------------------      Objective       Current [facility] Meds:  allopurinol, 100 mg, Oral, Daily  amiodarone, 200 mg, Oral, Q24H  apixaban, 5 mg, Oral, Q12H  cefTRIAXone, 2 g, Intravenous, Q24H  dilTIAZem CD, 180 mg, Oral, Q24H  doxycycline, 100 mg, Intravenous, Q12H  famotidine, 20 mg, Oral, Daily  lactobacillus acidophilus, 1 capsule, Oral, Daily  magic barrier cream, , Topical, 4x Daily  melatonin,  5 mg, Oral, Nightly  metoprolol tartrate, 37.5 mg, Oral, Q12H  oxybutynin XL, 15 mg, Oral, Daily  PARoxetine, 10 mg, Oral, Daily  sodium chloride, 10 mL, Intravenous, Q12H      Pharmacy Consult,       ----------------------------------------------------------------------------------------------------------------------    Vital Signs:  Temp:  [97.5 °F (36.4 °C)-98.1 °F (36.7 °C)] 98 °F (36.7 °C)  Heart Rate:  [] 107  Resp:  [20] 20  BP: (131-171)/(61-98) 131/74  Mean Arterial Pressure (Non-Invasive) for the past 24 hrs (Last 3 readings):   Noninvasive MAP (mmHg)   11/03/20 0637 119   11/03/20 0247 117   11/02/20 1847 111     SpO2 Percentage    11/02/20 1847 11/03/20 0247 11/03/20 0637   SpO2: 93% 94% 94%     SpO2:  [93 %-95 %] 94 %  on   ;   Device (Oxygen Therapy): room air    Body mass index is 53.96 kg/m².  Wt Readings from Last 3 Encounters:   11/03/20 130 kg (285 lb 9.6 oz)   12/28/19 130 kg (286 lb)   01/28/18 122 kg (270 lb)        Intake/Output Summary (Last 24 hours) at 11/3/2020 0957  Last data filed at 11/3/2020 0400  Gross per 24 hour   Intake 600 ml   Output 2450 ml   Net -1850 ml     Diet Regular; Cardiac  ----------------------------------------------------------------------------------------------------------------------      Physical Exam:    Constitutional:  Well-developed and well-nourished.  No respiratory distress.  Morbidly obese.     HENT:  Head: Normocephalic and atraumatic.  Mouth:  Moist mucous membranes.    Eyes:  Conjunctivae and EOM are normal.  No scleral icterus.  Neck:  Neck supple.  No JVD present.    Cardiovascular:  Normal rate, regular rhythm and normal heart sounds with no murmur. No edema.  Pulmonary/Chest:  No respiratory distress, no wheezes, no crackles, with normal breath sounds and good air movement.  Abdominal:  Soft.  Bowel sounds are normal.  No distension and no tenderness.   Musculoskeletal: Right knee pain with movement.  Neurological:  Alert and oriented to  person, place, and time.  No facial droop.  No slurred speech.   Skin:  Skin is warm and dry.  No rash noted.  No pallor.   Psychiatric:  Normal mood and affect.  Behavior is normal.  ----------------------------------------------------------------------------------------------------------------------  Results from last 7 days   Lab Units 10/28/20  0507 10/27/20  2212 10/27/20  1504   TROPONIN T ng/mL <0.010 <0.010 <0.010     Results from last 7 days   Lab Units 10/27/20  1248   PROBNP pg/mL 1,479.0*       Results from last 7 days   Lab Units 10/28/20  0435   PH, ARTERIAL pH units 7.432   PO2 ART mm Hg 52.6*   PCO2, ARTERIAL mm Hg 38.2   HCO3 ART mmol/L 25.5     Results from last 7 days   Lab Units 11/03/20  0200 11/02/20  0119 10/31/20  0806  10/27/20  2212   CRP mg/dL 3.52* 5.96* 17.39*  --  7.51*   WBC 10*3/mm3 10.36 9.52 10.53   < >  --    HEMOGLOBIN g/dL 11.6* 11.4* 11.1*   < >  --    HEMATOCRIT % 35.9 35.8 35.5   < >  --    MCV fL 93.0 93.2 93.9   < >  --    MCHC g/dL 32.3 31.8 31.3*   < >  --    PLATELETS 10*3/mm3 213 199 186   < >  --    INR   --   --   --   --  1.25*    < > = values in this interval not displayed.     Results from last 7 days   Lab Units 11/03/20  0200 11/02/20  0119 10/31/20  0806  10/29/20  0117 10/27/20  2212 10/27/20  1248   SODIUM mmol/L 138 139 138   < >  --   --  139   POTASSIUM mmol/L 3.8 4.0 4.4   < >  --   --  4.3   MAGNESIUM mg/dL  --   --   --   --  2.2 1.5*  --    CHLORIDE mmol/L 103 106 103   < >  --   --  106   CO2 mmol/L 23.7 24.1 25.8   < >  --   --  21.0*   BUN mg/dL 17 19 22   < >  --   --  23   CREATININE mg/dL 0.76 0.81 0.81   < >  --   --  0.97   EGFR IF NONAFRICN AM mL/min/1.73 75 70 70   < >  --   --  57*   CALCIUM mg/dL 9.6 9.1 9.3   < >  --   --  9.6   GLUCOSE mg/dL 146* 155* 167*   < >  --   --  225*   ALBUMIN g/dL  --   --   --   --   --   --  3.98   BILIRUBIN mg/dL  --   --   --   --   --   --  0.9   ALK PHOS U/L  --   --   --   --   --   --  83   AST (SGOT) U/L   --   --   --   --   --   --  20   ALT (SGPT) U/L  --   --   --   --   --   --  17    < > = values in this interval not displayed.   Estimated Creatinine Clearance: 82.2 mL/min (by C-G formula based on SCr of 0.76 mg/dL).  No results found for: AMMONIA    Glucose   Date/Time Value Ref Range Status   11/03/2020 0713 142 (H) 70 - 130 mg/dL Final   11/02/2020 1922 167 (H) 70 - 130 mg/dL Final   11/02/2020 1635 138 (H) 70 - 130 mg/dL Final   11/02/2020 1112 188 (H) 70 - 130 mg/dL Final   11/02/2020 0813 145 (H) 70 - 130 mg/dL Final   11/01/2020 1933 183 (H) 70 - 130 mg/dL Final   11/01/2020 1059 151 (H) 70 - 130 mg/dL Final   10/31/2020 2023 172 (H) 70 - 130 mg/dL Final     Lab Results   Component Value Date    HGBA1C 8.40 (H) 10/27/2020     Lab Results   Component Value Date    TSH 0.574 10/27/2020       Blood Culture   Date Value Ref Range Status   10/27/2020 No growth at 5 days  Final   10/27/2020 No growth at 5 days  Final     Urine Culture   Date Value Ref Range Status   10/27/2020 >100,000 CFU/mL Klebsiella aerogenes (A)  Final     No results found for: WOUNDCX  No results found for: STOOLCX  No results found for: RESPCX  Pain Management Panel     There is no flowsheet data to display.            ----------------------------------------------------------------------------------------------------------------------  Imaging Results (Last 24 Hours)     ** No results found for the last 24 hours. **          ----------------------------------------------------------------------------------------------------------------------    Assessment/Plan       Assessment/Plan     ASSESSMENT:    1.  Septic arthritis versus gout  2.  Pneumonia  3.  UTI    PLAN:      Patient resting in bed this morning.  Reports generalized pain and severe right knee pain this morning.  Afebrile, no diarrhea.  WBC normal.  CRP improving at 3.52.    Urinalysis positive with culture finalizing as Klebsiella aerogenes.  Blood cultures show no growth  thus far.  COVID-19 PCR negative.  Most recent chest x-ray from 10/30/2020 reports right-sided consolidation.  X-ray of the right ankle reports arthritic change and soft tissue swelling.  X-ray of the right knee reports no acute fracture, extensive degenerative osteoarthritis.  Venous duplex of the right lower extremity reports no evidence of DVT.    Due to patient's range of motion abilities it is less likely that patient is suffering from septic arthritis, but rather gout flareup along with extensive osteoarthritis.  Recommend orthopedic reevaluation.       Recommend to continue Rocephin 2 g IV every 24 hours and doxycycline 100 mg IV every 12 hours.  We will continue to follow closely and adjust antibiotic therapy as needed.    Code Status:   Code Status and Medical Interventions:   Ordered at: 10/27/20 2502     Code Status:    CPR     Medical Interventions (Level of Support Prior to Arrest):    Full       Yomaira Gao, MILLER  11/03/20  09:57 EST

## 2020-11-03 NOTE — NURSING NOTE
Notified by telemetry that pt's HR was staying up in 120s-130s, occasionally comes down but comes right back up. Dr. Slick COOK aware. See orders. Will cont to monitor and follow plan of care.

## 2020-11-03 NOTE — NURSING NOTE
Attempted to go into patients room and obtain a covid swab for procedure tomorrow and patient stated that she was not having the procedure and didn't want to discuss it further with me. I suggested she talk to the cardiologist further about it and she declined. Dr. Munguia is aware and stated to cancel the NPO diet and cardioversion and to tell the patient we will do the best we can.

## 2020-11-04 VITALS
RESPIRATION RATE: 20 BRPM | TEMPERATURE: 98.2 F | BODY MASS INDEX: 51.88 KG/M2 | OXYGEN SATURATION: 96 % | DIASTOLIC BLOOD PRESSURE: 93 MMHG | HEART RATE: 92 BPM | SYSTOLIC BLOOD PRESSURE: 153 MMHG | WEIGHT: 274.8 LBS | HEIGHT: 61 IN

## 2020-11-04 LAB
ANION GAP SERPL CALCULATED.3IONS-SCNC: 10.2 MMOL/L (ref 5–15)
BASOPHILS # BLD AUTO: 0.05 10*3/MM3 (ref 0–0.2)
BASOPHILS NFR BLD AUTO: 0.5 % (ref 0–1.5)
BUN SERPL-MCNC: 13 MG/DL (ref 8–23)
BUN/CREAT SERPL: 22 (ref 7–25)
CALCIUM SPEC-SCNC: 9.8 MG/DL (ref 8.6–10.5)
CHLORIDE SERPL-SCNC: 102 MMOL/L (ref 98–107)
CO2 SERPL-SCNC: 25.8 MMOL/L (ref 22–29)
CREAT SERPL-MCNC: 0.59 MG/DL (ref 0.57–1)
CRP SERPL-MCNC: 2.33 MG/DL (ref 0–0.5)
DEPRECATED RDW RBC AUTO: 44.5 FL (ref 37–54)
EOSINOPHIL # BLD AUTO: 0.29 10*3/MM3 (ref 0–0.4)
EOSINOPHIL NFR BLD AUTO: 3 % (ref 0.3–6.2)
ERYTHROCYTE [DISTWIDTH] IN BLOOD BY AUTOMATED COUNT: 13.2 % (ref 12.3–15.4)
GFR SERPL CREATININE-BSD FRML MDRD: 100 ML/MIN/1.73
GLUCOSE BLDC GLUCOMTR-MCNC: 155 MG/DL (ref 70–130)
GLUCOSE SERPL-MCNC: 151 MG/DL (ref 65–99)
HCT VFR BLD AUTO: 39 % (ref 34–46.6)
HGB BLD-MCNC: 12.4 G/DL (ref 12–15.9)
IMM GRANULOCYTES # BLD AUTO: 0.05 10*3/MM3 (ref 0–0.05)
IMM GRANULOCYTES NFR BLD AUTO: 0.5 % (ref 0–0.5)
LYMPHOCYTES # BLD AUTO: 2.33 10*3/MM3 (ref 0.7–3.1)
LYMPHOCYTES NFR BLD AUTO: 24.4 % (ref 19.6–45.3)
MCH RBC QN AUTO: 29.7 PG (ref 26.6–33)
MCHC RBC AUTO-ENTMCNC: 31.8 G/DL (ref 31.5–35.7)
MCV RBC AUTO: 93.3 FL (ref 79–97)
MONOCYTES # BLD AUTO: 0.64 10*3/MM3 (ref 0.1–0.9)
MONOCYTES NFR BLD AUTO: 6.7 % (ref 5–12)
NEUTROPHILS NFR BLD AUTO: 6.18 10*3/MM3 (ref 1.7–7)
NEUTROPHILS NFR BLD AUTO: 64.9 % (ref 42.7–76)
NRBC BLD AUTO-RTO: 0 /100 WBC (ref 0–0.2)
PLATELET # BLD AUTO: 229 10*3/MM3 (ref 140–450)
PMV BLD AUTO: 9.8 FL (ref 6–12)
POTASSIUM SERPL-SCNC: 3.9 MMOL/L (ref 3.5–5.2)
RBC # BLD AUTO: 4.18 10*6/MM3 (ref 3.77–5.28)
SODIUM SERPL-SCNC: 138 MMOL/L (ref 136–145)
WBC # BLD AUTO: 9.54 10*3/MM3 (ref 3.4–10.8)

## 2020-11-04 PROCEDURE — 82962 GLUCOSE BLOOD TEST: CPT

## 2020-11-04 PROCEDURE — 93010 ELECTROCARDIOGRAM REPORT: CPT | Performed by: INTERNAL MEDICINE

## 2020-11-04 PROCEDURE — 80048 BASIC METABOLIC PNL TOTAL CA: CPT | Performed by: INTERNAL MEDICINE

## 2020-11-04 PROCEDURE — 85025 COMPLETE CBC W/AUTO DIFF WBC: CPT | Performed by: FAMILY MEDICINE

## 2020-11-04 PROCEDURE — 86140 C-REACTIVE PROTEIN: CPT | Performed by: INTERNAL MEDICINE

## 2020-11-04 PROCEDURE — 99232 SBSQ HOSP IP/OBS MODERATE 35: CPT | Performed by: INTERNAL MEDICINE

## 2020-11-04 PROCEDURE — 99239 HOSP IP/OBS DSCHRG MGMT >30: CPT | Performed by: STUDENT IN AN ORGANIZED HEALTH CARE EDUCATION/TRAINING PROGRAM

## 2020-11-04 PROCEDURE — 93005 ELECTROCARDIOGRAM TRACING: CPT | Performed by: STUDENT IN AN ORGANIZED HEALTH CARE EDUCATION/TRAINING PROGRAM

## 2020-11-04 RX ORDER — METOPROLOL TARTRATE 50 MG/1
50 TABLET, FILM COATED ORAL EVERY 12 HOURS SCHEDULED
Status: DISCONTINUED | OUTPATIENT
Start: 2020-11-04 | End: 2020-11-04 | Stop reason: HOSPADM

## 2020-11-04 RX ADMIN — Medication 1 CAPSULE: at 08:29

## 2020-11-04 RX ADMIN — PAROXETINE HYDROCHLORIDE 10 MG: 20 TABLET, FILM COATED ORAL at 08:29

## 2020-11-04 RX ADMIN — METOPROLOL TARTRATE 37.5 MG: 25 TABLET, FILM COATED ORAL at 08:29

## 2020-11-04 RX ADMIN — Medication: at 08:29

## 2020-11-04 RX ADMIN — FAMOTIDINE 20 MG: 20 TABLET, FILM COATED ORAL at 08:29

## 2020-11-04 RX ADMIN — OXYBUTYNIN CHLORIDE 15 MG: 5 TABLET, EXTENDED RELEASE ORAL at 08:29

## 2020-11-04 RX ADMIN — DOXYCYCLINE 100 MG: 100 INJECTION, POWDER, LYOPHILIZED, FOR SOLUTION INTRAVENOUS at 03:36

## 2020-11-04 RX ADMIN — AMIODARONE HYDROCHLORIDE 400 MG: 200 TABLET ORAL at 08:29

## 2020-11-04 RX ADMIN — APIXABAN 5 MG: 5 TABLET, FILM COATED ORAL at 08:29

## 2020-11-04 RX ADMIN — ALLOPURINOL 100 MG: 100 TABLET ORAL at 08:29

## 2020-11-04 RX ADMIN — DILTIAZEM HYDROCHLORIDE 180 MG: 180 CAPSULE, EXTENDED RELEASE ORAL at 08:29

## 2020-11-04 RX ADMIN — OXYCODONE HYDROCHLORIDE AND ACETAMINOPHEN 1 TABLET: 10; 325 TABLET ORAL at 08:29

## 2020-11-04 NOTE — NURSING NOTE
Spoke with J Luis ALLEN [address] south to preop patient for todays cardioversion procedure. J Luis ALLEN stated that the patient is refusing the cardioversion procedure and did not sign consent. J Luis ALLEN also stated that if the patient became agreeable for the cardioversion later on that he would contact the cath lab so we can proceed with cardioversion procedure.

## 2020-11-04 NOTE — PROGRESS NOTES
PROGRESS NOTE         Patient Identification:  Name:  Anika Neri  Age:  70 y.o.  Sex:  female  :  1950  MRN:  [id number]  Visit Number:  [id number]  Primary Care Provider:  Provider, No Known         LOS: 8 days       ----------------------------------------------------------------------------------------------------------------------  Subjective       Chief Complaints:    Leg Pain        Interval History:      Patient resting comfortably in bed.  Reports continued right knee pain.  Afebrile, no diarrhea.  WBC normal.  CRP improving at 2.23.    Review of Systems:    Constitutional: no fever, chills and night sweats. No appetite change or unexpected weight change. No fatigue.  Eyes: no eye drainage, itching or redness.  HEENT: no mouth sores, dysphagia or nose bleed.  Respiratory: Positive shortness of breath, cough. No production of sputum.  Cardiovascular: no chest pain, no palpitations, no orthopnea.  Gastrointestinal: no nausea, vomiting or diarrhea. No abdominal pain, hematemesis or rectal bleeding.  Genitourinary: no dysuria or polyuria.  Hematologic/lymphatic: no lymph node abnormalities, no easy bruising or easy bleeding.  Musculoskeletal: Right knee pain.  Skin: No rash and no itching.  Neurological: no loss of consciousness, no seizure, no headache.  Behavioral/Psych: no depression or suicidal ideation.  Endocrine: no hot flashes.  Immunologic: negative.    ----------------------------------------------------------------------------------------------------------------------      Objective       Current [facility] Meds:  allopurinol, 100 mg, Oral, Daily  amiodarone, 400 mg, Oral, Q12H  apixaban, 5 mg, Oral, Q12H  cefTRIAXone, 2 g, Intravenous, Q24H  dilTIAZem CD, 180 mg, Oral, Q24H  doxycycline, 100 mg, Intravenous, Q12H  famotidine, 20 mg, Oral, Daily  lactobacillus acidophilus, 1 capsule, Oral, Daily  magic barrier cream, , Topical, 4x Daily  melatonin, 5 mg, Oral, Nightly  metoprolol  tartrate, 50 mg, Oral, Q12H  oxybutynin XL, 15 mg, Oral, Daily  PARoxetine, 10 mg, Oral, Daily  sodium chloride, 10 mL, Intravenous, Q12H      Pharmacy Consult,       ----------------------------------------------------------------------------------------------------------------------    Vital Signs:  Temp:  [97.7 °F (36.5 °C)-98.4 °F (36.9 °C)] 98.2 °F (36.8 °C)  Heart Rate:  [] 92  Resp:  [18-20] 20  BP: (135-176)/(82-93) 153/93  Mean Arterial Pressure (Non-Invasive) for the past 24 hrs (Last 3 readings):   Noninvasive MAP (mmHg)   11/04/20 0950 113   11/04/20 0644 131   11/04/20 0306 136     SpO2 Percentage    11/04/20 0306 11/04/20 0644 11/04/20 0950   SpO2: 95% 97% 96%     SpO2:  [91 %-97 %] 96 %  on   ;   Device (Oxygen Therapy): room air    Body mass index is 51.92 kg/m².  Wt Readings from Last 3 Encounters:   11/04/20 125 kg (274 lb 12.8 oz)   12/28/19 130 kg (286 lb)   01/28/18 122 kg (270 lb)        Intake/Output Summary (Last 24 hours) at 11/4/2020 1025  Last data filed at 11/4/2020 0905  Gross per 24 hour   Intake 693.94 ml   Output 1450 ml   Net -756.06 ml     Diet Regular; Cardiac  ----------------------------------------------------------------------------------------------------------------------      Physical Exam:    Constitutional:  Well-developed and well-nourished.  No respiratory distress.  Morbidly obese.     HENT:  Head: Normocephalic and atraumatic.  Mouth:  Moist mucous membranes.    Eyes:  Conjunctivae and EOM are normal.  No scleral icterus.  Neck:  Neck supple.  No JVD present.    Cardiovascular:  Normal rate, regular rhythm and normal heart sounds with no murmur. No edema.  Pulmonary/Chest:  No respiratory distress, no wheezes, no crackles, with normal breath sounds and good air movement.  Abdominal:  Soft.  Bowel sounds are normal.  No distension and no tenderness.   Musculoskeletal: Right knee pain with movement.  Neurological:  Alert and oriented to person, place, and time.  No  facial droop.  No slurred speech.   Skin:  Skin is warm and dry.  No rash noted.  No pallor.   Psychiatric:  Normal mood and affect.  Behavior is normal.  ----------------------------------------------------------------------------------------------------------------------                Results from last 7 days   Lab Units 11/04/20 0408 11/03/20 1330 11/03/20 0200 11/02/20  0119   CRP mg/dL 2.33*  --  3.52* 5.96*   WBC 10*3/mm3 9.54  --  10.36 9.52   HEMOGLOBIN g/dL 12.4  --  11.6* 11.4*   HEMATOCRIT % 39.0  --  35.9 35.8   MCV fL 93.3  --  93.0 93.2   MCHC g/dL 31.8  --  32.3 31.8   PLATELETS 10*3/mm3 229  --  213 199   INR   --  1.37*  --   --      Results from last 7 days   Lab Units 11/04/20  0408 11/03/20  1330 11/03/20  0200  10/29/20  0117   SODIUM mmol/L 138 139 138   < >  --    POTASSIUM mmol/L 3.9 3.8 3.8   < >  --    MAGNESIUM mg/dL  --   --   --   --  2.2   CHLORIDE mmol/L 102 103 103   < >  --    CO2 mmol/L 25.8 25.6 23.7   < >  --    BUN mg/dL 13 15 17   < >  --    CREATININE mg/dL 0.59 0.64 0.76   < >  --    EGFR IF NONAFRICN AM mL/min/1.73 100 91 75   < >  --    CALCIUM mg/dL 9.8 10.1 9.6   < >  --    GLUCOSE mg/dL 151* 162* 146*   < >  --     < > = values in this interval not displayed.   Estimated Creatinine Clearance: 80.1 mL/min (by C-G formula based on SCr of 0.59 mg/dL).  No results found for: AMMONIA    Glucose   Date/Time Value Ref Range Status   11/04/2020 0625 155 (H) 70 - 130 mg/dL Final   11/03/2020 1906 143 (H) 70 - 130 mg/dL Final   11/03/2020 1638 143 (H) 70 - 130 mg/dL Final   11/03/2020 1112 170 (H) 70 - 130 mg/dL Final   11/03/2020 0713 142 (H) 70 - 130 mg/dL Final   11/02/2020 1922 167 (H) 70 - 130 mg/dL Final   11/02/2020 1635 138 (H) 70 - 130 mg/dL Final   11/02/2020 1112 188 (H) 70 - 130 mg/dL Final     Lab Results   Component Value Date    HGBA1C 8.40 (H) 10/27/2020     Lab Results   Component Value Date    TSH 0.574 10/27/2020       Blood Culture   Date Value Ref Range  Status   10/27/2020 No growth at 5 days  Final   10/27/2020 No growth at 5 days  Final     Urine Culture   Date Value Ref Range Status   10/27/2020 >100,000 CFU/mL Klebsiella aerogenes (A)  Final     No results found for: WOUNDCX  No results found for: STOOLCX  No results found for: RESPCX  Pain Management Panel     There is no flowsheet data to display.            ----------------------------------------------------------------------------------------------------------------------  Imaging Results (Last 24 Hours)     ** No results found for the last 24 hours. **          ----------------------------------------------------------------------------------------------------------------------    Assessment/Plan       Assessment/Plan     ASSESSMENT:    1.  Septic arthritis versus gout  2.  Pneumonia  3.  UTI    PLAN:    Patient resting comfortably in bed.  Reports continued right knee pain.  Afebrile, no diarrhea.  WBC normal.  CRP improving at 2.23.    Urinalysis positive with culture finalizing as Klebsiella aerogenes.  Blood cultures show no growth thus far.  COVID-19 PCR negative.  Most recent chest x-ray from 10/30/2020 reports right-sided consolidation.  X-ray of the right ankle reports arthritic change and soft tissue swelling.  X-ray of the right knee reports no acute fracture, extensive degenerative osteoarthritis.  Venous duplex of the right lower extremity reports no evidence of DVT.    Due to patient's range of motion abilities it is less likely that patient is suffering from septic arthritis, but rather gout flareup along with extensive osteoarthritis.  Recommend orthopedic reevaluation.       While hospitalized recommend to continue Rocephin and doxycycline.     If patient is refusing orthopedic evaluation and arthrodesis to rule out septic arthritis, recommend to Omnicef 300 mg p.o. twice daily and doxycycline 100 mg p.o. twice daily to continue until follow-up with PCP.  As patient states she wants to  follow-up with PCP and establish with orthopedic surgeon in [place]. We will continue to follow closely and adjust antibiotic therapy as needed.    Code Status:   Code Status and Medical Interventions:   Ordered at: 10/27/20 1654     Code Status:    CPR     Medical Interventions (Level of Support Prior to Arrest):    Full       MILLER Blake  11/04/20  10:25 EST

## 2020-11-04 NOTE — PLAN OF CARE
Goal Outcome Evaluation:  Plan of Care Reviewed With: patient  Progress: no change  Outcome Summary: Pt resting in bed this shift with no complaints thus far. Amiodarone and Metoprolol PO given this shift and has kept HR WNL. Pt hasn't complained of any right knee pain this shift. Pt states she is going home tomorrow no matter what. VSS. No s/s of acute distress noted. Will cont to monitor and follow plan of care.

## 2020-11-04 NOTE — NURSING NOTE
Patient says she is going home today regardless of whether she is discharged. Pili BHATT spoke with patient about cardioversion, however, patient still refused. I asked the patient if she would wait until her hospitalist doctor could come speak with her, however, she refused saying her ride was almost here. Notified Dr. Ramirez of this and since patient has said multiple times for the past few days that she is going home regardless, to go ahead and print the AMA papers for the patient. Educated patient on importance of getting a cardioversion procedure done, however, patient still refuses. AMA papers were given to patient and signed.

## 2020-11-04 NOTE — PROGRESS NOTES
LOS: 8 days     Name: Anika Neri  Age/Sex: 70 y.o. female  :  1950        PCP: Provider, No Known    Active Problems:    A-casper (CMS/[facility])      Admission Information: Anika Neri is a 70 y.o. female with a past medical history significant for hypertension, diabetes, obesity, gout and osteoarthritis.  She presented to the ED with severe right knee and ankle pain.  She also reported increased urinary frequency and incontinence.  In the ED she was noted to be in atrial fibrillation with RVR.  Cardizem drip was initiated.  She was admitted for further evaluation and management.  Cardiology was consulted for atrial fibrillation with RVR    Chief Complaint: Follow-up atrial fibrillation with RVR    Interval history: Patient reportedly had increase of atrial fibrillation to the 150s when she got up earlier today and went to the bathroom.    Patient was seen and examined.  She states that she is wanting to go home.  According to ALEJANDRO Dietz, the patient has not been discharged by her admitting physician.  The patient states that she feels fine and wishes to leave the hospital.  Patient was scheduled for cardioversion today, however she has refused this.    Subjective       Vital Signs  Vital Signs (last 72 hrs)        07  -   0659 700  -   0659 700  -   0659 700  -   1015   Most Recent    Temp (°F) 97 -  98    97.5 -  98.1    97.7 -  98.4      98.2     98.2 (36.8)    Heart Rate 79 -  146    95 -  116    91 -  115      92     92    Resp   20      20    18 -  20      20     20    /53 -  149/99    131/61 -  171/85    131/74 -  176/91      153/93     153/93    SpO2 (%) 90 -  100    91 -  95    91 -  97      96     96        Temp:  [97.7 °F (36.5 °C)-98.4 °F (36.9 °C)] 98.2 °F (36.8 °C)  Heart Rate:  [] 92  Resp:  [18-20] 20  BP: (135-176)/(82-93) 153/93  Body mass index is 51.92 kg/m².      Intake/Output Summary (Last 24 hours) at 2020 1015  Last  data filed at 11/4/2020 0905  Gross per 24 hour   Intake 693.94 ml   Output 1450 ml   Net -756.06 ml       Vitals signs reviewed.   Constitutional:       Appearance: Well-developed.   Neck:      Vascular: No carotid bruit or JVD.   Pulmonary:      Effort: Pulmonary effort is normal. No respiratory distress.      Breath sounds: Normal breath sounds. No wheezing. No rales.   Cardiovascular:      Tachycardia present. Irregularly irregular rhythm.   Skin:     General: Skin is warm and dry.   Neurological:      Mental Status: Alert and oriented to person, place, and time.         Telemetry: Atrial fibrillation        Results Review:     Results from last 7 days   Lab Units 11/04/20  0408 11/03/20  0200 11/02/20  0119 10/31/20  0806 10/30/20  0148   WBC 10*3/mm3 9.54 10.36 9.52 10.53 11.87*   HEMOGLOBIN g/dL 12.4 11.6* 11.4* 11.1* 10.9*   PLATELETS 10*3/mm3 229 213 199 186 139*     Results from last 7 days   Lab Units 11/04/20  0408 11/03/20  1330 11/03/20  0200 11/02/20  0119 10/31/20  0806 10/30/20  0148 10/29/20  1015   SODIUM mmol/L 138 139 138 139 138 136 136   POTASSIUM mmol/L 3.9 3.8 3.8 4.0 4.4 4.4 4.5   CHLORIDE mmol/L 102 103 103 106 103 105 104   CO2 mmol/L 25.8 25.6 23.7 24.1 25.8 20.4* 21.5*   BUN mg/dL 13 15 17 19 22 29* 32*   CREATININE mg/dL 0.59 0.64 0.76 0.81 0.81 1.00 1.17*   CALCIUM mg/dL 9.8 10.1 9.6 9.1 9.3 8.8 8.8   GLUCOSE mg/dL 151* 162* 146* 155* 167* 150* 207*           Results from last 7 days   Lab Units 11/03/20  1330   INR  1.37*       I reviewed the patient's new clinical results.  I reviewed the patient's new imaging results and agree with the interpretation.  I personally viewed and interpreted the patient's EKG/Telemetry data      Medication Review:   allopurinol, 100 mg, Oral, Daily  amiodarone, 400 mg, Oral, Q12H  apixaban, 5 mg, Oral, Q12H  cefTRIAXone, 2 g, Intravenous, Q24H  dilTIAZem CD, 180 mg, Oral, Q24H  doxycycline, 100 mg, Intravenous, Q12H  famotidine, 20 mg, Oral,  Daily  lactobacillus acidophilus, 1 capsule, Oral, Daily  magic barrier cream, , Topical, 4x Daily  melatonin, 5 mg, Oral, Nightly  metoprolol tartrate, 37.5 mg, Oral, Q12H  oxybutynin XL, 15 mg, Oral, Daily  PARoxetine, 10 mg, Oral, Daily  sodium chloride, 10 mL, Intravenous, Q12H      Pharmacy Consult,         Assessment:  1. Atrial fibrillation with RVR, [id number] is at least 4 for hypertension, diabetes, age, and sex.  2. Moderate to severe aortic valve sclerosis  3. Sepsis secondary to UTI, treatment per primary  4. Gout, acute flareup, followed per primary and orthopedics.      Recommendations:  1. Discussed cardioversion with the patient today.  I explained to her the procedure and why it would be beneficial for her to have it.  She is still not interested in pursuing cardioversion at this time.  I did convince her to come and see me in the outpatient clinic and at that time we can discuss cardioversion further if it is necessary.  2. Patient is on Eliquis 5 mg twice daily for stroke prophylaxis in the setting of atrial fibrillation.  She is on amiodarone 400 mg twice daily, this can be titrated to 200 mg twice daily in 1 week and then 200 mg daily until she is seen in our office.  Continue metoprolol 50 mg twice daily.    I discussed the patients findings and my recommendations with patient and family      Pili Canchola, APRN  11/04/20  10:15 EST    Addendum:

## 2020-11-05 LAB
QT INTERVAL: 374 MS
QTC INTERVAL: 452 MS

## 2020-11-06 NOTE — DISCHARGE SUMMARY
[facility] HOSPITALISTS DISCHARGE SUMMARY    Patient Identification:  Name:  Anika Neri  Age:  70 y.o.  Sex:  female  :  1950  MRN:  [id number]  Visit Number:  [id number]    Date of Admission: 10/27/2020  Date of Discharge:  2020     PCP: Provider, No Known    DISCHARGE DIAGNOSIS    Atrial Fibrillation with RVR  Gout of the right knee  Urinary Tract Infection  Right lower lobe pneumonia  Essential Hypertension  Acute Hypoxic Respiratory Failure  Diabetes Mellitus Type 2  Anxiety/Depression  Chronic Pain  Obesity  CONSULTS     Cardiology  Orthopedics  Infectious Disease  PROCEDURES PERFORMED      HOSPITAL COURSE  Patient is a 70 y.o. female presented to [facility] complaining of right knee and ankle pain.  Please see the admitting history and physical for further details.      Patient was seen and evaluated and admitted for new onset atrial fibrillation with RVR, UTI, right knee pain.  Patient was seen in consultation by Cardiology who ultimately recommended JAZMÍN with Cardioversion however the patient refused and she continued on Cardizem and lopressor.  Patient also seen in consultation for right knee pain possibly due to gout vs septic arthritis who felt it was most likely due to gout but recommended arthocentesis to rule out however patient refused this as well. She was also found to have a right lower lobe consolidation concerning for pneumonia as well as a urinary tract infection.  She was receiving Rocephin and Doxycycline per ID recommendations as patient was refusing work up as above and was threatening to leave against medical advice.  Initially patient agreed to stay however on the day of her departure before she could be seen and follow up and prescriptions could be arranged the patient signed out against medical advice on 20.  Due to the patient signing out and leaving before she could be seen no prescriptions were able to be provided and no follow up  arranged.      VITAL SIGNS:        on   ;        Body mass index is 51.92 kg/m².  Wt Readings from Last 3 Encounters:   11/04/20 125 kg (274 lb 12.8 oz)   12/28/19 130 kg (286 lb)   01/28/18 122 kg (270 lb)       PHYSICAL EXAM:    A physical exam was unable to be performed due to the patient signing out of the hospital against medical advice.     DISCHARGE DISPOSITION     Left Against Medical Advice  Stable    DISCHARGE MEDICATIONS:     Discharge Medications      ASK your doctor about these medications      Instructions Start Date   allopurinol 100 MG tablet  Commonly known as: ZYLOPRIM  Ask about: Which instructions should I use?   100 mg, Oral, Daily      diazePAM 10 MG tablet  Commonly known as: VALIUM   5 mg, Oral, 2 Times Daily PRN      lisinopril 20 MG tablet  Commonly known as: PRINIVIL,ZESTRIL   20 mg, Oral, Daily      oxybutynin XL 15 MG 24 hr tablet  Commonly known as: DITROPAN XL   15 mg, Oral, Daily      oxyCODONE-acetaminophen  MG per tablet  Commonly known as: PERCOCET   1 tablet, Oral, Every 6 Hours PRN      PARoxetine 10 MG tablet  Commonly known as: PAXIL   10 mg, Oral, Every Morning      SITagliptin 100 MG tablet  Commonly known as: JANUVIA   100 mg, Oral, Daily               Follow-up Information     Provider, No Known .    [facility]  [facility] [place] [address] [phone]                    TEST  RESULTS PENDING AT DISCHARGE       CODE STATUS  Code Status and Medical Interventions:   Ordered at: 10/27/20 1653     Code Status:    CPR     Medical Interventions (Level of Support Prior to Arrest):    Terrence Ramirez DO  [facility] Hospitalist  11/05/20  21:51 EST    Please note that this discharge summary required more than 30 minutes to complete.

## 2020-11-27 ENCOUNTER — HOSPITAL ENCOUNTER (INPATIENT)
Facility: HOSPITAL | Age: 70
LOS: 4 days | Discharge: HOME | End: 2020-12-01
Attending: STUDENT IN AN ORGANIZED HEALTH CARE EDUCATION/TRAINING PROGRAM | Admitting: HOSPITALIST

## 2020-11-27 ENCOUNTER — APPOINTMENT (OUTPATIENT)
Dept: NUCLEAR MEDICINE | Facility: HOSPITAL | Age: 70
End: 2020-11-27

## 2020-11-27 ENCOUNTER — APPOINTMENT (OUTPATIENT)
Dept: GENERAL RADIOLOGY | Facility: HOSPITAL | Age: 70
End: 2020-11-27

## 2020-11-27 ENCOUNTER — APPOINTMENT (OUTPATIENT)
Dept: ULTRASOUND IMAGING | Facility: HOSPITAL | Age: 70
End: 2020-11-27

## 2020-11-27 DIAGNOSIS — M15.9 OSTEOARTHRITIS OF MULTIPLE JOINTS, UNSPECIFIED OSTEOARTHRITIS TYPE: ICD-10-CM

## 2020-11-27 DIAGNOSIS — I48.91 ATRIAL FIBRILLATION WITH RAPID VENTRICULAR RESPONSE (HCC): ICD-10-CM

## 2020-11-27 DIAGNOSIS — M79.605 PAIN OF LEFT LOWER EXTREMITY: Primary | ICD-10-CM

## 2020-11-27 LAB
ALBUMIN SERPL-MCNC: 3.69 G/DL (ref 3.5–5.2)
ALBUMIN/GLOB SERPL: 1 G/DL
ALP SERPL-CCNC: 74 U/L (ref 39–117)
ALT SERPL W P-5'-P-CCNC: 16 U/L (ref 1–33)
ANION GAP SERPL CALCULATED.3IONS-SCNC: 12.6 MMOL/L (ref 5–15)
ANION GAP SERPL CALCULATED.3IONS-SCNC: 12.6 MMOL/L (ref 5–15)
APTT PPP: 32.3 SECONDS (ref 25.6–35.3)
AST SERPL-CCNC: 23 U/L (ref 1–32)
BACTERIA UR QL AUTO: ABNORMAL /HPF
BASOPHILS # BLD AUTO: 0.03 10*3/MM3 (ref 0–0.2)
BASOPHILS # BLD AUTO: 0.06 10*3/MM3 (ref 0–0.2)
BASOPHILS NFR BLD AUTO: 0.3 % (ref 0–1.5)
BASOPHILS NFR BLD AUTO: 0.5 % (ref 0–1.5)
BILIRUB SERPL-MCNC: 1.2 MG/DL (ref 0–1.2)
BILIRUB UR QL STRIP: ABNORMAL
BUN SERPL-MCNC: 33 MG/DL (ref 8–23)
BUN SERPL-MCNC: 34 MG/DL (ref 8–23)
BUN/CREAT SERPL: 22.6 (ref 7–25)
BUN/CREAT SERPL: 27 (ref 7–25)
CALCIUM SPEC-SCNC: 9.2 MG/DL (ref 8.6–10.5)
CALCIUM SPEC-SCNC: 9.2 MG/DL (ref 8.6–10.5)
CHLORIDE SERPL-SCNC: 100 MMOL/L (ref 98–107)
CHLORIDE SERPL-SCNC: 104 MMOL/L (ref 98–107)
CK SERPL-CCNC: 323 U/L (ref 20–180)
CLARITY UR: ABNORMAL
CO2 SERPL-SCNC: 21.4 MMOL/L (ref 22–29)
CO2 SERPL-SCNC: 22.4 MMOL/L (ref 22–29)
COLOR UR: ABNORMAL
CREAT SERPL-MCNC: 1.26 MG/DL (ref 0.57–1)
CREAT SERPL-MCNC: 1.46 MG/DL (ref 0.57–1)
D DIMER PPP FEU-MCNC: 2.78 MCGFEU/ML (ref 0–0.5)
D-LACTATE SERPL-SCNC: 1.9 MMOL/L (ref 0.5–2)
DEPRECATED RDW RBC AUTO: 49.2 FL (ref 37–54)
DEPRECATED RDW RBC AUTO: 50.2 FL (ref 37–54)
EOSINOPHIL # BLD AUTO: 0.03 10*3/MM3 (ref 0–0.4)
EOSINOPHIL # BLD AUTO: 0.05 10*3/MM3 (ref 0–0.4)
EOSINOPHIL NFR BLD AUTO: 0.3 % (ref 0.3–6.2)
EOSINOPHIL NFR BLD AUTO: 0.5 % (ref 0.3–6.2)
ERYTHROCYTE [DISTWIDTH] IN BLOOD BY AUTOMATED COUNT: 14.3 % (ref 12.3–15.4)
ERYTHROCYTE [DISTWIDTH] IN BLOOD BY AUTOMATED COUNT: 14.5 % (ref 12.3–15.4)
FLUAV RNA RESP QL NAA+PROBE: NOT DETECTED
FLUBV RNA RESP QL NAA+PROBE: NOT DETECTED
GFR SERPL CREATININE-BSD FRML MDRD: 35 ML/MIN/1.73
GFR SERPL CREATININE-BSD FRML MDRD: 42 ML/MIN/1.73
GLOBULIN UR ELPH-MCNC: 3.8 GM/DL
GLUCOSE BLDC GLUCOMTR-MCNC: 236 MG/DL (ref 70–130)
GLUCOSE BLDC GLUCOMTR-MCNC: 256 MG/DL (ref 70–130)
GLUCOSE SERPL-MCNC: 273 MG/DL (ref 65–99)
GLUCOSE SERPL-MCNC: 291 MG/DL (ref 65–99)
GLUCOSE UR STRIP-MCNC: ABNORMAL MG/DL
HCT VFR BLD AUTO: 35.6 % (ref 34–46.6)
HCT VFR BLD AUTO: 36.6 % (ref 34–46.6)
HGB BLD-MCNC: 11.2 G/DL (ref 12–15.9)
HGB BLD-MCNC: 11.5 G/DL (ref 12–15.9)
HGB UR QL STRIP.AUTO: NEGATIVE
HYALINE CASTS UR QL AUTO: ABNORMAL /LPF
IMM GRANULOCYTES # BLD AUTO: 0.06 10*3/MM3 (ref 0–0.05)
IMM GRANULOCYTES # BLD AUTO: 0.07 10*3/MM3 (ref 0–0.05)
IMM GRANULOCYTES NFR BLD AUTO: 0.5 % (ref 0–0.5)
IMM GRANULOCYTES NFR BLD AUTO: 0.6 % (ref 0–0.5)
INR PPP: 1.2 (ref 0.9–1.1)
KETONES UR QL STRIP: NEGATIVE
LEUKOCYTE ESTERASE UR QL STRIP.AUTO: ABNORMAL
LIPASE SERPL-CCNC: 9 U/L (ref 13–60)
LYMPHOCYTES # BLD AUTO: 1.75 10*3/MM3 (ref 0.7–3.1)
LYMPHOCYTES # BLD AUTO: 1.86 10*3/MM3 (ref 0.7–3.1)
LYMPHOCYTES NFR BLD AUTO: 14.7 % (ref 19.6–45.3)
LYMPHOCYTES NFR BLD AUTO: 16.8 % (ref 19.6–45.3)
MAGNESIUM SERPL-MCNC: 1.5 MG/DL (ref 1.6–2.4)
MAGNESIUM SERPL-MCNC: 1.8 MG/DL (ref 1.6–2.4)
MCH RBC QN AUTO: 29.5 PG (ref 26.6–33)
MCH RBC QN AUTO: 29.6 PG (ref 26.6–33)
MCHC RBC AUTO-ENTMCNC: 31.4 G/DL (ref 31.5–35.7)
MCHC RBC AUTO-ENTMCNC: 31.5 G/DL (ref 31.5–35.7)
MCV RBC AUTO: 93.8 FL (ref 79–97)
MCV RBC AUTO: 93.9 FL (ref 79–97)
MONOCYTES # BLD AUTO: 0.88 10*3/MM3 (ref 0.1–0.9)
MONOCYTES # BLD AUTO: 0.95 10*3/MM3 (ref 0.1–0.9)
MONOCYTES NFR BLD AUTO: 7.4 % (ref 5–12)
MONOCYTES NFR BLD AUTO: 8.6 % (ref 5–12)
NEUTROPHILS NFR BLD AUTO: 73 % (ref 42.7–76)
NEUTROPHILS NFR BLD AUTO: 76.8 % (ref 42.7–76)
NEUTROPHILS NFR BLD AUTO: 8.1 10*3/MM3 (ref 1.7–7)
NEUTROPHILS NFR BLD AUTO: 9.13 10*3/MM3 (ref 1.7–7)
NITRITE UR QL STRIP: POSITIVE
NRBC BLD AUTO-RTO: 0 /100 WBC (ref 0–0.2)
NRBC BLD AUTO-RTO: 0 /100 WBC (ref 0–0.2)
NT-PROBNP SERPL-MCNC: 2239 PG/ML (ref 0–900)
PH UR STRIP.AUTO: <=5 [PH] (ref 5–8)
PHOSPHATE SERPL-MCNC: 3.3 MG/DL (ref 2.5–4.5)
PHOSPHATE SERPL-MCNC: 3.6 MG/DL (ref 2.5–4.5)
PLATELET # BLD AUTO: 152 10*3/MM3 (ref 140–450)
PLATELET # BLD AUTO: 178 10*3/MM3 (ref 140–450)
PMV BLD AUTO: 10.9 FL (ref 6–12)
PMV BLD AUTO: 11.3 FL (ref 6–12)
POTASSIUM SERPL-SCNC: 4.6 MMOL/L (ref 3.5–5.2)
POTASSIUM SERPL-SCNC: 4.8 MMOL/L (ref 3.5–5.2)
PROT SERPL-MCNC: 7.5 G/DL (ref 6–8.5)
PROT UR QL STRIP: ABNORMAL
PROTHROMBIN TIME: 15 SECONDS (ref 11.9–14.1)
RBC # BLD AUTO: 3.79 10*6/MM3 (ref 3.77–5.28)
RBC # BLD AUTO: 3.9 10*6/MM3 (ref 3.77–5.28)
RBC # UR: ABNORMAL /HPF
REF LAB TEST METHOD: ABNORMAL
SARS-COV-2 RNA RESP QL NAA+PROBE: NOT DETECTED
SODIUM SERPL-SCNC: 135 MMOL/L (ref 136–145)
SODIUM SERPL-SCNC: 138 MMOL/L (ref 136–145)
SP GR UR STRIP: 1.02 (ref 1–1.03)
SQUAMOUS #/AREA URNS HPF: ABNORMAL /HPF
TROPONIN T SERPL-MCNC: <0.01 NG/ML (ref 0–0.03)
TSH SERPL DL<=0.05 MIU/L-ACNC: 1.35 UIU/ML (ref 0.27–4.2)
URATE SERPL-MCNC: 7.2 MG/DL (ref 2.4–5.7)
UROBILINOGEN UR QL STRIP: ABNORMAL
WBC # BLD AUTO: 11.09 10*3/MM3 (ref 3.4–10.8)
WBC # BLD AUTO: 11.88 10*3/MM3 (ref 3.4–10.8)
WBC UR QL AUTO: ABNORMAL /HPF
YEAST URNS QL MICRO: ABNORMAL /HPF

## 2020-11-27 PROCEDURE — 85610 PROTHROMBIN TIME: CPT | Performed by: STUDENT IN AN ORGANIZED HEALTH CARE EDUCATION/TRAINING PROGRAM

## 2020-11-27 PROCEDURE — 73590 X-RAY EXAM OF LOWER LEG: CPT | Performed by: RADIOLOGY

## 2020-11-27 PROCEDURE — 85379 FIBRIN DEGRADATION QUANT: CPT | Performed by: STUDENT IN AN ORGANIZED HEALTH CARE EDUCATION/TRAINING PROGRAM

## 2020-11-27 PROCEDURE — 83735 ASSAY OF MAGNESIUM: CPT | Performed by: STUDENT IN AN ORGANIZED HEALTH CARE EDUCATION/TRAINING PROGRAM

## 2020-11-27 PROCEDURE — 99223 1ST HOSP IP/OBS HIGH 75: CPT | Performed by: NURSE PRACTITIONER

## 2020-11-27 PROCEDURE — 83735 ASSAY OF MAGNESIUM: CPT | Performed by: HOSPITALIST

## 2020-11-27 PROCEDURE — 81001 URINALYSIS AUTO W/SCOPE: CPT | Performed by: STUDENT IN AN ORGANIZED HEALTH CARE EDUCATION/TRAINING PROGRAM

## 2020-11-27 PROCEDURE — 25010000002 MAGNESIUM SULFATE IN D5W 1G/100ML (PREMIX) 1-5 GM/100ML-% SOLUTION: Performed by: HOSPITALIST

## 2020-11-27 PROCEDURE — 93971 EXTREMITY STUDY: CPT | Performed by: RADIOLOGY

## 2020-11-27 PROCEDURE — A9540 TC99M MAA: HCPCS | Performed by: STUDENT IN AN ORGANIZED HEALTH CARE EDUCATION/TRAINING PROGRAM

## 2020-11-27 PROCEDURE — 93005 ELECTROCARDIOGRAM TRACING: CPT | Performed by: STUDENT IN AN ORGANIZED HEALTH CARE EDUCATION/TRAINING PROGRAM

## 2020-11-27 PROCEDURE — 71045 X-RAY EXAM CHEST 1 VIEW: CPT | Performed by: RADIOLOGY

## 2020-11-27 PROCEDURE — 25010000002 FENTANYL CITRATE (PF) 100 MCG/2ML SOLUTION

## 2020-11-27 PROCEDURE — 83605 ASSAY OF LACTIC ACID: CPT | Performed by: STUDENT IN AN ORGANIZED HEALTH CARE EDUCATION/TRAINING PROGRAM

## 2020-11-27 PROCEDURE — 25010000002 MAGNESIUM SULFATE 2 GM/50ML SOLUTION: Performed by: HOSPITALIST

## 2020-11-27 PROCEDURE — 82550 ASSAY OF CK (CPK): CPT | Performed by: STUDENT IN AN ORGANIZED HEALTH CARE EDUCATION/TRAINING PROGRAM

## 2020-11-27 PROCEDURE — 25010000002 FUROSEMIDE PER 20 MG: Performed by: STUDENT IN AN ORGANIZED HEALTH CARE EDUCATION/TRAINING PROGRAM

## 2020-11-27 PROCEDURE — 63710000001 INSULIN ASPART PER 5 UNITS: Performed by: HOSPITALIST

## 2020-11-27 PROCEDURE — 85025 COMPLETE CBC W/AUTO DIFF WBC: CPT | Performed by: HOSPITALIST

## 2020-11-27 PROCEDURE — 82962 GLUCOSE BLOOD TEST: CPT

## 2020-11-27 PROCEDURE — 73562 X-RAY EXAM OF KNEE 3: CPT | Performed by: RADIOLOGY

## 2020-11-27 PROCEDURE — 84443 ASSAY THYROID STIM HORMONE: CPT | Performed by: HOSPITALIST

## 2020-11-27 PROCEDURE — 78580 LUNG PERFUSION IMAGING: CPT

## 2020-11-27 PROCEDURE — 87636 SARSCOV2 & INF A&B AMP PRB: CPT | Performed by: FAMILY MEDICINE

## 2020-11-27 PROCEDURE — 85730 THROMBOPLASTIN TIME PARTIAL: CPT | Performed by: STUDENT IN AN ORGANIZED HEALTH CARE EDUCATION/TRAINING PROGRAM

## 2020-11-27 PROCEDURE — 84550 ASSAY OF BLOOD/URIC ACID: CPT | Performed by: NURSE PRACTITIONER

## 2020-11-27 PROCEDURE — 84484 ASSAY OF TROPONIN QUANT: CPT | Performed by: HOSPITALIST

## 2020-11-27 PROCEDURE — 73590 X-RAY EXAM OF LOWER LEG: CPT

## 2020-11-27 PROCEDURE — 71045 X-RAY EXAM CHEST 1 VIEW: CPT

## 2020-11-27 PROCEDURE — 84100 ASSAY OF PHOSPHORUS: CPT | Performed by: STUDENT IN AN ORGANIZED HEALTH CARE EDUCATION/TRAINING PROGRAM

## 2020-11-27 PROCEDURE — 83880 ASSAY OF NATRIURETIC PEPTIDE: CPT | Performed by: STUDENT IN AN ORGANIZED HEALTH CARE EDUCATION/TRAINING PROGRAM

## 2020-11-27 PROCEDURE — 0 TECHNETIUM ALBUMIN AGGREGATED: Performed by: STUDENT IN AN ORGANIZED HEALTH CARE EDUCATION/TRAINING PROGRAM

## 2020-11-27 PROCEDURE — 73610 X-RAY EXAM OF ANKLE: CPT | Performed by: RADIOLOGY

## 2020-11-27 PROCEDURE — 25010000002 MAGNESIUM SULFATE IN D5W 1G/100ML (PREMIX) 1-5 GM/100ML-% SOLUTION: Performed by: FAMILY MEDICINE

## 2020-11-27 PROCEDURE — 93971 EXTREMITY STUDY: CPT

## 2020-11-27 PROCEDURE — 73562 X-RAY EXAM OF KNEE 3: CPT

## 2020-11-27 PROCEDURE — 80053 COMPREHEN METABOLIC PANEL: CPT | Performed by: STUDENT IN AN ORGANIZED HEALTH CARE EDUCATION/TRAINING PROGRAM

## 2020-11-27 PROCEDURE — 73610 X-RAY EXAM OF ANKLE: CPT

## 2020-11-27 PROCEDURE — 85025 COMPLETE CBC W/AUTO DIFF WBC: CPT | Performed by: STUDENT IN AN ORGANIZED HEALTH CARE EDUCATION/TRAINING PROGRAM

## 2020-11-27 PROCEDURE — 99285 EMERGENCY DEPT VISIT HI MDM: CPT

## 2020-11-27 PROCEDURE — 25010000002 HYDROMORPHONE PER 4 MG: Performed by: FAMILY MEDICINE

## 2020-11-27 PROCEDURE — 84100 ASSAY OF PHOSPHORUS: CPT | Performed by: HOSPITALIST

## 2020-11-27 PROCEDURE — 25010000002 MORPHINE PER 10 MG: Performed by: FAMILY MEDICINE

## 2020-11-27 PROCEDURE — 78580 LUNG PERFUSION IMAGING: CPT | Performed by: RADIOLOGY

## 2020-11-27 PROCEDURE — 83690 ASSAY OF LIPASE: CPT | Performed by: STUDENT IN AN ORGANIZED HEALTH CARE EDUCATION/TRAINING PROGRAM

## 2020-11-27 PROCEDURE — 84484 ASSAY OF TROPONIN QUANT: CPT | Performed by: STUDENT IN AN ORGANIZED HEALTH CARE EDUCATION/TRAINING PROGRAM

## 2020-11-27 RX ORDER — DIAZEPAM 5 MG/1
5 TABLET ORAL 2 TIMES DAILY PRN
Status: ON HOLD | COMMUNITY
End: 2022-09-21

## 2020-11-27 RX ORDER — MAGNESIUM SULFATE 1 G/100ML
1 INJECTION INTRAVENOUS ONCE
Status: COMPLETED | OUTPATIENT
Start: 2020-11-27 | End: 2020-11-27

## 2020-11-27 RX ORDER — METOPROLOL TARTRATE 50 MG/1
50 TABLET, FILM COATED ORAL ONCE
Status: COMPLETED | OUTPATIENT
Start: 2020-11-27 | End: 2020-11-27

## 2020-11-27 RX ORDER — NITROGLYCERIN 0.4 MG/1
0.4 TABLET SUBLINGUAL
Status: DISCONTINUED | OUTPATIENT
Start: 2020-11-27 | End: 2020-12-01 | Stop reason: HOSPADM

## 2020-11-27 RX ORDER — METOPROLOL TARTRATE 5 MG/5ML
INJECTION INTRAVENOUS
Status: COMPLETED
Start: 2020-11-27 | End: 2020-11-27

## 2020-11-27 RX ORDER — FENTANYL CITRATE 50 UG/ML
INJECTION, SOLUTION INTRAMUSCULAR; INTRAVENOUS
Status: COMPLETED
Start: 2020-11-27 | End: 2020-11-27

## 2020-11-27 RX ORDER — MAGNESIUM SULFATE HEPTAHYDRATE 40 MG/ML
4 INJECTION, SOLUTION INTRAVENOUS AS NEEDED
Status: DISCONTINUED | OUTPATIENT
Start: 2020-11-27 | End: 2020-12-01 | Stop reason: HOSPADM

## 2020-11-27 RX ORDER — HYDROMORPHONE HYDROCHLORIDE 1 MG/ML
0.5 INJECTION, SOLUTION INTRAMUSCULAR; INTRAVENOUS; SUBCUTANEOUS ONCE
Status: COMPLETED | OUTPATIENT
Start: 2020-11-27 | End: 2020-11-27

## 2020-11-27 RX ORDER — METOPROLOL TARTRATE 5 MG/5ML
5 INJECTION INTRAVENOUS ONCE
Status: COMPLETED | OUTPATIENT
Start: 2020-11-27 | End: 2020-11-27

## 2020-11-27 RX ORDER — FENTANYL CITRATE 50 UG/ML
50 INJECTION, SOLUTION INTRAMUSCULAR; INTRAVENOUS ONCE
Status: COMPLETED | OUTPATIENT
Start: 2020-11-27 | End: 2020-11-27

## 2020-11-27 RX ORDER — SODIUM CHLORIDE 0.9 % (FLUSH) 0.9 %
10 SYRINGE (ML) INJECTION AS NEEDED
Status: DISCONTINUED | OUTPATIENT
Start: 2020-11-27 | End: 2020-12-01 | Stop reason: HOSPADM

## 2020-11-27 RX ORDER — MAGNESIUM SULFATE 1 G/100ML
1 INJECTION INTRAVENOUS ONCE
Status: DISCONTINUED | OUTPATIENT
Start: 2020-11-27 | End: 2020-12-01 | Stop reason: HOSPADM

## 2020-11-27 RX ORDER — ALLOPURINOL 100 MG/1
100 TABLET ORAL DAILY
Status: DISCONTINUED | OUTPATIENT
Start: 2020-11-27 | End: 2020-12-01 | Stop reason: HOSPADM

## 2020-11-27 RX ORDER — DEXTROSE MONOHYDRATE 25 G/50ML
25 INJECTION, SOLUTION INTRAVENOUS
Status: DISCONTINUED | OUTPATIENT
Start: 2020-11-27 | End: 2020-12-01 | Stop reason: HOSPADM

## 2020-11-27 RX ORDER — NICOTINE POLACRILEX 4 MG
15 LOZENGE BUCCAL
Status: DISCONTINUED | OUTPATIENT
Start: 2020-11-27 | End: 2020-12-01 | Stop reason: HOSPADM

## 2020-11-27 RX ORDER — OXYCODONE AND ACETAMINOPHEN 10; 325 MG/1; MG/1
1 TABLET ORAL EVERY 6 HOURS PRN
Status: DISCONTINUED | OUTPATIENT
Start: 2020-11-27 | End: 2020-12-01 | Stop reason: HOSPADM

## 2020-11-27 RX ORDER — MAGNESIUM SULFATE HEPTAHYDRATE 40 MG/ML
2 INJECTION, SOLUTION INTRAVENOUS AS NEEDED
Status: DISCONTINUED | OUTPATIENT
Start: 2020-11-27 | End: 2020-12-01 | Stop reason: HOSPADM

## 2020-11-27 RX ORDER — DIAZEPAM 5 MG/1
5 TABLET ORAL 2 TIMES DAILY PRN
Status: DISCONTINUED | OUTPATIENT
Start: 2020-11-27 | End: 2020-12-01 | Stop reason: HOSPADM

## 2020-11-27 RX ORDER — OXYBUTYNIN CHLORIDE 5 MG/1
15 TABLET, EXTENDED RELEASE ORAL DAILY
Status: CANCELLED | OUTPATIENT
Start: 2020-11-27

## 2020-11-27 RX ORDER — METOPROLOL TARTRATE 50 MG/1
50 TABLET, FILM COATED ORAL EVERY 12 HOURS SCHEDULED
Status: DISCONTINUED | OUTPATIENT
Start: 2020-11-27 | End: 2020-12-01

## 2020-11-27 RX ORDER — DIAZEPAM 5 MG/1
5 TABLET ORAL 2 TIMES DAILY PRN
Status: CANCELLED | OUTPATIENT
Start: 2020-11-27

## 2020-11-27 RX ORDER — PANTOPRAZOLE SODIUM 40 MG/1
40 TABLET, DELAYED RELEASE ORAL
Status: DISCONTINUED | OUTPATIENT
Start: 2020-11-28 | End: 2020-12-01 | Stop reason: HOSPADM

## 2020-11-27 RX ORDER — SODIUM CHLORIDE 0.9 % (FLUSH) 0.9 %
10 SYRINGE (ML) INJECTION EVERY 12 HOURS SCHEDULED
Status: DISCONTINUED | OUTPATIENT
Start: 2020-11-27 | End: 2020-12-01 | Stop reason: HOSPADM

## 2020-11-27 RX ORDER — PAROXETINE HYDROCHLORIDE 20 MG/1
10 TABLET, FILM COATED ORAL EVERY MORNING
Status: DISCONTINUED | OUTPATIENT
Start: 2020-11-28 | End: 2020-12-01 | Stop reason: HOSPADM

## 2020-11-27 RX ORDER — MAGNESIUM SULFATE HEPTAHYDRATE 40 MG/ML
2 INJECTION, SOLUTION INTRAVENOUS
Status: COMPLETED | OUTPATIENT
Start: 2020-11-27 | End: 2020-11-27

## 2020-11-27 RX ORDER — AMOXICILLIN 250 MG
2 CAPSULE ORAL 2 TIMES DAILY
Status: DISCONTINUED | OUTPATIENT
Start: 2020-11-27 | End: 2020-12-01 | Stop reason: HOSPADM

## 2020-11-27 RX ORDER — MORPHINE SULFATE 2 MG/ML
2 INJECTION, SOLUTION INTRAMUSCULAR; INTRAVENOUS ONCE
Status: COMPLETED | OUTPATIENT
Start: 2020-11-27 | End: 2020-11-27

## 2020-11-27 RX ORDER — POTASSIUM CHLORIDE 1.5 G/1.77G
40 POWDER, FOR SOLUTION ORAL AS NEEDED
Status: DISCONTINUED | OUTPATIENT
Start: 2020-11-27 | End: 2020-12-01 | Stop reason: HOSPADM

## 2020-11-27 RX ORDER — POTASSIUM CHLORIDE 750 MG/1
40 CAPSULE, EXTENDED RELEASE ORAL AS NEEDED
Status: DISCONTINUED | OUTPATIENT
Start: 2020-11-27 | End: 2020-12-01 | Stop reason: HOSPADM

## 2020-11-27 RX ORDER — DILTIAZEM HYDROCHLORIDE 60 MG/1
60 TABLET, FILM COATED ORAL EVERY 6 HOURS SCHEDULED
Status: DISCONTINUED | OUTPATIENT
Start: 2020-11-28 | End: 2020-11-29

## 2020-11-27 RX ORDER — DILTIAZEM HYDROCHLORIDE 60 MG/1
120 TABLET, FILM COATED ORAL EVERY 6 HOURS SCHEDULED
Status: DISCONTINUED | OUTPATIENT
Start: 2020-11-27 | End: 2020-11-27

## 2020-11-27 RX ORDER — FUROSEMIDE 10 MG/ML
80 INJECTION INTRAMUSCULAR; INTRAVENOUS EVERY 12 HOURS
Status: DISCONTINUED | OUTPATIENT
Start: 2020-11-27 | End: 2020-11-29

## 2020-11-27 RX ADMIN — METOPROLOL TARTRATE 5 MG: 5 INJECTION, SOLUTION INTRAVENOUS at 06:47

## 2020-11-27 RX ADMIN — METOPROLOL TARTRATE 5 MG: 5 INJECTION INTRAVENOUS at 06:53

## 2020-11-27 RX ADMIN — ALLOPURINOL 100 MG: 100 TABLET ORAL at 15:29

## 2020-11-27 RX ADMIN — FENTANYL CITRATE 50 MCG: 50 INJECTION, SOLUTION INTRAMUSCULAR; INTRAVENOUS at 06:50

## 2020-11-27 RX ADMIN — SODIUM CHLORIDE, PRESERVATIVE FREE 10 ML: 5 INJECTION INTRAVENOUS at 15:28

## 2020-11-27 RX ADMIN — SODIUM CHLORIDE 1000 ML: 9 INJECTION, SOLUTION INTRAVENOUS at 06:40

## 2020-11-27 RX ADMIN — MAGNESIUM SULFATE HEPTAHYDRATE 2 G: 40 INJECTION, SOLUTION INTRAVENOUS at 17:00

## 2020-11-27 RX ADMIN — DIAZEPAM 5 MG: 5 TABLET ORAL at 13:53

## 2020-11-27 RX ADMIN — FUROSEMIDE 80 MG: 10 INJECTION, SOLUTION INTRAMUSCULAR; INTRAVENOUS at 20:38

## 2020-11-27 RX ADMIN — DILTIAZEM HYDROCHLORIDE 120 MG: 60 TABLET, FILM COATED ORAL at 15:28

## 2020-11-27 RX ADMIN — DOCUSATE SODIUM 50 MG AND SENNOSIDES 8.6 MG 2 TABLET: 8.6; 5 TABLET, FILM COATED ORAL at 20:37

## 2020-11-27 RX ADMIN — HYDROMORPHONE HYDROCHLORIDE 0.5 MG: 1 INJECTION, SOLUTION INTRAMUSCULAR; INTRAVENOUS; SUBCUTANEOUS at 11:08

## 2020-11-27 RX ADMIN — APIXABAN 5 MG: 2.5 TABLET, FILM COATED ORAL at 16:58

## 2020-11-27 RX ADMIN — METOPROLOL TARTRATE 50 MG: 50 TABLET, FILM COATED ORAL at 11:08

## 2020-11-27 RX ADMIN — METOPROLOL TARTRATE 5 MG: 5 INJECTION, SOLUTION INTRAVENOUS at 06:53

## 2020-11-27 RX ADMIN — SODIUM CHLORIDE, PRESERVATIVE FREE 10 ML: 5 INJECTION INTRAVENOUS at 20:38

## 2020-11-27 RX ADMIN — MORPHINE SULFATE 2 MG: 2 INJECTION, SOLUTION INTRAMUSCULAR; INTRAVENOUS at 10:37

## 2020-11-27 RX ADMIN — MAGNESIUM SULFATE HEPTAHYDRATE 2 G: 40 INJECTION, SOLUTION INTRAVENOUS at 15:28

## 2020-11-27 RX ADMIN — LINAGLIPTIN 5 MG: 5 TABLET, FILM COATED ORAL at 15:29

## 2020-11-27 RX ADMIN — OXYCODONE HYDROCHLORIDE AND ACETAMINOPHEN 1 TABLET: 10; 325 TABLET ORAL at 13:53

## 2020-11-27 RX ADMIN — METOPROLOL TARTRATE 50 MG: 50 TABLET, FILM COATED ORAL at 20:37

## 2020-11-27 RX ADMIN — DILTIAZEM HYDROCHLORIDE 120 MG: 60 TABLET, FILM COATED ORAL at 17:00

## 2020-11-27 RX ADMIN — DILTIAZEM HYDROCHLORIDE 5 MG/HR: 100 INJECTION, POWDER, LYOPHILIZED, FOR SOLUTION INTRAVENOUS at 08:27

## 2020-11-27 RX ADMIN — KIT FOR THE PREPARATION OF TECHNETIUM TC 99M ALBUMIN AGGREGATED 1 DOSE: 2.5 INJECTION, POWDER, FOR SOLUTION INTRAVENOUS at 10:05

## 2020-11-27 RX ADMIN — METOPROLOL TARTRATE 5 MG: 5 INJECTION INTRAVENOUS at 06:47

## 2020-11-27 RX ADMIN — INSULIN ASPART 4 UNITS: 100 INJECTION, SOLUTION INTRAVENOUS; SUBCUTANEOUS at 18:03

## 2020-11-27 RX ADMIN — MAGNESIUM SULFATE IN DEXTROSE 1 G: 10 INJECTION, SOLUTION INTRAVENOUS at 09:15

## 2020-11-28 LAB
ANION GAP SERPL CALCULATED.3IONS-SCNC: 12.1 MMOL/L (ref 5–15)
BASOPHILS # BLD AUTO: 0.05 10*3/MM3 (ref 0–0.2)
BASOPHILS NFR BLD AUTO: 0.4 % (ref 0–1.5)
BUN SERPL-MCNC: 40 MG/DL (ref 8–23)
BUN/CREAT SERPL: 24.5 (ref 7–25)
CALCIUM SPEC-SCNC: 9.1 MG/DL (ref 8.6–10.5)
CHLORIDE SERPL-SCNC: 101 MMOL/L (ref 98–107)
CO2 SERPL-SCNC: 19.9 MMOL/L (ref 22–29)
CREAT SERPL-MCNC: 1.63 MG/DL (ref 0.57–1)
DEPRECATED RDW RBC AUTO: 51.4 FL (ref 37–54)
EOSINOPHIL # BLD AUTO: 0.02 10*3/MM3 (ref 0–0.4)
EOSINOPHIL NFR BLD AUTO: 0.2 % (ref 0.3–6.2)
ERYTHROCYTE [DISTWIDTH] IN BLOOD BY AUTOMATED COUNT: 14.6 % (ref 12.3–15.4)
GFR SERPL CREATININE-BSD FRML MDRD: 31 ML/MIN/1.73
GLUCOSE BLDC GLUCOMTR-MCNC: 199 MG/DL (ref 70–130)
GLUCOSE BLDC GLUCOMTR-MCNC: 204 MG/DL (ref 70–130)
GLUCOSE BLDC GLUCOMTR-MCNC: 218 MG/DL (ref 70–130)
GLUCOSE BLDC GLUCOMTR-MCNC: 221 MG/DL (ref 70–130)
GLUCOSE SERPL-MCNC: 257 MG/DL (ref 65–99)
HCT VFR BLD AUTO: 35.1 % (ref 34–46.6)
HGB BLD-MCNC: 10.8 G/DL (ref 12–15.9)
IMM GRANULOCYTES # BLD AUTO: 0.08 10*3/MM3 (ref 0–0.05)
IMM GRANULOCYTES NFR BLD AUTO: 0.7 % (ref 0–0.5)
LYMPHOCYTES # BLD AUTO: 2.17 10*3/MM3 (ref 0.7–3.1)
LYMPHOCYTES NFR BLD AUTO: 18.9 % (ref 19.6–45.3)
MAGNESIUM SERPL-MCNC: 2.6 MG/DL (ref 1.6–2.4)
MCH RBC QN AUTO: 29.3 PG (ref 26.6–33)
MCHC RBC AUTO-ENTMCNC: 30.8 G/DL (ref 31.5–35.7)
MCV RBC AUTO: 95.4 FL (ref 79–97)
MONOCYTES # BLD AUTO: 0.94 10*3/MM3 (ref 0.1–0.9)
MONOCYTES NFR BLD AUTO: 8.2 % (ref 5–12)
NEUTROPHILS NFR BLD AUTO: 71.6 % (ref 42.7–76)
NEUTROPHILS NFR BLD AUTO: 8.22 10*3/MM3 (ref 1.7–7)
NRBC BLD AUTO-RTO: 0 /100 WBC (ref 0–0.2)
PHOSPHATE SERPL-MCNC: 4 MG/DL (ref 2.5–4.5)
PLATELET # BLD AUTO: 175 10*3/MM3 (ref 140–450)
PMV BLD AUTO: 11.2 FL (ref 6–12)
POTASSIUM SERPL-SCNC: 5 MMOL/L (ref 3.5–5.2)
RBC # BLD AUTO: 3.68 10*6/MM3 (ref 3.77–5.28)
SODIUM SERPL-SCNC: 133 MMOL/L (ref 136–145)
TROPONIN T SERPL-MCNC: <0.01 NG/ML (ref 0–0.03)
WBC # BLD AUTO: 11.48 10*3/MM3 (ref 3.4–10.8)

## 2020-11-28 PROCEDURE — 84484 ASSAY OF TROPONIN QUANT: CPT | Performed by: HOSPITALIST

## 2020-11-28 PROCEDURE — 85025 COMPLETE CBC W/AUTO DIFF WBC: CPT | Performed by: HOSPITALIST

## 2020-11-28 PROCEDURE — 84100 ASSAY OF PHOSPHORUS: CPT | Performed by: HOSPITALIST

## 2020-11-28 PROCEDURE — 99232 SBSQ HOSP IP/OBS MODERATE 35: CPT | Performed by: HOSPITALIST

## 2020-11-28 PROCEDURE — 80048 BASIC METABOLIC PNL TOTAL CA: CPT | Performed by: HOSPITALIST

## 2020-11-28 PROCEDURE — 82962 GLUCOSE BLOOD TEST: CPT

## 2020-11-28 PROCEDURE — 63710000001 INSULIN ASPART PER 5 UNITS: Performed by: HOSPITALIST

## 2020-11-28 PROCEDURE — 93005 ELECTROCARDIOGRAM TRACING: CPT | Performed by: HOSPITALIST

## 2020-11-28 PROCEDURE — 25010000002 FUROSEMIDE PER 20 MG: Performed by: STUDENT IN AN ORGANIZED HEALTH CARE EDUCATION/TRAINING PROGRAM

## 2020-11-28 PROCEDURE — 83735 ASSAY OF MAGNESIUM: CPT | Performed by: HOSPITALIST

## 2020-11-28 RX ORDER — OXYBUTYNIN CHLORIDE 5 MG/1
15 TABLET, EXTENDED RELEASE ORAL DAILY
Status: CANCELLED | OUTPATIENT
Start: 2020-11-28

## 2020-11-28 RX ADMIN — FUROSEMIDE 80 MG: 10 INJECTION, SOLUTION INTRAMUSCULAR; INTRAVENOUS at 20:36

## 2020-11-28 RX ADMIN — APIXABAN 5 MG: 2.5 TABLET, FILM COATED ORAL at 10:18

## 2020-11-28 RX ADMIN — INSULIN ASPART 3 UNITS: 100 INJECTION, SOLUTION INTRAVENOUS; SUBCUTANEOUS at 07:35

## 2020-11-28 RX ADMIN — INSULIN ASPART 3 UNITS: 100 INJECTION, SOLUTION INTRAVENOUS; SUBCUTANEOUS at 17:45

## 2020-11-28 RX ADMIN — OXYCODONE HYDROCHLORIDE AND ACETAMINOPHEN 1 TABLET: 10; 325 TABLET ORAL at 07:41

## 2020-11-28 RX ADMIN — METOPROLOL TARTRATE 50 MG: 50 TABLET, FILM COATED ORAL at 20:37

## 2020-11-28 RX ADMIN — OXYCODONE HYDROCHLORIDE AND ACETAMINOPHEN 1 TABLET: 10; 325 TABLET ORAL at 20:38

## 2020-11-28 RX ADMIN — DILTIAZEM HYDROCHLORIDE 60 MG: 60 TABLET, FILM COATED ORAL at 12:08

## 2020-11-28 RX ADMIN — OXYCODONE HYDROCHLORIDE AND ACETAMINOPHEN 1 TABLET: 10; 325 TABLET ORAL at 13:46

## 2020-11-28 RX ADMIN — APIXABAN 5 MG: 2.5 TABLET, FILM COATED ORAL at 20:36

## 2020-11-28 RX ADMIN — ALLOPURINOL 100 MG: 100 TABLET ORAL at 10:18

## 2020-11-28 RX ADMIN — DOCUSATE SODIUM 50 MG AND SENNOSIDES 8.6 MG 2 TABLET: 8.6; 5 TABLET, FILM COATED ORAL at 10:18

## 2020-11-28 RX ADMIN — METOPROLOL TARTRATE 50 MG: 50 TABLET, FILM COATED ORAL at 12:27

## 2020-11-28 RX ADMIN — LINAGLIPTIN 5 MG: 5 TABLET, FILM COATED ORAL at 10:19

## 2020-11-28 RX ADMIN — DOCUSATE SODIUM 50 MG AND SENNOSIDES 8.6 MG 2 TABLET: 8.6; 5 TABLET, FILM COATED ORAL at 20:36

## 2020-11-28 RX ADMIN — SODIUM CHLORIDE, PRESERVATIVE FREE 10 ML: 5 INJECTION INTRAVENOUS at 10:17

## 2020-11-28 RX ADMIN — PAROXETINE HYDROCHLORIDE 10 MG: 20 TABLET, FILM COATED ORAL at 05:58

## 2020-11-28 RX ADMIN — INSULIN ASPART 2 UNITS: 100 INJECTION, SOLUTION INTRAVENOUS; SUBCUTANEOUS at 12:07

## 2020-11-28 RX ADMIN — SODIUM CHLORIDE, PRESERVATIVE FREE 10 ML: 5 INJECTION INTRAVENOUS at 20:37

## 2020-11-28 RX ADMIN — PANTOPRAZOLE SODIUM 40 MG: 40 TABLET, DELAYED RELEASE ORAL at 05:57

## 2020-11-29 LAB
ANION GAP SERPL CALCULATED.3IONS-SCNC: 8.8 MMOL/L (ref 5–15)
BASOPHILS # BLD AUTO: 0.08 10*3/MM3 (ref 0–0.2)
BASOPHILS NFR BLD AUTO: 1.1 % (ref 0–1.5)
BUN SERPL-MCNC: 19 MG/DL (ref 8–23)
BUN/CREAT SERPL: 15.8 (ref 7–25)
CALCIUM SPEC-SCNC: 9.3 MG/DL (ref 8.6–10.5)
CHLORIDE SERPL-SCNC: 93 MMOL/L (ref 98–107)
CO2 SERPL-SCNC: 29.2 MMOL/L (ref 22–29)
CREAT SERPL-MCNC: 1.2 MG/DL (ref 0.57–1)
DEPRECATED RDW RBC AUTO: 47.9 FL (ref 37–54)
EOSINOPHIL # BLD AUTO: 0.31 10*3/MM3 (ref 0–0.4)
EOSINOPHIL NFR BLD AUTO: 4.2 % (ref 0.3–6.2)
ERYTHROCYTE [DISTWIDTH] IN BLOOD BY AUTOMATED COUNT: 13.4 % (ref 12.3–15.4)
GFR SERPL CREATININE-BSD FRML MDRD: 44 ML/MIN/1.73
GLUCOSE BLDC GLUCOMTR-MCNC: 148 MG/DL (ref 70–130)
GLUCOSE BLDC GLUCOMTR-MCNC: 155 MG/DL (ref 70–130)
GLUCOSE BLDC GLUCOMTR-MCNC: 164 MG/DL (ref 70–130)
GLUCOSE BLDC GLUCOMTR-MCNC: 189 MG/DL (ref 70–130)
GLUCOSE BLDC GLUCOMTR-MCNC: 268 MG/DL (ref 70–130)
GLUCOSE SERPL-MCNC: 141 MG/DL (ref 65–99)
HCT VFR BLD AUTO: 40.9 % (ref 34–46.6)
HGB BLD-MCNC: 12.6 G/DL (ref 12–15.9)
IMM GRANULOCYTES # BLD AUTO: 0.03 10*3/MM3 (ref 0–0.05)
IMM GRANULOCYTES NFR BLD AUTO: 0.4 % (ref 0–0.5)
LYMPHOCYTES # BLD AUTO: 1.24 10*3/MM3 (ref 0.7–3.1)
LYMPHOCYTES NFR BLD AUTO: 16.6 % (ref 19.6–45.3)
MAGNESIUM SERPL-MCNC: 1.8 MG/DL (ref 1.6–2.4)
MCH RBC QN AUTO: 29.6 PG (ref 26.6–33)
MCHC RBC AUTO-ENTMCNC: 30.8 G/DL (ref 31.5–35.7)
MCV RBC AUTO: 96.2 FL (ref 79–97)
MONOCYTES # BLD AUTO: 0.91 10*3/MM3 (ref 0.1–0.9)
MONOCYTES NFR BLD AUTO: 12.2 % (ref 5–12)
NEUTROPHILS NFR BLD AUTO: 4.88 10*3/MM3 (ref 1.7–7)
NEUTROPHILS NFR BLD AUTO: 65.5 % (ref 42.7–76)
NRBC BLD AUTO-RTO: 0 /100 WBC (ref 0–0.2)
PHOSPHATE SERPL-MCNC: 3.8 MG/DL (ref 2.5–4.5)
PLATELET # BLD AUTO: 240 10*3/MM3 (ref 140–450)
PMV BLD AUTO: 10 FL (ref 6–12)
POTASSIUM SERPL-SCNC: 4.5 MMOL/L (ref 3.5–5.2)
QT INTERVAL: 380 MS
QTC INTERVAL: 462 MS
RBC # BLD AUTO: 4.25 10*6/MM3 (ref 3.77–5.28)
SODIUM SERPL-SCNC: 131 MMOL/L (ref 136–145)
WBC # BLD AUTO: 7.45 10*3/MM3 (ref 3.4–10.8)

## 2020-11-29 PROCEDURE — 85025 COMPLETE CBC W/AUTO DIFF WBC: CPT | Performed by: HOSPITALIST

## 2020-11-29 PROCEDURE — 25010000002 FUROSEMIDE PER 20 MG: Performed by: STUDENT IN AN ORGANIZED HEALTH CARE EDUCATION/TRAINING PROGRAM

## 2020-11-29 PROCEDURE — 82962 GLUCOSE BLOOD TEST: CPT

## 2020-11-29 PROCEDURE — 83735 ASSAY OF MAGNESIUM: CPT | Performed by: HOSPITALIST

## 2020-11-29 PROCEDURE — 94799 UNLISTED PULMONARY SVC/PX: CPT

## 2020-11-29 PROCEDURE — 63710000001 INSULIN ASPART PER 5 UNITS: Performed by: HOSPITALIST

## 2020-11-29 PROCEDURE — 80048 BASIC METABOLIC PNL TOTAL CA: CPT | Performed by: HOSPITALIST

## 2020-11-29 PROCEDURE — 25010000002 FUROSEMIDE PER 20 MG: Performed by: HOSPITALIST

## 2020-11-29 PROCEDURE — 84100 ASSAY OF PHOSPHORUS: CPT | Performed by: HOSPITALIST

## 2020-11-29 PROCEDURE — 99232 SBSQ HOSP IP/OBS MODERATE 35: CPT | Performed by: HOSPITALIST

## 2020-11-29 RX ORDER — DILTIAZEM HYDROCHLORIDE 240 MG/1
240 CAPSULE, COATED, EXTENDED RELEASE ORAL
Status: DISCONTINUED | OUTPATIENT
Start: 2020-11-29 | End: 2020-12-01 | Stop reason: HOSPADM

## 2020-11-29 RX ORDER — FUROSEMIDE 10 MG/ML
40 INJECTION INTRAMUSCULAR; INTRAVENOUS ONCE
Status: COMPLETED | OUTPATIENT
Start: 2020-11-29 | End: 2020-11-29

## 2020-11-29 RX ORDER — FUROSEMIDE 40 MG/1
40 TABLET ORAL DAILY
Status: DISCONTINUED | OUTPATIENT
Start: 2020-11-30 | End: 2020-11-30

## 2020-11-29 RX ADMIN — PAROXETINE HYDROCHLORIDE 10 MG: 20 TABLET, FILM COATED ORAL at 06:21

## 2020-11-29 RX ADMIN — OXYCODONE HYDROCHLORIDE AND ACETAMINOPHEN 1 TABLET: 10; 325 TABLET ORAL at 18:10

## 2020-11-29 RX ADMIN — OXYCODONE HYDROCHLORIDE AND ACETAMINOPHEN 1 TABLET: 10; 325 TABLET ORAL at 10:38

## 2020-11-29 RX ADMIN — METOPROLOL TARTRATE 50 MG: 50 TABLET, FILM COATED ORAL at 21:12

## 2020-11-29 RX ADMIN — FUROSEMIDE 40 MG: 10 INJECTION, SOLUTION INTRAMUSCULAR; INTRAVENOUS at 21:09

## 2020-11-29 RX ADMIN — ALLOPURINOL 100 MG: 100 TABLET ORAL at 08:10

## 2020-11-29 RX ADMIN — PANTOPRAZOLE SODIUM 40 MG: 40 TABLET, DELAYED RELEASE ORAL at 06:21

## 2020-11-29 RX ADMIN — OXYCODONE HYDROCHLORIDE AND ACETAMINOPHEN 1 TABLET: 10; 325 TABLET ORAL at 04:39

## 2020-11-29 RX ADMIN — DIAZEPAM 5 MG: 5 TABLET ORAL at 00:32

## 2020-11-29 RX ADMIN — SODIUM CHLORIDE, PRESERVATIVE FREE 10 ML: 5 INJECTION INTRAVENOUS at 21:13

## 2020-11-29 RX ADMIN — FUROSEMIDE 80 MG: 10 INJECTION, SOLUTION INTRAMUSCULAR; INTRAVENOUS at 08:10

## 2020-11-29 RX ADMIN — DILTIAZEM HYDROCHLORIDE 240 MG: 240 CAPSULE, COATED, EXTENDED RELEASE ORAL at 15:57

## 2020-11-29 RX ADMIN — SODIUM CHLORIDE, PRESERVATIVE FREE 10 ML: 5 INJECTION INTRAVENOUS at 08:49

## 2020-11-29 RX ADMIN — INSULIN ASPART 4 UNITS: 100 INJECTION, SOLUTION INTRAVENOUS; SUBCUTANEOUS at 12:29

## 2020-11-29 RX ADMIN — DOCUSATE SODIUM 50 MG AND SENNOSIDES 8.6 MG 2 TABLET: 8.6; 5 TABLET, FILM COATED ORAL at 08:10

## 2020-11-29 RX ADMIN — METOPROLOL TARTRATE 50 MG: 50 TABLET, FILM COATED ORAL at 08:48

## 2020-11-29 RX ADMIN — INSULIN ASPART 2 UNITS: 100 INJECTION, SOLUTION INTRAVENOUS; SUBCUTANEOUS at 18:10

## 2020-11-29 RX ADMIN — APIXABAN 5 MG: 2.5 TABLET, FILM COATED ORAL at 08:10

## 2020-11-29 RX ADMIN — APIXABAN 5 MG: 2.5 TABLET, FILM COATED ORAL at 21:12

## 2020-11-29 RX ADMIN — DILTIAZEM HYDROCHLORIDE 60 MG: 60 TABLET, FILM COATED ORAL at 06:38

## 2020-11-29 RX ADMIN — LINAGLIPTIN 5 MG: 5 TABLET, FILM COATED ORAL at 08:10

## 2020-11-30 LAB
ANION GAP SERPL CALCULATED.3IONS-SCNC: 12.4 MMOL/L (ref 5–15)
BASOPHILS # BLD AUTO: 0.05 10*3/MM3 (ref 0–0.2)
BASOPHILS NFR BLD AUTO: 0.4 % (ref 0–1.5)
BUN SERPL-MCNC: 47 MG/DL (ref 8–23)
BUN/CREAT SERPL: 38.2 (ref 7–25)
CALCIUM SPEC-SCNC: 9.8 MG/DL (ref 8.6–10.5)
CHLORIDE SERPL-SCNC: 95 MMOL/L (ref 98–107)
CO2 SERPL-SCNC: 27.6 MMOL/L (ref 22–29)
CREAT SERPL-MCNC: 1.23 MG/DL (ref 0.57–1)
DEPRECATED RDW RBC AUTO: 47.1 FL (ref 37–54)
EOSINOPHIL # BLD AUTO: 0.19 10*3/MM3 (ref 0–0.4)
EOSINOPHIL NFR BLD AUTO: 1.5 % (ref 0.3–6.2)
ERYTHROCYTE [DISTWIDTH] IN BLOOD BY AUTOMATED COUNT: 13.9 % (ref 12.3–15.4)
GFR SERPL CREATININE-BSD FRML MDRD: 43 ML/MIN/1.73
GLUCOSE BLDC GLUCOMTR-MCNC: 168 MG/DL (ref 70–130)
GLUCOSE BLDC GLUCOMTR-MCNC: 170 MG/DL (ref 70–130)
GLUCOSE BLDC GLUCOMTR-MCNC: 212 MG/DL (ref 70–130)
GLUCOSE BLDC GLUCOMTR-MCNC: 234 MG/DL (ref 70–130)
GLUCOSE SERPL-MCNC: 228 MG/DL (ref 65–99)
HCT VFR BLD AUTO: 36.7 % (ref 34–46.6)
HGB BLD-MCNC: 11.7 G/DL (ref 12–15.9)
IMM GRANULOCYTES # BLD AUTO: 0.06 10*3/MM3 (ref 0–0.05)
IMM GRANULOCYTES NFR BLD AUTO: 0.5 % (ref 0–0.5)
LYMPHOCYTES # BLD AUTO: 2.29 10*3/MM3 (ref 0.7–3.1)
LYMPHOCYTES NFR BLD AUTO: 18.1 % (ref 19.6–45.3)
MAGNESIUM SERPL-MCNC: 1.6 MG/DL (ref 1.6–2.4)
MCH RBC QN AUTO: 29.3 PG (ref 26.6–33)
MCHC RBC AUTO-ENTMCNC: 31.9 G/DL (ref 31.5–35.7)
MCV RBC AUTO: 92 FL (ref 79–97)
MONOCYTES # BLD AUTO: 0.74 10*3/MM3 (ref 0.1–0.9)
MONOCYTES NFR BLD AUTO: 5.8 % (ref 5–12)
NEUTROPHILS NFR BLD AUTO: 73.7 % (ref 42.7–76)
NEUTROPHILS NFR BLD AUTO: 9.35 10*3/MM3 (ref 1.7–7)
NRBC BLD AUTO-RTO: 0 /100 WBC (ref 0–0.2)
NT-PROBNP SERPL-MCNC: 3772 PG/ML (ref 0–900)
PHOSPHATE SERPL-MCNC: 3.9 MG/DL (ref 2.5–4.5)
PLATELET # BLD AUTO: 216 10*3/MM3 (ref 140–450)
PMV BLD AUTO: 10.7 FL (ref 6–12)
POTASSIUM SERPL-SCNC: 3.4 MMOL/L (ref 3.5–5.2)
QT INTERVAL: 284 MS
QTC INTERVAL: 476 MS
RBC # BLD AUTO: 3.99 10*6/MM3 (ref 3.77–5.28)
SODIUM SERPL-SCNC: 135 MMOL/L (ref 136–145)
WBC # BLD AUTO: 12.68 10*3/MM3 (ref 3.4–10.8)

## 2020-11-30 PROCEDURE — 84100 ASSAY OF PHOSPHORUS: CPT | Performed by: HOSPITALIST

## 2020-11-30 PROCEDURE — 99232 SBSQ HOSP IP/OBS MODERATE 35: CPT | Performed by: HOSPITALIST

## 2020-11-30 PROCEDURE — 82962 GLUCOSE BLOOD TEST: CPT

## 2020-11-30 PROCEDURE — 97162 PT EVAL MOD COMPLEX 30 MIN: CPT

## 2020-11-30 PROCEDURE — 94799 UNLISTED PULMONARY SVC/PX: CPT

## 2020-11-30 PROCEDURE — 63710000001 INSULIN ASPART PER 5 UNITS: Performed by: HOSPITALIST

## 2020-11-30 PROCEDURE — 83735 ASSAY OF MAGNESIUM: CPT | Performed by: HOSPITALIST

## 2020-11-30 PROCEDURE — 85025 COMPLETE CBC W/AUTO DIFF WBC: CPT | Performed by: HOSPITALIST

## 2020-11-30 PROCEDURE — 83880 ASSAY OF NATRIURETIC PEPTIDE: CPT | Performed by: INTERNAL MEDICINE

## 2020-11-30 PROCEDURE — 80048 BASIC METABOLIC PNL TOTAL CA: CPT | Performed by: INTERNAL MEDICINE

## 2020-11-30 PROCEDURE — 97165 OT EVAL LOW COMPLEX 30 MIN: CPT

## 2020-11-30 RX ORDER — BUMETANIDE 1 MG/1
1 TABLET ORAL DAILY
Status: DISCONTINUED | OUTPATIENT
Start: 2020-11-30 | End: 2020-12-01 | Stop reason: HOSPADM

## 2020-11-30 RX ORDER — BUMETANIDE 1 MG/1
1 TABLET ORAL 2 TIMES DAILY
Status: DISCONTINUED | OUTPATIENT
Start: 2020-11-30 | End: 2020-11-30

## 2020-11-30 RX ORDER — POTASSIUM CHLORIDE 20 MEQ/1
40 TABLET, EXTENDED RELEASE ORAL EVERY 4 HOURS
Status: COMPLETED | OUTPATIENT
Start: 2020-11-30 | End: 2020-12-01

## 2020-11-30 RX ADMIN — BUMETANIDE 1 MG: 1 TABLET ORAL at 17:54

## 2020-11-30 RX ADMIN — METOPROLOL TARTRATE 50 MG: 50 TABLET, FILM COATED ORAL at 20:43

## 2020-11-30 RX ADMIN — FUROSEMIDE 40 MG: 40 TABLET ORAL at 09:08

## 2020-11-30 RX ADMIN — APIXABAN 5 MG: 2.5 TABLET, FILM COATED ORAL at 09:08

## 2020-11-30 RX ADMIN — LINAGLIPTIN 5 MG: 5 TABLET, FILM COATED ORAL at 09:08

## 2020-11-30 RX ADMIN — INSULIN ASPART 2 UNITS: 100 INJECTION, SOLUTION INTRAVENOUS; SUBCUTANEOUS at 09:10

## 2020-11-30 RX ADMIN — DOCUSATE SODIUM 50 MG AND SENNOSIDES 8.6 MG 2 TABLET: 8.6; 5 TABLET, FILM COATED ORAL at 09:08

## 2020-11-30 RX ADMIN — OXYCODONE HYDROCHLORIDE AND ACETAMINOPHEN 1 TABLET: 10; 325 TABLET ORAL at 09:09

## 2020-11-30 RX ADMIN — OXYCODONE HYDROCHLORIDE AND ACETAMINOPHEN 1 TABLET: 10; 325 TABLET ORAL at 03:07

## 2020-11-30 RX ADMIN — METOPROLOL TARTRATE 50 MG: 50 TABLET, FILM COATED ORAL at 09:08

## 2020-11-30 RX ADMIN — INSULIN ASPART 2 UNITS: 100 INJECTION, SOLUTION INTRAVENOUS; SUBCUTANEOUS at 12:43

## 2020-11-30 RX ADMIN — DILTIAZEM HYDROCHLORIDE 240 MG: 240 CAPSULE, COATED, EXTENDED RELEASE ORAL at 09:08

## 2020-11-30 RX ADMIN — ALLOPURINOL 100 MG: 100 TABLET ORAL at 09:08

## 2020-11-30 RX ADMIN — PANTOPRAZOLE SODIUM 40 MG: 40 TABLET, DELAYED RELEASE ORAL at 06:37

## 2020-11-30 RX ADMIN — APIXABAN 5 MG: 2.5 TABLET, FILM COATED ORAL at 20:43

## 2020-11-30 RX ADMIN — POTASSIUM CHLORIDE 40 MEQ: 1500 TABLET, EXTENDED RELEASE ORAL at 20:44

## 2020-11-30 RX ADMIN — SODIUM CHLORIDE, PRESERVATIVE FREE 10 ML: 5 INJECTION INTRAVENOUS at 09:09

## 2020-11-30 RX ADMIN — OXYCODONE HYDROCHLORIDE AND ACETAMINOPHEN 1 TABLET: 10; 325 TABLET ORAL at 21:41

## 2020-11-30 RX ADMIN — INSULIN ASPART 3 UNITS: 100 INJECTION, SOLUTION INTRAVENOUS; SUBCUTANEOUS at 17:54

## 2020-11-30 RX ADMIN — SODIUM CHLORIDE, PRESERVATIVE FREE 10 ML: 5 INJECTION INTRAVENOUS at 20:44

## 2020-12-01 ENCOUNTER — READMISSION MANAGEMENT (OUTPATIENT)
Dept: CALL CENTER | Facility: HOSPITAL | Age: 70
End: 2020-12-01

## 2020-12-01 VITALS
TEMPERATURE: 98 F | OXYGEN SATURATION: 96 % | BODY MASS INDEX: 50.49 KG/M2 | DIASTOLIC BLOOD PRESSURE: 85 MMHG | SYSTOLIC BLOOD PRESSURE: 122 MMHG | WEIGHT: 267.44 LBS | RESPIRATION RATE: 18 BRPM | HEIGHT: 61 IN | HEART RATE: 60 BPM

## 2020-12-01 LAB
BASOPHILS # BLD AUTO: 0.02 10*3/MM3 (ref 0–0.2)
BASOPHILS NFR BLD AUTO: 0.2 % (ref 0–1.5)
DEPRECATED RDW RBC AUTO: 47.6 FL (ref 37–54)
EOSINOPHIL # BLD AUTO: 0.11 10*3/MM3 (ref 0–0.4)
EOSINOPHIL NFR BLD AUTO: 0.9 % (ref 0.3–6.2)
ERYTHROCYTE [DISTWIDTH] IN BLOOD BY AUTOMATED COUNT: 14 % (ref 12.3–15.4)
GLUCOSE BLDC GLUCOMTR-MCNC: 209 MG/DL (ref 70–130)
GLUCOSE BLDC GLUCOMTR-MCNC: 234 MG/DL (ref 70–130)
HCT VFR BLD AUTO: 38.9 % (ref 34–46.6)
HGB BLD-MCNC: 12.2 G/DL (ref 12–15.9)
IMM GRANULOCYTES # BLD AUTO: 0.06 10*3/MM3 (ref 0–0.05)
IMM GRANULOCYTES NFR BLD AUTO: 0.5 % (ref 0–0.5)
LYMPHOCYTES # BLD AUTO: 1.87 10*3/MM3 (ref 0.7–3.1)
LYMPHOCYTES NFR BLD AUTO: 15.1 % (ref 19.6–45.3)
MAGNESIUM SERPL-MCNC: 1.5 MG/DL (ref 1.6–2.4)
MCH RBC QN AUTO: 29 PG (ref 26.6–33)
MCHC RBC AUTO-ENTMCNC: 31.4 G/DL (ref 31.5–35.7)
MCV RBC AUTO: 92.6 FL (ref 79–97)
MONOCYTES # BLD AUTO: 0.8 10*3/MM3 (ref 0.1–0.9)
MONOCYTES NFR BLD AUTO: 6.5 % (ref 5–12)
NEUTROPHILS NFR BLD AUTO: 76.8 % (ref 42.7–76)
NEUTROPHILS NFR BLD AUTO: 9.53 10*3/MM3 (ref 1.7–7)
NRBC BLD AUTO-RTO: 0 /100 WBC (ref 0–0.2)
PHOSPHATE SERPL-MCNC: 3.2 MG/DL (ref 2.5–4.5)
PLATELET # BLD AUTO: 224 10*3/MM3 (ref 140–450)
PMV BLD AUTO: 10.7 FL (ref 6–12)
POTASSIUM SERPL-SCNC: 3.9 MMOL/L (ref 3.5–5.2)
RBC # BLD AUTO: 4.2 10*6/MM3 (ref 3.77–5.28)
WBC # BLD AUTO: 12.39 10*3/MM3 (ref 3.4–10.8)

## 2020-12-01 PROCEDURE — 63710000001 INSULIN ASPART PER 5 UNITS: Performed by: HOSPITALIST

## 2020-12-01 PROCEDURE — 82962 GLUCOSE BLOOD TEST: CPT

## 2020-12-01 PROCEDURE — 94799 UNLISTED PULMONARY SVC/PX: CPT

## 2020-12-01 PROCEDURE — 99239 HOSP IP/OBS DSCHRG MGMT >30: CPT | Performed by: HOSPITALIST

## 2020-12-01 PROCEDURE — 84132 ASSAY OF SERUM POTASSIUM: CPT | Performed by: HOSPITALIST

## 2020-12-01 PROCEDURE — 85025 COMPLETE CBC W/AUTO DIFF WBC: CPT | Performed by: HOSPITALIST

## 2020-12-01 PROCEDURE — 83735 ASSAY OF MAGNESIUM: CPT | Performed by: HOSPITALIST

## 2020-12-01 PROCEDURE — 99232 SBSQ HOSP IP/OBS MODERATE 35: CPT | Performed by: NURSE PRACTITIONER

## 2020-12-01 PROCEDURE — 25010000002 MAGNESIUM SULFATE 2 GM/50ML SOLUTION: Performed by: HOSPITALIST

## 2020-12-01 PROCEDURE — 84100 ASSAY OF PHOSPHORUS: CPT | Performed by: HOSPITALIST

## 2020-12-01 RX ORDER — METOPROLOL TARTRATE 75 MG/1
75 TABLET, FILM COATED ORAL EVERY 12 HOURS SCHEDULED
Qty: 60 TABLET | Refills: 3 | Status: ON HOLD | OUTPATIENT
Start: 2020-12-01 | End: 2022-09-21

## 2020-12-01 RX ORDER — OXYCODONE AND ACETAMINOPHEN 10; 325 MG/1; MG/1
0.5 TABLET ORAL EVERY 6 HOURS PRN
Qty: 30 TABLET | Refills: 0 | Status: ON HOLD | OUTPATIENT
Start: 2020-12-01 | End: 2022-09-21

## 2020-12-01 RX ORDER — DILTIAZEM HYDROCHLORIDE 5 MG/ML
10 INJECTION INTRAVENOUS ONCE
Status: DISCONTINUED | OUTPATIENT
Start: 2020-12-01 | End: 2020-12-01

## 2020-12-01 RX ORDER — GLIPIZIDE 5 MG/1
5 TABLET ORAL
Status: DISCONTINUED | OUTPATIENT
Start: 2020-12-01 | End: 2020-12-01 | Stop reason: HOSPADM

## 2020-12-01 RX ORDER — MAGNESIUM OXIDE 400 MG/1
400 TABLET ORAL DAILY
Qty: 30 TABLET | Refills: 3 | Status: ON HOLD | OUTPATIENT
Start: 2020-12-01 | End: 2022-09-21

## 2020-12-01 RX ORDER — METOPROLOL TARTRATE 5 MG/5ML
2.5 INJECTION INTRAVENOUS ONCE
Status: COMPLETED | OUTPATIENT
Start: 2020-12-01 | End: 2020-12-01

## 2020-12-01 RX ORDER — BUMETANIDE 1 MG/1
1 TABLET ORAL DAILY
Qty: 30 TABLET | Refills: 3 | Status: ON HOLD | OUTPATIENT
Start: 2020-12-02 | End: 2022-09-21

## 2020-12-01 RX ORDER — DILTIAZEM HYDROCHLORIDE 240 MG/1
240 CAPSULE, COATED, EXTENDED RELEASE ORAL
Qty: 30 CAPSULE | Refills: 3 | Status: ON HOLD | OUTPATIENT
Start: 2020-12-02 | End: 2022-09-21

## 2020-12-01 RX ORDER — DILTIAZEM HYDROCHLORIDE 5 MG/ML
10 INJECTION INTRAVENOUS ONCE
Status: COMPLETED | OUTPATIENT
Start: 2020-12-01 | End: 2020-12-01

## 2020-12-01 RX ORDER — GLIPIZIDE 5 MG/1
5 TABLET ORAL
Qty: 30 TABLET | Refills: 3 | Status: ON HOLD | OUTPATIENT
Start: 2020-12-02 | End: 2022-09-21

## 2020-12-01 RX ORDER — MAGNESIUM SULFATE HEPTAHYDRATE 40 MG/ML
2 INJECTION, SOLUTION INTRAVENOUS
Status: COMPLETED | OUTPATIENT
Start: 2020-12-01 | End: 2020-12-01

## 2020-12-01 RX ADMIN — LINAGLIPTIN 5 MG: 5 TABLET, FILM COATED ORAL at 10:36

## 2020-12-01 RX ADMIN — MAGNESIUM SULFATE IN WATER 2 G: 40 INJECTION, SOLUTION INTRAVENOUS at 13:50

## 2020-12-01 RX ADMIN — PANTOPRAZOLE SODIUM 40 MG: 40 TABLET, DELAYED RELEASE ORAL at 05:16

## 2020-12-01 RX ADMIN — ALLOPURINOL 100 MG: 100 TABLET ORAL at 10:36

## 2020-12-01 RX ADMIN — POTASSIUM CHLORIDE 40 MEQ: 1500 TABLET, EXTENDED RELEASE ORAL at 00:04

## 2020-12-01 RX ADMIN — METOPROLOL TARTRATE 75 MG: 50 TABLET, FILM COATED ORAL at 10:35

## 2020-12-01 RX ADMIN — MAGNESIUM SULFATE IN WATER 2 G: 40 INJECTION, SOLUTION INTRAVENOUS at 15:20

## 2020-12-01 RX ADMIN — BUMETANIDE 1 MG: 1 TABLET ORAL at 10:35

## 2020-12-01 RX ADMIN — DILTIAZEM HYDROCHLORIDE 10 MG: 5 INJECTION INTRAVENOUS at 03:50

## 2020-12-01 RX ADMIN — INSULIN ASPART 3 UNITS: 100 INJECTION, SOLUTION INTRAVENOUS; SUBCUTANEOUS at 11:20

## 2020-12-01 RX ADMIN — METOPROLOL TARTRATE 2.5 MG: 1 INJECTION, SOLUTION INTRAVENOUS at 03:04

## 2020-12-01 RX ADMIN — DILTIAZEM HYDROCHLORIDE 10 MG: 5 INJECTION INTRAVENOUS at 05:14

## 2020-12-01 RX ADMIN — DILTIAZEM HYDROCHLORIDE 240 MG: 240 CAPSULE, COATED, EXTENDED RELEASE ORAL at 10:35

## 2020-12-01 RX ADMIN — OXYCODONE HYDROCHLORIDE AND ACETAMINOPHEN 1 TABLET: 10; 325 TABLET ORAL at 16:25

## 2020-12-01 RX ADMIN — DOCUSATE SODIUM 50 MG AND SENNOSIDES 8.6 MG 2 TABLET: 8.6; 5 TABLET, FILM COATED ORAL at 10:36

## 2020-12-01 RX ADMIN — APIXABAN 5 MG: 2.5 TABLET, FILM COATED ORAL at 10:35

## 2020-12-01 RX ADMIN — OXYCODONE HYDROCHLORIDE AND ACETAMINOPHEN 1 TABLET: 10; 325 TABLET ORAL at 10:35

## 2020-12-01 RX ADMIN — MAGNESIUM SULFATE IN WATER 2 G: 40 INJECTION, SOLUTION INTRAVENOUS at 11:20

## 2020-12-01 NOTE — DISCHARGE SUMMARY
[facility] HOSPITALIST MEDICINE DISCHARGE SUMMARY    Patient Identification:  Name:  Anika Neri  Age:  70 y.o.  Sex:  female  :  1950  MRN:  [id number]  Visit Number:  [id number]    Date of Admission: 2020  Date of Discharge: 2020  DISCHARGE DISPOSITION   Stable  PCP: Provider, No Known    DISCHARGE DIAGNOSIS : Atrial fibrillation without recurrent response, noncompliance, diabetes type 2 non-insulin-requiring, hypomagnesemia, morbid obesity with BMI 50.5, significant DJD of both lower extremity and foot, left popliteal cyst, acute kidney injury likely cardiorenal syndrome per cardiology.  Essential hypertension.  Gout.  Possible obstructive sleep apnea needs further work-up.  Preserved ejection fraction heart failure now compensated.    HOSPITAL COURSE  Patient is a 70 y.o. female presented to [facility] complaining of severe lower extremity pain worse on the left with swelling.  At the emergency room she was noted to have atrial fibrillation with rapid vascular response.  She has history of A. fib, she was here recently, she was offered cardioversion which she refused and signed out AMA.  Appointment were made for her for orthopedics for knee pain and arthritis with joint effusion which she did not keep her appointment.  At the emergency room initially she was placed on Cardizem drip, she was transitioned to oral Cardizem as well as beta-blocker with good heart rate control.  She was started on anticoagulation.  Initially she was noted to have acute kidney injury but improved after diuresis.  Cardiology Dr. Skaggs felt that patient's renal failure is cardiorenal in nature, he recommended to continue diuretics.  Renal function continues to improve.  During her stay physical therapy was also consulted to prevent deconditioning.  Unfortunately she is not very compliant in participating.  Patient is able to ambulate she does use walker.  Regarding her medication  she also refused at times.  Again patient is frequently counseled the importance of compliance with her medication and explained to the patient that her condition can be life-threatening.  Monitoring her electrolytes and renal function is also a challenge due to her frequent refusal.  Electrolytes was monitored and replaced accordingly.  Patient's heart rate has been well controlled when she takes her medicine.    Her leg swelling particular the left rule out for DVT, she was noted to have popliteal cyst.  She also significant arthritis of both knees and foot.    Plan is to discharge her today, rolling walker will be ordered, appointment will be made with her primary care provider , cardiology will be following in 2 weeks time, appointment was also made for orthopedics for evaluation of severe knee arthritis, surgery appointment for popliteal cyst.      Patient was counseled the importance of compliance, and the potential side effects of anticoagulation including bleeding, to come to the emergency room should she develop melena, hematemesis hematochezia hematuria.    VITAL SIGNS:      11/29/20  0400 11/29/20  1850 12/01/20  0350   Weight: 132 kg (290 lb) 123 kg (271 lb 9.6 oz) 121 kg (267 lb 7 oz)     Body mass index is 50.53 kg/m².  Vitals:    12/01/20 1418   BP: 122/85   Pulse: 60   Resp: 18   Temp: 98 °F (36.7 °C)   SpO2: 96%     PHYSICAL EXAM:  General: Currently in bed she is not orthopneic, awake and alert, initially she was refusing her blood test and because of this I did not give her morning medication until blood was drawn, she did develop tachycardia but resolved after starting her medicine. Morbidly obese, comfortable, eating breakfast,  No respiratory distress.    Skin:  Skin is warm and dry. No rash noted. No pallor.    HENT:  Head:  Normocephalic and atraumatic.  Mouth:  Moist mucous membranes.    Eyes:  Conjunctivae and EOM are normal.  Pupils are equal, round, and reactive to light.  No scleral  icterus.    Neck:  Neck supple.  No JVD present.    Pulmonary/Chest:  No respiratory distress, no wheezes, no crackles, with normal breath sounds and good air movement.  Cardiovascular: Irregular irregular rhythm, rate controlled, no obvious murmur or gallop.   Abdominal:  Soft.  Bowel sounds are normal.  No distension and no tenderness.  Obese extremities: Trace edema left foot, no redness, no skin break, no range of motion limitation.  Strong pedal pulse bilateral.  Significant DJD changes both knees worse on the right   Neurological:  Motor strength equal no obvious deficit, sensory grossly intact.   No cranial nerve deficit.  No tongue deviation.  No facial droop.  No slurred speech.    Genitourinary: No Flanagan catheter  Back: No skin break    ----------    DISCHARGE MEDICATIONS:     Discharge Medications      New Medications      Instructions Start Date   apixaban 5 MG tablet tablet  Commonly known as: ELIQUIS   5 mg, Oral, Every 12 Hours Scheduled      bumetanide 1 MG tablet  Commonly known as: BUMEX   1 mg, Oral, Daily   Start Date: December 2, 2020     dilTIAZem  MG 24 hr capsule  Commonly known as: CARDIZEM CD   240 mg, Oral, Every 24 Hours Scheduled   Start Date: December 2, 2020     glipizide 5 MG tablet  Commonly known as: GLUCOTROL   5 mg, Oral, Every Morning Before Breakfast   Start Date: December 2, 2020     magnesium oxide 400 MG tablet  Commonly known as: MAG-OX   400 mg, Oral, Daily      Metoprolol Tartrate 75 MG tablet   75 mg, Oral, Every 12 Hours Scheduled         Changes to Medications      Instructions Start Date   oxyCODONE-acetaminophen  MG per tablet  Commonly known as: PERCOCET  What changed: how much to take   0.5 tablets, Oral, Every 6 Hours PRN         Continue These Medications      Instructions Start Date   allopurinol 100 MG tablet  Commonly known as: ZYLOPRIM   100 mg, Oral, Daily      diazePAM 5 MG tablet  Commonly known as: VALIUM   5 mg, Oral, 2 Times Daily PRN       PARoxetine 10 MG tablet  Commonly known as: PAXIL   10 mg, Oral, Every Morning      SITagliptin 100 MG tablet  Commonly known as: JANUVIA   100 mg, Oral, Daily         Stop These Medications    oxybutynin XL 15 MG 24 hr tablet  Commonly known as: DITROPAN XL            Diet Instructions     Resume diet as tolerated.             Your Scheduled Appointments    Dec 11, 2020 10:00 AM  Hospital Follow Up with MILLER Valdivia  [facility] FAMILY MEDICINE ([place]) [address]  [address]  [facility] [address] [phone]      Dec 15, 2020 11:00 AM  Follow Up with MILLER Waggoner  [facility] CARDIOLOGY (--) [address]  [facility] [address] [phone]   Arrive 15 minutes prior to appointment.      Additional instructions:     Pt  Has  No  Primary  Has  A apt  With  madeline Chaparro  For 9  At  10  Am  And  Will get  referal  For  Ortho  And  sugery                Activity Instructions     Resume activity as tolerated.             Additional Instructions for the Follow-ups that You Need to Schedule     Discharge Follow-up with PCP   As directed       Currently Documented PCP:    Provider, No Known    PCP Phone Number:    [phone]     Follow Up Details: Primary care provider in 1 week's time         Discharge Follow-up with Specified Provider: Cardiology clinic; 2 Weeks   As directed      To: Cardiology clinic    Follow Up: 2 Weeks    Follow Up Details: For atrial fibrillation         Discharge Follow-up with Specified Provider: Orthopedics; 1 Month   As directed      To: Orthopedics    Follow Up: 1 Month    Follow Up Details: Severe DJD both knees         Discharge Follow-up with Specified Provider: Surgery; 1 Month   As directed      To: Surgery    Follow Up: 1 Month    Follow Up Details: Left popliteal cyst           Follow-up Information     Provider, No Known .    Why: Primary care provider in 1 week's time  Contact information:  [facility]  [facility] [address]  [phone]                   Cathleen Romano MD  12/01/20  17:28 EST    Please note that this discharge summary required more than 30 minutes to complete.

## 2020-12-01 NOTE — OUTREACH NOTE
Prep Survey      Responses   [facility] facility patient discharged from?  [place]   Is LACE score < 7 ?  No   Eligibility  [facility]   Date of Admission  11/27/20   Date of Discharge  12/01/20   Discharge Disposition  Home or Self Care   Discharge diagnosis  Afib w/ AVR, EDILSON,   Does the patient have one of the following disease processes/diagnoses(primary or secondary)?  Other   Does the patient have Home health ordered?  No   Is there a DME ordered?  Yes   What DME was ordered?  Rolling Walker/[facility]   Comments regarding appointments  Please see AVS   General alerts for this patient  not very compliant/misses appts/refuses meds   Prep survey completed?  Yes          Negar Gee RN

## 2020-12-01 NOTE — PROGRESS NOTES
LOS: 4 days     Name: Anika Neri  Age/Sex: 70 y.o. female  :  1950        PCP: Provider, No Known    Active Problems:    Pain of left lower extremity      Admission Information: Anika Neri is a 70 y.o. female with a past medical history significant for hypertension, diabetes, gout, and arthritis.  She presented to the ED on 2020 with complaint of knee pain.  The patient was admitted and cardiology was consulted secondary to atrial fibrillation with RVR.    Of note, according to the medical record, the patient was admitted 10/27/2020 through 2020, treated for atrial fibrillation with RVR, UTI, right lower lobe pneumonia and right knee pain.  She left AMA and did not receive any of her home medications.    Chief Complaint: Atrial fibrillation    Interval history: There were no events through the night.  Patient was seen and examined.  She states that she is being discharged.  She also reports that she will not be going home to [facility] until after [place].    Subjective       Vital Signs  Vital Signs (last 72 hrs)        07  -   0659  07  -   0659  07  -   0659  07  -   1530   Most Recent    Temp (°F) 98.4 -  99.3    98 -  98.4    98.1 -  99.6    98 -  98.2     98 (36.7)    Heart Rate 74 -  129    68 -  123    71 -  130    54 -  60     60    Resp  -      18 -  20    18 -  20      18     18    BP 91/67 -  128/87    104/69 -  133/65    121/67 -  165/93    122/85 -  148/65     122/85    SpO2 (%) 86 -  97    94 -  97    92 -  98    96 -  97     96        Temp:  [98 °F (36.7 °C)-99.6 °F (37.6 °C)] 98 °F (36.7 °C)  Heart Rate:  [] 60  Resp:  [18-20] 18  BP: (121-165)/(65-93) 122/85  Body mass index is 50.53 kg/m².      Intake/Output Summary (Last 24 hours) at 2020 1530  Last data filed at 2020 1348  Gross per 24 hour   Intake 240 ml   Output 4000 ml   Net -3760 ml       Constitutional:       Appearance: Normal  appearance. Well-developed.   Eyes:      General: Lids are normal.      Conjunctiva/sclera: Conjunctivae normal.      Pupils: Pupils are equal, round, and reactive to light.   HENT:      Head: Normocephalic and atraumatic.   Neck:      Vascular: No carotid bruit or JVD.   Pulmonary:      Effort: Pulmonary effort is normal. No respiratory distress.      Breath sounds: Normal breath sounds. No decreased breath sounds. No wheezing. No rhonchi. No rales.   Cardiovascular:      Normal rate. Irregular rhythm.   Edema:     Peripheral edema absent.   Abdominal:      General: Bowel sounds are normal. There is no distension.      Palpations: Abdomen is soft.      Tenderness: There is no abdominal tenderness.   Skin:     General: Skin is warm and dry.   Neurological:      Mental Status: Alert and oriented to person, place, and time.   Psychiatric:         Behavior: Behavior is cooperative.         Telemetry: Atrial fibrillation 90s       Results Review:     Results from last 7 days   Lab Units 12/01/20  0945 11/30/20  1438 11/29/20  0037 11/28/20  0056 11/27/20  1515 11/27/20  0800   WBC 10*3/mm3 12.39* 12.68* 7.45 11.48* 11.09* 11.88*   HEMOGLOBIN g/dL 12.2 11.7* 12.6 10.8* 11.2* 11.5*   PLATELETS 10*3/mm3 224 216 240 175 152 178     Results from last 7 days   Lab Units 12/01/20  0945 11/30/20  1438 11/29/20  0126 11/28/20  0056 11/27/20  1515 11/27/20  0800   SODIUM mmol/L  --  135* 131* 133* 138 135*   POTASSIUM mmol/L 3.9 3.4* 4.5 5.0 4.8 4.6   CHLORIDE mmol/L  --  95* 93* 101 104 100   CO2 mmol/L  --  27.6 29.2* 19.9* 21.4* 22.4   BUN mg/dL  --  47* 19 40* 34* 33*   CREATININE mg/dL  --  1.23* 1.20* 1.63* 1.26* 1.46*   CALCIUM mg/dL  --  9.8 9.3 9.1 9.2 9.2   GLUCOSE mg/dL  --  228* 141* 257* 273* 291*     Results from last 7 days   Lab Units 11/28/20  0056 11/27/20  1833 11/27/20  1515 11/27/20  0800   CK TOTAL U/L  --   --   --  323*   TROPONIN T ng/mL <0.010 <0.010 <0.010 <0.010       Results from last 7 days   Lab  Units 11/27/20  0800   INR  1.20*       I reviewed the patient's new clinical results.  I reviewed the patient's new imaging results and agree with the interpretation.  I personally viewed and interpreted the patient's EKG/Telemetry data      Medication Review:   allopurinol, 100 mg, Oral, Daily  apixaban, 5 mg, Oral, Q12H  bumetanide, 1 mg, Oral, Daily  dilTIAZem CD, 240 mg, Oral, Q24H  glipizide, 5 mg, Oral, QAM AC  insulin aspart, 0-7 Units, Subcutaneous, TID AC  linagliptin, 5 mg, Oral, Daily  magnesium sulfate, 2 g, Intravenous, Q2H  magnesium sulfate, 1 g, Intravenous, Once  metoprolol tartrate, 75 mg, Oral, Q12H  pantoprazole, 40 mg, Oral, Q AM  PARoxetine, 10 mg, Oral, QAM  senna-docusate sodium, 2 tablet, Oral, BID  sodium chloride, 10 mL, Intravenous, Q12H           Assessment:  1. Atrial fibrillation with RVR, [id number] is at least 4 for hypertension, diabetes, age and sex.  Patient has been placed on Eliquis 5 mg twice daily  2. CHF with preserved EF, acutely decompensated with volume overload.  Resolved.  3. Acute kidney injury, patient's baseline appears to have been somewhere around 0.8-1 per her admission in late October.  Creatinine 1.23 today.  4. Morbid obesity      Recommendations:  1. Patient is to continue Cardizem, metoprolol and Eliquis.  Continue Bumex for heart failure.  2. Since patient will not be returning to her home for over a month, she has agreed to see our clinic in follow-up in 2 weeks.    I discussed the patients findings and my recommendations with patient and family    Pili Canchola, MILLER  12/01/20  15:30 EST    Addendum:

## 2020-12-01 NOTE — CONSULTS
Heart Failure Education Consult    Patient did not wear a mask for this consultation encounter.    Type of Heart Failure: Diastolic vs rate related secondary to A-fib with RVR  Length of diagnosis: New Diagnosis      Current HF knowledge: poor     Have you had HF education/teaching in the past? No  Do you check your weight daily? No    Current weight        11/29/20  1850 12/01/20  0350   Weight: 123 kg (271 lb 9.6 oz) 121 kg (267 lb 7 oz)          Most recent EF 60% Date 10/28/2020       Most recent ProBNP 3772pg/ml Date 11/30/2020      Edema Yes and Improving        Shortness of Breath: Yes and Improving     Number of pillows used to sleep at night: 2  Barriers to learning: Other Distracted regarding eager to be discharged     Materials Provided:Daily Weight Monitoring  and 2 Gm Na+ diet             Referral candidate for HF Clinic:No - patient is visiting from [place], [place] and plans to return to [place] after the Christmas holiday. I did provide her with the HF clinic number should she want to be seen.      Thank you for this consult. Please let me know if I can be of any assistance with HF education for this patient.  Berkley Lam, RN   12/01/20 14:14 EST

## 2020-12-01 NOTE — NURSING NOTE
Patient agreeable to labs after speaking to Md at bedside, Phlebs made aware and are en route to unit.

## 2020-12-01 NOTE — PROGRESS NOTES
Notified by nursing staff that patient with atrial fibrillation with rapid ventricular response.  Patient asymptomatic.  Patient's heart rate was initially climbing to the 150s.    Patient received a total of 20 mg IV Cardizem bolus (10 mg x 2) and currently her heart rate is more in the 110s to 1 teens but will still occasionally increased to the 120s-130s.    Current medications have been reviewed.  Plan to increase her dose of metoprolol tartrate 75 mg twice daily beginning this morning (up from 50 mg twice daily).  Patient also recently started on Cardizem, 240 mg daily.

## 2020-12-01 NOTE — DISCHARGE INSTR - APPOINTMENTS
Pt  Has  No  Primary  Has  A apt  With  madeline Chaparro  For 9  At  10  Am  And  Will get  referal  For  Ortho  And  michell

## 2020-12-01 NOTE — PLAN OF CARE
Goal Outcome Evaluation:  Plan of Care Reviewed With: patient  Progress: no change  No s/s of distress. No complaints at this time. Will continue with plan of care.

## 2020-12-01 NOTE — PROGRESS NOTES
Discharge Planning Assessment  [facility]     Patient Name: Anika Neri  MRN: [id number]  Today's Date: 12/1/2020    Admit Date: 11/27/2020        Discharge Plan     Row Name 12/01/20 1229       Plan    Final Discharge Disposition Code  01 - home or self-care    Final Note  Pt to be discharged home on this date with Physician ordered rolling walker.  Pt stated no preference with DME provider.   made referral to [facility] per rotation list and faxed order to agency at [phone].  [facility] to bring rolling walker to [facility] prior to pt's discharge.        Dana Malik, CELIAW

## 2020-12-01 NOTE — NURSING NOTE
"Lab at bedside attempting to draw last nights and this mornings labs.  Patient agitated and refusing lab draw. RN at bedside attempting to console patient and educate why these labs are important for care and decisions as far as going home.  Patient states that \"she doesn't care, she is going home regardless\".  Tele called RN and stated that heart rate was elevated.  Patient heart rate 130-140 at this moment.  RN attempted to give patient cardiac meds to help reduce heart rate to which she refused.  Patient states \" she isnt doing nothing but going home\".  RN educated the patient of risks of not taking cardiac medications and the risk of leaving AMA with elevated heart rate.  MD paged and RN awaiting return call.  "

## 2020-12-01 NOTE — PLAN OF CARE
Goal Outcome Evaluation:  Plan of Care Reviewed With: patient  Progress: no change   Patient resting well during shift. Patient complaining of bilateral lower extremity pain at times. PRN medications given as ordered. Patient complaining of hearing voices, Agnes SHAVER aware see orders. Patient alert and oriented X4 refused ABG and morning dose of paxil. AGATHA SHAVER aware. Patient HR elevated during shift. AGATHA SHAVER and Dr. Bang aware. See orders. Will follow plan of care. No distress noted will continue to monitor.

## 2020-12-02 ENCOUNTER — TRANSITIONAL CARE MANAGEMENT TELEPHONE ENCOUNTER (OUTPATIENT)
Dept: CALL CENTER | Facility: HOSPITAL | Age: 70
End: 2020-12-02

## 2020-12-02 NOTE — OUTREACH NOTE
Call Center TCM Note      Responses   [facility] patient discharged from?  [facility]   Does the patient have one of the following disease processes/diagnoses(primary or secondary)?  Other   TCM attempt successful?  No   Unsuccessful attempts  Attempt 2          Tere Reyes LPN    12/2/2020, 17:21 EST

## 2020-12-02 NOTE — OUTREACH NOTE
Call Center TCM Note      Responses   [facility] patient discharged from?  [facility]   Does the patient have one of the following disease processes/diagnoses(primary or secondary)?  Other   TCM attempt successful?  No   Unsuccessful attempts  Attempt 1          Tere Reyes LPN    12/2/2020, 17:08 EST

## 2020-12-03 ENCOUNTER — TRANSITIONAL CARE MANAGEMENT TELEPHONE ENCOUNTER (OUTPATIENT)
Dept: CALL CENTER | Facility: HOSPITAL | Age: 70
End: 2020-12-03

## 2020-12-03 NOTE — OUTREACH NOTE
Call Center TCM Note      Responses   [facility] patient discharged from?  [facility]   Does the patient have one of the following disease processes/diagnoses(primary or secondary)?  Other   TCM attempt successful?  No [No verbal release]   Unsuccessful attempts  Attempt 3          Cris Aparicio RN    12/3/2020, 08:20 EST

## 2020-12-04 ENCOUNTER — TELEPHONE (OUTPATIENT)
Dept: FAMILY MEDICINE CLINIC | Facility: CLINIC | Age: 70
End: 2020-12-04

## 2020-12-04 ENCOUNTER — NURSE TRIAGE (OUTPATIENT)
Dept: CALL CENTER | Facility: HOSPITAL | Age: 70
End: 2020-12-04

## 2020-12-04 NOTE — TELEPHONE ENCOUNTER
Not really since I am not familiar with the patient.   does need to be involved however.      Karissa Meadows returned call,situation was explained,she will call & talk to the Daughter.

## 2020-12-04 NOTE — TELEPHONE ENCOUNTER
"    Reason for Disposition  • [1] Caller requesting NON-URGENT health information AND [2] PCP's office is the best resource    Additional Information  • Negative: [1] Caller is not with the adult (patient) AND [2] reporting urgent symptoms  • Negative: Lab result questions  • Negative: Medication questions  • Negative: Caller can't be reached by phone  • Negative: Caller has already spoken to PCP or another triager  • Negative: RN needs further essential information from caller in order to complete triage  • Negative: Requesting regular office appointment    Answer Assessment - Initial Assessment Questions  1. REASON FOR CALL or QUESTION: \"What is your reason for calling today?\" or \"How can I best help you?\" or \"What question do you have that I can help answer?\"      Daughter is caller she has came in from [place]. Mother recent discharge from hospital. Daughter is asking for home health.  Mother can't get out of bed and is inconteint of urine. Daughter asking for home health. Advised to call MD for home health order.    Protocols used: INFORMATION ONLY CALL - NO TRIAGE-ADULT-      "

## 2020-12-04 NOTE — PROGRESS NOTES
Discharge Planning Assessment   [place]     Patient Name: Anika Neri  MRN: [id number]  Today's Date: 12/4/2020    Admit Date: 11/27/2020    Discharge Needs Assessment    No documentation.       Discharge Plan     Row Name 12/04/20 1328       Plan    Plan SS recieved call from nurseGayla at [facility] stating pt's daughter is requesting home health. Pt is a new patient for [facility] office but first available appt they have open is 12/11/20. SS contacted pt's daughter, Joellen Landry [phone] who states pt needs more assistance at home, requesting HH services. SS explained that PCP would need to see pt first before home health can be ordered. SS spoke with pt via phone also. SS asked pt if she would like to go to a NH for short term rehab. Pt says she would like to think about, asked SS to call her back this afternoon. SS will follow.    1510pm SS attempted to contact pt again about NHP for short term rehab. Pt states she is continuing to discuss NHP with her daughters. SS left pt her contact information should she made a decision. SS will follow.         Karissa Meadows

## 2020-12-04 NOTE — TELEPHONE ENCOUNTER
Patients daughter from [place] called she came from [place] to check on her Mom,reports they need home health,reports her mother was recently discharged from the hospital & they had to get the fire dept. To get her into the home,she cannot walk,they cannot get her in & out of bed,she has been sitting in urine in a chair all night & they cannot get her up,she has a scheduled apt. With you on 12/11/2020 but she said they cannot get her here,you have never seen her,i called the hospital & left a message for the  to call to see if she can offer any suggestions/options,do you have any other ideas?

## 2020-12-08 ENCOUNTER — READMISSION MANAGEMENT (OUTPATIENT)
Dept: CALL CENTER | Facility: HOSPITAL | Age: 70
End: 2020-12-08

## 2020-12-08 NOTE — OUTREACH NOTE
Medical Week 2 Survey      Responses   [facility] patient discharged from?  [facility]   Does the patient have one of the following disease processes/diagnoses(primary or secondary)?  Other   Week 2 attempt successful?  No   Revoke  Phone number issues [Only one phone # available for patient. Recording states not accepting calls. ]          Rohini Dominguez RN

## 2022-04-20 ENCOUNTER — NURSE TRIAGE (OUTPATIENT)
Dept: CALL CENTER | Facility: HOSPITAL | Age: 72
End: 2022-04-20

## 2022-04-21 NOTE — TELEPHONE ENCOUNTER
"    Reason for Disposition  • Low blood sugar definition and treatment, questions about    Additional Information  • Negative: Unconscious or difficult to awaken  • Negative: Seizure occurs  • Negative: Acting confused (e.g., disoriented, slurred speech)  • Negative: Very weak (e.g., can't stand)  • Negative: Sounds like a life-threatening emergency to the triager  • Negative: [1] Vomiting AND [2] signs of dehydration (e.g., very dry mouth, lightheaded, dark urine)  • Negative: [1] Low blood sugar symptoms persist > 30 minutes AND [2] using low blood sugar Care Advice  • Negative: [1] Low blood glucose (< 70 mg/dL  or 3.9 mmol/L) persists > 30 minutes AND [2] using low blood sugar Care Advice  • Negative: Patient sounds very sick or weak to the triager  • Negative: [1] Low blood sugar symptoms with no other adult present AND [2] hasn't tried Care Advice  • Negative: [1] Low blood glucose (< 70 mg/dL  or 3.9 mmol/L) with no other adult present AND [2] hasn't tried Care Advice  • Negative: Diabetes drug error or overdose (e.g., insulin error or extra dose)  • Negative: [1] Caller has URGENT medication or insulin pump question AND [2] triager unable to answer question  • Negative: [1] Blood glucose < 70  mg/dL (3.9 mmol/L) or symptomatic, now improved with Care Advice AND [2] cause unknown  • Negative: [1] Caller has NON-URGENT medication or insulin pump question AND [2] triager unable to answer question  • Negative: [1] Morning (before breakfast) blood glucose < 80 mg/dL (4.4 mmol/L) AND [2] more than once in past week  • Negative: [1] Evening (after bedtime snack) blood glucose < 100 mg/dL (5.6 mmol/L) AND [2] more than once in past week  • Negative: [1] Blood glucose < 70 mg/dL (3.9 mmol/L) or symptomatic AND [2] cause known    Answer Assessment - Initial Assessment Questions  1. SYMPTOMS: \"What symptoms are you concerned about?\"      89  2. ONSET:  \"When did the symptoms start?\"    Today   3. BLOOD GLUCOSE: \"What " "is your blood glucose level?\"      89  4. USUAL RANGE: \"What is your blood glucose level usually?\" (e.g., usual fasting morning value, usual evening value)     105  5. TYPE 1 or 2:  \"Do you know what type of diabetes you have?\"  (e.g., Type 1, Type 2, Gestational; doesn't know)       unsure  6. INSULIN: \"Do you take insulin?\" \"What type of insulin(s) do you use? What is the mode of delivery? (syringe, pen; injection or pump) \"When did you last give yourself an insulin dose?\" (i.e., time or hours/minutes ago) \"How much did you give?\" (i.e., how many units)     no  7. DIABETES PILLS: \"Do you take any pills for your diabetes?\"      no  8. OTHER SYMPTOMS: \"Do you have any symptoms?\" (e.g., fever, frequent urination, difficulty breathing, vomiting)     no  9. LOW BLOOD GLUCOSE TREATMENT: \"What have you done so far to treat the low blood glucose level?\"     nothing  10. FOOD: \"When did you last eat or drink?\"        Drank a Mt Dew an hour ago  11. ALONE: \"Are you alone right now or is someone with you?\"        Not alone  12. PREGNANCY: \"Is there any chance you are pregnant?\" \"When was your last menstrual period?\"      *    Protocols used: DIABETES - LOW BLOOD SUGAR-ADULT-AH      "

## 2022-04-22 ENCOUNTER — HOSPITAL ENCOUNTER (EMERGENCY)
Facility: HOSPITAL | Age: 72
Discharge: HOME | End: 2022-04-22
Attending: EMERGENCY MEDICINE | Admitting: EMERGENCY MEDICINE

## 2022-04-22 ENCOUNTER — APPOINTMENT (OUTPATIENT)
Dept: GENERAL RADIOLOGY | Facility: HOSPITAL | Age: 72
End: 2022-04-22

## 2022-04-22 ENCOUNTER — APPOINTMENT (OUTPATIENT)
Dept: CT IMAGING | Facility: HOSPITAL | Age: 72
End: 2022-04-22

## 2022-04-22 VITALS
SYSTOLIC BLOOD PRESSURE: 115 MMHG | TEMPERATURE: 97.7 F | WEIGHT: 208 LBS | OXYGEN SATURATION: 96 % | HEIGHT: 61 IN | HEART RATE: 87 BPM | DIASTOLIC BLOOD PRESSURE: 69 MMHG | RESPIRATION RATE: 18 BRPM | BODY MASS INDEX: 39.27 KG/M2

## 2022-04-22 DIAGNOSIS — L89.104 PRESSURE INJURY OF BACK, STAGE 4: Primary | ICD-10-CM

## 2022-04-22 LAB
ALBUMIN SERPL-MCNC: 3.54 G/DL (ref 3.5–5.2)
ALBUMIN/GLOB SERPL: 0.9 G/DL
ALP SERPL-CCNC: 90 U/L (ref 39–117)
ALT SERPL W P-5'-P-CCNC: 9 U/L (ref 1–33)
ANION GAP SERPL CALCULATED.3IONS-SCNC: 12.3 MMOL/L (ref 5–15)
AST SERPL-CCNC: 18 U/L (ref 1–32)
BASOPHILS # BLD AUTO: 0.05 10*3/MM3 (ref 0–0.2)
BASOPHILS NFR BLD AUTO: 0.4 % (ref 0–1.5)
BILIRUB SERPL-MCNC: 0.4 MG/DL (ref 0–1.2)
BUN SERPL-MCNC: 18 MG/DL (ref 8–23)
BUN/CREAT SERPL: 24.7 (ref 7–25)
CALCIUM SPEC-SCNC: 9.5 MG/DL (ref 8.6–10.5)
CHLORIDE SERPL-SCNC: 104 MMOL/L (ref 98–107)
CO2 SERPL-SCNC: 25.7 MMOL/L (ref 22–29)
CREAT SERPL-MCNC: 0.73 MG/DL (ref 0.57–1)
DEPRECATED RDW RBC AUTO: 44.3 FL (ref 37–54)
EGFRCR SERPLBLD CKD-EPI 2021: 87.5 ML/MIN/1.73
EOSINOPHIL # BLD AUTO: 0.41 10*3/MM3 (ref 0–0.4)
EOSINOPHIL NFR BLD AUTO: 3.5 % (ref 0.3–6.2)
ERYTHROCYTE [DISTWIDTH] IN BLOOD BY AUTOMATED COUNT: 14 % (ref 12.3–15.4)
GLOBULIN UR ELPH-MCNC: 3.9 GM/DL
GLUCOSE SERPL-MCNC: 88 MG/DL (ref 65–99)
HCT VFR BLD AUTO: 35.5 % (ref 34–46.6)
HGB BLD-MCNC: 11.8 G/DL (ref 12–15.9)
IMM GRANULOCYTES # BLD AUTO: 0.05 10*3/MM3 (ref 0–0.05)
IMM GRANULOCYTES NFR BLD AUTO: 0.4 % (ref 0–0.5)
LYMPHOCYTES # BLD AUTO: 3.37 10*3/MM3 (ref 0.7–3.1)
LYMPHOCYTES NFR BLD AUTO: 28.9 % (ref 19.6–45.3)
MCH RBC QN AUTO: 29 PG (ref 26.6–33)
MCHC RBC AUTO-ENTMCNC: 33.2 G/DL (ref 31.5–35.7)
MCV RBC AUTO: 87.2 FL (ref 79–97)
MONOCYTES # BLD AUTO: 0.57 10*3/MM3 (ref 0.1–0.9)
MONOCYTES NFR BLD AUTO: 4.9 % (ref 5–12)
NEUTROPHILS NFR BLD AUTO: 61.9 % (ref 42.7–76)
NEUTROPHILS NFR BLD AUTO: 7.22 10*3/MM3 (ref 1.7–7)
NRBC BLD AUTO-RTO: 0 /100 WBC (ref 0–0.2)
PLATELET # BLD AUTO: 298 10*3/MM3 (ref 140–450)
PMV BLD AUTO: 9.4 FL (ref 6–12)
POTASSIUM SERPL-SCNC: 3.8 MMOL/L (ref 3.5–5.2)
PROT SERPL-MCNC: 7.4 G/DL (ref 6–8.5)
RBC # BLD AUTO: 4.07 10*6/MM3 (ref 3.77–5.28)
SODIUM SERPL-SCNC: 142 MMOL/L (ref 136–145)
WBC NRBC COR # BLD: 11.67 10*3/MM3 (ref 3.4–10.8)

## 2022-04-22 PROCEDURE — 80053 COMPREHEN METABOLIC PANEL: CPT | Performed by: EMERGENCY MEDICINE

## 2022-04-22 PROCEDURE — 25010000002 HYDROMORPHONE 1 MG/ML SOLUTION: Performed by: EMERGENCY MEDICINE

## 2022-04-22 PROCEDURE — 25010000002 ONDANSETRON PER 1 MG: Performed by: EMERGENCY MEDICINE

## 2022-04-22 PROCEDURE — 96375 TX/PRO/DX INJ NEW DRUG ADDON: CPT

## 2022-04-22 PROCEDURE — 96374 THER/PROPH/DIAG INJ IV PUSH: CPT

## 2022-04-22 PROCEDURE — 73562 X-RAY EXAM OF KNEE 3: CPT

## 2022-04-22 PROCEDURE — 99284 EMERGENCY DEPT VISIT MOD MDM: CPT

## 2022-04-22 PROCEDURE — 74176 CT ABD & PELVIS W/O CONTRAST: CPT

## 2022-04-22 PROCEDURE — 51702 INSERT TEMP BLADDER CATH: CPT

## 2022-04-22 PROCEDURE — 85025 COMPLETE CBC W/AUTO DIFF WBC: CPT | Performed by: EMERGENCY MEDICINE

## 2022-04-22 RX ORDER — LEVOFLOXACIN 750 MG/1
750 TABLET ORAL DAILY
Qty: 10 TABLET | Refills: 0 | Status: SHIPPED | OUTPATIENT
Start: 2022-04-22 | End: 2022-05-02

## 2022-04-22 RX ORDER — LEVOFLOXACIN 750 MG/1
750 TABLET ORAL ONCE
Status: COMPLETED | OUTPATIENT
Start: 2022-04-22 | End: 2022-04-22

## 2022-04-22 RX ORDER — ONDANSETRON 2 MG/ML
4 INJECTION INTRAMUSCULAR; INTRAVENOUS ONCE
Status: COMPLETED | OUTPATIENT
Start: 2022-04-22 | End: 2022-04-22

## 2022-04-22 RX ORDER — LEVOFLOXACIN 750 MG/1
750 TABLET ORAL DAILY
Qty: 10 TABLET | Refills: 0 | Status: SHIPPED | OUTPATIENT
Start: 2022-04-22 | End: 2022-04-22 | Stop reason: SDUPTHER

## 2022-04-22 RX ADMIN — ONDANSETRON 4 MG: 2 INJECTION INTRAMUSCULAR; INTRAVENOUS at 18:54

## 2022-04-22 RX ADMIN — LEVOFLOXACIN 750 MG: 750 TABLET, FILM COATED ORAL at 21:48

## 2022-04-22 RX ADMIN — HYDROMORPHONE HYDROCHLORIDE 1 MG: 1 INJECTION, SOLUTION INTRAMUSCULAR; INTRAVENOUS; SUBCUTANEOUS at 18:54

## 2022-04-22 NOTE — ED PROVIDER NOTES
Subjective   Patient is a 71-year-old white female who is from [place] and is here visiting family and will not be going back for about a week.  She is extremely obese and developed a pressure ulcer on the sacrum about a year ago and she says that since she is a diabetic that it is having trouble healing alertly since what they have been telling her.  It has not been giving her any trouble recently and she has not had any intervention since it was debrided by general surgeon about a year ago.  However she said that starting last night it began to hurt and then there was bleeding that came today.  She says it normally does not do any of that and so she had to come to the hospital for evaluation.  She normally cannot get up to walk and she says they tell her not to lay on that but she said that last night she did and then it was significantly worse today.          Review of Systems   Constitutional: Negative.  Negative for fever.        Patient has chronic pain and takes Percocet 4 times daily.   HENT: Negative.    Respiratory: Negative.    Cardiovascular: Negative.  Negative for chest pain.   Gastrointestinal: Negative.  Negative for abdominal pain.   Endocrine: Negative.    Genitourinary: Negative.  Negative for dysuria.   Skin: Negative.    Neurological: Negative.    Psychiatric/Behavioral: Negative.    All other systems reviewed and are negative.      Past Medical History:   Diagnosis Date   • Arthritis    • Diabetes mellitus (CMS/HCC)    • Gout    • Hypertension        No Known Allergies    Past Surgical History:   Procedure Laterality Date   • CHOLECYSTECTOMY         No family history on file.    Social History     Socioeconomic History   • Marital status:    Tobacco Use   • Smoking status: Never Smoker   • Smokeless tobacco: Never Used   Substance and Sexual Activity   • Alcohol use: No   • Drug use: No   • Sexual activity: Defer           Objective   Physical Exam  Vitals and nursing note reviewed.    Constitutional:       General: She is not in acute distress.     Appearance: She is well-developed. She is obese. She is not toxic-appearing or diaphoretic.   HENT:      Head: Normocephalic and atraumatic.      Right Ear: External ear normal.      Left Ear: External ear normal.      Nose: Nose normal.   Eyes:      Conjunctiva/sclera: Conjunctivae normal.      Pupils: Pupils are equal, round, and reactive to light.   Neck:      Vascular: No JVD.      Trachea: No tracheal deviation.   Cardiovascular:      Rate and Rhythm: Normal rate and regular rhythm.      Heart sounds: Normal heart sounds. No murmur heard.  Pulmonary:      Effort: Pulmonary effort is normal. No respiratory distress.      Breath sounds: Normal breath sounds. No wheezing.   Abdominal:      General: There is no distension.      Palpations: Abdomen is soft.      Tenderness: There is no abdominal tenderness. There is no guarding.   Musculoskeletal:         General: No deformity. Normal range of motion.      Cervical back: Normal range of motion and neck supple.   Skin:     General: Skin is warm and dry.      Coloration: Skin is not pale.      Findings: No erythema or rash.      Comments: On the presacral area there is a large pressure ulcer measuring approximately 7 cm across.  There is a large amount of necrotic debris adherent.  When nursing was in the room the wound was probed and extends approximately 4 cm on the left side with small amounts of red blood being expressible.  No purulent material was found and there is no surrounding erythema.   Neurological:      Mental Status: She is alert and oriented to person, place, and time.      Cranial Nerves: No cranial nerve deficit.   Psychiatric:         Behavior: Behavior normal.         Thought Content: Thought content normal.         Procedures           ED Course  ED Course as of 04/23/22 0107   Fri Apr 22, 2022 2044 Patient's lab testing is essentially normal.  I have talked with the patient's  daughterJoellen, and also with the patient and the recommendation is to try a course of oral antibiotics, Levaquin being recommended.  The pressure ulcer should be rechecked at some point and the patient is going home in about a week and is felt reasonable that he can be followed up at that point.  It was presumably exacerbated by the patient lying on the area all night. [DS]      ED Course User Index  [DS] Darrian Drake MD                                                 MDM  Number of Diagnoses or Management Options  Pressure injury of back, stage 4 (HCC): established and worsening     Amount and/or Complexity of Data Reviewed  Clinical lab tests: reviewed  Tests in the radiology section of CPT®: reviewed  Tests in the medicine section of CPT®: reviewed    Risk of Complications, Morbidity, and/or Mortality  Presenting problems: moderate  Diagnostic procedures: moderate  Management options: moderate        Final diagnoses:   Pressure injury of back, stage 4 (HCC)       ED Disposition  ED Disposition     ED Disposition   Discharge    Condition   Stable    Comment   --             No follow-up provider specified.       Medication List      New Prescriptions    levoFLOXacin 750 MG tablet  Commonly known as: LEVAQUIN  Take 1 tablet by mouth Daily for 10 days.           Where to Get Your Medications      You can get these medications from any pharmacy    Bring a paper prescription for each of these medications  · levoFLOXacin 750 MG tablet          Darrian Drake MD  04/23/22 0107

## 2022-04-23 NOTE — ED NOTES
Pt requesting EMS to transport to [facility]. Address says [address] but in [facility]. [facility] called to transport pt.

## 2022-04-23 NOTE — ED NOTES
Pt called out. States needs to go to new address in [facility] to stay with different family member. EMS contacted.

## 2022-04-23 NOTE — DISCHARGE INSTRUCTIONS
We are recommending a course of antibiotic for the pressure ulcer but it is unclear what has caused it to bleed.  It is most likely from lying on overnight.  If there is ongoing problems such as bleeding or pain we recommend returning here for reevaluation.  Otherwise it is something you should be seen for when you return home next week.

## 2022-09-20 ENCOUNTER — HOSPITAL ENCOUNTER (OUTPATIENT)
Facility: HOSPITAL | Age: 72
Setting detail: OBSERVATION
Discharge: HOME | End: 2022-09-23
Attending: EMERGENCY MEDICINE | Admitting: INTERNAL MEDICINE

## 2022-09-20 DIAGNOSIS — Z74.09 DOES NOT WALK: ICD-10-CM

## 2022-09-20 DIAGNOSIS — L89.154 SACRAL DECUBITUS ULCER, STAGE IV: ICD-10-CM

## 2022-09-20 DIAGNOSIS — L89.150 PRESSURE INJURY OF SACRAL REGION, UNSTAGEABLE: ICD-10-CM

## 2022-09-20 DIAGNOSIS — E66.01 MORBID OBESITY: Primary | ICD-10-CM

## 2022-09-20 DIAGNOSIS — Z97.8 CHRONIC INDWELLING FOLEY CATHETER: ICD-10-CM

## 2022-09-20 PROBLEM — L89.159 SACRAL DECUBITUS ULCER: Status: ACTIVE | Noted: 2022-09-20

## 2022-09-20 LAB
ALBUMIN SERPL-MCNC: 3.31 G/DL (ref 3.5–5.2)
ALBUMIN/GLOB SERPL: 0.9 G/DL
ALP SERPL-CCNC: 89 U/L (ref 39–117)
ALT SERPL W P-5'-P-CCNC: 13 U/L (ref 1–33)
ANION GAP SERPL CALCULATED.3IONS-SCNC: 10.6 MMOL/L (ref 5–15)
AST SERPL-CCNC: 23 U/L (ref 1–32)
B PARAPERT DNA SPEC QL NAA+PROBE: NOT DETECTED
B PERT DNA SPEC QL NAA+PROBE: NOT DETECTED
BACTERIA UR QL AUTO: ABNORMAL /HPF
BACTERIA UR QL AUTO: ABNORMAL /HPF
BASOPHILS # BLD AUTO: 0.07 10*3/MM3 (ref 0–0.2)
BASOPHILS NFR BLD AUTO: 0.7 % (ref 0–1.5)
BILIRUB SERPL-MCNC: 0.5 MG/DL (ref 0–1.2)
BILIRUB UR QL STRIP: NEGATIVE
BILIRUB UR QL STRIP: NEGATIVE
BUN SERPL-MCNC: 19 MG/DL (ref 8–23)
BUN/CREAT SERPL: 20.7 (ref 7–25)
C PNEUM DNA NPH QL NAA+NON-PROBE: NOT DETECTED
CALCIUM SPEC-SCNC: 9.6 MG/DL (ref 8.6–10.5)
CHLORIDE SERPL-SCNC: 106 MMOL/L (ref 98–107)
CLARITY UR: ABNORMAL
CLARITY UR: CLEAR
CO2 SERPL-SCNC: 24.4 MMOL/L (ref 22–29)
COLOR UR: YELLOW
COLOR UR: YELLOW
CREAT SERPL-MCNC: 0.92 MG/DL (ref 0.57–1)
DEPRECATED RDW RBC AUTO: 47.1 FL (ref 37–54)
EGFRCR SERPLBLD CKD-EPI 2021: 65.9 ML/MIN/1.73
EOSINOPHIL # BLD AUTO: 0.29 10*3/MM3 (ref 0–0.4)
EOSINOPHIL NFR BLD AUTO: 2.9 % (ref 0.3–6.2)
ERYTHROCYTE [DISTWIDTH] IN BLOOD BY AUTOMATED COUNT: 14 % (ref 12.3–15.4)
FLUAV SUBTYP SPEC NAA+PROBE: NOT DETECTED
FLUBV RNA ISLT QL NAA+PROBE: NOT DETECTED
GLOBULIN UR ELPH-MCNC: 3.8 GM/DL
GLUCOSE SERPL-MCNC: 128 MG/DL (ref 65–99)
GLUCOSE UR STRIP-MCNC: NEGATIVE MG/DL
GLUCOSE UR STRIP-MCNC: NEGATIVE MG/DL
HADV DNA SPEC NAA+PROBE: NOT DETECTED
HBA1C MFR BLD: 7.8 % (ref 4.8–5.6)
HCOV 229E RNA SPEC QL NAA+PROBE: NOT DETECTED
HCOV HKU1 RNA SPEC QL NAA+PROBE: NOT DETECTED
HCOV NL63 RNA SPEC QL NAA+PROBE: NOT DETECTED
HCOV OC43 RNA SPEC QL NAA+PROBE: NOT DETECTED
HCT VFR BLD AUTO: 36 % (ref 34–46.6)
HGB BLD-MCNC: 11.7 G/DL (ref 12–15.9)
HGB UR QL STRIP.AUTO: NEGATIVE
HGB UR QL STRIP.AUTO: NEGATIVE
HMPV RNA NPH QL NAA+NON-PROBE: NOT DETECTED
HPIV1 RNA ISLT QL NAA+PROBE: NOT DETECTED
HPIV2 RNA SPEC QL NAA+PROBE: NOT DETECTED
HPIV3 RNA NPH QL NAA+PROBE: NOT DETECTED
HPIV4 P GENE NPH QL NAA+PROBE: NOT DETECTED
HYALINE CASTS UR QL AUTO: ABNORMAL /LPF
HYALINE CASTS UR QL AUTO: ABNORMAL /LPF
IMM GRANULOCYTES # BLD AUTO: 0.04 10*3/MM3 (ref 0–0.05)
IMM GRANULOCYTES NFR BLD AUTO: 0.4 % (ref 0–0.5)
KETONES UR QL STRIP: NEGATIVE
KETONES UR QL STRIP: NEGATIVE
LEUKOCYTE ESTERASE UR QL STRIP.AUTO: ABNORMAL
LEUKOCYTE ESTERASE UR QL STRIP.AUTO: ABNORMAL
LYMPHOCYTES # BLD AUTO: 2.4 10*3/MM3 (ref 0.7–3.1)
LYMPHOCYTES NFR BLD AUTO: 24.3 % (ref 19.6–45.3)
M PNEUMO IGG SER IA-ACNC: NOT DETECTED
MAGNESIUM SERPL-MCNC: 1.5 MG/DL (ref 1.6–2.4)
MCH RBC QN AUTO: 29.6 PG (ref 26.6–33)
MCHC RBC AUTO-ENTMCNC: 32.5 G/DL (ref 31.5–35.7)
MCV RBC AUTO: 91.1 FL (ref 79–97)
MONOCYTES # BLD AUTO: 0.41 10*3/MM3 (ref 0.1–0.9)
MONOCYTES NFR BLD AUTO: 4.1 % (ref 5–12)
NEUTROPHILS NFR BLD AUTO: 6.67 10*3/MM3 (ref 1.7–7)
NEUTROPHILS NFR BLD AUTO: 67.6 % (ref 42.7–76)
NITRITE UR QL STRIP: NEGATIVE
NITRITE UR QL STRIP: POSITIVE
NRBC BLD AUTO-RTO: 0 /100 WBC (ref 0–0.2)
PH UR STRIP.AUTO: 8 [PH] (ref 5–8)
PH UR STRIP.AUTO: >=9 [PH] (ref 5–8)
PLATELET # BLD AUTO: 272 10*3/MM3 (ref 140–450)
PMV BLD AUTO: 9.2 FL (ref 6–12)
POTASSIUM SERPL-SCNC: 4.2 MMOL/L (ref 3.5–5.2)
PROT SERPL-MCNC: 7.1 G/DL (ref 6–8.5)
PROT UR QL STRIP: ABNORMAL
PROT UR QL STRIP: NEGATIVE
RBC # BLD AUTO: 3.95 10*6/MM3 (ref 3.77–5.28)
RBC # UR STRIP: ABNORMAL /HPF
RBC # UR STRIP: ABNORMAL /HPF
REF LAB TEST METHOD: ABNORMAL
REF LAB TEST METHOD: ABNORMAL
RHINOVIRUS RNA SPEC NAA+PROBE: NOT DETECTED
RSV RNA NPH QL NAA+NON-PROBE: NOT DETECTED
SARS-COV-2 RNA NPH QL NAA+NON-PROBE: NOT DETECTED
SODIUM SERPL-SCNC: 141 MMOL/L (ref 136–145)
SP GR UR STRIP: 1.01 (ref 1–1.03)
SP GR UR STRIP: 1.02 (ref 1–1.03)
SQUAMOUS #/AREA URNS HPF: ABNORMAL /HPF
SQUAMOUS #/AREA URNS HPF: ABNORMAL /HPF
TSH SERPL DL<=0.05 MIU/L-ACNC: 1.1 UIU/ML (ref 0.27–4.2)
UROBILINOGEN UR QL STRIP: ABNORMAL
UROBILINOGEN UR QL STRIP: ABNORMAL
WBC # UR STRIP: ABNORMAL /HPF
WBC # UR STRIP: ABNORMAL /HPF
WBC NRBC COR # BLD: 9.88 10*3/MM3 (ref 3.4–10.8)

## 2022-09-20 PROCEDURE — 83735 ASSAY OF MAGNESIUM: CPT | Performed by: INTERNAL MEDICINE

## 2022-09-20 PROCEDURE — 99284 EMERGENCY DEPT VISIT MOD MDM: CPT

## 2022-09-20 PROCEDURE — 87086 URINE CULTURE/COLONY COUNT: CPT | Performed by: PHYSICIAN ASSISTANT

## 2022-09-20 PROCEDURE — 36415 COLL VENOUS BLD VENIPUNCTURE: CPT

## 2022-09-20 PROCEDURE — 99219 PR INITIAL OBSERVATION CARE/DAY 50 MINUTES: CPT | Performed by: PHYSICIAN ASSISTANT

## 2022-09-20 PROCEDURE — 80053 COMPREHEN METABOLIC PANEL: CPT | Performed by: PHYSICIAN ASSISTANT

## 2022-09-20 PROCEDURE — 25010000002 CEFTRIAXONE PER 250 MG: Performed by: PHYSICIAN ASSISTANT

## 2022-09-20 PROCEDURE — G0378 HOSPITAL OBSERVATION PER HR: HCPCS

## 2022-09-20 PROCEDURE — 94799 UNLISTED PULMONARY SVC/PX: CPT

## 2022-09-20 PROCEDURE — 85025 COMPLETE CBC W/AUTO DIFF WBC: CPT | Performed by: PHYSICIAN ASSISTANT

## 2022-09-20 PROCEDURE — 83036 HEMOGLOBIN GLYCOSYLATED A1C: CPT | Performed by: INTERNAL MEDICINE

## 2022-09-20 PROCEDURE — 0202U NFCT DS 22 TRGT SARS-COV-2: CPT | Performed by: PHYSICIAN ASSISTANT

## 2022-09-20 PROCEDURE — 86140 C-REACTIVE PROTEIN: CPT | Performed by: PHYSICIAN ASSISTANT

## 2022-09-20 PROCEDURE — 81001 URINALYSIS AUTO W/SCOPE: CPT | Performed by: PHYSICIAN ASSISTANT

## 2022-09-20 PROCEDURE — P9612 CATHETERIZE FOR URINE SPEC: HCPCS

## 2022-09-20 PROCEDURE — 99285 EMERGENCY DEPT VISIT HI MDM: CPT

## 2022-09-20 PROCEDURE — 96365 THER/PROPH/DIAG IV INF INIT: CPT

## 2022-09-20 PROCEDURE — 84443 ASSAY THYROID STIM HORMONE: CPT | Performed by: INTERNAL MEDICINE

## 2022-09-20 RX ORDER — NICOTINE POLACRILEX 4 MG
15 LOZENGE BUCCAL
Status: DISCONTINUED | OUTPATIENT
Start: 2022-09-20 | End: 2022-09-23 | Stop reason: HOSPADM

## 2022-09-20 RX ORDER — OXYCODONE AND ACETAMINOPHEN 10; 325 MG/1; MG/1
1 TABLET ORAL ONCE
Status: COMPLETED | OUTPATIENT
Start: 2022-09-20 | End: 2022-09-20

## 2022-09-20 RX ORDER — SODIUM CHLORIDE 0.9 % (FLUSH) 0.9 %
3-10 SYRINGE (ML) INJECTION AS NEEDED
Status: DISCONTINUED | OUTPATIENT
Start: 2022-09-20 | End: 2022-09-23 | Stop reason: HOSPADM

## 2022-09-20 RX ORDER — NITROGLYCERIN 0.4 MG/1
0.4 TABLET SUBLINGUAL
Status: DISCONTINUED | OUTPATIENT
Start: 2022-09-20 | End: 2022-09-23 | Stop reason: HOSPADM

## 2022-09-20 RX ORDER — DEXTROSE MONOHYDRATE 25 G/50ML
25 INJECTION, SOLUTION INTRAVENOUS
Status: DISCONTINUED | OUTPATIENT
Start: 2022-09-20 | End: 2022-09-23 | Stop reason: HOSPADM

## 2022-09-20 RX ORDER — SODIUM CHLORIDE 0.9 % (FLUSH) 0.9 %
3 SYRINGE (ML) INJECTION EVERY 12 HOURS SCHEDULED
Status: DISCONTINUED | OUTPATIENT
Start: 2022-09-21 | End: 2022-09-23 | Stop reason: HOSPADM

## 2022-09-20 RX ADMIN — OXYCODONE HYDROCHLORIDE AND ACETAMINOPHEN 1 TABLET: 10; 325 TABLET ORAL at 21:04

## 2022-09-20 RX ADMIN — CEFTRIAXONE 1 G: 1 INJECTION, POWDER, FOR SOLUTION INTRAMUSCULAR; INTRAVENOUS at 18:35

## 2022-09-20 NOTE — CASE MANAGEMENT/SOCIAL WORK
Case Management/Social Work    Patient Name:  Anika Neri  YOB: 1950  MRN: [id number]  Admit Date:  9/20/2022        SS received consult for Pt does not have transportation home/ resources needed at home. SS spoke with RN who states Pt was brought in by EMS. Pt reports sleeping on her daughter's couch, she is unable to stand or walk. Upon RN's  Assessment, Pt's brief was taped around her waist and filled with feces. SS evaluated wound at bedside with RN and CNA present. Wound appears large with purulent drainage and muscle tissue exposed, Patients cath is soiled and leaking. SS spoke with Pt at bedside. Pt lives at [address] [place] [address] with her daughter, Esther. Pt does not have a PCP established yet. Pt reports she has an appt with a new PCP on 09/21/22 but does not know the name of PCP. Pt states she has a history of a CVA and a year ago resided in a [facility] in [facility], and moved in with daughter Joellen. Daughter Joellen had provided care until Pt moved from [place] approx 3 days ago. Pt states she lives with her daughter, Esther and adult GD. SS attempted to speak with Pt's daughter Esther [phone] and also with Pt's daughter/POA Joellen Landry [phone] but was not successful. SS notified Pt concerns of home situation and current state of wound. SS attempted to make a report with [facility] KUSHAL Barnett but was not successful. SS left message for [facility]PS KUSHAL to return SS call. SS discussed plan with ER provider and RN.     18:01pm: SS spoke with [facility] KUSHAL Barnett and provided her with referral. APS KUSHAL to speak with her supervisor and determine if report meets for investigation.     Electronically signed by:  AZEEM George  09/20/22 18:41 EDT

## 2022-09-20 NOTE — ED NOTES
Dr. Darlene Lee at bedside.  Membranes ruptured and light meconium noted Patient arrived in the ED via EMS, Patient reports complaints of Pain in her rectum, and that her barajas cath is leaking. Patient states that she lives at her daughters house, and sleeps on the couch, she is unable to stand or walk. Upon assessment of patient her brief was taped around her waist and filled with feces. Provider came to bedside to examine the wound, Patient was cleaned and new, clean brief applied, patient assisted to a side lying position, positioning assisted using pillows for comfort, patient given warm blanket. Wound appears large with purulent drainage and muscle tissue exposed, Patients cath is soiled and leaking. Patient states that she is not able to clean herself at home. Waiting on new orders, WCTM.

## 2022-09-21 LAB
ALBUMIN SERPL-MCNC: 3.33 G/DL (ref 3.5–5.2)
ALBUMIN/GLOB SERPL: 0.9 G/DL
ALP SERPL-CCNC: 91 U/L (ref 39–117)
ALT SERPL W P-5'-P-CCNC: 14 U/L (ref 1–33)
ANION GAP SERPL CALCULATED.3IONS-SCNC: 12.9 MMOL/L (ref 5–15)
AST SERPL-CCNC: 26 U/L (ref 1–32)
BACTERIA SPEC AEROBE CULT: NORMAL
BASOPHILS # BLD AUTO: 0.05 10*3/MM3 (ref 0–0.2)
BASOPHILS NFR BLD AUTO: 0.5 % (ref 0–1.5)
BILIRUB SERPL-MCNC: 0.5 MG/DL (ref 0–1.2)
BUN SERPL-MCNC: 17 MG/DL (ref 8–23)
BUN/CREAT SERPL: 19.1 (ref 7–25)
CALCIUM SPEC-SCNC: 9.4 MG/DL (ref 8.6–10.5)
CHLORIDE SERPL-SCNC: 105 MMOL/L (ref 98–107)
CO2 SERPL-SCNC: 23.1 MMOL/L (ref 22–29)
CREAT SERPL-MCNC: 0.89 MG/DL (ref 0.57–1)
CRP SERPL-MCNC: 1.07 MG/DL (ref 0–0.5)
D-LACTATE SERPL-SCNC: 0.8 MMOL/L (ref 0.5–2)
DEPRECATED RDW RBC AUTO: 46.8 FL (ref 37–54)
EGFRCR SERPLBLD CKD-EPI 2021: 68.6 ML/MIN/1.73
EOSINOPHIL # BLD AUTO: 0.39 10*3/MM3 (ref 0–0.4)
EOSINOPHIL NFR BLD AUTO: 3.7 % (ref 0.3–6.2)
ERYTHROCYTE [DISTWIDTH] IN BLOOD BY AUTOMATED COUNT: 13.8 % (ref 12.3–15.4)
FOLATE SERPL-MCNC: 15.3 NG/ML (ref 4.78–24.2)
GLOBULIN UR ELPH-MCNC: 3.6 GM/DL
GLUCOSE BLDC GLUCOMTR-MCNC: 119 MG/DL (ref 70–130)
GLUCOSE BLDC GLUCOMTR-MCNC: 135 MG/DL (ref 70–130)
GLUCOSE BLDC GLUCOMTR-MCNC: 155 MG/DL (ref 70–130)
GLUCOSE BLDC GLUCOMTR-MCNC: 99 MG/DL (ref 70–130)
GLUCOSE SERPL-MCNC: 130 MG/DL (ref 65–99)
HCT VFR BLD AUTO: 36.1 % (ref 34–46.6)
HGB BLD-MCNC: 11.7 G/DL (ref 12–15.9)
IMM GRANULOCYTES # BLD AUTO: 0.06 10*3/MM3 (ref 0–0.05)
IMM GRANULOCYTES NFR BLD AUTO: 0.6 % (ref 0–0.5)
IRON 24H UR-MRATE: 52 MCG/DL (ref 37–145)
IRON SATN MFR SERPL: 19 % (ref 20–50)
LYMPHOCYTES # BLD AUTO: 2.81 10*3/MM3 (ref 0.7–3.1)
LYMPHOCYTES NFR BLD AUTO: 26.3 % (ref 19.6–45.3)
MAGNESIUM SERPL-MCNC: 1.5 MG/DL (ref 1.6–2.4)
MCH RBC QN AUTO: 29.5 PG (ref 26.6–33)
MCHC RBC AUTO-ENTMCNC: 32.4 G/DL (ref 31.5–35.7)
MCV RBC AUTO: 91.2 FL (ref 79–97)
MONOCYTES # BLD AUTO: 0.52 10*3/MM3 (ref 0.1–0.9)
MONOCYTES NFR BLD AUTO: 4.9 % (ref 5–12)
MRSA DNA SPEC QL NAA+PROBE: ABNORMAL
NEUTROPHILS NFR BLD AUTO: 6.84 10*3/MM3 (ref 1.7–7)
NEUTROPHILS NFR BLD AUTO: 64 % (ref 42.7–76)
NRBC BLD AUTO-RTO: 0 /100 WBC (ref 0–0.2)
PLATELET # BLD AUTO: 264 10*3/MM3 (ref 140–450)
PMV BLD AUTO: 9.7 FL (ref 6–12)
POTASSIUM SERPL-SCNC: 3.7 MMOL/L (ref 3.5–5.2)
PROT SERPL-MCNC: 6.9 G/DL (ref 6–8.5)
QT INTERVAL: 422 MS
QTC INTERVAL: 513 MS
RBC # BLD AUTO: 3.96 10*6/MM3 (ref 3.77–5.28)
SODIUM SERPL-SCNC: 141 MMOL/L (ref 136–145)
TIBC SERPL-MCNC: 277 MCG/DL (ref 298–536)
TRANSFERRIN SERPL-MCNC: 186 MG/DL (ref 200–360)
VIT B12 BLD-MCNC: 417 PG/ML (ref 211–946)
WBC NRBC COR # BLD: 10.67 10*3/MM3 (ref 3.4–10.8)

## 2022-09-21 PROCEDURE — 83735 ASSAY OF MAGNESIUM: CPT | Performed by: PHYSICIAN ASSISTANT

## 2022-09-21 PROCEDURE — 82746 ASSAY OF FOLIC ACID SERUM: CPT | Performed by: PHYSICIAN ASSISTANT

## 2022-09-21 PROCEDURE — 87641 MR-STAPH DNA AMP PROBE: CPT | Performed by: INTERNAL MEDICINE

## 2022-09-21 PROCEDURE — 25010000002 ENOXAPARIN PER 10 MG: Performed by: PHYSICIAN ASSISTANT

## 2022-09-21 PROCEDURE — 96367 TX/PROPH/DG ADDL SEQ IV INF: CPT

## 2022-09-21 PROCEDURE — 93005 ELECTROCARDIOGRAM TRACING: CPT | Performed by: PHYSICIAN ASSISTANT

## 2022-09-21 PROCEDURE — 82962 GLUCOSE BLOOD TEST: CPT

## 2022-09-21 PROCEDURE — 82607 VITAMIN B-12: CPT | Performed by: PHYSICIAN ASSISTANT

## 2022-09-21 PROCEDURE — 97166 OT EVAL MOD COMPLEX 45 MIN: CPT

## 2022-09-21 PROCEDURE — 25010000002 MAGNESIUM SULFATE 2 GM/50ML SOLUTION: Performed by: INTERNAL MEDICINE

## 2022-09-21 PROCEDURE — G0378 HOSPITAL OBSERVATION PER HR: HCPCS

## 2022-09-21 PROCEDURE — 83540 ASSAY OF IRON: CPT | Performed by: PHYSICIAN ASSISTANT

## 2022-09-21 PROCEDURE — 85025 COMPLETE CBC W/AUTO DIFF WBC: CPT | Performed by: PHYSICIAN ASSISTANT

## 2022-09-21 PROCEDURE — 25010000002 CEFTRIAXONE PER 250 MG: Performed by: INTERNAL MEDICINE

## 2022-09-21 PROCEDURE — 97602 WOUND(S) CARE NON-SELECTIVE: CPT

## 2022-09-21 PROCEDURE — 93010 ELECTROCARDIOGRAM REPORT: CPT | Performed by: INTERNAL MEDICINE

## 2022-09-21 PROCEDURE — 80053 COMPREHEN METABOLIC PANEL: CPT | Performed by: PHYSICIAN ASSISTANT

## 2022-09-21 PROCEDURE — 96372 THER/PROPH/DIAG INJ SC/IM: CPT

## 2022-09-21 PROCEDURE — 99226 PR SBSQ OBSERVATION CARE/DAY 35 MINUTES: CPT | Performed by: PHYSICIAN ASSISTANT

## 2022-09-21 PROCEDURE — 96366 THER/PROPH/DIAG IV INF ADDON: CPT

## 2022-09-21 PROCEDURE — 83605 ASSAY OF LACTIC ACID: CPT | Performed by: PHYSICIAN ASSISTANT

## 2022-09-21 PROCEDURE — 97163 PT EVAL HIGH COMPLEX 45 MIN: CPT

## 2022-09-21 PROCEDURE — 84466 ASSAY OF TRANSFERRIN: CPT | Performed by: PHYSICIAN ASSISTANT

## 2022-09-21 PROCEDURE — 63710000001 INSULIN ASPART PER 5 UNITS: Performed by: PHYSICIAN ASSISTANT

## 2022-09-21 PROCEDURE — 92610 EVALUATE SWALLOWING FUNCTION: CPT

## 2022-09-21 RX ORDER — FUROSEMIDE 40 MG/1
40 TABLET ORAL 3 TIMES WEEKLY
Status: DISCONTINUED | OUTPATIENT
Start: 2022-09-21 | End: 2022-09-22

## 2022-09-21 RX ORDER — POTASSIUM CHLORIDE 20 MEQ/1
20 TABLET, EXTENDED RELEASE ORAL 3 TIMES WEEKLY
COMMUNITY
End: 2022-09-23 | Stop reason: HOSPADM

## 2022-09-21 RX ORDER — DIPHENOXYLATE HYDROCHLORIDE AND ATROPINE SULFATE 2.5; .025 MG/1; MG/1
1 TABLET ORAL DAILY
Status: DISCONTINUED | OUTPATIENT
Start: 2022-09-21 | End: 2022-09-23 | Stop reason: HOSPADM

## 2022-09-21 RX ORDER — ENOXAPARIN SODIUM 100 MG/ML
40 INJECTION SUBCUTANEOUS DAILY
Status: DISCONTINUED | OUTPATIENT
Start: 2022-09-21 | End: 2022-09-23 | Stop reason: HOSPADM

## 2022-09-21 RX ORDER — GABAPENTIN 800 MG/1
800 TABLET ORAL 3 TIMES DAILY
Status: ON HOLD | COMMUNITY
End: 2022-09-23 | Stop reason: SDUPTHER

## 2022-09-21 RX ORDER — DILTIAZEM HYDROCHLORIDE 300 MG/1
300 CAPSULE, COATED, EXTENDED RELEASE ORAL DAILY
COMMUNITY
End: 2022-12-08 | Stop reason: HOSPADM

## 2022-09-21 RX ORDER — MAGNESIUM SULFATE HEPTAHYDRATE 40 MG/ML
2 INJECTION, SOLUTION INTRAVENOUS AS NEEDED
Status: DISCONTINUED | OUTPATIENT
Start: 2022-09-21 | End: 2022-09-23 | Stop reason: HOSPADM

## 2022-09-21 RX ORDER — POTASSIUM CHLORIDE 1.5 G/1.77G
40 POWDER, FOR SOLUTION ORAL AS NEEDED
Status: DISCONTINUED | OUTPATIENT
Start: 2022-09-21 | End: 2022-09-23 | Stop reason: HOSPADM

## 2022-09-21 RX ORDER — ACETAMINOPHEN 325 MG/1
650 TABLET ORAL EVERY 6 HOURS PRN
Status: DISCONTINUED | OUTPATIENT
Start: 2022-09-21 | End: 2022-09-23 | Stop reason: HOSPADM

## 2022-09-21 RX ORDER — MAGNESIUM SULFATE HEPTAHYDRATE 40 MG/ML
4 INJECTION, SOLUTION INTRAVENOUS AS NEEDED
Status: DISCONTINUED | OUTPATIENT
Start: 2022-09-21 | End: 2022-09-23 | Stop reason: HOSPADM

## 2022-09-21 RX ORDER — NYSTATIN 100000 [USP'U]/G
POWDER TOPICAL EVERY 12 HOURS SCHEDULED
Status: DISCONTINUED | OUTPATIENT
Start: 2022-09-22 | End: 2022-09-23 | Stop reason: HOSPADM

## 2022-09-21 RX ORDER — DILTIAZEM HYDROCHLORIDE 300 MG/1
300 CAPSULE, COATED, EXTENDED RELEASE ORAL DAILY
Status: DISCONTINUED | OUTPATIENT
Start: 2022-09-21 | End: 2022-09-23 | Stop reason: HOSPADM

## 2022-09-21 RX ORDER — NICOTINE POLACRILEX 4 MG
15 LOZENGE BUCCAL
Status: DISCONTINUED | OUTPATIENT
Start: 2022-09-21 | End: 2022-09-21

## 2022-09-21 RX ORDER — FUROSEMIDE 40 MG/1
40 TABLET ORAL 3 TIMES WEEKLY
COMMUNITY
End: 2022-09-23 | Stop reason: HOSPADM

## 2022-09-21 RX ORDER — HYDROCODONE BITARTRATE AND ACETAMINOPHEN 5; 325 MG/1; MG/1
1 TABLET ORAL EVERY 6 HOURS PRN
Status: DISCONTINUED | OUTPATIENT
Start: 2022-09-21 | End: 2022-09-23

## 2022-09-21 RX ORDER — GABAPENTIN 300 MG/1
300 CAPSULE ORAL EVERY 8 HOURS SCHEDULED
Status: DISCONTINUED | OUTPATIENT
Start: 2022-09-21 | End: 2022-09-23 | Stop reason: HOSPADM

## 2022-09-21 RX ORDER — DEXTROSE MONOHYDRATE 25 G/50ML
25 INJECTION, SOLUTION INTRAVENOUS
Status: DISCONTINUED | OUTPATIENT
Start: 2022-09-21 | End: 2022-09-21

## 2022-09-21 RX ORDER — OXYCODONE AND ACETAMINOPHEN 10; 325 MG/1; MG/1
1 TABLET ORAL EVERY 6 HOURS PRN
Status: CANCELLED | OUTPATIENT
Start: 2022-09-21

## 2022-09-21 RX ORDER — ENOXAPARIN SODIUM 100 MG/ML
1 INJECTION SUBCUTANEOUS EVERY 12 HOURS
Status: DISCONTINUED | OUTPATIENT
Start: 2022-09-21 | End: 2022-09-21

## 2022-09-21 RX ORDER — OXYCODONE HYDROCHLORIDE AND ACETAMINOPHEN 5; 325 MG/1; MG/1
1 TABLET ORAL ONCE
Status: COMPLETED | OUTPATIENT
Start: 2022-09-21 | End: 2022-09-21

## 2022-09-21 RX ORDER — IRON POLYSACCHARIDE COMPLEX 150 MG
150 CAPSULE ORAL DAILY
Status: DISCONTINUED | OUTPATIENT
Start: 2022-09-21 | End: 2022-09-23 | Stop reason: HOSPADM

## 2022-09-21 RX ORDER — MAGNESIUM SULFATE HEPTAHYDRATE 40 MG/ML
2 INJECTION, SOLUTION INTRAVENOUS
Status: COMPLETED | OUTPATIENT
Start: 2022-09-21 | End: 2022-09-21

## 2022-09-21 RX ORDER — INSULIN ASPART 100 [IU]/ML
0-9 INJECTION, SOLUTION INTRAVENOUS; SUBCUTANEOUS
Status: DISCONTINUED | OUTPATIENT
Start: 2022-09-21 | End: 2022-09-23 | Stop reason: HOSPADM

## 2022-09-21 RX ORDER — POTASSIUM CHLORIDE 1500 MG/1
40 TABLET, FILM COATED, EXTENDED RELEASE ORAL AS NEEDED
Status: DISCONTINUED | OUTPATIENT
Start: 2022-09-21 | End: 2022-09-23 | Stop reason: HOSPADM

## 2022-09-21 RX ORDER — OXYCODONE AND ACETAMINOPHEN 10; 325 MG/1; MG/1
1 TABLET ORAL EVERY 6 HOURS PRN
Status: ON HOLD | COMMUNITY
End: 2022-09-23 | Stop reason: SDUPTHER

## 2022-09-21 RX ORDER — POTASSIUM CHLORIDE 20 MEQ/1
20 TABLET, EXTENDED RELEASE ORAL 3 TIMES WEEKLY
Status: CANCELLED | OUTPATIENT
Start: 2022-09-21

## 2022-09-21 RX ORDER — INSULIN GLARGINE 100 [IU]/ML
14-24 INJECTION, SOLUTION SUBCUTANEOUS DAILY
COMMUNITY
End: 2022-12-08 | Stop reason: HOSPADM

## 2022-09-21 RX ADMIN — MAGNESIUM SULFATE HEPTAHYDRATE 2 G: 40 INJECTION, SOLUTION INTRAVENOUS at 06:35

## 2022-09-21 RX ADMIN — HYDROCODONE BITARTRATE AND ACETAMINOPHEN 1 TABLET: 5; 325 TABLET ORAL at 19:45

## 2022-09-21 RX ADMIN — MAGNESIUM SULFATE HEPTAHYDRATE 2 G: 40 INJECTION, SOLUTION INTRAVENOUS at 09:10

## 2022-09-21 RX ADMIN — OXYCODONE HYDROCHLORIDE AND ACETAMINOPHEN 1 TABLET: 5; 325 TABLET ORAL at 02:33

## 2022-09-21 RX ADMIN — HYDROCODONE BITARTRATE AND ACETAMINOPHEN 1 TABLET: 5; 325 TABLET ORAL at 13:18

## 2022-09-21 RX ADMIN — INSULIN ASPART 2 UNITS: 100 INJECTION, SOLUTION INTRAVENOUS; SUBCUTANEOUS at 11:00

## 2022-09-21 RX ADMIN — FUROSEMIDE 40 MG: 40 TABLET ORAL at 15:37

## 2022-09-21 RX ADMIN — MAGNESIUM SULFATE HEPTAHYDRATE 2 G: 40 INJECTION, SOLUTION INTRAVENOUS at 11:00

## 2022-09-21 RX ADMIN — GABAPENTIN 300 MG: 300 CAPSULE ORAL at 19:46

## 2022-09-21 RX ADMIN — Medication 1 TABLET: at 09:11

## 2022-09-21 RX ADMIN — ETHYL ALCOHOL 1 EACH: 62 SWAB TOPICAL at 11:00

## 2022-09-21 RX ADMIN — SODIUM CHLORIDE 2 G: 900 INJECTION INTRAVENOUS at 13:18

## 2022-09-21 RX ADMIN — DILTIAZEM HYDROCHLORIDE 300 MG: 300 CAPSULE, COATED, EXTENDED RELEASE ORAL at 15:37

## 2022-09-21 RX ADMIN — ENOXAPARIN SODIUM 40 MG: 80 INJECTION SUBCUTANEOUS at 02:33

## 2022-09-21 RX ADMIN — Medication 3 ML: at 00:23

## 2022-09-21 RX ADMIN — Medication 150 MG: at 15:37

## 2022-09-21 RX ADMIN — ETHYL ALCOHOL 1 EACH: 62 SWAB TOPICAL at 19:46

## 2022-09-21 RX ADMIN — Medication 3 ML: at 09:11

## 2022-09-21 RX ADMIN — GABAPENTIN 300 MG: 300 CAPSULE ORAL at 13:18

## 2022-09-21 NOTE — PLAN OF CARE
Goal Outcome Evaluation:           Progress: no change  Outcome Evaluation: Patient has been resting inbetween care, has worked with PT and OT, wound care done by wound care RN, patient complains of pain PRN medication has been given, patient has been turned Q2hrs with a wedge and heels are offloaded, patient voices no other complaints or concerns at this time

## 2022-09-21 NOTE — PLAN OF CARE
Goal Outcome Evaluation:  Plan of Care Reviewed With: patient        Progress: no change  Outcome Evaluation: Pt presents w/ decreased bed mobility and transfers but w/ fair strength and sitting balance.  Pt would benefit from a trial of skilled PT to maximize functional potential.  D/C disposition depends on progess while in house.

## 2022-09-21 NOTE — PROGRESS NOTES
Pharmacy was consulted to dose lovenox in place of the patients eliquis as they will have sacral decubitus surgery, and the lovenox can be stopped shortly before surgery. Based on an estimated CrCl of 47mL/min, and an actual body weight of 125kg, a lovenox dose of 130mg q12hr has been ordered. Thank you for the consult.     Thank you,   Minnie Marin, PharmD  00:06 EDT

## 2022-09-21 NOTE — H&P
[facility] Hospital Medicine Services  History & Physical    Patient Identification:  Name:  Anika Neri  Age:  72 y.o.  Sex:  female  :  1950  MRN:  [id number]   Visit Number:  [id number]  Primary Care Physician:  Provider, No Known    Subjective     2022   Chief complaint:   Chief Complaint   Patient presents with   • Wound Check   • urinary catheter problem     History of presenting illness:      Anika Neri is a 72 y.o. female with past medical history significant for type 2 diabetes mellitus, paroxysmal atrial fibrillation, essential hypertension, history of CVA with residual right-sided weakness, gout, arthritis, and obesity who presents to [facility] emergency department for a wound check.  Patient was living in [place] with her daughter, Joellen, but was brought back to [place] to live with her other daughter, Lisa, and granddaughter, Salima, approximately 3 days ago as her other daughter could no longer care for her (further details regarding living situation detailed in  note).  Patient has reportedly been bedbound for the past 2 years due to a prior stroke and developed a nonhealing sacral wound.  She states that she was seeing wound care in [place] who performed as needed debridements, but has not seen them in over a year. She states that when she was living with her daughter, Joellen, home health was able to dress her wound. Since moving back to [place] she has not been able to be set up with home health due to not having an established PCP. She states that her daughter Lisa has been dressing the wound with the supplies that she has left over from [place]. She states the wound is not any more painful than usual and takes a Percocet 10 mg as needed at home to make the pain more tolerable.  She does state she has noticed foul-smelling urine from her barajas catheter bag for the past 2-3 days, but denies any suprapubic pain. Barajas catheter  was replaced in ED on 09/20/2022. She denies any fevers or chills. Denies any shortness of breath or cough. Denies any abdominal pain, nausea, vomiting, or diarrhea. Currently alert and oriented to self, date of birth, place, year, and president.    Upon arrival to the ED, vital signs were temperature 99.6, heart rate 66, respirations 18, blood pressure 118/65, SPO2 98% on room air.  CMP with glucose 128, albumin 3.31, otherwise unremarkable.  CBC with hemoglobin 11.7, otherwise unremarkable.  Hemoglobin A1c 7.8.  TSH 1.1.  Magnesium 1.5.  Urinalysis with positive nitrites, 2+ leukocytes, trace protein, 3-5 RBC, 21-30 WBC, 4+ bacteria; repeat urinalysis with 1+ leukocytes and 13-20 WBC.  Both urine sample sent off for culture.  Respiratory panel PCR negative.    Known Emergency Department medications received prior to my evaluation included IV Rocephin 1 g and Percocet 10 mg.     Patient will be admitted to the medical surgical floor for further evaluation and monitoring.    ---------------------------------------------------------------------------------------------------------------------   Review of Systems   Constitutional: Positive for activity change (Chronically bed bound due to prior CVA). Negative for chills and fever.   HENT: Negative for congestion and rhinorrhea.    Eyes: Negative for discharge and redness.   Respiratory: Negative for apnea, cough and shortness of breath.    Cardiovascular: Negative for chest pain and palpitations.   Gastrointestinal: Negative for abdominal distention, abdominal pain, diarrhea, nausea and vomiting.   Endocrine: Negative for polydipsia, polyphagia and polyuria.   Genitourinary: Negative for flank pain and pelvic pain.        Reports foul-smelling urine for the past 2-3 days   Musculoskeletal: Positive for gait problem (Bed bound). Negative for arthralgias and myalgias.   Skin: Positive for wound (sacral/coccyx). Negative for color change.   Neurological: Negative for  dizziness, syncope and light-headedness.   Psychiatric/Behavioral: Negative for agitation, behavioral problems and confusion.        ---------------------------------------------------------------------------------------------------------------------   Past Medical History:   Diagnosis Date   • Arthritis    • Atrial fibrillation (HCC)     Not on anticoagulation due to postmenopausal bleeding   • Diabetes mellitus (HCC)    • Gout    • History of CVA (cerebrovascular accident)     residual right-sided weakness   • Hyperlipidemia    • Hypertension    • Stage IV pressure ulcer of sacral region (HCC)      Past Surgical History:   Procedure Laterality Date   • CHOLECYSTECTOMY       Family History   Problem Relation Age of Onset   • Diabetes Mother    • Hypertension Father      Social History     Socioeconomic History   • Marital status:    Tobacco Use   • Smoking status: Never Smoker   • Smokeless tobacco: Never Used   Substance and Sexual Activity   • Alcohol use: No   • Drug use: No   • Sexual activity: Defer     ---------------------------------------------------------------------------------------------------------------------   Allergies:  Contrast dye  ---------------------------------------------------------------------------------------------------------------------   Home medications:    Medications below are reported home medications pulling from within the system; at this time, these medications have not been reconciled unless otherwise specified and are in the verification process for further verifcation as current home medications.  Medications Prior to Admission   Medication Sig Dispense Refill Last Dose   • allopurinol (ZYLOPRIM) 100 MG tablet Take 100 mg by mouth Daily.      • apixaban (ELIQUIS) 5 MG tablet tablet Take 1 tablet by mouth Every 12 (Twelve) Hours. Indications: Atrial Fibrillation 60 tablet 3    • bumetanide (BUMEX) 1 MG tablet Take 1 tablet by mouth Daily. 30 tablet 3    • diazePAM  (VALIUM) 5 MG tablet Take 5 mg by mouth 2 (Two) Times a Day As Needed for Anxiety.      • dilTIAZem CD (CARDIZEM CD) 240 MG 24 hr capsule Take 1 capsule by mouth Daily. 30 capsule 3    • glipizide (GLUCOTROL) 5 MG tablet Take 1 tablet by mouth Every Morning Before Breakfast. 30 tablet 3    • magnesium oxide (MAG-OX) 400 MG tablet Take 1 tablet by mouth Daily. 30 tablet 3    • metoprolol tartrate 75 MG tablet Take 75 mg by mouth Every 12 (Twelve) Hours. 60 tablet 3    • oxyCODONE-acetaminophen (PERCOCET)  MG per tablet Take 0.5 tablets by mouth Every 6 (Six) Hours As Needed for Moderate Pain . 30 tablet 0    • PARoxetine (PAXIL) 10 MG tablet Take 10 mg by mouth Every Morning.      • SITagliptin (JANUVIA) 100 MG tablet Take 100 mg by mouth Daily.          Hospital Scheduled Meds:         Current listed hospital scheduled medications may not yet reflect those currently placed in orders that are signed and held awaiting patient's arrival to floor.   ---------------------------------------------------------------------------------------------------------------------     Objective     Vital Signs:  Temp:  [99 °F (37.2 °C)-99.6 °F (37.6 °C)] 99 °F (37.2 °C)  Heart Rate:  [68-69] 69  Resp:  [18] 18  BP: (106-124)/(56-80) 124/71      09/20/22  1545 09/20/22  7   Weight: 125 kg (275 lb) 120 kg (265 lb 9.6 oz)     Body mass index is 323.76 kg/m².  ---------------------------------------------------------------------------------------------------------------------       Physical Exam  Constitutional:       General: She is awake.      Appearance: She is morbidly obese. She is ill-appearing ( chronically).      Comments: Lying in bed. Complaining of pain from sacral wound. Answers questions appropriately.   HENT:      Head: Normocephalic and atraumatic.      Right Ear: External ear normal.      Left Ear: External ear normal.      Nose: Nose normal.      Mouth/Throat:      Mouth: Mucous membranes are moist.      Pharynx:  Oropharynx is clear.   Eyes:      Extraocular Movements: Extraocular movements intact.      Conjunctiva/sclera: Conjunctivae normal.      Pupils: Pupils are equal, round, and reactive to light.   Cardiovascular:      Rate and Rhythm: Normal rate and regular rhythm.      Pulses: Normal pulses.           Dorsalis pedis pulses are 2+ on the right side and 2+ on the left side.        Posterior tibial pulses are 2+ on the right side and 2+ on the left side.      Heart sounds: Normal heart sounds. No murmur heard.    No friction rub. No gallop.   Pulmonary:      Effort: Pulmonary effort is normal. No respiratory distress.      Breath sounds: Normal breath sounds. No wheezing, rhonchi or rales.   Abdominal:      General: Abdomen is flat. Bowel sounds are normal. There is no distension.      Palpations: Abdomen is soft.      Tenderness: There is no abdominal tenderness. There is no guarding.   Genitourinary:     Comments: Barajas catheter in place. Approximately 325 mL clear urine present in barajas bag. No hematuria.  Musculoskeletal:         General: Normal range of motion.      Cervical back: Normal range of motion and neck supple.      Right lower leg: No edema.      Left lower leg: No edema.   Skin:     General: Skin is warm and dry.      Findings: Wound present. No abscess or erythema.          Neurological:      General: No focal deficit present.      Mental Status: She is alert and oriented to person, place, and time. Mental status is at baseline.      Comments: Alert and oriented x 4. Moves extremities appropriately. Coordination in tact.   Psychiatric:         Mood and Affect: Mood normal.         Behavior: Behavior normal. Behavior is cooperative.         Thought Content: Thought content normal.         ---------------------------------------------------------------------------------------------------------------------  EKG:    Obtain baseline EKG.    I have personally looked at both the EKG and the telemetry  strips.  ---------------------------------------------------------------------------------------------------------------------   Results from last 7 days   Lab Units 09/20/22  1638   WBC 10*3/mm3 9.88   HEMOGLOBIN g/dL 11.7*   HEMATOCRIT % 36.0   MCV fL 91.1   MCHC g/dL 32.5   PLATELETS 10*3/mm3 272         Results from last 7 days   Lab Units 09/20/22  1638   SODIUM mmol/L 141   POTASSIUM mmol/L 4.2   MAGNESIUM mg/dL 1.5*   CHLORIDE mmol/L 106   CO2 mmol/L 24.4   BUN mg/dL 19   CREATININE mg/dL 0.92   CALCIUM mg/dL 9.6   GLUCOSE mg/dL 128*   ALBUMIN g/dL 3.31*   BILIRUBIN mg/dL 0.5   ALK PHOS U/L 89   AST (SGOT) U/L 23   ALT (SGPT) U/L 13   Estimated Creatinine Clearance: 45.4 mL/min (by C-G formula based on SCr of 0.92 mg/dL).  No results found for: AMMONIA          Lab Results   Component Value Date    HGBA1C 7.80 (H) 09/20/2022     Lab Results   Component Value Date    TSH 1.100 09/20/2022     No results found for: PREGTESTUR, PREGSERUM, HCG, HCGQUANT  Pain Management Panel    There is no flowsheet data to display.           ---------------------------------------------------------------------------------------------------------------------  Imaging Results (Last 7 Days)     ** No results found for the last 168 hours. **        Last echocardiogram:  Results for orders placed during the hospital encounter of 10/27/20    Adult Transthoracic Echo Complete W/ Cont if Necessary Per Protocol    Interpretation Summary  · Estimated left ventricular EF = 60% Left ventricular ejection fraction appears to be 56 - 60%. Left ventricular systolic function is normal.  · Moderate to severe aortic sclerosis without stenosis  · Mild mitral valve regurgitation is present.  · No previous study to compare  · Mild tricuspid valve regurgitation is present.  · Estimated right ventricular systolic pressure from tricuspid regurgitation is mildly elevated (35-45 mmHg).    Image of sacral/coccygeal ulcer 09/20/22:    :  I have personally  reviewed the above radiology images and read the final radiology report on 09/21/22  ---------------------------------------------------------------------------------------------------------------------  Assessment / Plan     Active Hospital Problems    Diagnosis  POA   • **Sacral decubitus ulcer [L89.159]  Yes       ASSESSMENT/PLAN:  -Non-healing stage IV sacral/coccygeal decubitus wound, POA  -Does not currently meet sepsis criteria. No leukocytosis. Vitals stable. Obtain C-RP and lactate.  -Inpatient general surgery consulted for input on possible debridement, assistance appreciated. Wound care also consulted.  -No imaging obtained in ED; may need imaging to assess for osteomyelitis/abscess. Will await surgical input.  -Patient has chronic indwelling barajas catheter due to sacral wound. Exchanged in ED upon arrival on 09/20/22.  -Obtain MRSA swab.   -Turn patient every 2 hours.     -Abnormal urinalysis  -Initial urinalysis grossly abnormal with positive nitrites, 2+ leukocytes, trace protein, 3-5 RBC, 21-30 WBC, 4 + bacteria; repeat urinalysis with 1 + leukocytes and 13-20 WBC; Both urine samples sent for culture  -Received IV Rocephin in ED; will await for results to determine need for antibiotics.    -Normocytic anemia  -Hemoglobin 11.7, hematocrit 36.  -No signs of bleeding at present.   -Obtain iron, vitamin B12, and folate.   -Repeat a.m. CBC.     -Hypomagnesemia  -Magnesium 1.5, potassium 4.2.   -Replace per protocol.   -Repeat a.m. BMP and magnesium level.    -Hypoalbuminemia  -Daily multivitamin ordered.   -Repeat a.m. CMP.     -Debility  -History of CVA with residual right-sided weakness  -Up with assistance, fall precautions.  -PT/OT/SLP consulted.  - consulted.    -Non-insulin dependent type II diabetes mellitus  -Hemoglobin A1c 7.8.   -Accuchecks qAC and qHS.   -SSI ordered; titrate as needed.   -Hypoglycemia protocol ordered.   -Consistent carbohydrate diet.   -Diabetes educated  consulted.    -Paroxysmal atrial fibrillation  -Per patient, Eliquis was discontinued over a year ago due to her developing postmenopausal bleeding.  -Review home medications once reconciled.   -Monitor closely on telemetry.    -Essential hypertension  -Hyperlipidemia  -BP stable.   -Monitor per hospital protocol.   -Review home medications once reconciled.     -Gout  -Arthritis  -Supportive care.   -Review ELANA.   -Review home medications once reconciled.    -Obesity  -Complicates all aspects of care.   ----------  -DVT prophylaxis: SQ Lovenox  -Activity: Up with assistance, fall precautions  -Expected length of stay:   OBSERVATION status; however, if further evaluation or treatment plans warrant, status may change.  Based upon current information, I predict patient's care encounter to be less than or equal to 2 midnights    CODE STATUS: Full code; discussed with patient at bedside.    High risk secondary to stage IV sacral/coccygeal decubitus wound, abnormal urinalysis, hypomagnesemia, and debility     Disposition: Pending clinical course    Jennifer Eugene PA-C  09/21/22  00:45 EDT    ---------------------------------------------------------------------------------------------------------------------

## 2022-09-21 NOTE — CASE MANAGEMENT/SOCIAL WORK
Discharge Planning Assessment  [facility]     Patient Name: Anika Neri  MRN: [id number]  Today's Date: 9/21/2022    Admit Date: 9/20/2022       Discharge Needs Assessment     Row Name 09/21/22 1645       Living Environment    People in Home child(mak), adult    Name(s) of People in Home Esther Parker (daughter)    Current Living Arrangements home  lives with daughter    Primary Care Provided by child(mak);self    Provides Primary Care For no one, unable/limited ability to care for self    Family Caregiver if Needed child(mak), adult    Quality of Family Relationships other (see comments)  unknown    Able to Return to Prior Arrangements other (see comments)       Transition Planning    Patient/Family Anticipates Transition to other (see comments)       Discharge Needs Assessment    Equipment Currently Used at Home rollator;wheelchair;commode  Pt states she needs a hospital bed and wilma lift at D/C.               Discharge Plan     Row Name 09/21/22 9807       Plan    Plan SS spoke with pt who states she lives with daughter and plans to return home with daughter once D/C. Pt voices plans to see MILLER Cordon at [facility]. Pt does not utilize  services. Pt utilizes wheelchair, rollator, [employer] for DME needs. PT utilizes [employer] [facility] in [place]. Pt states she does have a POA (not on file). Pt will need transportation upon discharge via [employer] or Taxi. SS spoke with URI Cali[facility]o who states he will be going to follow up with the pt daughter. SS to follow up tomorrow with [facility] tomorrow 9/22/22.                 AZEEM Fish

## 2022-09-21 NOTE — THERAPY EVALUATION
Acute Care - Physical Therapy Initial Evaluation   Randy     Patient Name: Anika Neri  : 1950  MRN: [id number]  Today's Date: 2022   Onset of Illness/Injury or Date of Surgery: 22  Visit Dx:   No diagnosis found.  Patient Active Problem List   Diagnosis   • A-fib (HCC)   • Pain of left lower extremity   • Sacral decubitus ulcer     Past Medical History:   Diagnosis Date   • Arthritis    • Atrial fibrillation (HCC)     Not on anticoagulation due to postmenopausal bleeding   • Diabetes mellitus (HCC)    • Gout    • History of CVA (cerebrovascular accident)     residual right-sided weakness   • Hyperlipidemia    • Hypertension    • Stage IV pressure ulcer of sacral region (HCC)      Past Surgical History:   Procedure Laterality Date   • CHOLECYSTECTOMY       PT Assessment (last 12 hours)     PT Evaluation and Treatment     Row Name 22 1015          Physical Therapy Time and Intention    Subjective Information complains of;pain  -CS     Document Type evaluation  -CS     Mode of Treatment physical therapy  -CS     Patient Effort adequate  -CS     Symptoms Noted During/After Treatment increased pain  -CS     Comment Pt seen bedside this AM.  Pt reports being bedbound essentially for 2 years and requiring a wilma lift for transfers.  Pt agreed to evaluation.  -CS     Row Name 22 1015          General Information    Patient Profile Reviewed yes  -CS     Onset of Illness/Injury or Date of Surgery 22  -CS     Referring Physician Beti  -CS     Patient Observations alert;cooperative;agree to therapy  -CS     Risks Reviewed patient:;LOB;dizziness;increased discomfort;change in vital signs;lines disloged  -CS     Benefits Reviewed patient:;improve function;increase independence;increase strength;increase balance;decrease pain;decrease risk of DVT;improve skin integrity;increase knowledge  -CS     Row Name 22 1015          Pain    Additional Documentation --  mod c/o w/ sitting  EOB  -[facility] Name 09/21/22 1015          Cognition    Orientation Status (Cognition) oriented x 4  -[facility] Name 09/21/22 1015          Range of Motion (ROM)    Range of Motion --  (B)LE WFL though some limitation 2/2 soft tissue approximation  -[facility] Name 09/21/22 1015          Strength Comprehensive (MMT)    Comment, General Manual Muscle Testing (MMT) Assessment (B)LE grossly 3-/5  -[facility] Name 09/21/22 1015          Bed Mobility    Bed Mobility bed mobility (all) activities  -     All Activities, Whitman (Bed Mobility) moderate assist (50% patient effort)  -     Bed Mobility, Safety Issues decreased use of legs for bridging/pushing;decreased use of arms for pushing/pulling  -CS     Assistive Device (Bed Mobility) bed rails;draw sheet;head of bed elevated  -[facility] Name 09/21/22 1015          Transfers    Comment, (Transfers) Deferred this day 2/2 pain.  -CS     Row Name 09/21/22 1015          Gait/Stairs (Locomotion)    Comment, (Gait/Stairs) Deferred this day 2/2 pain.  -[facility] Name             Wound 09/20/22 2353 medial sacral spine    Wound - Properties Group Placement Date: 09/20/22  -KF Placement Time: 2353 -KF Orientation: medial  -KF Location: sacral spine  -KF     Retired Wound - Properties Group Placement Date: 09/20/22  -KF Placement Time: 2353 -KF Orientation: medial  -KF Location: sacral spine  -KF     Retired Wound - Properties Group Date first assessed: 09/20/22  -KF Time first assessed: 2353 -KF Location: sacral spine  -KF     Row Name 09/21/22 1015          Plan of Care Review    Plan of Care Reviewed With patient  -CS     Progress no change  -CS     Outcome Evaluation Pt presents w/ decreased bed mobility and transfers but w/ fair strength and sitting balance.  Pt would benefit from a trial of skilled PT to maximize functional potential.  D/C disposition depends on progess while in house.  -[facility] Name 09/21/22 1015          Therapy Assessment/Plan (PT)     Functional Level at Time of Evaluation (PT) moderate assist  -CS     PT Diagnosis (PT) impaired functional mobility  -CS     Rehab Potential (PT) fair, will monitor progress closely  -CS     Criteria for Skilled Interventions Met (PT) yes;meets criteria;skilled treatment is necessary  -CS     Therapy Frequency (PT) 2 times/wk  2-5x/week  -CS     Predicted Duration of Therapy Intervention (PT) while in house  -CS     Problem List (PT) problems related to;mobility;strength  -CS     Activity Limitations Related to Problem List (PT) unable to ambulate safely;unable to transfer safely  -CS     Comment, Therapy Assessment/Plan (PT) Pt presents w/ decreased bed mobility and transfers but w/ fair strength and sitting balance.  Pt would benefit from a trial of skilled PT to maximize functional potential.  D/C disposition depends on progess while in house.  -CS     Row Name 09/21/22 1015          PT Evaluation Complexity    History, PT Evaluation Complexity 3 or more personal factors and/or comorbidities  -CS     Examination of Body Systems (PT Eval Complexity) total of 4 or more elements  -CS     Clinical Presentation (PT Evaluation Complexity) unstable  -CS     Clinical Decision Making (PT Evaluation Complexity) high complexity  -CS     Overall Complexity (PT Evaluation Complexity) high complexity  -CS     Row Name 09/21/22 1015          Therapy Plan Review/Discharge Plan (PT)    Therapy Plan Review (PT) evaluation/treatment results reviewed;care plan/treatment goals reviewed;risks/benefits reviewed;current/potential barriers reviewed;participants voiced agreement with care plan;participants included;patient  -CS     Row Name 09/21/22 1015          Physical Therapy Goals    Bed Mobility Goal Selection (PT) bed mobility, PT goal 1  -CS     Transfer Goal Selection (PT) transfer, PT goal 1  -CS     Row Name 09/21/22 1015          Bed Mobility Goal 1 (PT)    Activity/Assistive Device (Bed Mobility Goal 1, PT) bed mobility  activities, all  -CS     [place] Level/Cues Needed (Bed Mobility Goal 1, PT) standby assist  -CS     Time Frame (Bed Mobility Goal 1, PT) long term goal (LTG);by discharge  -     Row Name 09/21/22 1015          Transfer Goal 1 (PT)    Activity/Assistive Device (Transfer Goal 1, PT) transfers, all  -CS     [place] Level/Cues Needed (Transfer Goal 1, PT) standby assist  -CS     Time Frame (Transfer Goal 1, PT) long term goal (LTG);by discharge  -           User Key  (r) = Recorded By, (t) = Taken By, (c) = Cosigned By    Initials Name Provider Type    KF Seda Phipps, RN Registered Nurse    Nito Cadena, PT Physical Therapist                Physical Therapy Education                 Title: PT OT SLP Therapies (Done)     Topic: Physical Therapy (Done)     Point: Mobility training (Done)     Learning Progress Summary           Patient Acceptance, E, VU by  at 9/21/2022 1116                   Point: Home exercise program (Done)     Learning Progress Summary           Patient Acceptance, E, VU by  at 9/21/2022 1116                   Point: Body mechanics (Done)     Learning Progress Summary           Patient Acceptance, E, VU by  at 9/21/2022 1116                   Point: Precautions (Done)     Learning Progress Summary           Patient Acceptance, E, VU by  at 9/21/2022 1116                               User Key     Initials Effective Dates Name Provider Type Discipline     05/24/22 -  Nito Cortez, PT Physical Therapist PT              PT Recommendation and Plan  Anticipated Discharge Disposition (PT):  (TBD)  Planned Therapy Interventions (PT): balance training, bed mobility training, gait training, home exercise program, neuromuscular re-education, patient/family education, strengthening, transfer training  Therapy Frequency (PT): 2 times/wk (2-5x/week)  Plan of Care Reviewed With: patient  Progress: no change  Outcome Evaluation: Pt presents w/ decreased bed mobility and transfers but  w/ fair strength and sitting balance.  Pt would benefit from a trial of skilled PT to maximize functional potential.  D/C disposition depends on progess while in house.       Time Calculation:    PT Charges     Row Name 09/21/22 1116             Time Calculation    PT Received On 09/21/22  -      PT Goal Re-Cert Due Date 10/05/22  -            User Key  (r) = Recorded By, (t) = Taken By, (c) = Cosigned By    Initials Name Provider Type     Nito Cortez, PT Physical Therapist              Therapy Charges for Today     Code Description Service Date Service Provider Modifiers Qty    51498964086 HC PT EVAL HIGH COMPLEXITY 4 9/21/2022 Nito Cortez, PT GP 1          PT G-Codes  AM-PAC 6 Clicks Score (PT): 7    Nito Cortez PT  9/21/2022

## 2022-09-21 NOTE — PROGRESS NOTES
Surgery consult    Asked by Jessie Floyd to see patient in ER yesterday for sacral wound.    Patient has a chronic sacral decubitus.     On exam there is a chronic sacral decubitus. The wound appears to have been covered but not packed.  There is chronic slough over the granulation tissue.  There does not appear to be tunneling or any necrotic tissue that requires debridement.    Please reconsult if needed.

## 2022-09-21 NOTE — PROGRESS NOTES
[facility] Medicine Services  PROGRESS NOTE     Patient Identification:  Name:  Anika Neri  Age:  72 y.o.  Sex:  female  :  1950  MRN:  [id number]  Visit Number:  [id number]  Primary Care Provider:  Provider, No Known    Length of stay:  0    ----------------------------------------------------------------------------------------------------------------------  Subjective     Chief Complaint:  Follow up for UTI, sacral wound, debility, electrolyte abnormalities      History of Presenting Illness/Hospital Course:  Patient is a 72-year-old female with past medical history significant for insulin-dependent type 2 diabetes mellitus, paroxysmal atrial fibrillation not chronically anticoagulated secondary to history of post menopausal bleeding,    Subjective:  Today, the patient was lying in bed sleeping per my evaluation this morning, easily aroused.  No acute distress noted.  No family present at bedside.  No overnight events or issues reported per AM ALEJANDRO Castro.  Patient reports feeling fatigued, states she did not get much sleep overnight.  She continues to report pain at site of coccyx wound.  Otherwise, she has no complaints, no new complaints.  Patient did work with physical therapy, per report she was able to get up to edge of bed.  Discussed functional baseline with patient, she states at baseline she can sit up on edge of couch/bed.  However, she states she has not ambulated in 2 years, she states with assistance she is able to stand but very minimally.  She states for the past 3 days she has been sitting on her daughter's couch, she states she does have access to a bed but the couch is more comfortable for her.    Present during exam: n/a  ----------------------------------------------------------------------------------------------------------------------  Objective     Consults:  • General surgery     Procedures:  • None     Current Hospital  Meds:  cefTRIAXone, 2 g, Intravenous, Q24H  enoxaparin, 40 mg, Subcutaneous, Daily  Insulin Aspart, 0-9 Units, Subcutaneous, TID AC  magnesium sulfate, 2 g, Intravenous, Q2H  multivitamin, 1 tablet, Oral, Daily  sodium chloride, 3 mL, Intravenous, Q12H         ----------------------------------------------------------------------------------------------------------------------  Vital Signs:  Temp:  [97.5 °F (36.4 °C)-99.6 °F (37.6 °C)] 97.5 °F (36.4 °C)  Heart Rate:  [68-90] 90  Resp:  [18-20] 18  BP: (106-139)/(56-81) 132/67  Mean Arterial Pressure (Non-Invasive) for the past 24 hrs (Last 3 readings):   Noninvasive MAP (mmHg)   09/20/22 2047 88   09/20/22 2028 90   09/20/22 2006 102     SpO2 Percentage    09/20/22 2357 09/21/22 0039 09/21/22 0600   SpO2: 97% 98% 98%     SpO2:  [91 %-98 %] 98 %  on   ;   Device (Oxygen Therapy): room air    Body mass index is 323.76 kg/m².  Wt Readings from Last 3 Encounters:   09/20/22 120 kg (265 lb 9.6 oz)   04/22/22 94.3 kg (208 lb)   12/01/20 121 kg (267 lb 7 oz)        Intake/Output Summary (Last 24 hours) at 9/21/2022 0929  Last data filed at 9/21/2022 0645  Gross per 24 hour   Intake 400 ml   Output 1150 ml   Net -750 ml     Diet Regular; Consistent Carbohydrate, Cardiac  ----------------------------------------------------------------------------------------------------------------------  Physical exam:  Physical Exam  Nursing note reviewed.   Constitutional:       General: She is sleeping. She is not in acute distress.     Appearance: She is well-developed. She is morbidly obese. She is ill-appearing (Chronically). She is not toxic-appearing.      Comments: Lying in bed, sleeping, easily aroused. No family present at bedside.    HENT:      Head: Normocephalic and atraumatic.      Mouth/Throat:      Mouth: Mucous membranes are moist.   Eyes:      Conjunctiva/sclera: Conjunctivae normal.      Pupils: Pupils are equal, round, and reactive to light.   Cardiovascular:      Rate  and Rhythm: Normal rate and regular rhythm.      Pulses:           Dorsalis pedis pulses are 2+ on the right side and 2+ on the left side.      Heart sounds: Normal heart sounds. No murmur heard.    No friction rub. No gallop.   Pulmonary:      Effort: Pulmonary effort is normal.      Breath sounds: Normal breath sounds and air entry. No wheezing, rhonchi or rales.      Comments: Speaks in full sentences without dyspnea   Abdominal:      General: Abdomen is protuberant. Bowel sounds are normal. There is no distension.      Palpations: Abdomen is soft.      Tenderness: There is no abdominal tenderness. There is no guarding or rebound.   Genitourinary:     Comments: No barajas catheter in place.  Musculoskeletal:      Cervical back: Neck supple.      Right lower leg: No edema.      Left lower leg: No edema.   Skin:     General: Skin is warm and dry.      Capillary Refill: Capillary refill takes less than 2 seconds.   Neurological:      General: No focal deficit present.      Mental Status: She is oriented to person, place, and time and easily aroused.      GCS: GCS eye subscore is 4. GCS verbal subscore is 5. GCS motor subscore is 6.      Sensory: Sensation is intact.      Motor: Weakness (3/5 strength bilateral lower extremities,  strength intact ) present.      Comments: Awake and alert. Follows commands. Answers questions appropriately. Moves all extremities equally. Strength and sensation intact. No focal neuro deficit on exam.   Psychiatric:         Mood and Affect: Mood and affect normal.         Speech: Speech normal.         Behavior: Behavior is cooperative.        ----------------------------------------------------------------------------------------------------------------------  Tele:    Sinus 80s     I have personally reviewed the EKG/Telemetry strips   ----------------------------------------------------------------------------------------------------------------------            Results from last 7  days   Lab Units 09/21/22  0136 09/20/22  1638   CRP mg/dL  --  1.07*   LACTATE mmol/L 0.8  --    WBC 10*3/mm3 10.67 9.88   HEMOGLOBIN g/dL 11.7* 11.7*   HEMATOCRIT % 36.1 36.0   MCV fL 91.2 91.1   MCHC g/dL 32.4 32.5   PLATELETS 10*3/mm3 264 272     Results from last 7 days   Lab Units 09/21/22  0136 09/20/22  1638   SODIUM mmol/L 141 141   POTASSIUM mmol/L 3.7 4.2   MAGNESIUM mg/dL 1.5* 1.5*   CHLORIDE mmol/L 105 106   CO2 mmol/L 23.1 24.4   BUN mg/dL 17 19   CREATININE mg/dL 0.89 0.92   CALCIUM mg/dL 9.4 9.6   GLUCOSE mg/dL 130* 128*   ALBUMIN g/dL 3.33* 3.31*   BILIRUBIN mg/dL 0.5 0.5   ALK PHOS U/L 91 89   AST (SGOT) U/L 26 23   ALT (SGPT) U/L 14 13   Estimated Creatinine Clearance: 46.9 mL/min (by C-G formula based on SCr of 0.89 mg/dL).  No results found for: AMMONIA    Hemoglobin A1C   Date/Time Value Ref Range Status   09/20/2022 1638 7.80 (H) 4.80 - 5.60 % Final     Glucose   Date/Time Value Ref Range Status   09/21/2022 0633 119 70 - 130 mg/dL Final     Comment:     Meter: [id number] : [id number] durga petiten   09/21/2022 0019 99 70 - 130 mg/dL Final     Comment:     Meter: [id number] : [id number] HENOK FUSION     Lab Results   Component Value Date    HGBA1C 7.80 (H) 09/20/2022     Lab Results   Component Value Date    TSH 1.100 09/20/2022       Microbiology Results (last 10 days)     Procedure Component Value - Date/Time    MRSA Screen, PCR (Inpatient) - Swab, Nares [[id number]]  (Abnormal) Collected: 09/21/22 0022    Lab Status: Final result Specimen: Swab from Nares Updated: 09/21/22 0501     MRSA PCR MRSA Detected    Narrative:      The negative predictive value of this diagnostic test is high and should only be used to consider de-escalating anti-MRSA therapy. A positive result may indicate colonization with MRSA and must be correlated clinically.    Respiratory Panel PCR w/COVID-19(SARS-CoV-2) MARYJANE/AUDIE/EMILY/PAD/COR/MAD/VANDA In-House, NP Swab in UTM/VTM, 3-4 HR TAT - Swab, Nasopharynx  [[id number]]  (Normal) Collected: 09/20/22 2118    Lab Status: Final result Specimen: Swab from Nasopharynx Updated: 09/20/22 2229     ADENOVIRUS, PCR Not Detected     Coronavirus 229E Not Detected     Coronavirus HKU1 Not Detected     Coronavirus NL63 Not Detected     Coronavirus OC43 Not Detected     COVID19 Not Detected     Human Metapneumovirus Not Detected     Human Rhinovirus/Enterovirus Not Detected     Influenza A PCR Not Detected     Influenza B PCR Not Detected     Parainfluenza Virus 1 Not Detected     Parainfluenza Virus 2 Not Detected     Parainfluenza Virus 3 Not Detected     Parainfluenza Virus 4 Not Detected     RSV, PCR Not Detected     Bordetella pertussis pcr Not Detected     Bordetella parapertussis PCR Not Detected     Chlamydophila pneumoniae PCR Not Detected     Mycoplasma pneumo by PCR Not Detected    Narrative:      In the setting of a positive respiratory panel with a viral infection PLUS a negative procalcitonin without other underlying concern for bacterial infection, consider observing off antibiotics or discontinuation of antibiotics and continue supportive care. If the respiratory panel is positive for atypical bacterial infection (Bordetella pertussis, Chlamydophila pneumoniae, or Mycoplasma pneumoniae), consider antibiotic de-escalation to target atypical bacterial infection.    Urine Culture - Urine, Urine, Catheter [[id number]]  (Normal) Collected: 09/20/22 1645    Lab Status: Preliminary result Specimen: Urine, Catheter Updated: 09/21/22 0841     Urine Culture Culture in progress         I have personally reviewed the above laboratory results.   ----------------------------------------------------------------------------------------------------------------------  Imaging Results (Last 24 Hours)     ** No results found for the last 24 hours. **        I have personally reviewed the above radiology results.    ----------------------------------------------------------------------------------------------------------------------  Assessment/Plan     ACUTE HOSPITAL PROBLEMS      -Questionable acute urinary tract infectious, catheter associated, present on admission in setting of chronic indwelling barajas catheter   · Patient with report of foul odorous urine on arrival as well as complaint of leaking catheter   · UA abnormal. Urine culture obtained in ED, await final results   · Urinary barajas catheter exchanged in ED, continue catheter care per protocol   · Continue empiric coverage with IV Rocephin pending culture finalization.   · Patient afebrile, no leukocytosis. CRP only minimally elevated on arrival, lactate WNL.   · Monitor vitals and temperature curve closely   · Repeat labs in AM     -Chronic sacral decubitus wound  • General surgery has evaluated patient's wound, no tunneling or necrosis noted requiring debridement   • Not felt to be acutely infected   • Continue wound care  • Turn Q2H  • MRSA swab positive, however likely chronic colonization. Ethyl alcohol nasal swabs ordered per protocol.     -Hypomagnesemia   · Replace per protocol   · Telemetry monitoring   · Repeat mag level in AM     -Normocytic anemia   · Per review of chart, patient with chronic anemia, likely AOCD   · Iron profile with borderline low iron saturation   · Add iron polysaccharides   · Closely monitor H&H, transfuse if necessary   · Repeat CBC in AM     -Debility   -Non ambulatory x 2 years   · PT/OT consults   · SS/CM consult for disposition planning     -Hypoalbuminemia, multifactorial     CHRONIC MEDICAL PROBLEMS      -Paroxysmal atrial fibrillation, not chronically anticoagulated d/t reported history of post menopausal bleeding: EKG with paced rhythm, no afib. HR controlled. Continue home rate controlling agents. Telemetry monitoring.  -Type II diabetes mellitus, insulin dependent  • HgbA1C 7.8  • BG levels moderately controlled   • At  home, patient is prescribed 14-24 units Lantus daily   • Continue 0-9 SSI for now, titrate dosage as necessary   • Closely monitor blood glucose levels with accuchecks QAC and QHS  • Hypoglycemia protocol in place should it be necessary  -Essential hypertension  • BP appears moderately controlled   • Continue home antihypertensive regimen with appropriate holding parameters to prevent hypotension and/or bradycardia   • Closely monitor BP per hospital protocol, titrate medications as necessary  -History of CVA: No deficits on exam at bedside. PT/OT/SLP ordered by previous provider, await recommendations.   -Chronic pain: Supportive care. Cont home medication regimen once reconciled per pharmacy.   -Obesity by BMI: Affecting all aspects of care  --------------------------------------------------  DVT Prophylaxis: Lovenox   FEN: No IV fluids. Replace electrolytes per protocol as necessary. Consistent carbohydrate diet.   Activity: Up with assistance, fall precautions, turn every 2 hours, PT  --------------------------------------------------  Disposition:  • Pending clinical course and therapy evaluations   • APS also involved, pending possible investigation   • SS/CM on board, aiding in disposition planning   --------------------------------------------------  I have discussed the patient's assessment and plan with the patient, AM RN Gloria, and attending physician Stephanie Schafer DO Allyson O'Kuma, PA-C  Hospitalist Service -- [facility]   Pager: [phone]    09/21/22  09:29 EDT    Attending Physician: Stephanie Bang DO    ----------------------------------------------------------------------------------------------------------------------

## 2022-09-21 NOTE — NURSING NOTE
Patient with POA stage 4 PI to coccyx.  6x5x1.5cm with a 2cm tunnel at 6 o'clock.  Wound bed is red, moist, and clean at this time.  No slough noted.  Scant serous drainage noted to cover dressing.  No odor.  Wound cleansed with wound cleanser and gently packed with Normal Saline dampened gauze roll.  Wound covered with silicone border dressing.  Patient tolerated procedure well.      Treatment order placed for BID dressing changes.  PI prevention orders initiated.    Patient would likely benefit from the outpatient wound clinic at discharge.       09/21/22 1130   Wound 09/20/22 2353 medial sacral spine   Placement Date/Time: 09/20/22 2353   Orientation: medial  Location: sacral spine   Pressure Injury Stage 4   Dressing Appearance dry;intact   Closure None   Base red;clean;moist;yellow;subcutaneous;exposed structure   Periwound intact;pink;blanchable   Periwound Temperature warm   Periwound Skin Turgor soft   Edges open   Wound Length (cm) 6 cm   Wound Width (cm) 5 cm   Wound Depth (cm) 1.5 cm   Wound Surface Area (cm^2) 30 cm^2   Wound Volume (cm^3) 45 cm^3   Tunneling [Depth (cm)/Location] 2cm tunnel at 6 o'clock   Drainage Characteristics/Odor serous   Drainage Amount scant   Care, Wound cleansed with;wound cleanser   Dressing Care packed with;gauze;dressing applied   Periwound Care dry periwound area maintained

## 2022-09-21 NOTE — CASE MANAGEMENT/SOCIAL WORK
Discharge Planning Assessment  [facility]     Patient Name: Anika Neri  MRN: [id number]  Today's Date: 9/21/2022    Admit Date: 9/20/2022    Plan: SS notified on-call by Adult Protective Services SW, Aleida Barnett who states after she reviewed referral further, Pt lives in [facility] instead of [facility]. APS KUSHAL reports she will send referral on to Central intake. SS to follow.     09:32am: APS report has been assigned to [facility] APS KUSHAL Gayle to investigate. SS to follow.       CELIA GeorgeW

## 2022-09-21 NOTE — THERAPY EVALUATION
Acute Care - Speech Language Pathology   Swallow Initial Evaluation [facility]   Clinical Dysphagia Assessment     Patient Name: Anika Neri  : 1950  MRN: [id number]  Today's Date: 2022  Onset of Illness/Injury or Date of Surgery: 22     Referring Physician: Beti      Admit Date: 2022    Visit Dx:   No diagnosis found.  Patient Active Problem List   Diagnosis   • A-fib (HCC)   • Pain of left lower extremity   • Sacral decubitus ulcer     Past Medical History:   Diagnosis Date   • Arthritis    • Atrial fibrillation (HCC)     Not on anticoagulation due to postmenopausal bleeding   • Diabetes mellitus (HCC)    • Gout    • History of CVA (cerebrovascular accident)     residual right-sided weakness   • Hyperlipidemia    • Hypertension    • Stage IV pressure ulcer of sacral region (HCC)      Past Surgical History:   Procedure Laterality Date   • CHOLECYSTECTOMY       Anika Neri  is seen at bedside this am on 3N to assess safety/efficacy of swallowing fnx, determine safest/least restrictive diet tolerance.     Ms. Neri presented to [facility] ED for a wound check. She is admitted w/ sacral decubitus ulcer. She is referred for dysphagia assessment.     Social History     Socioeconomic History   • Marital status:    Tobacco Use   • Smoking status: Never Smoker   • Smokeless tobacco: Never Used   Substance and Sexual Activity   • Alcohol use: No   • Drug use: No   • Sexual activity: Defer      No recent chest xray available for review.     Diet Orders (active) (From admission, onward)     Start     Ordered    22  Diet Regular; Consistent Carbohydrate, Cardiac  Diet Effective Now         22 2356              She is observed on ra w/o complications.     Pt is positioned upright and centered in bed to accept multiple po presentations of ice chips, solid cracker and thin liquids via spoon, cup and straw. She declines puree consistency. Pt is able to self feed.     Facial/oral  structures are symmetrical upon observation. Lingual protrusion reveals no deviation. Oral mucosa are moist, pink, and clean. Secretions are clear, thin, and well controlled. OROM/CRYSTAL is wfl to imitate oral postures. Gag is not assessed. Volitional cough is intact w/ adequate  intensity, clear in quality, non-productive. Voice is adequate in intensity, clear in quality w/ intelligible speech. She is oriented, follows directives and participates in conversational exchanges.     Upon po presentations, adequate bolus anticipation and acceptance w/ good labial seal for bolus clearance via spoon bowl, cup rim stability and suction via straw. Bolus formation, manipulation and control are wfl w/ rotary mastication pattern. A-p transit is timely w/o oral residue. No overt s/s aspiration evidenced before the swallow.     Pharyngeal swallow is timely w/ adequate hyolaryngeal elevation per palpation. No overt s/s aspiration evidenced across this assessment. No silent aspiration suspected as pt is w/o changes in vocal quality, respirations or secretions post po presentations. Pt denies odynophagia.    Impression: Per this assessment, Ms. Neri presents w/ wfl oropharyngeal swallow w/o s/s aspiration. No s/s indicative of silent aspiration. No odynophagia reported.     SLP Recommendation and Plan  1. Regular consistency, thin liquids.    2. Meds whole in puree/thins.   3. Upright and centered for all po intake.  4. CHRISTINA precautions.  5. Oral care protocol.  No further formal SLP f/u warranted at this time.    D/w pt results and recommendations w/ verbal agreement.    D/w RN results and recommendations w/ verbal agreement.    Thank you for allowing me to participate in the care of your patient-  Karly Rose M.A., CCC-SLP      EDUCATION  The patient has been educated in the following areas:   Dysphagia (Swallowing Impairment).     Time Calculation:     Therapy Charges for Today     Code Description Service Date Service Provider  Modifiers Qty    05700222509 [facility] EVAL ORAL PHARYNG SWALLOW 4 9/21/2022 Karly Rose MA,CCC-SLP GN 1          Karly Rose MA,LUIZA-SLP  9/21/2022

## 2022-09-21 NOTE — THERAPY EVALUATION
Patient Name: Anika Neri  : 1950    MRN: [id number]                              Today's Date: 2022       Admit Date: 2022    Visit Dx: No diagnosis found.  Patient Active Problem List   Diagnosis   • A-fib (HCC)   • Pain of left lower extremity   • Sacral decubitus ulcer     Past Medical History:   Diagnosis Date   • Arthritis    • Atrial fibrillation (HCC)     Not on anticoagulation due to postmenopausal bleeding   • Diabetes mellitus (HCC)    • Gout    • History of CVA (cerebrovascular accident)     residual right-sided weakness   • Hyperlipidemia    • Hypertension    • Stage IV pressure ulcer of sacral region (HCC)      Past Surgical History:   Procedure Laterality Date   • CHOLECYSTECTOMY        General Information     Row Name 22 1356          OT Time and Intention    Document Type evaluation  -LA     Mode of Treatment occupational therapy  -LA     Row Name 22 [facility]7          General Information    Patient Profile Reviewed yes  -LA     Prior Level of Function ADL's;max assist:;dependent:  Patient has been living with daughter in [place] for approx 2 years since CVA. Patient has been bedfast for most of that time; mechanical lift transfers  -LA     Existing Precautions/Restrictions fall;other (see comments)  sacral wound  -LA     Barriers to Rehab medically complex;previous functional deficit  wound  -LA     Row Name 22 1350          Occupational Profile    Reason for Services/Referral (Occupational Profile) OT to assess for changes in level of independence/safety with ADLs.  -LA     Patient Goals (Occupational Profile) be able to do more for myself  -LA     Row Name 22 135          Living Environment    People in Home child(mak), adult  Patient moved to [place] approx 3 days ago from [place] and is living with daughter.  -LA     Row Name 22 1352          Cognition    Orientation Status (Cognition) oriented x 4  -LA     Row Name 22 1359          Safety Issues,  Functional Mobility    Impairments Affecting Function (Mobility) strength;endurance/activity tolerance  -LA           User Key  (r) = Recorded By, (t) = Taken By, (c) = Cosigned By    Initials Name Provider Type    Key Watkins OT Occupational Therapist                 Mobility/ADL's     Row Name 09/21/22 1400          Bed Mobility    Bed Mobility bed mobility (all) activities  -LA     All Activities, [place] (Bed Mobility) moderate assist (50% patient effort)  -LA     Bed Mobility, Safety Issues decreased use of arms for pushing/pulling  -LA     Assistive Device (Bed Mobility) bed rails  -LA     Row Name 09/21/22 1400          Transfers    Transfers other (see comments)  Patient refused sit to stand this date due to pain (sacral wound)  -LA     Row Name 09/21/22 1400          Activities of Daily Living    BADL Assessment/Intervention bathing;upper body dressing;lower body dressing;grooming;feeding;toileting  -[facility] 09/21/22 1400          Bathing Assessment/Intervention    [place] Level (Bathing) maximum assist (25% patient effort)  -LA     Row Name 09/21/22 1400          Upper Body Dressing Assessment/Training    [place] Level (Upper Body Dressing) minimum assist (75% patient effort)  -LA     Row Name 09/21/22 1400          Lower Body Dressing Assessment/Training    [place] Level (Lower Body Dressing) maximum assist (25% patient effort);dependent (less than 25% patient effort)  -LA     Row Name 09/21/22 1400          Grooming Assessment/Training    [place] Level (Grooming) set up;minimum assist (75% patient effort)  -LA     Row Name 09/21/22 1400          Self-Feeding Assessment/Training    [place] Level (Feeding) set up  -LA     Row Name 09/21/22 1400          Toileting Assessment/Training    [place] Level (Toileting) dependent (less than 25% patient effort);maximum assist (25% patient effort)  Patient has barajas cath  -LA           User Key  (r) = Recorded By, (t)  = Taken By, (c) = Cosigned By    Initials Name Provider Type    Key Watkins OT Occupational Therapist               Obj/Interventions     Row Name 09/21/22 1404          Sensory Assessment (Somatosensory)    Sensory Assessment (Somatosensory) sensation intact  -LA     Row Name 09/21/22 1404          Range of Motion Comprehensive    General Range of Motion no range of motion deficits identified;bilateral upper extremity ROM WFL  -LA     Comment, General Range of Motion BUE WFL  -LA     Row Name 09/21/22 1404          Strength Comprehensive (MMT)    General Manual Muscle Testing (MMT) Assessment no strength deficits identified  -LA     Comment, General Manual Muscle Testing (MMT) Assessment BUE WFL; strength grossly 4/5  -LA     Row Name 09/21/22 1404          Motor Skills    Motor Skills functional endurance  -LA     Functional Endurance poor  -[facility] Name 09/21/22 1404          Balance    Balance Assessment sitting static balance  -LA     Static Sitting Balance supervision  -LA     Position, Sitting Balance unsupported  -LA     Balance Interventions static;sitting  -LA     Comment, Balance --  Patient able to sit EOB this date with good balance. Patient requested to return to sidelying due to pain in wound. Patient does demonstrate potential with strength to progress to be able to transfer and increase independence with ADLs.  -LA           User Key  (r) = Recorded By, (t) = Taken By, (c) = Cosigned By    Initials Name Provider Type    Key Watkins OT Occupational Therapist               Goals/Plan     Row Name 09/21/22 1410          Bed Mobility Goal 1 (OT)    Activity/Assistive Device (Bed Mobility Goal 1, OT) bed mobility activities, all  -LA     [place] Level/Cues Needed (Bed Mobility Goal 1, OT) supervision required  -LA     Time Frame (Bed Mobility Goal 1, OT) by discharge  -LA     Row Name 09/21/22 1410          Transfer Goal 1 (OT)    Activity/Assistive Device (Transfer Goal 1, OT)  sit-to-stand/stand-to-sit  -LA     [place] Level/Cues Needed (Transfer Goal 1, OT) moderate assist (50-74% patient effort)  -LA     Time Frame (Transfer Goal 1, OT) by discharge  -LA     Row Name 09/21/22 1410          Dressing Goal 1 (OT)    Activity/Device (Dressing Goal 1, OT) dressing skills, all  -LA     [place]/Cues Needed (Dressing Goal 1, OT) moderate assist (50-74% patient effort)  -LA     Time Frame (Dressing Goal 1, OT) by discharge  -LA     Row Name 09/21/22 1410          Therapy Assessment/Plan (OT)    Planned Therapy Interventions (OT) activity tolerance training;adaptive equipment training;patient/caregiver education/training;ROM/therapeutic exercise;BADL retraining;occupation/activity based interventions;strengthening exercise;transfer/mobility retraining  -LA           User Key  (r) = Recorded By, (t) = Taken By, (c) = Cosigned By    Initials Name Provider Type    Key Watkins OT Occupational Therapist               Clinical Impression     Row Name 09/21/22 1400          Pain Assessment    Pain Location - buttock  -LA     Row Name 09/21/22 1407          Plan of Care Review    Plan of Care Reviewed With patient  -LA     Outcome Evaluation OT evaluation completed this date. Patient with adequate UE strength and ROM to improve fx independence with transfers and ADLs and would benefit from skilled OT. Patient verbalizes she hasn't really ever participated in therapy since CVA 2 years ago and is motivated to regain ability to stand/transfer. No equimpment needs at this time. OT d/c disposition recommendation would be IPR vs SNF depending on progress with therapy  -LA     Row Name 09/21/22 1400          Therapy Assessment/Plan (OT)    Patient/Family Therapy Goal Statement (OT) Be able to take care of myself more then I have been  -LA     Rehab Potential (OT) good, to achieve stated therapy goals  -LA     Criteria for Skilled Therapeutic Interventions Met (OT) meets criteria;skilled  treatment is necessary  -LA     Therapy Frequency (OT) other (see comments)  PRN to follow for changes/ progress toward OT goals.  -LA     Row Name 09/21/22 1406          Therapy Plan Review/Discharge Plan (OT)    Anticipated Discharge Disposition (OT) inpatient rehabilitation facility;skilled nursing facility  -LA     Row Name 09/21/22 1406          Positioning and Restraints    Pre-Treatment Position in bed  -LA     Post Treatment Position bed  -LA     In Bed side lying left;call light within reach;encouraged to call for assist;exit alarm on  -LA           User Key  (r) = Recorded By, (t) = Taken By, (c) = Cosigned By    Initials Name Provider Type    Key Watkins, CONSTANZA Occupational Therapist               Outcome Measures     Row Name 09/21/22 0910          How much help from another person do you currently need...    Turning from your back to your side while in flat bed without using bedrails? 2  -LG     Moving from lying on back to sitting on the side of a flat bed without bedrails? 1  -LG     Moving to and from a bed to a chair (including a wheelchair)? 1  -LG     Standing up from a chair using your arms (e.g., wheelchair, bedside chair)? 1  -LG     Climbing 3-5 steps with a railing? 1  -LG     To walk in hospital room? 1  -LG     AM-PAC 6 Clicks Score (PT) 7  -LG     Highest level of mobility 2 --> Bed activities/dependent transfer  -           User Key  (r) = Recorded By, (t) = Taken By, (c) = Cosigned By    Initials Name Provider Type    Gloria Caldwell, RN Registered Nurse                  OT Recommendation and Plan  Planned Therapy Interventions (OT): activity tolerance training, adaptive equipment training, patient/caregiver education/training, ROM/therapeutic exercise, BADL retraining, occupation/activity based interventions, strengthening exercise, transfer/mobility retraining  Therapy Frequency (OT): other (see comments) (PRN to follow for changes/ progress toward OT goals.)  Plan of  Care Review  Plan of Care Reviewed With: patient  Outcome Evaluation: OT evaluation completed this date. Patient with adequate UE strength and ROM to improve fx independence with transfers and ADLs and would benefit from skilled OT. Patient verbalizes she hasn't really ever participated in therapy since CVA 2 years ago and is motivated to regain ability to stand/transfer. No equimpment needs at this time. OT d/c disposition recommendation would be IPR vs SNF depending on progress with therapy     Time Calculation:     Therapy Charges for Today     Code Description Service Date Service Provider Modifiers Qty    44030134416 [facility] OT EVAL MOD COMPLEXITY 4 9/21/2022 Key Herrera OT  1               Key Herrera OT  9/21/2022

## 2022-09-21 NOTE — PLAN OF CARE
Goal Outcome Evaluation:              Outcome Evaluation: Patient arrived to the floor this shift, wound care consult ordered, wound care completed per nursing standing orders, pain medicine given x1, VSS, no other complaints or request at this time, Will continue to monitor.

## 2022-09-22 LAB
ANION GAP SERPL CALCULATED.3IONS-SCNC: 14.5 MMOL/L (ref 5–15)
BACTERIA SPEC AEROBE CULT: NO GROWTH
BASOPHILS # BLD AUTO: 0.07 10*3/MM3 (ref 0–0.2)
BASOPHILS NFR BLD AUTO: 0.6 % (ref 0–1.5)
BUN SERPL-MCNC: 24 MG/DL (ref 8–23)
BUN/CREAT SERPL: 19.8 (ref 7–25)
CALCIUM SPEC-SCNC: 9.3 MG/DL (ref 8.6–10.5)
CHLORIDE SERPL-SCNC: 103 MMOL/L (ref 98–107)
CO2 SERPL-SCNC: 22.5 MMOL/L (ref 22–29)
CREAT SERPL-MCNC: 1.21 MG/DL (ref 0.57–1)
DEPRECATED RDW RBC AUTO: 47.2 FL (ref 37–54)
EGFRCR SERPLBLD CKD-EPI 2021: 47.4 ML/MIN/1.73
EOSINOPHIL # BLD AUTO: 0.42 10*3/MM3 (ref 0–0.4)
EOSINOPHIL NFR BLD AUTO: 3.9 % (ref 0.3–6.2)
ERYTHROCYTE [DISTWIDTH] IN BLOOD BY AUTOMATED COUNT: 14.1 % (ref 12.3–15.4)
GLUCOSE BLDC GLUCOMTR-MCNC: 132 MG/DL (ref 70–130)
GLUCOSE BLDC GLUCOMTR-MCNC: 162 MG/DL (ref 70–130)
GLUCOSE BLDC GLUCOMTR-MCNC: 179 MG/DL (ref 70–130)
GLUCOSE BLDC GLUCOMTR-MCNC: 212 MG/DL (ref 70–130)
GLUCOSE SERPL-MCNC: 189 MG/DL (ref 65–99)
HCT VFR BLD AUTO: 36.2 % (ref 34–46.6)
HGB BLD-MCNC: 11.9 G/DL (ref 12–15.9)
IMM GRANULOCYTES # BLD AUTO: 0.04 10*3/MM3 (ref 0–0.05)
IMM GRANULOCYTES NFR BLD AUTO: 0.4 % (ref 0–0.5)
LYMPHOCYTES # BLD AUTO: 2.53 10*3/MM3 (ref 0.7–3.1)
LYMPHOCYTES NFR BLD AUTO: 23.5 % (ref 19.6–45.3)
MAGNESIUM SERPL-MCNC: 2.4 MG/DL (ref 1.6–2.4)
MCH RBC QN AUTO: 30 PG (ref 26.6–33)
MCHC RBC AUTO-ENTMCNC: 32.9 G/DL (ref 31.5–35.7)
MCV RBC AUTO: 91.2 FL (ref 79–97)
MONOCYTES # BLD AUTO: 0.63 10*3/MM3 (ref 0.1–0.9)
MONOCYTES NFR BLD AUTO: 5.8 % (ref 5–12)
NEUTROPHILS NFR BLD AUTO: 65.8 % (ref 42.7–76)
NEUTROPHILS NFR BLD AUTO: 7.08 10*3/MM3 (ref 1.7–7)
NRBC BLD AUTO-RTO: 0 /100 WBC (ref 0–0.2)
PHOSPHATE SERPL-MCNC: 4 MG/DL (ref 2.5–4.5)
PLATELET # BLD AUTO: 256 10*3/MM3 (ref 140–450)
PMV BLD AUTO: 9.4 FL (ref 6–12)
POTASSIUM SERPL-SCNC: 3.9 MMOL/L (ref 3.5–5.2)
RBC # BLD AUTO: 3.97 10*6/MM3 (ref 3.77–5.28)
SODIUM SERPL-SCNC: 140 MMOL/L (ref 136–145)
WBC NRBC COR # BLD: 10.77 10*3/MM3 (ref 3.4–10.8)

## 2022-09-22 PROCEDURE — 25010000002 ENOXAPARIN PER 10 MG: Performed by: PHYSICIAN ASSISTANT

## 2022-09-22 PROCEDURE — 82962 GLUCOSE BLOOD TEST: CPT

## 2022-09-22 PROCEDURE — 96372 THER/PROPH/DIAG INJ SC/IM: CPT

## 2022-09-22 PROCEDURE — 85025 COMPLETE CBC W/AUTO DIFF WBC: CPT | Performed by: PHYSICIAN ASSISTANT

## 2022-09-22 PROCEDURE — 25010000002 CEFTRIAXONE PER 250 MG: Performed by: INTERNAL MEDICINE

## 2022-09-22 PROCEDURE — 96366 THER/PROPH/DIAG IV INF ADDON: CPT

## 2022-09-22 PROCEDURE — G0378 HOSPITAL OBSERVATION PER HR: HCPCS

## 2022-09-22 PROCEDURE — 63710000001 INSULIN ASPART PER 5 UNITS: Performed by: PHYSICIAN ASSISTANT

## 2022-09-22 PROCEDURE — 84100 ASSAY OF PHOSPHORUS: CPT | Performed by: PHYSICIAN ASSISTANT

## 2022-09-22 PROCEDURE — 80048 BASIC METABOLIC PNL TOTAL CA: CPT | Performed by: PHYSICIAN ASSISTANT

## 2022-09-22 PROCEDURE — 83735 ASSAY OF MAGNESIUM: CPT | Performed by: PHYSICIAN ASSISTANT

## 2022-09-22 PROCEDURE — 97602 WOUND(S) CARE NON-SELECTIVE: CPT

## 2022-09-22 PROCEDURE — 99225 PR SBSQ OBSERVATION CARE/DAY 25 MINUTES: CPT | Performed by: PHYSICIAN ASSISTANT

## 2022-09-22 RX ORDER — HYDROCODONE BITARTRATE AND ACETAMINOPHEN 5; 325 MG/1; MG/1
1 TABLET ORAL ONCE AS NEEDED
Status: COMPLETED | OUTPATIENT
Start: 2022-09-22 | End: 2022-09-22

## 2022-09-22 RX ADMIN — HYDROCODONE BITARTRATE AND ACETAMINOPHEN 1 TABLET: 5; 325 TABLET ORAL at 08:27

## 2022-09-22 RX ADMIN — INSULIN ASPART 4 UNITS: 100 INJECTION, SOLUTION INTRAVENOUS; SUBCUTANEOUS at 17:09

## 2022-09-22 RX ADMIN — ENOXAPARIN SODIUM 40 MG: 80 INJECTION SUBCUTANEOUS at 08:29

## 2022-09-22 RX ADMIN — SODIUM CHLORIDE 2 G: 900 INJECTION INTRAVENOUS at 11:27

## 2022-09-22 RX ADMIN — Medication 1 TABLET: at 08:29

## 2022-09-22 RX ADMIN — Medication 150 MG: at 08:27

## 2022-09-22 RX ADMIN — HYDROCODONE BITARTRATE AND ACETAMINOPHEN 1 TABLET: 5; 325 TABLET ORAL at 01:39

## 2022-09-22 RX ADMIN — HYDROCODONE BITARTRATE AND ACETAMINOPHEN 1 TABLET: 5; 325 TABLET ORAL at 18:50

## 2022-09-22 RX ADMIN — DILTIAZEM HYDROCHLORIDE 300 MG: 300 CAPSULE, COATED, EXTENDED RELEASE ORAL at 08:28

## 2022-09-22 RX ADMIN — NYSTATIN: 100000 POWDER TOPICAL at 21:05

## 2022-09-22 RX ADMIN — Medication 3 ML: at 21:05

## 2022-09-22 RX ADMIN — NYSTATIN: 100000 POWDER TOPICAL at 08:31

## 2022-09-22 RX ADMIN — Medication 3 ML: at 08:30

## 2022-09-22 RX ADMIN — HYDROCODONE BITARTRATE AND ACETAMINOPHEN 1 TABLET: 5; 325 TABLET ORAL at 22:48

## 2022-09-22 RX ADMIN — GABAPENTIN 300 MG: 300 CAPSULE ORAL at 13:33

## 2022-09-22 RX ADMIN — GABAPENTIN 300 MG: 300 CAPSULE ORAL at 05:18

## 2022-09-22 RX ADMIN — GABAPENTIN 300 MG: 300 CAPSULE ORAL at 21:05

## 2022-09-22 RX ADMIN — INSULIN ASPART 2 UNITS: 100 INJECTION, SOLUTION INTRAVENOUS; SUBCUTANEOUS at 11:26

## 2022-09-22 RX ADMIN — ETHYL ALCOHOL 1 EACH: 62 SWAB TOPICAL at 21:08

## 2022-09-22 RX ADMIN — ETHYL ALCOHOL 1 EACH: 62 SWAB TOPICAL at 08:32

## 2022-09-22 RX ADMIN — NYSTATIN: 100000 POWDER TOPICAL at 01:00

## 2022-09-22 NOTE — CASE MANAGEMENT/SOCIAL WORK
Discharge Planning Assessment  [facility]     Patient Name: Anika Neri  MRN: [id number]  Today's Date: 9/22/2022    Admit Date: 9/20/2022    Plan: SS sent a message to APS Durga FATIMA requesting a call after he visits pt's daughter. SS to follow.     Discharge Plan     Row Name 09/22/22 0953       Plan    Plan SS sent a message to APS Durga FATIMA requesting a call after he visits pt's daughter. SS to follow.              Expected Discharge Date and Time     Expected Discharge Date Expected Discharge Time    Sep 22, 2022         ABI Lemons

## 2022-09-22 NOTE — PLAN OF CARE
Goal Outcome Evaluation:  Plan of Care Reviewed With: patient        Progress: no change     Wound care completed; pain meds given x2 this shift. No acute changes, will continue to monitor and follow current POC.

## 2022-09-22 NOTE — CONSULTS
"Diabetes Education  Assessment/Teaching    Patient Name:  Anika Neri  YOB: 1950  MRN: [id number]  Admit Date:  9/20/2022      Assessment Date:  9/22/2022  Flowsheet Row Most Recent Value   General Information     Height 61 cm (24.02\")   Height Method Stated   Weight 120 kg (265 lb 9.6 oz)   Weight Method Estimated   Pregnancy Assessment    Diabetes History    Education Preferences    Nutrition Information    Assessment Topics    DM Goals           Flowsheet Row Most Recent Value   DM Education Needs    Meter Has own   Frequency of Testing 2 times a day   Medication Oral   Problem Solving Hypoglycemia, Hyperglycemia, Sick days, Signs, Symptoms, Treatment   Reducing Risks Cardiovascular, Immunizations, Foot care, Dental exam, Eye exam, Blood pressure, Lipids, A1C testing, Neuropathy, Retinopathy   Healthy Eating Basic meal plan provided   Physical Activity Walking   Physical Activity Frequency Occasionally   Healthy Coping Appropriate   Discharge Plan Home, Follow-up with PCP   Motivation Moderate   Teaching Method Explanation, Discussion, Handouts   Patient Response Verbalized understanding            Other Comments:  A1C 7.8 Patient did not wear a mask.Patient was educated and received AADE7 and ADA handouts on diet, activity, checking blood glucose, taking medication as prescribed, checking feet daily and S/S of hypo/hyperglycemia. Patient was educated on sick rule days. Patient had no questions or concerns. Please re-consult or call for concerns or questions. Thank you.         Electronically signed by:  Janie Decker RN  09/22/22 13:13 EDT  "

## 2022-09-22 NOTE — PROGRESS NOTES
[facility] Medicine Services  PROGRESS NOTE     Patient Identification:  Name:  Anika Neri  Age:  72 y.o.  Sex:  female  :  1950  MRN:  [id number]  Visit Number:  [id number]  Primary Care Provider:  Provider, No Known    Length of stay:  0    ----------------------------------------------------------------------------------------------------------------------  Subjective     Chief Complaint:  Follow up for UTI, sacral wound, debility, electrolyte abnormalities      History of Presenting Illness/Hospital Course:  Patient is a 72-year-old female with past medical history significant for insulin-dependent type 2 diabetes mellitus, paroxysmal atrial fibrillation not chronically anticoagulated secondary to history of post menopausal bleeding, essential hypertension, history of CVA, chronic pain, non ambulatory x 2 years per patient report, chronic indwelling barajas catheter, chronic sacral decubitus wound, and morbid obesity that presented to the [facility] emergency department for evaluation of coccyx/sacral wound and leaking barajas catheter. Please see admitting history and physical for further and complete details. Patient was found to have chronic sacral wound and questionable catheter associated urinary tract infection in setting of chronic indwelling barajas catheter.  Patient was admitted under observation status on the medical surgical floor for further evaluation and treatment. Blood cultures obtained, remain with no growth to date. Urine culture pending. Barajas catheter was exchanged in ED prior to admission. Patient continued on empiric IV Rocephin. General surgery was consulted to evaluate decubitus sacral wound, not felt to be actively infected or requiring surgical debridement. Patient continued with wound care per protocol. Electrolytes replaced per protocol. Patient with chronic debility, PT/OT on board.     Subjective:  Today, the patient  was sitting up in bed per my evaluation.  No acute distress noted.  No overnight events reported per nursing staff.  Patient continues to report discomfort/pain at coccyx wound.  Otherwise, she is of no complaints.  Denies any fevers or chills.  No chest pain or dyspnea.  Has not worked with physical therapy today as of yet.    Present during exam: n/a  ----------------------------------------------------------------------------------------------------------------------  Objective     Consults:  • General surgery     Procedures:  • None     Current Hospital Meds:  cefTRIAXone, 2 g, Intravenous, Q24H  dilTIAZem CD, 300 mg, Oral, Daily  enoxaparin, 40 mg, Subcutaneous, Daily  ethyl alcohol, 1 application, Nasal, BID  furosemide, 40 mg, Oral, Once per day on Mon Wed Fri  gabapentin, 300 mg, Oral, Q8H  Insulin Aspart, 0-9 Units, Subcutaneous, TID AC  iron polysaccharides, 150 mg, Oral, Daily  multivitamin, 1 tablet, Oral, Daily  nystatin, , Topical, Q12H  sodium chloride, 3 mL, Intravenous, Q12H         ----------------------------------------------------------------------------------------------------------------------  Vital Signs:  Temp:  [97.5 °F (36.4 °C)-97.9 °F (36.6 °C)] 97.5 °F (36.4 °C)  Heart Rate:  [88-95] 90  Resp:  [16-18] 16  BP: ()/(62-72) 98/62  No data found.  SpO2 Percentage    09/21/22 1900 09/22/22 0300 09/22/22 0600   SpO2: 96% 97% 97%     SpO2:  [95 %-97 %] 97 %  on   ;   Device (Oxygen Therapy): room air    Body mass index is 323.76 kg/m².  Wt Readings from Last 3 Encounters:   09/20/22 120 kg (265 lb 9.6 oz)   04/22/22 94.3 kg (208 lb)   12/01/20 121 kg (267 lb 7 oz)        Intake/Output Summary (Last 24 hours) at 9/22/2022 0752  Last data filed at 9/22/2022 0500  Gross per 24 hour   Intake 839.52 ml   Output 2350 ml   Net -1510.48 ml     Diet Regular; Consistent Carbohydrate,  Cardiac  ----------------------------------------------------------------------------------------------------------------------  Physical exam:  Physical Exam  Nursing note reviewed.   Constitutional:       General: She is sleeping. She is not in acute distress.     Appearance: She is well-developed. She is morbidly obese. She is ill-appearing (Chronically). She is not toxic-appearing.      Comments: Lying in bed, sleeping, easily aroused. No family present at bedside.    HENT:      Head: Normocephalic and atraumatic.      Mouth/Throat:      Mouth: Mucous membranes are moist.   Eyes:      Conjunctiva/sclera: Conjunctivae normal.      Pupils: Pupils are equal, round, and reactive to light.   Cardiovascular:      Rate and Rhythm: Normal rate and regular rhythm.      Pulses:           Dorsalis pedis pulses are 2+ on the right side and 2+ on the left side.      Heart sounds: Normal heart sounds. No murmur heard.    No friction rub. No gallop.   Pulmonary:      Effort: Pulmonary effort is normal.      Breath sounds: Normal breath sounds and air entry. No wheezing, rhonchi or rales.      Comments: Speaks in full sentences without dyspnea   Abdominal:      General: Abdomen is protuberant. Bowel sounds are normal. There is no distension.      Palpations: Abdomen is soft.      Tenderness: There is no abdominal tenderness. There is no guarding or rebound.   Genitourinary:     Comments: Flanagan catheter in place.  Yellow urine noted in collection.  No sediment or hematuria appreciated.  Musculoskeletal:      Cervical back: Neck supple.      Right lower leg: No edema.      Left lower leg: No edema.   Skin:     General: Skin is warm and dry.      Capillary Refill: Capillary refill takes less than 2 seconds.      Comments: Coccyx/sacral wound   Neurological:      General: No focal deficit present.      Mental Status: She is oriented to person, place, and time and easily aroused.      GCS: GCS eye subscore is 4. GCS verbal subscore  is 5. GCS motor subscore is 6.      Sensory: Sensation is intact.      Motor: Weakness (3/5 strength bilateral lower extremities,  strength intact ) present.      Comments: Awake and alert. Follows commands. Answers questions appropriately. Moves all extremities equally. Strength and sensation intact. No focal neuro deficit on exam.   Psychiatric:         Mood and Affect: Mood and affect normal.         Speech: Speech normal.         Behavior: Behavior is cooperative.        ----------------------------------------------------------------------------------------------------------------------  Tele:    Sinus 80s    I have personally reviewed the EKG/Telemetry strips   ----------------------------------------------------------------------------------------------------------------------            Results from last 7 days   Lab Units 09/22/22 0157 09/21/22 0136 09/20/22  1638   CRP mg/dL  --   --  1.07*   LACTATE mmol/L  --  0.8  --    WBC 10*3/mm3 10.77 10.67 9.88   HEMOGLOBIN g/dL 11.9* 11.7* 11.7*   HEMATOCRIT % 36.2 36.1 36.0   MCV fL 91.2 91.2 91.1   MCHC g/dL 32.9 32.4 32.5   PLATELETS 10*3/mm3 256 264 272     Results from last 7 days   Lab Units 09/22/22  0157 09/21/22 0136 09/20/22  1638   SODIUM mmol/L 140 141 141   POTASSIUM mmol/L 3.9 3.7 4.2   MAGNESIUM mg/dL 2.4 1.5* 1.5*   CHLORIDE mmol/L 103 105 106   CO2 mmol/L 22.5 23.1 24.4   BUN mg/dL 24* 17 19   CREATININE mg/dL 1.21* 0.89 0.92   CALCIUM mg/dL 9.3 9.4 9.6   GLUCOSE mg/dL 189* 130* 128*   ALBUMIN g/dL  --  3.33* 3.31*   BILIRUBIN mg/dL  --  0.5 0.5   ALK PHOS U/L  --  91 89   AST (SGOT) U/L  --  26 23   ALT (SGPT) U/L  --  14 13   Estimated Creatinine Clearance: 34.5 mL/min (A) (by C-G formula based on SCr of 1.21 mg/dL (H)).    Hemoglobin A1C   Date/Time Value Ref Range Status   09/20/2022 1638 7.80 (H) 4.80 - 5.60 % Final     Glucose   Date/Time Value Ref Range Status   09/22/2022 0643 132 (H) 70 - 130 mg/dL Final     Comment:     Meter:  [id number] : [id number] Jane Mart   09/21/2022 1632 135 (H) 70 - 130 mg/dL Final     Comment:     Meter: [id number] : [id number] jennie ventura   09/21/2022 1029 155 (H) 70 - 130 mg/dL Final     Comment:     Meter: [id number] : [id number] jennie ventura   09/21/2022 0633 119 70 - 130 mg/dL Final     Comment:     Meter: [id number] : [id number] durga puckett   09/21/2022 0019 99 70 - 130 mg/dL Final     Comment:     Meter: [id number] : [id number] HENOK FUSION     Lab Results   Component Value Date    TSH 1.100 09/20/2022     Microbiology Results (last 10 days)     Procedure Component Value - Date/Time    MRSA Screen, PCR (Inpatient) - Swab, Nares [[id number]]  (Abnormal) Collected: 09/21/22 0022    Lab Status: Final result Specimen: Swab from Nares Updated: 09/21/22 0501     MRSA PCR MRSA Detected    Narrative:      The negative predictive value of this diagnostic test is high and should only be used to consider de-escalating anti-MRSA therapy. A positive result may indicate colonization with MRSA and must be correlated clinically.    Respiratory Panel PCR w/COVID-19(SARS-CoV-2) MARYJANE/AUDIE/EMILY/PAD/COR/MAD/VANDA In-House, NP Swab in UTM/VTM, 3-4 HR TAT - Swab, Nasopharynx [[id number]]  (Normal) Collected: 09/20/22 2118    Lab Status: Final result Specimen: Swab from Nasopharynx Updated: 09/20/22 2229     ADENOVIRUS, PCR Not Detected     Coronavirus 229E Not Detected     Coronavirus HKU1 Not Detected     Coronavirus NL63 Not Detected     Coronavirus OC43 Not Detected     COVID19 Not Detected     Human Metapneumovirus Not Detected     Human Rhinovirus/Enterovirus Not Detected     Influenza A PCR Not Detected     Influenza B PCR Not Detected     Parainfluenza Virus 1 Not Detected     Parainfluenza Virus 2 Not Detected     Parainfluenza Virus 3 Not Detected     Parainfluenza Virus 4 Not Detected     RSV, PCR Not Detected     Bordetella pertussis pcr Not Detected     Bordetella parapertussis PCR Not  Detected     Chlamydophila pneumoniae PCR Not Detected     Mycoplasma pneumo by PCR Not Detected    Narrative:      In the setting of a positive respiratory panel with a viral infection PLUS a negative procalcitonin without other underlying concern for bacterial infection, consider observing off antibiotics or discontinuation of antibiotics and continue supportive care. If the respiratory panel is positive for atypical bacterial infection (Bordetella pertussis, Chlamydophila pneumoniae, or Mycoplasma pneumoniae), consider antibiotic de-escalation to target atypical bacterial infection.    Urine Culture - Urine, Urine, Catheter [[id number]] Collected: 09/20/22 1640    Lab Status: Final result Specimen: Urine, Catheter Updated: 09/21/22 3933     Urine Culture >100,000 CFU/mL Mixed Nati Isolated    Narrative:      Specimen contains mixed organisms of questionable pathogenicity suggestive of contamination. If symptoms persist, suggest recollection.  Colonization of the urinary tract without infection is common. Treatment is discouraged unless the patient is symptomatic, pregnant, or undergoing an invasive urologic procedure.         I have personally reviewed the above laboratory results.   ----------------------------------------------------------------------------------------------------------------------  Imaging Results (Last 24 Hours)     ** No results found for the last 24 hours. **        I have personally reviewed the above radiology results.   ----------------------------------------------------------------------------------------------------------------------  Assessment/Plan     ACUTE HOSPITAL PROBLEMS      -Questionable complicated acute urinary tract infectious, catheter associated, present on admission in setting of chronic indwelling barajas catheter   · Patient with report of foul odorous urine on arrival as well as complaint of leaking catheter prior to presentation  · UA abnormal. Urine culture  pending.  · Urinary barajas catheter exchanged in ED, continue catheter care per protocol   · Continue empiric coverage with IV Rocephin pending culture finalization.   · Patient afebrile, no leukocytosis. CRP only minimally elevated on arrival, lactate WNL.   · Monitor vitals and temperature curve closely   · Repeat labs in AM     -Chronic sacral decubitus wound  • General surgery has evaluated patient's wound, no tunneling or necrosis not requiring debridement   • Not felt to be acutely infected   • Continue wound care  • Turn Q2H  • MRSA swab positive, however likely chronic colonization. Ethyl alcohol nasal swabs ordered per protocol.   • As needed pain medication available    -Mild renal insufficiency  · Baseline creatinine 0.8-0.9, increased to 1.21 overnight  · Home Lasix had been resumed yesterday, will hold for now  · Avoid further nephrotoxins as much as possible  · Monitor urinary output, patient has Barajas catheter  · Repeat chemistry panel in a.m.    -Hypomagnesemia   · Resolved supplementation  · Continue to replace per protocol as necessary  · Telemetry monitoring   · Repeat mag level in AM     -Normocytic anemia   · Per review of chart, patient with chronic anemia, likely AOCD   · Iron profile with borderline low iron saturation   · Continue iron polysaccharides started previously  · Closely monitor H&H, transfuse if necessary   · Repeat CBC in AM     -Debility   -Non ambulatory x 2 years   · PT/OT consults   · SS/CM consult for disposition planning     -Hypoalbuminemia, multifactorial     CHRONIC MEDICAL PROBLEMS      -Paroxysmal atrial fibrillation, not chronically anticoagulated d/t reported history of post menopausal bleeding: EKG with paced rhythm, no afib. HR controlled. Continue home rate controlling agents. Telemetry monitoring.  -Type II diabetes mellitus, insulin dependent  • HgbA1C 7.8  • BG levels moderately controlled   • At home, patient is prescribed 14-24 units Lantus daily   • Continue  0-9 SSI for now, titrate dosage as necessary   • Closely monitor blood glucose levels with accuchecks QAC and QHS  • Hypoglycemia protocol in place should it be necessary  • DM educator did meet with patient   -Essential hypertension  • BP appears moderately controlled   • Continue home antihypertensive regimen with appropriate holding parameters to prevent hypotension and/or bradycardia.  Hold Lasix due to mild renal insufficiency.  • Closely monitor BP per hospital protocol, titrate medications as necessary  -History of CVA: No deficits on exam at bedside. PT/OT.  -Chronic pain: Supportive care. Cont home medication regimen.  -Obesity by BMI: Affecting all aspects of care.  --------------------------------------------------  DVT Prophylaxis: Lovenox   FEN: No IV fluids. Replace electrolytes per protocol as necessary. Consistent carbohydrate diet.   Activity: Up with assistance, fall precautions, turn every 2 hours, PT  --------------------------------------------------  Disposition:  • Pending clinical course and therapy evaluations   • APS also involved, awaiting report   • SS/CM on board, aiding in disposition planning   --------------------------------------------------  I have discussed the patient's assessment and plan with the patient, nursing staff, and attending physician Stephanie Schfaer DO Allyson O'Kuma, PA-C  Hospitalist Service -- [facility]   Pager: [phone]    09/22/22  07:52 EDT    Attending Physician: Stephanie Bang DO    ----------------------------------------------------------------------------------------------------------------------

## 2022-09-22 NOTE — PLAN OF CARE
Goal Outcome Evaluation:              Outcome Evaluation: pt has rested in bed, pt has not complained of anything during shift, has had wound care done per orders, no acute changes, will continue plan of care

## 2022-09-23 VITALS
RESPIRATION RATE: 18 BRPM | DIASTOLIC BLOOD PRESSURE: 60 MMHG | OXYGEN SATURATION: 96 % | HEIGHT: 62 IN | HEART RATE: 90 BPM | WEIGHT: 265.6 LBS | TEMPERATURE: 97.4 F | SYSTOLIC BLOOD PRESSURE: 103 MMHG | BODY MASS INDEX: 48.87 KG/M2

## 2022-09-23 LAB
ANION GAP SERPL CALCULATED.3IONS-SCNC: 13.7 MMOL/L (ref 5–15)
BASOPHILS # BLD AUTO: 0.06 10*3/MM3 (ref 0–0.2)
BASOPHILS NFR BLD AUTO: 0.6 % (ref 0–1.5)
BUN SERPL-MCNC: 30 MG/DL (ref 8–23)
BUN/CREAT SERPL: 28.3 (ref 7–25)
CALCIUM SPEC-SCNC: 9.5 MG/DL (ref 8.6–10.5)
CHLORIDE SERPL-SCNC: 104 MMOL/L (ref 98–107)
CO2 SERPL-SCNC: 22.3 MMOL/L (ref 22–29)
CREAT SERPL-MCNC: 1.06 MG/DL (ref 0.57–1)
DEPRECATED RDW RBC AUTO: 48.5 FL (ref 37–54)
EGFRCR SERPLBLD CKD-EPI 2021: 55.6 ML/MIN/1.73
EOSINOPHIL # BLD AUTO: 0.54 10*3/MM3 (ref 0–0.4)
EOSINOPHIL NFR BLD AUTO: 5.2 % (ref 0.3–6.2)
ERYTHROCYTE [DISTWIDTH] IN BLOOD BY AUTOMATED COUNT: 14.4 % (ref 12.3–15.4)
GLUCOSE BLDC GLUCOMTR-MCNC: 113 MG/DL (ref 70–130)
GLUCOSE BLDC GLUCOMTR-MCNC: 157 MG/DL (ref 70–130)
GLUCOSE BLDC GLUCOMTR-MCNC: 203 MG/DL (ref 70–130)
GLUCOSE SERPL-MCNC: 170 MG/DL (ref 65–99)
HCT VFR BLD AUTO: 36 % (ref 34–46.6)
HGB BLD-MCNC: 11.6 G/DL (ref 12–15.9)
IMM GRANULOCYTES # BLD AUTO: 0.04 10*3/MM3 (ref 0–0.05)
IMM GRANULOCYTES NFR BLD AUTO: 0.4 % (ref 0–0.5)
LYMPHOCYTES # BLD AUTO: 2.86 10*3/MM3 (ref 0.7–3.1)
LYMPHOCYTES NFR BLD AUTO: 27.7 % (ref 19.6–45.3)
MAGNESIUM SERPL-MCNC: 2 MG/DL (ref 1.6–2.4)
MCH RBC QN AUTO: 29.8 PG (ref 26.6–33)
MCHC RBC AUTO-ENTMCNC: 32.2 G/DL (ref 31.5–35.7)
MCV RBC AUTO: 92.5 FL (ref 79–97)
MONOCYTES # BLD AUTO: 0.6 10*3/MM3 (ref 0.1–0.9)
MONOCYTES NFR BLD AUTO: 5.8 % (ref 5–12)
NEUTROPHILS NFR BLD AUTO: 6.24 10*3/MM3 (ref 1.7–7)
NEUTROPHILS NFR BLD AUTO: 60.3 % (ref 42.7–76)
NRBC BLD AUTO-RTO: 0 /100 WBC (ref 0–0.2)
PLATELET # BLD AUTO: 239 10*3/MM3 (ref 140–450)
PMV BLD AUTO: 9.4 FL (ref 6–12)
POTASSIUM SERPL-SCNC: 4.2 MMOL/L (ref 3.5–5.2)
RBC # BLD AUTO: 3.89 10*6/MM3 (ref 3.77–5.28)
SODIUM SERPL-SCNC: 140 MMOL/L (ref 136–145)
WBC NRBC COR # BLD: 10.34 10*3/MM3 (ref 3.4–10.8)

## 2022-09-23 PROCEDURE — G0378 HOSPITAL OBSERVATION PER HR: HCPCS

## 2022-09-23 PROCEDURE — 96372 THER/PROPH/DIAG INJ SC/IM: CPT

## 2022-09-23 PROCEDURE — 83735 ASSAY OF MAGNESIUM: CPT | Performed by: PHYSICIAN ASSISTANT

## 2022-09-23 PROCEDURE — 85025 COMPLETE CBC W/AUTO DIFF WBC: CPT | Performed by: PHYSICIAN ASSISTANT

## 2022-09-23 PROCEDURE — 25010000002 ENOXAPARIN PER 10 MG: Performed by: PHYSICIAN ASSISTANT

## 2022-09-23 PROCEDURE — 80048 BASIC METABOLIC PNL TOTAL CA: CPT | Performed by: PHYSICIAN ASSISTANT

## 2022-09-23 PROCEDURE — 63710000001 INSULIN ASPART PER 5 UNITS: Performed by: PHYSICIAN ASSISTANT

## 2022-09-23 PROCEDURE — 97602 WOUND(S) CARE NON-SELECTIVE: CPT

## 2022-09-23 PROCEDURE — 82962 GLUCOSE BLOOD TEST: CPT

## 2022-09-23 PROCEDURE — 25010000002 CEFTRIAXONE PER 250 MG: Performed by: INTERNAL MEDICINE

## 2022-09-23 PROCEDURE — 99217 PR OBSERVATION CARE DISCHARGE MANAGEMENT: CPT | Performed by: PHYSICIAN ASSISTANT

## 2022-09-23 RX ORDER — OXYCODONE AND ACETAMINOPHEN 10; 325 MG/1; MG/1
1 TABLET ORAL EVERY 6 HOURS PRN
Qty: 12 TABLET | Refills: 0
Start: 2022-09-23 | End: 2022-09-26

## 2022-09-23 RX ORDER — DIPHENOXYLATE HYDROCHLORIDE AND ATROPINE SULFATE 2.5; .025 MG/1; MG/1
1 TABLET ORAL DAILY
Qty: 30 TABLET | Refills: 0 | Status: ON HOLD | OUTPATIENT
Start: 2022-09-24 | End: 2022-12-01

## 2022-09-23 RX ORDER — GABAPENTIN 800 MG/1
800 TABLET ORAL 3 TIMES DAILY
Qty: 9 TABLET | Refills: 0 | Status: ON HOLD
Start: 2022-09-23 | End: 2022-12-01

## 2022-09-23 RX ORDER — IRON POLYSACCHARIDE COMPLEX 150 MG
150 CAPSULE ORAL DAILY
Qty: 30 CAPSULE | Refills: 0 | Status: ON HOLD | OUTPATIENT
Start: 2022-09-24 | End: 2022-12-01

## 2022-09-23 RX ORDER — GABAPENTIN 800 MG/1
800 TABLET ORAL 3 TIMES DAILY
Qty: 9 TABLET | Refills: 0 | Status: SHIPPED | OUTPATIENT
Start: 2022-09-23 | End: 2022-09-23 | Stop reason: SDUPTHER

## 2022-09-23 RX ORDER — OXYCODONE AND ACETAMINOPHEN 10; 325 MG/1; MG/1
1 TABLET ORAL EVERY 6 HOURS PRN
Qty: 12 TABLET | Refills: 0 | Status: SHIPPED | OUTPATIENT
Start: 2022-09-23 | End: 2022-09-23 | Stop reason: SDUPTHER

## 2022-09-23 RX ORDER — OXYCODONE AND ACETAMINOPHEN 10; 325 MG/1; MG/1
1 TABLET ORAL EVERY 6 HOURS PRN
Status: DISCONTINUED | OUTPATIENT
Start: 2022-09-23 | End: 2022-09-23 | Stop reason: HOSPADM

## 2022-09-23 RX ORDER — BLOOD PRESSURE TEST KIT
1 KIT MISCELLANEOUS 2 TIMES DAILY
Qty: 5 EACH | Refills: 0 | Status: SHIPPED | OUTPATIENT
Start: 2022-09-23 | End: 2022-09-26

## 2022-09-23 RX ADMIN — Medication 3 ML: at 08:59

## 2022-09-23 RX ADMIN — INSULIN ASPART 4 UNITS: 100 INJECTION, SOLUTION INTRAVENOUS; SUBCUTANEOUS at 16:56

## 2022-09-23 RX ADMIN — Medication 1 TABLET: at 08:58

## 2022-09-23 RX ADMIN — GABAPENTIN 300 MG: 300 CAPSULE ORAL at 13:30

## 2022-09-23 RX ADMIN — DILTIAZEM HYDROCHLORIDE 300 MG: 300 CAPSULE, COATED, EXTENDED RELEASE ORAL at 08:58

## 2022-09-23 RX ADMIN — Medication 150 MG: at 08:58

## 2022-09-23 RX ADMIN — INSULIN ASPART 2 UNITS: 100 INJECTION, SOLUTION INTRAVENOUS; SUBCUTANEOUS at 11:38

## 2022-09-23 RX ADMIN — ENOXAPARIN SODIUM 40 MG: 80 INJECTION SUBCUTANEOUS at 08:58

## 2022-09-23 RX ADMIN — OXYCODONE HYDROCHLORIDE AND ACETAMINOPHEN 1 TABLET: 10; 325 TABLET ORAL at 12:12

## 2022-09-23 RX ADMIN — ETHYL ALCOHOL 1 EACH: 62 SWAB TOPICAL at 09:03

## 2022-09-23 RX ADMIN — NYSTATIN: 100000 POWDER TOPICAL at 08:59

## 2022-09-23 RX ADMIN — GABAPENTIN 300 MG: 300 CAPSULE ORAL at 05:27

## 2022-09-23 RX ADMIN — SODIUM CHLORIDE 2 G: 900 INJECTION INTRAVENOUS at 11:38

## 2022-09-23 RX ADMIN — HYDROCODONE BITARTRATE AND ACETAMINOPHEN 1 TABLET: 5; 325 TABLET ORAL at 05:36

## 2022-09-23 NOTE — PLAN OF CARE
Goal Outcome Evaluation:              Outcome Evaluation: patient has refused her wound care for this shift. said she was in too much pain to tolerate the dressing change, educated patient on the risks of not performing wound care, she still declined. she has rested since receiving pain medication but still shows nonverbal indications of pain. VSS. Will continue with current plan of care.

## 2022-09-23 NOTE — CASE MANAGEMENT/SOCIAL WORK
Discharge Planning Assessment   Randy     Patient Name: Anika Neri  MRN: [id number]  Today's Date: 9/23/2022    Admit Date: 9/20/2022       Discharge Plan     Row Name 09/23/22 1301       Plan    Final Discharge Disposition Code 06 - home with home health care    Final Note Pt is being discharged home with physician ordered home health services. SS contacted PCP Magda Fam's office who states Pt will be seen in office on Monday, 09/26/22 at 10:15am. Pt is a new Pt for PCP [place]. SS informed PCP office Physician has ordered home health services. Pt's daughter Esther is aware and agreeable to discharge. Daughter prefers [facility]. SS faxed HH referral to [facility]. SS to provide RN with report number once [facility] confirms they can accept Pt. Pt will need EMS to transport. Daughter reports she is at home and ready for Pt to be discharged.    15:07pm: SS spoke with [facility] who states they are out of network with Pt's insurance. Pt's insurance to switch to medicare replacement on 10/01/22. SS faxed referral to [facility] fax [phone].     16:46pm: SS spoke with [facility] per Madie who states HH will not be able to official accept Pt until PCP is established. A  to follow up with PCP Magda Fam on 09/26/22. SS notified lead nurse who states she will provide wound care education with Pt's daughter and  to send wound care supplies. SS contacted  EMS per ANUJ Barrientos who states SS will need [facility]. SS contacted [facility] [phone] and faxed PCS form to fax [phone]. SS scheduled transport via [facility] per Donald.                            CELIA GeorgeW

## 2022-09-23 NOTE — PLAN OF CARE
Goal Outcome Evaluation:           Progress: no change  Outcome Evaluation: pt has rested well, has had prn pain medications given,  wound care done per orders, no acute changes, will continue plan of care

## 2022-09-23 NOTE — DISCHARGE PLACEMENT REQUEST
"Anika Gonzalez (72 y.o. Female)             Date of Birth   1950    Social Security Number       Address   [address] [place] [id number]    Home Phone   [phone]    MRN   [id number]       [facility]    Marital Status                               Admission Date   22    Admission Type   Emergency    Admitting Provider   Darell Kruger MD    Attending Provider   Stephanie Bang DO    Department, Room/Bed   [address], 3342/       Discharge Date       Discharge Disposition   Home-Health Care [facility]    Discharge Destination                               Attending Provider: Stephanie Bang DO    Allergies: Contrast Dye    Isolation: None   Infection: MRSA No Isolation this Admit (22)   Code Status: CPR   Advance Care Planning Activity    Ht: 157.5 cm (62.01\")   Wt: 120 kg (265 lb 9.6 oz)    Admission Cmt: None   Principal Problem: Sacral decubitus ulcer [L89.159]                 Active Insurance as of 2022     Primary Coverage     Payor Plan Insurance Group Employer/Plan Group    MEDICARE MEDICARE A & B      Payor Plan Address Payor Plan Phone Number Payor Plan Fax Number Effective Dates    [address] [phone]  1999 - None Entered    [place] [address]       Subscriber Name Subscriber Birth Date Member ID       ANIKA GONZALEZ 1950 [id number]                 Emergency Contacts      (Rel.) Home Phone Work Phone Mobile Phone    FrantzJoellen (POA) (Daughter) -- -- [phone]    Esther Parker (Daughter) [phone] -- --        [address] [address]  Dept. Phone:  [phone]  Dept. Fax:  [phone] Date Ordered: Sep 23, 2022         Patient:  Anika Gonzalez MRN:  [id number]   [address]  [facility] [address] :  1950  SSN:    Phone: [phone] Sex:  F     Weight: 120 kg (265 lb 9.6 oz)         Ht Readings from Last 1 Encounters:   22 " "157.5 cm (62.01\")         Hospital Bed  (Order ID: [id number])    Diagnosis:  Morbid obesity (HCC) (E66.01 [ICD-10-CM] 278.01 [ICD-9-CM])  Does not walk (Z74.09 [ICD-10-CM] V41.8 [ICD-9-CM])  Pressure injury of sacral region, unstageable (HCC) (L89.150 [ICD-10-CM] 707.03,707.25 [ICD-9-CM])   Quantity:  1     Equipment:  Hospital Bed, Semi-Electirc w/ Mattress & w/ Rails  Length of Need (99 Months = Lifetime): 99 Months = Lifetime        Authorizing Provider's Phone: [phone]  Authorizing Provider:Radha May PA  Authorizing Provider's NPI: [id number]  Order Entered By: Radha May PA 2022 11:30 AM     Electronically signed by: Radha May PA 2022 11:30 AM     [address] [address]  Dept. Phone:  [phone]  Dept. Fax:  [phone] Date Ordered: Sep 23, 2022         Patient:  Anika Neri MRN:  [id number]   [address] [facility] [address] :  1950  SSN:    Phone: [phone] Sex:  F     Weight: 120 kg (265 lb 9.6 oz)         Ht Readings from Last 1 Encounters:   22 157.5 cm (62.01\")         Miscellaneous DME   (Order ID: [id number])    Diagnosis:  Morbid obesity (HCC) (E66.01 [ICD-10-CM] 278.01 [ICD-9-CM])  Does not walk (Z74.09 [ICD-10-CM] V41.8 [ICD-9-CM])  Pressure injury of sacral region, unstageable (HCC) (L89.150 [ICD-10-CM] 707.03,707.25 [ICD-9-CM])   Quantity:  1     Type of DME: Estefania lift  Length of Need (99 Months = Lifetime): 99 Months = Lifetime        Authorizing Provider's Phone: [phone]  Authorizing Provider:Radha May PA  Authorizing Provider's NPI: [id number]  Order Entered By: Radha May PA 2022 11:30 AM     Electronically signed by: Radha May PA 2022 11:30 AM            History & Physical      Jennifer Eugene PA-C at 22 4340     Attestation signed by Darell Kruger MD at 22 0477 (Updated)    I have reviewed this documentation " and agree.  I have discussed and formulated the assessment and plan with DAVID Rodriguez and I have reviewed the wound images.  Jennifer stated that the patient was in severe pain when she was turned for the skin assessment and thus I have elected not to look at the wound as multiple providers have already.  Await the general surgery consultation.  Also await PT/OT/speech to see the patient to help with disposition.  The MRSA nasal swab was negative; however, I will not start vancomycin as the patient is not septic.                      [facility] Medicine Services  History & Physical    Patient Identification:  Name:  Anika Neri  Age:  72 y.o.  Sex:  female  :  1950  MRN:  [id number]   Visit Number:  [id number]  Primary Care Physician:  Provider, No Known    Subjective     2022   Chief complaint:   Chief Complaint   Patient presents with   • Wound Check   • urinary catheter problem     History of presenting illness:      Anika Neri is a 72 y.o. female with past medical history significant for type 2 diabetes mellitus, paroxysmal atrial fibrillation, essential hypertension, history of CVA with residual right-sided weakness, gout, arthritis, and obesity who presents to [facility] emergency department for a wound check.  Patient was living in [place] with her daughter, Joellen, but was brought back to [place] to live with her other daughter, Lisa, and granddaughter, Salima, approximately 3 days ago as her other daughter could no longer care for her (further details regarding living situation detailed in  note).  Patient has reportedly been bedbound for the past 2 years due to a prior stroke and developed a nonhealing sacral wound.  She states that she was seeing wound care in [place] who performed as needed debridements, but has not seen them in over a year. She states that when she was living with her daughter, Joellen, home health was able to  dress her wound. Since moving back to [place] she has not been able to be set up with home health due to not having an established PCP. She states that her daughter Lisa has been dressing the wound with the supplies that she has left over from [place]. She states the wound is not any more painful than usual and takes a Percocet 10 mg as needed at home to make the pain more tolerable.  She does state she has noticed foul-smelling urine from her barajas catheter bag for the past 2-3 days, but denies any suprapubic pain. Barajas catheter was replaced in ED on 09/20/2022. She denies any fevers or chills. Denies any shortness of breath or cough. Denies any abdominal pain, nausea, vomiting, or diarrhea. Currently alert and oriented to self, date of birth, place, year, and president.    Upon arrival to the ED, vital signs were temperature 99.6, heart rate 66, respirations 18, blood pressure 118/65, SPO2 98% on room air.  CMP with glucose 128, albumin 3.31, otherwise unremarkable.  CBC with hemoglobin 11.7, otherwise unremarkable.  Hemoglobin A1c 7.8.  TSH 1.1.  Magnesium 1.5.  Urinalysis with positive nitrites, 2+ leukocytes, trace protein, 3-5 RBC, 21-30 WBC, 4+ bacteria; repeat urinalysis with 1+ leukocytes and 13-20 WBC.  Both urine sample sent off for culture.  Respiratory panel PCR negative.    Known Emergency Department medications received prior to my evaluation included IV Rocephin 1 g and Percocet 10 mg.     Patient will be admitted to the medical surgical floor for further evaluation and monitoring.    ---------------------------------------------------------------------------------------------------------------------   Review of Systems   Constitutional: Positive for activity change (Chronically bed bound due to prior CVA). Negative for chills and fever.   HENT: Negative for congestion and rhinorrhea.    Eyes: Negative for discharge and redness.   Respiratory: Negative for apnea, cough and shortness of breath.     Cardiovascular: Negative for chest pain and palpitations.   Gastrointestinal: Negative for abdominal distention, abdominal pain, diarrhea, nausea and vomiting.   Endocrine: Negative for polydipsia, polyphagia and polyuria.   Genitourinary: Negative for flank pain and pelvic pain.        Reports foul-smelling urine for the past 2-3 days   Musculoskeletal: Positive for gait problem (Bed bound). Negative for arthralgias and myalgias.   Skin: Positive for wound (sacral/coccyx). Negative for color change.   Neurological: Negative for dizziness, syncope and light-headedness.   Psychiatric/Behavioral: Negative for agitation, behavioral problems and confusion.        ---------------------------------------------------------------------------------------------------------------------   Past Medical History:   Diagnosis Date   • Arthritis    • Atrial fibrillation (HCC)     Not on anticoagulation due to postmenopausal bleeding   • Diabetes mellitus (HCC)    • Gout    • History of CVA (cerebrovascular accident)     residual right-sided weakness   • Hyperlipidemia    • Hypertension    • Stage IV pressure ulcer of sacral region (HCC)      Past Surgical History:   Procedure Laterality Date   • CHOLECYSTECTOMY       Family History   Problem Relation Age of Onset   • Diabetes Mother    • Hypertension Father      Social History     Socioeconomic History   • Marital status:    Tobacco Use   • Smoking status: Never Smoker   • Smokeless tobacco: Never Used   Substance and Sexual Activity   • Alcohol use: No   • Drug use: No   • Sexual activity: Defer     ---------------------------------------------------------------------------------------------------------------------   Allergies:  Contrast dye  ---------------------------------------------------------------------------------------------------------------------   Home medications:    Medications below are reported home medications pulling from within the system; at this time,  these medications have not been reconciled unless otherwise specified and are in the verification process for further verifcation as current home medications.  Medications Prior to Admission   Medication Sig Dispense Refill Last Dose   • allopurinol (ZYLOPRIM) 100 MG tablet Take 100 mg by mouth Daily.      • apixaban (ELIQUIS) 5 MG tablet tablet Take 1 tablet by mouth Every 12 (Twelve) Hours. Indications: Atrial Fibrillation 60 tablet 3    • bumetanide (BUMEX) 1 MG tablet Take 1 tablet by mouth Daily. 30 tablet 3    • diazePAM (VALIUM) 5 MG tablet Take 5 mg by mouth 2 (Two) Times a Day As Needed for Anxiety.      • dilTIAZem CD (CARDIZEM CD) 240 MG 24 hr capsule Take 1 capsule by mouth Daily. 30 capsule 3    • glipizide (GLUCOTROL) 5 MG tablet Take 1 tablet by mouth Every Morning Before Breakfast. 30 tablet 3    • magnesium oxide (MAG-OX) 400 MG tablet Take 1 tablet by mouth Daily. 30 tablet 3    • metoprolol tartrate 75 MG tablet Take 75 mg by mouth Every 12 (Twelve) Hours. 60 tablet 3    • oxyCODONE-acetaminophen (PERCOCET)  MG per tablet Take 0.5 tablets by mouth Every 6 (Six) Hours As Needed for Moderate Pain . 30 tablet 0    • PARoxetine (PAXIL) 10 MG tablet Take 10 mg by mouth Every Morning.      • SITagliptin (JANUVIA) 100 MG tablet Take 100 mg by mouth Daily.          Hospital Scheduled Meds:         Current listed hospital scheduled medications may not yet reflect those currently placed in orders that are signed and held awaiting patient's arrival to floor.   ---------------------------------------------------------------------------------------------------------------------     Objective     Vital Signs:  Temp:  [99 °F (37.2 °C)-99.6 °F (37.6 °C)] 99 °F (37.2 °C)  Heart Rate:  [68-69] 69  Resp:  [18] 18  BP: (106-124)/(56-80) 124/71      09/20/22  1545 09/20/22  2357   Weight: 125 kg (275 lb) 120 kg (265 lb 9.6 oz)     Body mass index is 323.76  kg/m².  ---------------------------------------------------------------------------------------------------------------------       Physical Exam  Constitutional:       General: She is awake.      Appearance: She is morbidly obese. She is ill-appearing ( chronically).      Comments: Lying in bed. Complaining of pain from sacral wound. Answers questions appropriately.   HENT:      Head: Normocephalic and atraumatic.      Right Ear: External ear normal.      Left Ear: External ear normal.      Nose: Nose normal.      Mouth/Throat:      Mouth: Mucous membranes are moist.      Pharynx: Oropharynx is clear.   Eyes:      Extraocular Movements: Extraocular movements intact.      Conjunctiva/sclera: Conjunctivae normal.      Pupils: Pupils are equal, round, and reactive to light.   Cardiovascular:      Rate and Rhythm: Normal rate and regular rhythm.      Pulses: Normal pulses.           Dorsalis pedis pulses are 2+ on the right side and 2+ on the left side.        Posterior tibial pulses are 2+ on the right side and 2+ on the left side.      Heart sounds: Normal heart sounds. No murmur heard.    No friction rub. No gallop.   Pulmonary:      Effort: Pulmonary effort is normal. No respiratory distress.      Breath sounds: Normal breath sounds. No wheezing, rhonchi or rales.   Abdominal:      General: Abdomen is flat. Bowel sounds are normal. There is no distension.      Palpations: Abdomen is soft.      Tenderness: There is no abdominal tenderness. There is no guarding.   Genitourinary:     Comments: Barajas catheter in place. Approximately 325 mL clear urine present in barajas bag. No hematuria.  Musculoskeletal:         General: Normal range of motion.      Cervical back: Normal range of motion and neck supple.      Right lower leg: No edema.      Left lower leg: No edema.   Skin:     General: Skin is warm and dry.      Findings: Wound present. No abscess or erythema.          Neurological:      General: No focal deficit  present.      Mental Status: She is alert and oriented to person, place, and time. Mental status is at baseline.      Comments: Alert and oriented x 4. Moves extremities appropriately. Coordination in tact.   Psychiatric:         Mood and Affect: Mood normal.         Behavior: Behavior normal. Behavior is cooperative.         Thought Content: Thought content normal.         ---------------------------------------------------------------------------------------------------------------------  EKG:    Obtain baseline EKG.    I have personally looked at both the EKG and the telemetry strips.  ---------------------------------------------------------------------------------------------------------------------   Results from last 7 days   Lab Units 09/20/22  1638   WBC 10*3/mm3 9.88   HEMOGLOBIN g/dL 11.7*   HEMATOCRIT % 36.0   MCV fL 91.1   MCHC g/dL 32.5   PLATELETS 10*3/mm3 272         Results from last 7 days   Lab Units 09/20/22  1638   SODIUM mmol/L 141   POTASSIUM mmol/L 4.2   MAGNESIUM mg/dL 1.5*   CHLORIDE mmol/L 106   CO2 mmol/L 24.4   BUN mg/dL 19   CREATININE mg/dL 0.92   CALCIUM mg/dL 9.6   GLUCOSE mg/dL 128*   ALBUMIN g/dL 3.31*   BILIRUBIN mg/dL 0.5   ALK PHOS U/L 89   AST (SGOT) U/L 23   ALT (SGPT) U/L 13   Estimated Creatinine Clearance: 45.4 mL/min (by C-G formula based on SCr of 0.92 mg/dL).  No results found for: AMMONIA          Lab Results   Component Value Date    HGBA1C 7.80 (H) 09/20/2022     Lab Results   Component Value Date    TSH 1.100 09/20/2022     No results found for: PREGTESTUR, PREGSERUM, HCG, HCGQUANT  Pain Management Panel    There is no flowsheet data to display.           ---------------------------------------------------------------------------------------------------------------------  Imaging Results (Last 7 Days)     ** No results found for the last 168 hours. **        Last echocardiogram:  Results for orders placed during the hospital encounter of 10/27/20    Adult  Transthoracic Echo Complete W/ Cont if Necessary Per Protocol    Interpretation Summary  · Estimated left ventricular EF = 60% Left ventricular ejection fraction appears to be 56 - 60%. Left ventricular systolic function is normal.  · Moderate to severe aortic sclerosis without stenosis  · Mild mitral valve regurgitation is present.  · No previous study to compare  · Mild tricuspid valve regurgitation is present.  · Estimated right ventricular systolic pressure from tricuspid regurgitation is mildly elevated (35-45 mmHg).    Image of sacral/coccygeal ulcer 09/20/22:    :  I have personally reviewed the above radiology images and read the final radiology report on 09/21/22  ---------------------------------------------------------------------------------------------------------------------  Assessment / Plan     Active Hospital Problems    Diagnosis  POA   • **Sacral decubitus ulcer [L89.159]  Yes       ASSESSMENT/PLAN:  -Non-healing stage IV sacral/coccygeal decubitus wound, POA  -Does not currently meet sepsis criteria. No leukocytosis. Vitals stable. Obtain C-RP and lactate.  -Inpatient general surgery consulted for input on possible debridement, assistance appreciated. Wound care also consulted.  -No imaging obtained in ED; may need imaging to assess for osteomyelitis/abscess. Will await surgical input.  -Patient has chronic indwelling barajas catheter due to sacral wound. Exchanged in ED upon arrival on 09/20/22.  -Obtain MRSA swab.   -Turn patient every 2 hours.     -Abnormal urinalysis  -Initial urinalysis grossly abnormal with positive nitrites, 2+ leukocytes, trace protein, 3-5 RBC, 21-30 WBC, 4 + bacteria; repeat urinalysis with 1 + leukocytes and 13-20 WBC; Both urine samples sent for culture  -Received IV Rocephin in ED; will await for results to determine need for antibiotics.    -Normocytic anemia  -Hemoglobin 11.7, hematocrit 36.  -No signs of bleeding at present.   -Obtain iron, vitamin B12, and folate.    -Repeat a.m. CBC.     -Hypomagnesemia  -Magnesium 1.5, potassium 4.2.   -Replace per protocol.   -Repeat a.m. BMP and magnesium level.    -Hypoalbuminemia  -Daily multivitamin ordered.   -Repeat a.m. CMP.     -Debility  -History of CVA with residual right-sided weakness  -Up with assistance, fall precautions.  -PT/OT/SLP consulted.  - consulted.    -Non-insulin dependent type II diabetes mellitus  -Hemoglobin A1c 7.8.   -Accuchecks qAC and qHS.   -SSI ordered; titrate as needed.   -Hypoglycemia protocol ordered.   -Consistent carbohydrate diet.   -Diabetes educated consulted.    -Paroxysmal atrial fibrillation  -Per patient, Eliquis was discontinued over a year ago due to her developing postmenopausal bleeding.  -Review home medications once reconciled.   -Monitor closely on telemetry.    -Essential hypertension  -Hyperlipidemia  -BP stable.   -Monitor per hospital protocol.   -Review home medications once reconciled.     -Gout  -Arthritis  -Supportive care.   -Review ELANA.   -Review home medications once reconciled.    -Obesity  -Complicates all aspects of care.   ----------  -DVT prophylaxis: SQ Lovenox  -Activity: Up with assistance, fall precautions  -Expected length of stay:   OBSERVATION status; however, if further evaluation or treatment plans warrant, status may change.  Based upon current information, I predict patient's care encounter to be less than or equal to 2 midnights    CODE STATUS: Full code; discussed with patient at bedside.    High risk secondary to stage IV sacral/coccygeal decubitus wound, abnormal urinalysis, hypomagnesemia, and debility     Disposition: Pending clinical course    Jennifer Eugene PA-C  09/21/22  00:45 EDT    ---------------------------------------------------------------------------------------------------------------------           Electronically signed by Darell Kruger MD at 09/21/22 0621

## 2022-09-23 NOTE — DISCHARGE INSTR - APPOINTMENTS
A follow up appointment has been scheduled for you to see MILLER Sebastian on September 26, 2022 at 10:15 am.  You can reach the office at .    formerly [facility] has been contacted for home services.  You can reach the office at .(Lab BMP in one week to monitor renal function)

## 2022-09-23 NOTE — CASE MANAGEMENT/SOCIAL WORK
"Continued Stay Note   Randy     Patient Name: Anika Neri  MRN: [id number]  Today's Date: 9/23/2022    Admit Date: 9/20/2022    Plan: DC home today. Referral for Hospital bed & estefania lift received and faxed to \"[facility]\", pt preference. Spoke with April, [facility], Estefania lift & Hospital bed will be delivered to [address] (address given per pt) today per patient request. No further DME needs.   Discharge Plan     Row Name 09/23/22 1218       Plan    Plan DC home today. Referral for Hospital bed & estefania lift received and faxed to \"[facility]\", pt preference. Spoke with April, [facility], Estefania lift & Hospital bed will be delivered to [address] (address given per pt) today per patient request. No further DME needs.    Patient/Family in Agreement with Plan yes               Discharge Codes    No documentation.               Expected Discharge Date and Time     Expected Discharge Date Expected Discharge Time    Sep 23, 2022             Quyen Smith RN    "

## 2022-09-23 NOTE — DISCHARGE SUMMARY
[facility]  DISCHARGE SUMMARY    Patient Identification:  Name:  Anika Neri  Age:  72 y.o.  Sex:  female  :  1950  MRN:  [id number]  Visit Number:  [id number]    Date of Admission: 2022  Date of Discharge: 2022    PCP: Provider, No Known    Discharging Provider: Radha May PA-C / Stephanie Schafer DO    Admission/Discharge Diagnoses     Discharge Diagnoses:  -Questionable complicated acute urinary tract infectious, catheter associated, present on admission in setting of chronic indwelling barajas catheter , urine culture with no growth   -Chronic sacral decubitus wound, not requiring surgical debridement   -Hypomagnesemia, resolved with supplementation   -Normocytic anemia with mild iron deficiency   -Debility   -Non ambulatory x 2 years per patient   -Paroxysmal atrial fibrillation, not chronically anticoagulated d/t reported history of post menopausal bleeding  -Essential hypertension   -History of CVA in past   -Chronic pain   -Morbid obesity, BMI 48.57    Consults/Procedures     Consults:   • General surgery -- Dr. Mendez     Procedures Performed:  • 2022: Barajas catheter exchange in ED     History of Presenting Illness     Anika Neri is a 72 y.o. female with past medical history significant for insulin-dependent type 2 diabetes mellitus, paroxysmal atrial fibrillation not chronically anticoagulated secondary to history of post menopausal bleeding, essential hypertension, history of CVA, chronic pain, non ambulatory x 2 years per patient report, chronic indwelling barajas catheter, chronic sacral decubitus wound, and morbid obesity that presented to the [facility] emergency department for evaluation of coccyx/sacral wound and leaking barajas catheter. Please see admitting history and physical for further and complete details. Patient was found to have chronic sacral wound and questionable catheter associated urinary tract  infection in setting of chronic indwelling barajas catheter.     Hospital Course     Patient was admitted to the medical surgical floor for further evaluation and treatment.  Blood cultures x2 obtained, remain with no growth to date.  Barajas catheter was exchanged in ED, remains without leaking throughout hospitalization.  Urine culture obtained remain with no growth.  Patient was empirically treated with IV Rocephin for UTI, this was discontinued as no growth noted on urine culture.  Patient remained with no urinary complaints.  General surgery was consulted to evaluate decubitus sacral wound, not felt to be actively infected or requiring surgical debridement.  Patient was evaluated by wound care, wound care was continued per protocol.  Electrolytes were replaced per protocol.  Patient with chronic debility, reported being nonambulatory x2 years.  PT/OT on board recommending home health services and physical therapy at time of discharge.    On day of discharge, it was felt that she had reached maximal inpatient benefit and was stable for discharge home with her daughter Esther with home health services.  Patient recently moved here from [place] where she had been living with her daughter Joellen.  Patient plans on living with her daughter Lily, /case management and APS involvement noted and cleared Lily for being the primary caregiver of the patient.  Home health services with physical therapy, wound care, and Barajas catheter management ordered.  DME ordered to include Estefania lift and hospital bed.  Patient did have mild bump in creatinine that improved with holding her home Lasix, will recommend that patient have repeat labs with PCP in 1 week post discharge to monitor creatinine.  We will continue to hold home Lasix on discharge.  Defer to PCP about resuming Lasix.  Please see discharge MAR below for complete list of discharge medications.  Patient educated on signs/symptoms warrant emergent return  to care, verbalized understanding.  Patient agreeable for discharge plan.  Patient did report being out of her home Percocet, patient will be prescribed a short course of 3-day narcotic prescription to get her through until she is seen by PCP for which she is being established with locally since moving to [place] next week.  Patient verbalized understanding.    Discharge Vitals/Physical Examination     Vital Signs:  Temp:  [97.8 °F (36.6 °C)-98.6 °F (37 °C)] 98.6 °F (37 °C)  Heart Rate:  [89-94] 89  Resp:  [18] 18  BP: (106-133)/(60-81) 119/60  Mean Arterial Pressure (Non-Invasive) for the past 24 hrs (Last 3 readings):   Noninvasive MAP (mmHg)   09/23/22 0600 83   09/22/22 1700 90     SpO2 Percentage    09/22/22 0600 09/22/22 1200 09/23/22 0600   SpO2: 97% 91% 94%     SpO2:  [91 %-94 %] 94 %  on   ;   Device (Oxygen Therapy): room air    Body mass index is 48.57 kg/m².  Wt Readings from Last 3 Encounters:   09/20/22 120 kg (265 lb 9.6 oz)   04/22/22 94.3 kg (208 lb)   12/01/20 121 kg (267 lb 7 oz)       Physical Exam:  Physical Exam  Nursing note reviewed.   Constitutional:       General: She is awake. She is not in acute distress.     Appearance: She is well-developed. She is morbidly obese. She is not toxic-appearing.      Comments: Lying in bed. No acute distress noted. On room air. No family present at bedside.    HENT:      Head: Normocephalic and atraumatic.      Mouth/Throat:      Mouth: Mucous membranes are moist.   Eyes:      Conjunctiva/sclera: Conjunctivae normal.      Pupils: Pupils are equal, round, and reactive to light.   Cardiovascular:      Rate and Rhythm: Normal rate and regular rhythm.      Pulses:           Dorsalis pedis pulses are 2+ on the right side and 2+ on the left side.      Heart sounds: Normal heart sounds. No murmur heard.    No friction rub. No gallop.   Pulmonary:      Effort: Pulmonary effort is normal. No tachypnea, accessory muscle usage or respiratory distress.      Breath  sounds: Normal breath sounds and air entry. No wheezing, rhonchi or rales.      Comments: On room air. Speaks in full sentences without dyspnea.  Abdominal:      General: Bowel sounds are normal. There is no distension.      Palpations: Abdomen is soft.      Tenderness: There is no abdominal tenderness. There is no guarding or rebound.   Genitourinary:     Comments: No barajas catheter in place.  Musculoskeletal:      Cervical back: Neck supple.      Right lower leg: No edema.      Left lower leg: No edema.   Skin:     General: Skin is warm and dry.      Capillary Refill: Capillary refill takes less than 2 seconds.      Comments: Sacral wound, stable   Neurological:      General: No focal deficit present.      Mental Status: She is alert and oriented to person, place, and time.      GCS: GCS eye subscore is 4. GCS verbal subscore is 5. GCS motor subscore is 6.      Sensory: Sensation is intact.      Motor: Motor function is intact.      Comments: Awake and alert. Follows commands. Answers questions appropriately. Moves all extremities equally. Strength and sensation intact. No focal neuro deficit on exam.   Psychiatric:         Attention and Perception: Attention normal.         Mood and Affect: Mood and affect normal.         Speech: Speech normal.         Behavior: Behavior is cooperative.         Cognition and Memory: Cognition and memory normal.       Pertinent Laboratory/Radiology Results     Pertinent Laboratory Results:            Results from last 7 days   Lab Units 09/23/22  0117 09/22/22  0157 09/21/22  0136 09/20/22  1638   CRP mg/dL  --   --   --  1.07*   LACTATE mmol/L  --   --  0.8  --    WBC 10*3/mm3 10.34 10.77 10.67 9.88   HEMOGLOBIN g/dL 11.6* 11.9* 11.7* 11.7*   HEMATOCRIT % 36.0 36.2 36.1 36.0   MCV fL 92.5 91.2 91.2 91.1   MCHC g/dL 32.2 32.9 32.4 32.5   PLATELETS 10*3/mm3 239 256 264 272     Results from last 7 days   Lab Units 09/23/22  0117 09/22/22  0157 09/21/22  0136 09/20/22  1638   SODIUM  mmol/L 140 140 141 141   POTASSIUM mmol/L 4.2 3.9 3.7 4.2   MAGNESIUM mg/dL 2.0 2.4 1.5* 1.5*   CHLORIDE mmol/L 104 103 105 106   CO2 mmol/L 22.3 22.5 23.1 24.4   BUN mg/dL 30* 24* 17 19   CREATININE mg/dL 1.06* 1.21* 0.89 0.92   CALCIUM mg/dL 9.5 9.3 9.4 9.6   GLUCOSE mg/dL 170* 189* 130* 128*   ALBUMIN g/dL  --   --  3.33* 3.31*   BILIRUBIN mg/dL  --   --  0.5 0.5   ALK PHOS U/L  --   --  91 89   AST (SGOT) U/L  --   --  26 23   ALT (SGPT) U/L  --   --  14 13   Estimated Creatinine Clearance: 58.3 mL/min (A) (by C-G formula based on SCr of 1.06 mg/dL (H)).    Hemoglobin A1C   Date/Time Value Ref Range Status   09/20/2022 1638 7.80 (H) 4.80 - 5.60 % Final     Glucose   Date/Time Value Ref Range Status   09/23/2022 1058 157 (H) 70 - 130 mg/dL Final     Comment:     Meter: [id number] : [id number] ROBEL AMAYA   09/23/2022 0620 113 70 - 130 mg/dL Final     Comment:     Meter: [id number] : [id number] OLINDA LION   09/22/2022 2019 162 (H) 70 - 130 mg/dL Final     Comment:     Meter: [id number] : [id number] SUSIE SHIN   09/22/2022 1633 212 (H) 70 - 130 mg/dL Final     Comment:     Meter: [id number] : [id number] jennie ventura   09/22/2022 1034 179 (H) 70 - 130 mg/dL Final     Comment:     Meter: [id number] : [id number] jennie ventura   09/22/2022 0643 132 (H) 70 - 130 mg/dL Final     Comment:     Meter: [id number] : [id number] Jane Mart   09/21/2022 1632 135 (H) 70 - 130 mg/dL Final     Comment:     Meter: [id number] : [id number] jennie ventura   09/21/2022 1029 155 (H) 70 - 130 mg/dL Final     Comment:     Meter: [id number] : [id number] jennie ventura     Lab Results   Component Value Date    HGBA1C 7.80 (H) 09/20/2022     Lab Results   Component Value Date    TSH 1.100 09/20/2022     Microbiology Results (last 10 days)     Procedure Component Value - Date/Time    MRSA Screen, PCR (Inpatient) - Swab, Nares [[id number]]  (Abnormal) Collected: 09/21/22 0022    Lab Status: Final  result Specimen: Swab from Nares Updated: 09/21/22 0501     MRSA PCR MRSA Detected    Narrative:      The negative predictive value of this diagnostic test is high and should only be used to consider de-escalating anti-MRSA therapy. A positive result may indicate colonization with MRSA and must be correlated clinically.    Respiratory Panel PCR w/COVID-19(SARS-CoV-2) MARYJANE/AUDIE/EMILY/PAD/COR/MAD/VANDA In-House, NP Swab in UTM/VTM, 3-4 HR TAT - Swab, Nasopharynx [[id number]]  (Normal) Collected: 09/20/22 2118    Lab Status: Final result Specimen: Swab from Nasopharynx Updated: 09/20/22 2229     ADENOVIRUS, PCR Not Detected     Coronavirus 229E Not Detected     Coronavirus HKU1 Not Detected     Coronavirus NL63 Not Detected     Coronavirus OC43 Not Detected     COVID19 Not Detected     Human Metapneumovirus Not Detected     Human Rhinovirus/Enterovirus Not Detected     Influenza A PCR Not Detected     Influenza B PCR Not Detected     Parainfluenza Virus 1 Not Detected     Parainfluenza Virus 2 Not Detected     Parainfluenza Virus 3 Not Detected     Parainfluenza Virus 4 Not Detected     RSV, PCR Not Detected     Bordetella pertussis pcr Not Detected     Bordetella parapertussis PCR Not Detected     Chlamydophila pneumoniae PCR Not Detected     Mycoplasma pneumo by PCR Not Detected    Narrative:      In the setting of a positive respiratory panel with a viral infection PLUS a negative procalcitonin without other underlying concern for bacterial infection, consider observing off antibiotics or discontinuation of antibiotics and continue supportive care. If the respiratory panel is positive for atypical bacterial infection (Bordetella pertussis, Chlamydophila pneumoniae, or Mycoplasma pneumoniae), consider antibiotic de-escalation to target atypical bacterial infection.    Urine Culture - Urine, Urine, Catheter [[id number]]  (Normal) Collected: 09/20/22 2039    Lab Status: Final result Specimen: Urine, Catheter Updated: 09/22/22  1204     Urine Culture No growth    Urine Culture - Urine, Urine, Catheter [[id number]] Collected: 09/20/22 1645    Lab Status: Final result Specimen: Urine, Catheter Updated: 09/21/22 2245     Urine Culture >100,000 CFU/mL Mixed Nati Isolated    Narrative:      Specimen contains mixed organisms of questionable pathogenicity suggestive of contamination. If symptoms persist, suggest recollection.  Colonization of the urinary tract without infection is common. Treatment is discouraged unless the patient is symptomatic, pregnant, or undergoing an invasive urologic procedure.        Pertinent Radiology Results:  Imaging Results (All)     None        Discharge Disposition/Discharge Medications/Discharge Appointments     Discharge Disposition:   Home-Health Care [facility]    Condition at Discharge:  Stable     DME Prescribed at Discharge:  · Estefania lift   · Hospital bed     Discharge Diet:  Diet Instructions     Diet: Consistent Carbohydrate      Discharge Diet: Consistent Carbohydrate        Discharge Activity:  Activity Instructions     Activity as Tolerated      Up WIth Assist          Code Status While Inpatient:  Code Status and Medical Interventions:   Ordered at: 09/20/22 2118     Level Of Support Discussed With:    Patient     Code Status (Patient has no pulse and is not breathing):    CPR (Attempt to Resuscitate)     Medical Interventions (Patient has pulse or is breathing):    Full Support     Discharge Medications:     Discharge Medications      New Medications      Instructions Start Date   ethyl alcohol 62 % swab   1 each, Nasal, 2 Times Daily      iron polysaccharides 150 MG capsule  Commonly known as: NIFEREX   150 mg, Oral, Daily   Start Date: September 24, 2022     multivitamin tablet tablet   1 tablet, Oral, Daily   Start Date: September 24, 2022        Continue These Medications      Instructions Start Date   Diclofenac Sodium 1 % gel gel  Commonly known as: VOLTAREN   4 g, Topical, 4 Times Daily PRN       dilTIAZem  MG 24 hr capsule  Commonly known as: CARDIZEM CD   300 mg, Oral, Daily      gabapentin 800 MG tablet  Commonly known as: NEURONTIN   800 mg, Oral, 3 Times Daily      insulin glargine 100 UNIT/ML injection  Commonly known as: LANTUS, SEMGLEE   14-24 Units, Subcutaneous, Daily      oxyCODONE-acetaminophen  MG per tablet  Commonly known as: PERCOCET   1 tablet, Oral, Every 6 Hours PRN         Stop These Medications    furosemide 40 MG tablet  Commonly known as: LASIX     potassium chloride 20 MEQ CR tablet  Commonly known as: K-DUR,KLOR-CON          Discharge Appointments:  Additional Instructions for the Follow-ups that You Need to Schedule     Ambulatory Referral to Home Health   As directed      Wound care of sacral wound: BID, Gently cleanse with normal saline, pack loosely with moist normal saline fluffed gauze, cover with dry dressing    Order Comments: Wound care of sacral wound: BID, Gently cleanse with normal saline, pack loosely with moist normal saline fluffed gauze, cover with dry dressing     Face to Face Visit Date: 9/23/2022    Follow-up provider for Plan of Care?: I treated the patient in an acute care facility and will not continue treatment after discharge.    Follow-up provider: DONALD HAN [[id number]]    Reason/Clinical Findings: Non ambulatory x > 2years, unstageable sacral wound, debility, morbid obesity, chronic indwelling barajas catheter    Describe mobility limitations that make leaving home difficult: Non ambulatory x > 2years, unstageable sacral wound, debility, morbid obesity, chronic indwelling barajas    Nursing/Therapeutic Services Requested: Skilled Nursing Physical Therapy    Skilled nursing orders: Monthly catheter care Wound care dressing/changes    PT orders: Total joint pathway Therapeutic exercise Strengthening    Frequency: 1 Week 1         Call MD With Problems / Concerns   As directed      GENERAL WARNINGS: Return to ED or contact your primary care  provider immediately if   your condition worsens or changes unexpectedly, if not improving as expected,   or if other problems arise.    Order Comments: GENERAL WARNINGS: Return to ED or contact your primary care provider immediately if your condition worsens or changes unexpectedly, if not improving as expected, or if other problems arise.          Discharge Follow-up with PCP   As directed       Currently Documented PCP:    Provider, No Known    PCP Phone Number:    [phone]     Follow Up Details: Alexander Cordon at [facility] 1 week, repeat BMP to monitor renal function as home lasix was held             Test Results Pending at Discharge:  None     Attestation: I personally discussed the patient's hospital course, disposition, discharge planning, and discharge medications with attending physician, Dr. Bang, Stephanie Link DO, prior to time of discharge.     Radha May PA-C  Hospitalist Service -- [facility]       09/23/22  11:44 EDT    Discharge Time: Greater than 30 minutes

## 2022-09-23 NOTE — DISCHARGE PLACEMENT REQUEST
"Anika Gonzalez (72 y.o. Female)             Date of Birth   1950    Social Security Number       Address   [address] [place] [id number]    Home Phone   [phone]    MRN   [id number]       [facility]    Marital Status                               Admission Date   22    Admission Type   Emergency    Admitting Provider   Darell Kruger MD    Attending Provider   Stephanie Bang DO    Department, Room/Bed   [address], [address]/       Discharge Date       Discharge Disposition   Home-Health Care [facility]    Discharge Destination                               Attending Provider: Stephanie Bang DO    Allergies: Contrast Dye    Isolation: None   Infection: MRSA No Isolation this Admit (22)   Code Status: CPR   Advance Care Planning Activity    Ht: 157.5 cm (62.01\")   Wt: 120 kg (265 lb 9.6 oz)    Admission Cmt: None   Principal Problem: Sacral decubitus ulcer [L89.159]                 Active Insurance as of 2022     Primary Coverage     Payor Plan Insurance Group Employer/Plan Group    MEDICARE MEDICARE A & B      Payor Plan Address Payor Plan Phone Number Payor Plan Fax Number Effective Dates    [address] [phone]  1999 - None Entered    [place] [address]       Subscriber Name Subscriber Birth Date Member ID       ANIKA GONZALEZ 1950 [id number]                 Emergency Contacts      (Rel.) Home Phone Work Phone Mobile Phone    Joellen Landry (POA) (Daughter) -- -- [phone]    Esther Parker (Daughter) [phone] -- --        [address] [address]  Phone:  [phone]  Fax:  [phone] Date: Sep 23, 2022      Ambulatory Referral to Home Health     Patient:  Anika Gonzalez MRN:  [id number]   [address]  [facility] [address] :  1950  SSN:    Phone: [phone] Sex:  F      INSURANCE PAYOR PLAN GROUP # SUBSCRIBER ID   Primary:    MEDICARE " [id number]   [id number]      Referring Provider Information:  COLLEEN ROSALES Phone: [phone] Fax: [phone]       Referral Information:   # Visits:  999 Referral Type: Home Health [42]   Urgency:  Routine Referral Reason: Specialty Services Required   Start Date: Sep 23, 2022 End Date:  To be determined by Insurer   Diagnosis: Morbid obesity (HCC) (E66.01 [ICD-10-CM] 278.01 [ICD-9-CM])  Does not walk (Z74.09 [ICD-10-CM] V41.8 [ICD-9-CM])  Pressure injury of sacral region, unstageable (HCC) (L89.150 [ICD-10-CM] 707.03,707.25 [ICD-9-CM])  Chronic indwelling Barajas catheter (Z97.8 [ICD-10-CM] V45.89 [ICD-9-CM])      Refer to Dept:   Refer to Provider:   Refer to Provider Phone:   Refer to Facility:    Wound care of sacral wound: BID, Gently cleanse with normal saline, pack loosely with moist normal saline fluffed gauze, cover with dry dressing  Face to Face Visit Date: 9/23/2022  Follow-up provider for Plan of Care? I treated the patient in an acute care facility and will not continue treatment after discharge.  Follow-up provider: DONALD HAN [[id number]]  Reason/Clinical Findings: Non ambulatory x > 2years, unstageable sacral wound, debility, morbid obesity, chronic indwelling barajas catheter  Describe mobility limitations that make leaving home difficult: Non ambulatory x > 2years, unstageable sacral wound, debility, morbid obesity, chronic indwelling barajsa  Nursing/Therapeutic Services Requested: Skilled Nursing  Nursing/Therapeutic Services Requested: Physical Therapy  Skilled nursing orders: Monthly catheter care  Skilled nursing orders: Wound care dressing/changes  PT orders: Total joint pathway  PT orders: Therapeutic exercise  PT orders: Strengthening  Frequency: 1 Week 1     This document serves as a request of services and does not constitute Insurance authorization or approval of services.  To determine eligibility, please contact the members Insurance carrier to verify and review coverage.      If you have medical questions regarding this request for services. Please contact [address] at [phone] during normal business hours.        Authorizing Provider:Radha May PA  Authorizing Provider's NPI: [id number]  Order Entered By: Radha May PA 2022 11:30 AM     Electronically signed by: Radha May PA 2022 11:30 AM            History & Physical      Jennifer Eugene PA-C at 22 [phone]     Attestation signed by Darell Kruger MD at 22 [phone] (Updated)    I have reviewed this documentation and agree.  I have discussed and formulated the assessment and plan with DAVID Rodriguez and I have reviewed the wound images.  Jennifer stated that the patient was in severe pain when she was turned for the skin assessment and thus I have elected not to look at the wound as multiple providers have already.  Await the general surgery consultation.  Also await PT/OT/speech to see the patient to help with disposition.  The MRSA nasal swab was negative; however, I will not start vancomycin as the patient is not septic.                      [facility] Hospital Medicine Services  History & Physical    Patient Identification:  Name:  Anika Neri  Age:  72 y.o.  Sex:  female  :  1950  MRN:  [id number]   Visit Number:  [id number]  Primary Care Physician:  Provider, No Known    Subjective     2022   Chief complaint:   Chief Complaint   Patient presents with   • Wound Check   • urinary catheter problem     History of presenting illness:      Anika Neri is a 72 y.o. female with past medical history significant for type 2 diabetes mellitus, paroxysmal atrial fibrillation, essential hypertension, history of CVA with residual right-sided weakness, gout, arthritis, and obesity who presents to [facility] emergency department for a wound check.  Patient was living in [place] with her daughter, Joellen, but was brought back to  [place] to live with her other daughter, Lisa, and granddaughter, Salima, approximately 3 days ago as her other daughter could no longer care for her (further details regarding living situation detailed in  note).  Patient has reportedly been bedbound for the past 2 years due to a prior stroke and developed a nonhealing sacral wound.  She states that she was seeing wound care in [place] who performed as needed debridements, but has not seen them in over a year. She states that when she was living with her daughterJoellen, home health was able to dress her wound. Since moving back to [place] she has not been able to be set up with home health due to not having an established PCP. She states that her daughter Lisa has been dressing the wound with the supplies that she has left over from [place]. She states the wound is not any more painful than usual and takes a Percocet 10 mg as needed at home to make the pain more tolerable.  She does state she has noticed foul-smelling urine from her barajas catheter bag for the past 2-3 days, but denies any suprapubic pain. Barajas catheter was replaced in ED on 09/20/2022. She denies any fevers or chills. Denies any shortness of breath or cough. Denies any abdominal pain, nausea, vomiting, or diarrhea. Currently alert and oriented to self, date of birth, place, year, and president.    Upon arrival to the ED, vital signs were temperature 99.6, heart rate 66, respirations 18, blood pressure 118/65, SPO2 98% on room air.  CMP with glucose 128, albumin 3.31, otherwise unremarkable.  CBC with hemoglobin 11.7, otherwise unremarkable.  Hemoglobin A1c 7.8.  TSH 1.1.  Magnesium 1.5.  Urinalysis with positive nitrites, 2+ leukocytes, trace protein, 3-5 RBC, 21-30 WBC, 4+ bacteria; repeat urinalysis with 1+ leukocytes and 13-20 WBC.  Both urine sample sent off for culture.  Respiratory panel PCR negative.    Known Emergency Department medications received prior to my  evaluation included IV Rocephin 1 g and Percocet 10 mg.     Patient will be admitted to the medical surgical floor for further evaluation and monitoring.    ---------------------------------------------------------------------------------------------------------------------   Review of Systems   Constitutional: Positive for activity change (Chronically bed bound due to prior CVA). Negative for chills and fever.   HENT: Negative for congestion and rhinorrhea.    Eyes: Negative for discharge and redness.   Respiratory: Negative for apnea, cough and shortness of breath.    Cardiovascular: Negative for chest pain and palpitations.   Gastrointestinal: Negative for abdominal distention, abdominal pain, diarrhea, nausea and vomiting.   Endocrine: Negative for polydipsia, polyphagia and polyuria.   Genitourinary: Negative for flank pain and pelvic pain.        Reports foul-smelling urine for the past 2-3 days   Musculoskeletal: Positive for gait problem (Bed bound). Negative for arthralgias and myalgias.   Skin: Positive for wound (sacral/coccyx). Negative for color change.   Neurological: Negative for dizziness, syncope and light-headedness.   Psychiatric/Behavioral: Negative for agitation, behavioral problems and confusion.        ---------------------------------------------------------------------------------------------------------------------   Past Medical History:   Diagnosis Date   • Arthritis    • Atrial fibrillation (HCC)     Not on anticoagulation due to postmenopausal bleeding   • Diabetes mellitus (HCC)    • Gout    • History of CVA (cerebrovascular accident)     residual right-sided weakness   • Hyperlipidemia    • Hypertension    • Stage IV pressure ulcer of sacral region (HCC)      Past Surgical History:   Procedure Laterality Date   • CHOLECYSTECTOMY       Family History   Problem Relation Age of Onset   • Diabetes Mother    • Hypertension Father      Social History     Socioeconomic History   • Marital  status:    Tobacco Use   • Smoking status: Never Smoker   • Smokeless tobacco: Never Used   Substance and Sexual Activity   • Alcohol use: No   • Drug use: No   • Sexual activity: Defer     ---------------------------------------------------------------------------------------------------------------------   Allergies:  Contrast dye  ---------------------------------------------------------------------------------------------------------------------   Home medications:    Medications below are reported home medications pulling from within the system; at this time, these medications have not been reconciled unless otherwise specified and are in the verification process for further verifcation as current home medications.  Medications Prior to Admission   Medication Sig Dispense Refill Last Dose   • allopurinol (ZYLOPRIM) 100 MG tablet Take 100 mg by mouth Daily.      • apixaban (ELIQUIS) 5 MG tablet tablet Take 1 tablet by mouth Every 12 (Twelve) Hours. Indications: Atrial Fibrillation 60 tablet 3    • bumetanide (BUMEX) 1 MG tablet Take 1 tablet by mouth Daily. 30 tablet 3    • diazePAM (VALIUM) 5 MG tablet Take 5 mg by mouth 2 (Two) Times a Day As Needed for Anxiety.      • dilTIAZem CD (CARDIZEM CD) 240 MG 24 hr capsule Take 1 capsule by mouth Daily. 30 capsule 3    • glipizide (GLUCOTROL) 5 MG tablet Take 1 tablet by mouth Every Morning Before Breakfast. 30 tablet 3    • magnesium oxide (MAG-OX) 400 MG tablet Take 1 tablet by mouth Daily. 30 tablet 3    • metoprolol tartrate 75 MG tablet Take 75 mg by mouth Every 12 (Twelve) Hours. 60 tablet 3    • oxyCODONE-acetaminophen (PERCOCET)  MG per tablet Take 0.5 tablets by mouth Every 6 (Six) Hours As Needed for Moderate Pain . 30 tablet 0    • PARoxetine (PAXIL) 10 MG tablet Take 10 mg by mouth Every Morning.      • SITagliptin (JANUVIA) 100 MG tablet Take 100 mg by mouth Daily.          Hospital Scheduled Meds:         Current listed hospital scheduled  medications may not yet reflect those currently placed in orders that are signed and held awaiting patient's arrival to floor.   ---------------------------------------------------------------------------------------------------------------------     Objective     Vital Signs:  Temp:  [99 °F (37.2 °C)-99.6 °F (37.6 °C)] 99 °F (37.2 °C)  Heart Rate:  [68-69] 69  Resp:  [18] 18  BP: (106-124)/(56-80) 124/71      09/20/22  1545 09/20/22  2357   Weight: 125 kg (275 lb) 120 kg (265 lb 9.6 oz)     Body mass index is 323.76 kg/m².  ---------------------------------------------------------------------------------------------------------------------       Physical Exam  Constitutional:       General: She is awake.      Appearance: She is morbidly obese. She is ill-appearing ( chronically).      Comments: Lying in bed. Complaining of pain from sacral wound. Answers questions appropriately.   HENT:      Head: Normocephalic and atraumatic.      Right Ear: External ear normal.      Left Ear: External ear normal.      Nose: Nose normal.      Mouth/Throat:      Mouth: Mucous membranes are moist.      Pharynx: Oropharynx is clear.   Eyes:      Extraocular Movements: Extraocular movements intact.      Conjunctiva/sclera: Conjunctivae normal.      Pupils: Pupils are equal, round, and reactive to light.   Cardiovascular:      Rate and Rhythm: Normal rate and regular rhythm.      Pulses: Normal pulses.           Dorsalis pedis pulses are 2+ on the right side and 2+ on the left side.        Posterior tibial pulses are 2+ on the right side and 2+ on the left side.      Heart sounds: Normal heart sounds. No murmur heard.    No friction rub. No gallop.   Pulmonary:      Effort: Pulmonary effort is normal. No respiratory distress.      Breath sounds: Normal breath sounds. No wheezing, rhonchi or rales.   Abdominal:      General: Abdomen is flat. Bowel sounds are normal. There is no distension.      Palpations: Abdomen is soft.       Tenderness: There is no abdominal tenderness. There is no guarding.   Genitourinary:     Comments: Barajas catheter in place. Approximately 325 mL clear urine present in barajas bag. No hematuria.  Musculoskeletal:         General: Normal range of motion.      Cervical back: Normal range of motion and neck supple.      Right lower leg: No edema.      Left lower leg: No edema.   Skin:     General: Skin is warm and dry.      Findings: Wound present. No abscess or erythema.          Neurological:      General: No focal deficit present.      Mental Status: She is alert and oriented to person, place, and time. Mental status is at baseline.      Comments: Alert and oriented x 4. Moves extremities appropriately. Coordination in tact.   Psychiatric:         Mood and Affect: Mood normal.         Behavior: Behavior normal. Behavior is cooperative.         Thought Content: Thought content normal.         ---------------------------------------------------------------------------------------------------------------------  EKG:    Obtain baseline EKG.    I have personally looked at both the EKG and the telemetry strips.  ---------------------------------------------------------------------------------------------------------------------   Results from last 7 days   Lab Units 09/20/22  1638   WBC 10*3/mm3 9.88   HEMOGLOBIN g/dL 11.7*   HEMATOCRIT % 36.0   MCV fL 91.1   MCHC g/dL 32.5   PLATELETS 10*3/mm3 272         Results from last 7 days   Lab Units 09/20/22  1638   SODIUM mmol/L 141   POTASSIUM mmol/L 4.2   MAGNESIUM mg/dL 1.5*   CHLORIDE mmol/L 106   CO2 mmol/L 24.4   BUN mg/dL 19   CREATININE mg/dL 0.92   CALCIUM mg/dL 9.6   GLUCOSE mg/dL 128*   ALBUMIN g/dL 3.31*   BILIRUBIN mg/dL 0.5   ALK PHOS U/L 89   AST (SGOT) U/L 23   ALT (SGPT) U/L 13   Estimated Creatinine Clearance: 45.4 mL/min (by C-G formula based on SCr of 0.92 mg/dL).  No results found for: AMMONIA          Lab Results   Component Value Date    HGBA1C 7.80 (H)  09/20/2022     Lab Results   Component Value Date    TSH 1.100 09/20/2022     No results found for: PREGTESTUR, PREGSERUM, HCG, HCGQUANT  Pain Management Panel    There is no flowsheet data to display.           ---------------------------------------------------------------------------------------------------------------------  Imaging Results (Last 7 Days)     ** No results found for the last 168 hours. **        Last echocardiogram:  Results for orders placed during the hospital encounter of 10/27/20    Adult Transthoracic Echo Complete W/ Cont if Necessary Per Protocol    Interpretation Summary  · Estimated left ventricular EF = 60% Left ventricular ejection fraction appears to be 56 - 60%. Left ventricular systolic function is normal.  · Moderate to severe aortic sclerosis without stenosis  · Mild mitral valve regurgitation is present.  · No previous study to compare  · Mild tricuspid valve regurgitation is present.  · Estimated right ventricular systolic pressure from tricuspid regurgitation is mildly elevated (35-45 mmHg).    Image of sacral/coccygeal ulcer 09/20/22:    :  I have personally reviewed the above radiology images and read the final radiology report on 09/21/22  ---------------------------------------------------------------------------------------------------------------------  Assessment / Plan     Active Hospital Problems    Diagnosis  POA   • **Sacral decubitus ulcer [L89.159]  Yes       ASSESSMENT/PLAN:  -Non-healing stage IV sacral/coccygeal decubitus wound, POA  -Does not currently meet sepsis criteria. No leukocytosis. Vitals stable. Obtain C-RP and lactate.  -Inpatient general surgery consulted for input on possible debridement, assistance appreciated. Wound care also consulted.  -No imaging obtained in ED; may need imaging to assess for osteomyelitis/abscess. Will await surgical input.  -Patient has chronic indwelling barajas catheter due to sacral wound. Exchanged in ED upon arrival on  09/20/22.  -Obtain MRSA swab.   -Turn patient every 2 hours.     -Abnormal urinalysis  -Initial urinalysis grossly abnormal with positive nitrites, 2+ leukocytes, trace protein, 3-5 RBC, 21-30 WBC, 4 + bacteria; repeat urinalysis with 1 + leukocytes and 13-20 WBC; Both urine samples sent for culture  -Received IV Rocephin in ED; will await for results to determine need for antibiotics.    -Normocytic anemia  -Hemoglobin 11.7, hematocrit 36.  -No signs of bleeding at present.   -Obtain iron, vitamin B12, and folate.   -Repeat a.m. CBC.     -Hypomagnesemia  -Magnesium 1.5, potassium 4.2.   -Replace per protocol.   -Repeat a.m. BMP and magnesium level.    -Hypoalbuminemia  -Daily multivitamin ordered.   -Repeat a.m. CMP.     -Debility  -History of CVA with residual right-sided weakness  -Up with assistance, fall precautions.  -PT/OT/SLP consulted.  - consulted.    -Non-insulin dependent type II diabetes mellitus  -Hemoglobin A1c 7.8.   -Accuchecks qAC and qHS.   -SSI ordered; titrate as needed.   -Hypoglycemia protocol ordered.   -Consistent carbohydrate diet.   -Diabetes educated consulted.    -Paroxysmal atrial fibrillation  -Per patient, Eliquis was discontinued over a year ago due to her developing postmenopausal bleeding.  -Review home medications once reconciled.   -Monitor closely on telemetry.    -Essential hypertension  -Hyperlipidemia  -BP stable.   -Monitor per hospital protocol.   -Review home medications once reconciled.     -Gout  -Arthritis  -Supportive care.   -Review ELANA.   -Review home medications once reconciled.    -Obesity  -Complicates all aspects of care.   ----------  -DVT prophylaxis: SQ Lovenox  -Activity: Up with assistance, fall precautions  -Expected length of stay:   OBSERVATION status; however, if further evaluation or treatment plans warrant, status may change.  Based upon current information, I predict patient's care encounter to be less than or equal to 2  midnights    CODE STATUS: Full code; discussed with patient at bedside.    High risk secondary to stage IV sacral/coccygeal decubitus wound, abnormal urinalysis, hypomagnesemia, and debility     Disposition: Pending clinical course    Jennifer Eugene PA-C  09/21/22  00:45 EDT    ---------------------------------------------------------------------------------------------------------------------           Electronically signed by Darell Kruger MD at 09/21/22 0621       Discharge Order (From admission, onward)     Start     Ordered    09/23/22 1122  Discharge patient  Once        Expected Discharge Date: 09/23/22    Discharge Disposition: Home-Health Care [facility]    Physician of Record for Attribution - Please select from Treatment Team: LANA HAGAN [[id number]]    Review needed by CMO to determine Physician of Record: No       Question Answer Comment   Physician of Record for Attribution - Please select from Treatment Team LANA HAGAN    Review needed by CMO to determine Physician of Record No        09/23/22 1130

## 2022-09-24 NOTE — ED PROVIDER NOTES
Subjective   History of Present Illness  Patient complains of a large pressure ulcer of the sacral region that is been there for several months.  Patient just recently moved here from [place] with her family.  Patient has feces on trunk area.  States that she is sleeping on the couch at her granddaughters.  Patient has past medical history of arthritis gout hypertension diabetes atrial fib hyperlipidemia and a CVA.    History provided by:  Patient   used: No    Wound Check  Location:  Sacrum area  Severity:  Severe  Onset quality:  Sudden  Associated symptoms: no chest pain, no congestion, no cough, no diarrhea, no ear pain, no fever, no headaches, no nausea, no rash, no shortness of breath, no sore throat, no vomiting and no wheezing        Review of Systems   Constitutional: Positive for activity change. Negative for chills and fever.   HENT: Negative for congestion, ear pain and sore throat.    Respiratory: Negative for cough, shortness of breath and wheezing.    Cardiovascular: Negative for chest pain.   Gastrointestinal: Negative for diarrhea, nausea and vomiting.   Genitourinary: Positive for difficulty urinating and dysuria. Negative for flank pain.   Musculoskeletal: Positive for arthralgias.   Skin: Positive for wound. Negative for rash.   Neurological: Negative for headaches.   Psychiatric/Behavioral: The patient is not nervous/anxious.    All other systems reviewed and are negative.      Past Medical History:   Diagnosis Date   • Arthritis    • Atrial fibrillation (HCC)     Not on anticoagulation due to postmenopausal bleeding   • Diabetes mellitus (HCC)    • Gout    • History of CVA (cerebrovascular accident)     residual right-sided weakness   • Hyperlipidemia    • Hypertension    • Stage IV pressure ulcer of sacral region (HCC)        Allergies   Allergen Reactions   • Contrast Dye Itching       Past Surgical History:   Procedure Laterality Date   • CHOLECYSTECTOMY         Family  History   Problem Relation Age of Onset   • Diabetes Mother    • Hypertension Father        Social History     Socioeconomic History   • Marital status:    Tobacco Use   • Smoking status: Never Smoker   • Smokeless tobacco: Never Used   Substance and Sexual Activity   • Alcohol use: No   • Drug use: No   • Sexual activity: Defer           Objective   Physical Exam  Vitals and nursing note reviewed.   Constitutional:       Appearance: She is well-developed.   HENT:      Head: Normocephalic.   Cardiovascular:      Rate and Rhythm: Normal rate and regular rhythm.   Pulmonary:      Effort: Pulmonary effort is normal.      Breath sounds: Normal breath sounds.   Abdominal:      General: Bowel sounds are normal.      Palpations: Abdomen is soft.      Tenderness: There is no abdominal tenderness.   Musculoskeletal:         General: Normal range of motion.      Cervical back: Neck supple.      Comments: Non ambulatory   Skin:     General: Skin is warm and dry.      Comments: Pt has large decubitus ulcer to sacral area    Neurological:      Mental Status: She is alert and oriented to person, place, and time.   Psychiatric:         Behavior: Behavior normal.         Thought Content: Thought content normal.         Judgment: Judgment normal.         Procedures           ED Course  ED Course as of 09/24/22 0840   Tue Sep 20, 2022   2038 Discussed with Dr Kruger will admit  [LC]      ED Course User Index  [LC] Jessie Floyd PA                                           [facility]    Final diagnoses:   Sacral decubitus ulcer, stage IV (HCC)       ED Disposition  ED Disposition     ED Disposition   Decision to Admit    Condition   --    Comment   Level of Care: Med/Surg [1]   Diagnosis: Sacral decubitus ulcer [842555]               Provider, No Known  [facility]  [place] [id number]  [phone]      Alexander Cordon at [facility] 1 week, repeat BMP to monitor renal function as home lasix was held         Medication List       New Prescriptions    Alcohol Swabs pads  Use 1 application into the nostril(s) as directed by provider 2 (Two) Times a Day for 5 doses.     Ferrex 150 150 MG capsule  Generic drug: iron polysaccharides  Take 1 capsule by mouth Daily.     Multivitamin tablet tablet  Generic drug: multivitamin  Take 1 tablet by mouth Daily.        Stop    furosemide 40 MG tablet  Commonly known as: LASIX     potassium chloride 20 MEQ CR tablet  Commonly known as: K-DUR,KLOR-CON           Where to Get Your Medications      [address] [place] [address]    Hours: 8AM-6PM Mon-Fri Phone: [phone]   · Alcohol Swabs pads  · Ferrex 150 150 MG capsule  · gabapentin 800 MG tablet  · Multivitamin tablet tablet  · oxyCODONE-acetaminophen  MG per tablet          Jessie Floyd PA  09/24/22 0874

## 2022-10-02 ENCOUNTER — HOSPITAL ENCOUNTER (EMERGENCY)
Facility: HOSPITAL | Age: 72
Discharge: HOME | End: 2022-10-02
Attending: FAMILY MEDICINE | Admitting: FAMILY MEDICINE

## 2022-10-02 ENCOUNTER — APPOINTMENT (OUTPATIENT)
Dept: GENERAL RADIOLOGY | Facility: HOSPITAL | Age: 72
End: 2022-10-02

## 2022-10-02 ENCOUNTER — APPOINTMENT (OUTPATIENT)
Dept: CT IMAGING | Facility: HOSPITAL | Age: 72
End: 2022-10-02

## 2022-10-02 VITALS
HEIGHT: 62 IN | WEIGHT: 208 LBS | BODY MASS INDEX: 38.28 KG/M2 | HEART RATE: 90 BPM | SYSTOLIC BLOOD PRESSURE: 141 MMHG | DIASTOLIC BLOOD PRESSURE: 84 MMHG | TEMPERATURE: 98 F | RESPIRATION RATE: 18 BRPM | OXYGEN SATURATION: 98 %

## 2022-10-02 DIAGNOSIS — R00.2 PALPITATIONS: ICD-10-CM

## 2022-10-02 DIAGNOSIS — L89.154 PRESSURE INJURY OF SACRAL REGION, STAGE 4: ICD-10-CM

## 2022-10-02 DIAGNOSIS — N30.00 ACUTE CYSTITIS WITHOUT HEMATURIA: Primary | ICD-10-CM

## 2022-10-02 LAB
ALBUMIN SERPL-MCNC: 3.43 G/DL (ref 3.5–5.2)
ALBUMIN/GLOB SERPL: 0.9 G/DL
ALP SERPL-CCNC: 93 U/L (ref 39–117)
ALT SERPL W P-5'-P-CCNC: 11 U/L (ref 1–33)
ANION GAP SERPL CALCULATED.3IONS-SCNC: 14 MMOL/L (ref 5–15)
AST SERPL-CCNC: 24 U/L (ref 1–32)
BACTERIA UR QL AUTO: ABNORMAL /HPF
BASOPHILS # BLD AUTO: 0.08 10*3/MM3 (ref 0–0.2)
BASOPHILS NFR BLD AUTO: 0.8 % (ref 0–1.5)
BILIRUB SERPL-MCNC: 0.6 MG/DL (ref 0–1.2)
BILIRUB UR QL STRIP: NEGATIVE
BUN SERPL-MCNC: 22 MG/DL (ref 8–23)
BUN/CREAT SERPL: 25.3 (ref 7–25)
CALCIUM SPEC-SCNC: 9.7 MG/DL (ref 8.6–10.5)
CHLORIDE SERPL-SCNC: 106 MMOL/L (ref 98–107)
CLARITY UR: CLEAR
CO2 SERPL-SCNC: 18 MMOL/L (ref 22–29)
COLOR UR: YELLOW
CREAT SERPL-MCNC: 0.87 MG/DL (ref 0.57–1)
CRP SERPL-MCNC: 1.08 MG/DL (ref 0–0.5)
D-LACTATE SERPL-SCNC: 1.2 MMOL/L (ref 0.5–2)
DEPRECATED RDW RBC AUTO: 47 FL (ref 37–54)
EGFRCR SERPLBLD CKD-EPI 2021: 70.5 ML/MIN/1.73
EOSINOPHIL # BLD AUTO: 0.41 10*3/MM3 (ref 0–0.4)
EOSINOPHIL NFR BLD AUTO: 3.9 % (ref 0.3–6.2)
ERYTHROCYTE [DISTWIDTH] IN BLOOD BY AUTOMATED COUNT: 14 % (ref 12.3–15.4)
GLOBULIN UR ELPH-MCNC: 4 GM/DL
GLUCOSE SERPL-MCNC: 129 MG/DL (ref 65–99)
GLUCOSE UR STRIP-MCNC: NEGATIVE MG/DL
HCT VFR BLD AUTO: 38.6 % (ref 34–46.6)
HGB BLD-MCNC: 12.5 G/DL (ref 12–15.9)
HGB UR QL STRIP.AUTO: ABNORMAL
HOLD SPECIMEN: NORMAL
HOLD SPECIMEN: NORMAL
HYALINE CASTS UR QL AUTO: ABNORMAL /LPF
IMM GRANULOCYTES # BLD AUTO: 0.03 10*3/MM3 (ref 0–0.05)
IMM GRANULOCYTES NFR BLD AUTO: 0.3 % (ref 0–0.5)
INR PPP: 1.1 (ref 0.9–1.1)
KETONES UR QL STRIP: NEGATIVE
LEUKOCYTE ESTERASE UR QL STRIP.AUTO: ABNORMAL
LYMPHOCYTES # BLD AUTO: 3 10*3/MM3 (ref 0.7–3.1)
LYMPHOCYTES NFR BLD AUTO: 28.6 % (ref 19.6–45.3)
MAGNESIUM SERPL-MCNC: 2 MG/DL (ref 1.6–2.4)
MCH RBC QN AUTO: 29.8 PG (ref 26.6–33)
MCHC RBC AUTO-ENTMCNC: 32.4 G/DL (ref 31.5–35.7)
MCV RBC AUTO: 92.1 FL (ref 79–97)
MONOCYTES # BLD AUTO: 0.56 10*3/MM3 (ref 0.1–0.9)
MONOCYTES NFR BLD AUTO: 5.3 % (ref 5–12)
NEUTROPHILS NFR BLD AUTO: 6.4 10*3/MM3 (ref 1.7–7)
NEUTROPHILS NFR BLD AUTO: 61.1 % (ref 42.7–76)
NITRITE UR QL STRIP: NEGATIVE
NRBC BLD AUTO-RTO: 0 /100 WBC (ref 0–0.2)
NT-PROBNP SERPL-MCNC: 890.6 PG/ML (ref 0–900)
PH UR STRIP.AUTO: 6 [PH] (ref 5–8)
PLATELET # BLD AUTO: 218 10*3/MM3 (ref 140–450)
PMV BLD AUTO: 10.2 FL (ref 6–12)
POTASSIUM SERPL-SCNC: 4.2 MMOL/L (ref 3.5–5.2)
PROCALCITONIN SERPL-MCNC: 0.06 NG/ML (ref 0–0.25)
PROT SERPL-MCNC: 7.4 G/DL (ref 6–8.5)
PROT UR QL STRIP: ABNORMAL
PROTHROMBIN TIME: 14.4 SECONDS (ref 12.1–14.7)
QT INTERVAL: 404 MS
QT INTERVAL: 416 MS
QTC INTERVAL: 491 MS
QTC INTERVAL: 506 MS
RBC # BLD AUTO: 4.19 10*6/MM3 (ref 3.77–5.28)
RBC # UR STRIP: ABNORMAL /HPF
REF LAB TEST METHOD: ABNORMAL
SODIUM SERPL-SCNC: 138 MMOL/L (ref 136–145)
SP GR UR STRIP: 1.02 (ref 1–1.03)
SQUAMOUS #/AREA URNS HPF: ABNORMAL /HPF
TROPONIN T SERPL-MCNC: <0.01 NG/ML (ref 0–0.03)
TROPONIN T SERPL-MCNC: <0.01 NG/ML (ref 0–0.03)
UROBILINOGEN UR QL STRIP: ABNORMAL
WBC # UR STRIP: ABNORMAL /HPF
WBC NRBC COR # BLD: 10.48 10*3/MM3 (ref 3.4–10.8)
WHOLE BLOOD HOLD COAG: NORMAL
WHOLE BLOOD HOLD SPECIMEN: NORMAL

## 2022-10-02 PROCEDURE — 81001 URINALYSIS AUTO W/SCOPE: CPT | Performed by: FAMILY MEDICINE

## 2022-10-02 PROCEDURE — 96375 TX/PRO/DX INJ NEW DRUG ADDON: CPT

## 2022-10-02 PROCEDURE — 86140 C-REACTIVE PROTEIN: CPT | Performed by: FAMILY MEDICINE

## 2022-10-02 PROCEDURE — 83605 ASSAY OF LACTIC ACID: CPT | Performed by: FAMILY MEDICINE

## 2022-10-02 PROCEDURE — 36415 COLL VENOUS BLD VENIPUNCTURE: CPT

## 2022-10-02 PROCEDURE — 85610 PROTHROMBIN TIME: CPT | Performed by: FAMILY MEDICINE

## 2022-10-02 PROCEDURE — 71045 X-RAY EXAM CHEST 1 VIEW: CPT

## 2022-10-02 PROCEDURE — 80053 COMPREHEN METABOLIC PANEL: CPT | Performed by: FAMILY MEDICINE

## 2022-10-02 PROCEDURE — 74176 CT ABD & PELVIS W/O CONTRAST: CPT

## 2022-10-02 PROCEDURE — 87040 BLOOD CULTURE FOR BACTERIA: CPT | Performed by: FAMILY MEDICINE

## 2022-10-02 PROCEDURE — 83735 ASSAY OF MAGNESIUM: CPT | Performed by: FAMILY MEDICINE

## 2022-10-02 PROCEDURE — 84145 PROCALCITONIN (PCT): CPT | Performed by: FAMILY MEDICINE

## 2022-10-02 PROCEDURE — 93005 ELECTROCARDIOGRAM TRACING: CPT

## 2022-10-02 PROCEDURE — 99285 EMERGENCY DEPT VISIT HI MDM: CPT

## 2022-10-02 PROCEDURE — 93010 ELECTROCARDIOGRAM REPORT: CPT | Performed by: INTERNAL MEDICINE

## 2022-10-02 PROCEDURE — 93005 ELECTROCARDIOGRAM TRACING: CPT | Performed by: FAMILY MEDICINE

## 2022-10-02 PROCEDURE — 25010000002 MORPHINE PER 10 MG: Performed by: FAMILY MEDICINE

## 2022-10-02 PROCEDURE — 96374 THER/PROPH/DIAG INJ IV PUSH: CPT

## 2022-10-02 PROCEDURE — 85025 COMPLETE CBC W/AUTO DIFF WBC: CPT | Performed by: FAMILY MEDICINE

## 2022-10-02 PROCEDURE — 83880 ASSAY OF NATRIURETIC PEPTIDE: CPT | Performed by: FAMILY MEDICINE

## 2022-10-02 PROCEDURE — 84484 ASSAY OF TROPONIN QUANT: CPT | Performed by: FAMILY MEDICINE

## 2022-10-02 PROCEDURE — 25010000002 ONDANSETRON PER 1 MG: Performed by: FAMILY MEDICINE

## 2022-10-02 PROCEDURE — 51702 INSERT TEMP BLADDER CATH: CPT

## 2022-10-02 RX ORDER — SODIUM CHLORIDE 0.9 % (FLUSH) 0.9 %
10 SYRINGE (ML) INJECTION AS NEEDED
Status: DISCONTINUED | OUTPATIENT
Start: 2022-10-02 | End: 2022-10-03 | Stop reason: HOSPADM

## 2022-10-02 RX ORDER — ONDANSETRON 2 MG/ML
4 INJECTION INTRAMUSCULAR; INTRAVENOUS ONCE
Status: COMPLETED | OUTPATIENT
Start: 2022-10-02 | End: 2022-10-02

## 2022-10-02 RX ORDER — HYDROCODONE BITARTRATE AND ACETAMINOPHEN 5; 325 MG/1; MG/1
1 TABLET ORAL ONCE
Status: COMPLETED | OUTPATIENT
Start: 2022-10-02 | End: 2022-10-02

## 2022-10-02 RX ORDER — CEFDINIR 300 MG/1
300 CAPSULE ORAL 2 TIMES DAILY
Qty: 14 CAPSULE | Refills: 0 | OUTPATIENT
Start: 2022-10-02 | End: 2022-10-25

## 2022-10-02 RX ADMIN — ONDANSETRON 4 MG: 2 INJECTION INTRAMUSCULAR; INTRAVENOUS at 15:18

## 2022-10-02 RX ADMIN — HYDROCODONE BITARTRATE AND ACETAMINOPHEN 1 TABLET: 5; 325 TABLET ORAL at 20:54

## 2022-10-02 RX ADMIN — MORPHINE SULFATE 4 MG: 4 INJECTION, SOLUTION INTRAMUSCULAR; INTRAVENOUS at 15:18

## 2022-10-02 NOTE — ED PROVIDER NOTES
Subjective   History of Present Illness  72-year-old female with history of atrial fibrillation CVA with residual right-sided weakness chronic stage IV pressure ulcer presents the emergency room with complaints of dysuria.  Patient reports she had a chronic indwelling catheter she states she had it for 2 months.  She reports over the past 2 days she has had burning with urination. She states she has had lower abdominal pain as well.  She denies fever chills.  She denies nausea or vomiting.  Patient reports that today she had episode of feeling her heart was fluttering.  She states she has had this happen to her in the past.  She says it does intermittently.  She denies shortness of breath she denies chest pain.  She states she came to the emergency room due to her dysuria and wanting her catheter to be changed.  She states she has a chronic pressure ulcers ongoing for years.  She states that her daughter currently treats her wounds with the use of wet-to-dry dressings.  Patient also reports she is leaking around her catheter.    Dysuria  Pain quality:  Burning  Pain severity:  Mild  Timing:  Constant  Relieved by:  Nothing  Ineffective treatments:  None tried  Urinary symptoms: no discolored urine and no foul-smelling urine    Associated symptoms: abdominal pain    Associated symptoms: no fever, no flank pain, no nausea and no vomiting        Review of Systems   Constitutional: Negative for fever.   Respiratory: Negative for cough and shortness of breath.    Cardiovascular: Negative for chest pain.   Gastrointestinal: Positive for abdominal pain. Negative for nausea and vomiting.   Genitourinary: Positive for dysuria. Negative for flank pain.   All other systems reviewed and are negative.      Past Medical History:   Diagnosis Date   • Arthritis    • Atrial fibrillation (HCC)     Not on anticoagulation due to postmenopausal bleeding   • Diabetes mellitus (HCC)    • Gout    • History of CVA (cerebrovascular accident)      residual right-sided weakness   • Hyperlipidemia    • Hypertension    • Stage IV pressure ulcer of sacral region (HCC)        Allergies   Allergen Reactions   • Contrast Dye Itching       Past Surgical History:   Procedure Laterality Date   • CHOLECYSTECTOMY         Family History   Problem Relation Age of Onset   • Diabetes Mother    • Hypertension Father        Social History     Socioeconomic History   • Marital status:    Tobacco Use   • Smoking status: Never Smoker   • Smokeless tobacco: Never Used   Substance and Sexual Activity   • Alcohol use: No   • Drug use: No   • Sexual activity: Defer           Objective   Physical Exam  Vitals and nursing note reviewed.   Constitutional:       Appearance: She is obese. She is not ill-appearing.   HENT:      Head: Normocephalic and atraumatic.      Mouth/Throat:      Mouth: Mucous membranes are moist.   Eyes:      Conjunctiva/sclera: Conjunctivae normal.      Pupils: Pupils are equal, round, and reactive to light.   Cardiovascular:      Rate and Rhythm: Rhythm irregular.      Heart sounds: No murmur heard.     Comments: 2+ radial pulses bilaterally.  Pulmonary:      Effort: Pulmonary effort is normal.      Breath sounds: Normal breath sounds.      Comments: No rhonchi's rales or wheezes.  Abdominal:      General: Bowel sounds are normal.      Palpations: Abdomen is soft.      Tenderness: There is no abdominal tenderness. There is no guarding.      Comments: Unable appreciate organomegaly due to body habitus.  Soft nontender.   Musculoskeletal:      Cervical back: Neck supple.      Right lower leg: No edema.      Left lower leg: No edema.      Comments: Decubitus ulcer to sacrum 6.3 x 6.3 x 1 cm with sloughing and undermining 6-12 o'clock.   Neurological:      Mental Status: She is alert and oriented to person, place, and time.      Cranial Nerves: No cranial nerve deficit.   Psychiatric:         Mood and Affect: Mood normal.         Procedures           ED  Course  ED Course as of 10/02/22 1817   Sun Oct 02, 2022   1450 CBC is unremarkable. [BB]   1453 Patient's Flanagan catheter was noted to be underinflated with only 8 mL in bulb.  Have replaced catheter. [BB]   1608 Patient's lactic acid unremarkable. [BB]   1615 Patient BMP unremarkable magnesium unremarkable CRP 1.08. [BB]   1627 CT Abdomen Pelvis Without Contrast    Result Date: 10/2/2022  No acute findings 2 mm nonobstructing right renal stone Colonic diverticulosis. Signer Name: Kody Vann MD  Signed: 10/2/2022 4:24 PM  Workstation [facility]: [id number]  [facility]    XR Chest 1 View    Result Date: 10/2/2022  Cardiomegaly. No acute cardiopulmonary findings. Signer Name: Fernando Hurley MD  Signed: 10/2/2022 4:06 PM  Workstation Name: [email or web address]  [facility]     [BB]   1808 Patient troponin negative x2 sets. [BB]   1812 Patient will be placed on oral antibiotics for urinary tract infection.  It is noted patient can follow-up as an outpatient setting.  Patient afebrile vital signs stable. [BB]      ED Course User Index  [BB] Laurent Mccullough MD                                           [facility]    Final diagnoses:   Acute cystitis without hematuria   Palpitations   Pressure injury of sacral region, stage 4 (HCC)       ED Disposition  ED Disposition     ED Disposition   Discharge    Condition   Stable    Comment   --             Magda Fam  [address] [place] [address]  [phone]    In 2 days           Medication List      New Prescriptions    cefdinir 300 MG capsule  Commonly known as: OMNICEF  Take 1 capsule by mouth 2 (Two) Times a Day.           Where to Get Your Medications      You can get these medications from any pharmacy    Bring a paper prescription for each of these medications  · cefdinir 300 MG capsule          Laurent Mccullough MD  10/02/22 1818

## 2022-10-07 ENCOUNTER — TELEPHONE (OUTPATIENT)
Dept: SOCIAL WORK | Facility: HOSPITAL | Age: 72
End: 2022-10-07

## 2022-10-07 LAB
BACTERIA SPEC AEROBE CULT: NORMAL
BACTERIA SPEC AEROBE CULT: NORMAL

## 2022-10-07 NOTE — TELEPHONE ENCOUNTER
SS received call from former Patient who states she was just at ED a few nights ago and would like to get home health services for skilled nursing. Former Pt states she is having some difficulty with her catheter leaking. Former Pt states she had a PCP visit with Lea Gaona last week. SS contacted PCP office, spoke with Lea Gaona. Per Lea Gaona she has placed the order for home health services and will get her office staff to follow up with HH agency. SS notified former Pt that PCP office has ordered HH services.

## 2022-10-08 ENCOUNTER — HOSPITAL ENCOUNTER (EMERGENCY)
Facility: HOSPITAL | Age: 72
Discharge: HOME | End: 2022-10-08
Attending: EMERGENCY MEDICINE | Admitting: EMERGENCY MEDICINE

## 2022-10-08 VITALS
DIASTOLIC BLOOD PRESSURE: 76 MMHG | OXYGEN SATURATION: 98 % | RESPIRATION RATE: 18 BRPM | SYSTOLIC BLOOD PRESSURE: 133 MMHG | HEART RATE: 87 BPM | WEIGHT: 208 LBS | HEIGHT: 62 IN | BODY MASS INDEX: 38.28 KG/M2 | TEMPERATURE: 97 F

## 2022-10-08 DIAGNOSIS — T83.9XXA FOLEY CATHETER PROBLEM, INITIAL ENCOUNTER: Primary | ICD-10-CM

## 2022-10-08 LAB
BACTERIA UR QL AUTO: ABNORMAL /HPF
BILIRUB UR QL STRIP: NEGATIVE
CLARITY UR: ABNORMAL
COLOR UR: YELLOW
GLUCOSE UR STRIP-MCNC: NEGATIVE MG/DL
HGB UR QL STRIP.AUTO: NEGATIVE
HYALINE CASTS UR QL AUTO: ABNORMAL /LPF
KETONES UR QL STRIP: NEGATIVE
LEUKOCYTE ESTERASE UR QL STRIP.AUTO: ABNORMAL
NITRITE UR QL STRIP: POSITIVE
PH UR STRIP.AUTO: 6.5 [PH] (ref 5–8)
PROT UR QL STRIP: NEGATIVE
RBC # UR STRIP: ABNORMAL /HPF
REF LAB TEST METHOD: ABNORMAL
SP GR UR STRIP: 1.02 (ref 1–1.03)
SQUAMOUS #/AREA URNS HPF: ABNORMAL /HPF
UROBILINOGEN UR QL STRIP: ABNORMAL
WBC # UR STRIP: ABNORMAL /HPF

## 2022-10-08 PROCEDURE — 81001 URINALYSIS AUTO W/SCOPE: CPT | Performed by: EMERGENCY MEDICINE

## 2022-10-08 PROCEDURE — 51702 INSERT TEMP BLADDER CATH: CPT

## 2022-10-08 PROCEDURE — 87077 CULTURE AEROBIC IDENTIFY: CPT | Performed by: EMERGENCY MEDICINE

## 2022-10-08 PROCEDURE — 87086 URINE CULTURE/COLONY COUNT: CPT | Performed by: EMERGENCY MEDICINE

## 2022-10-08 PROCEDURE — 87186 SC STD MICRODIL/AGAR DIL: CPT | Performed by: EMERGENCY MEDICINE

## 2022-10-08 PROCEDURE — 99284 EMERGENCY DEPT VISIT MOD MDM: CPT

## 2022-10-08 RX ORDER — HYDROCODONE BITARTRATE AND ACETAMINOPHEN 5; 325 MG/1; MG/1
1 TABLET ORAL ONCE
Status: COMPLETED | OUTPATIENT
Start: 2022-10-08 | End: 2022-10-08

## 2022-10-08 RX ADMIN — HYDROCODONE BITARTRATE AND ACETAMINOPHEN 1 TABLET: 5; 325 TABLET ORAL at 22:16

## 2022-10-09 NOTE — ED NOTES
"Patient refused lab work and stated, \"I just came here to get my catheter changed.\" Provider and primary RN made aware.  "

## 2022-10-10 LAB — BACTERIA SPEC AEROBE CULT: ABNORMAL

## 2022-10-17 ENCOUNTER — NURSE TRIAGE (OUTPATIENT)
Dept: CALL CENTER | Facility: HOSPITAL | Age: 72
End: 2022-10-17

## 2022-10-17 NOTE — TELEPHONE ENCOUNTER
"Patient had a stroke and is currently bedridden. Has a Flanagan catheter in place. Urine is leaking around the catheter. C/o low abdominal pain and back pain. Reviewed protocol with patient. Advised to be seen within 4 hours. Patient states she will have to call EMS to go to the ER.    Reason for Disposition  • Lower abdominal pain or distention    Additional Information  • Negative: Shock suspected (e.g., cold/pale/clammy skin, too weak to stand, low BP, rapid pulse)  • Negative: Sounds like a life-threatening emergency to the triager  • Negative: [1] Catheter was accidentally pulled-out AND [2] bright red continuous bleeding  • Negative: SEVERE abdominal pain  • Negative: Fever > 100.4 F (38.0 C)  • Negative: [1] Drinking very little AND [2] dehydration suspected (e.g., no urine > 12 hours, very dry mouth, very lightheaded)  • Negative: Patient sounds very sick or weak to the triager  • Negative: Catheter was accidentally pulled-out  • Negative: [1] Catheter is broken AND [2] is not usable  • Negative: Bleeding around catheter (e.g., from penis or female urethra)    Answer Assessment - Initial Assessment Questions  1. SYMPTOMS: \"What symptoms are you concerned about?\"      Leaking around the catheter  2. ONSET:  \"When did the symptoms start?\"      Couple of days, worse today  3. FEVER: \"Is there a fever?\" If Yes, ask: \"What is the temperature, how was it measured, and when did it start?\"      No  4. ABDOMINAL PAIN: \"Is there any abdominal pain?\" (e.g., Scale 1-10; or mild, moderate, severe)      Bladder pain, burning with urination  5. URINE COLOR: \"What color is the urine?\"  \"Is there blood present in the urine?\" (e.g., clear, yellow, cloudy, tea-colored, blood streaks, bright red)      Yellow, mild cloudiness  6. ONSET: \"When was the catheter inserted?\"      1 week ago  7. OTHER SYMPTOMS: \"Do you have any other symptoms?\" (e.g., back pain, bad urine odor)       Back pain, urine malodorous, already has wound on " "bottom  8. PREGNANCY: \"Is there any chance you are pregnant?\" \"When was your last menstrual period?\"      NA    Protocols used: URINARY CATHETER SYMPTOMS AND QUESTIONS-ADULT-AH    "

## 2022-10-25 ENCOUNTER — HOSPITAL ENCOUNTER (EMERGENCY)
Facility: HOSPITAL | Age: 72
Discharge: HOME | End: 2022-10-25
Attending: STUDENT IN AN ORGANIZED HEALTH CARE EDUCATION/TRAINING PROGRAM | Admitting: EMERGENCY MEDICINE

## 2022-10-25 ENCOUNTER — APPOINTMENT (OUTPATIENT)
Dept: CT IMAGING | Facility: HOSPITAL | Age: 72
End: 2022-10-25

## 2022-10-25 VITALS
HEART RATE: 89 BPM | WEIGHT: 208 LBS | OXYGEN SATURATION: 99 % | BODY MASS INDEX: 38.28 KG/M2 | SYSTOLIC BLOOD PRESSURE: 141 MMHG | HEIGHT: 62 IN | DIASTOLIC BLOOD PRESSURE: 89 MMHG | RESPIRATION RATE: 16 BRPM | TEMPERATURE: 98.2 F

## 2022-10-25 DIAGNOSIS — T83.511A URINARY TRACT INFECTION ASSOCIATED WITH INDWELLING URETHRAL CATHETER, INITIAL ENCOUNTER: Primary | ICD-10-CM

## 2022-10-25 DIAGNOSIS — T83.011A MALFUNCTION OF FOLEY CATHETER, INITIAL ENCOUNTER: ICD-10-CM

## 2022-10-25 DIAGNOSIS — N39.0 URINARY TRACT INFECTION ASSOCIATED WITH INDWELLING URETHRAL CATHETER, INITIAL ENCOUNTER: Primary | ICD-10-CM

## 2022-10-25 LAB
ALBUMIN SERPL-MCNC: 3.95 G/DL (ref 3.5–5.2)
ALBUMIN/GLOB SERPL: 1.1 G/DL
ALP SERPL-CCNC: 122 U/L (ref 39–117)
ALT SERPL W P-5'-P-CCNC: 10 U/L (ref 1–33)
ANION GAP SERPL CALCULATED.3IONS-SCNC: 14.1 MMOL/L (ref 5–15)
AST SERPL-CCNC: 16 U/L (ref 1–32)
BACTERIA UR QL AUTO: ABNORMAL /HPF
BASOPHILS # BLD AUTO: 0.08 10*3/MM3 (ref 0–0.2)
BASOPHILS NFR BLD AUTO: 0.6 % (ref 0–1.5)
BILIRUB SERPL-MCNC: 0.3 MG/DL (ref 0–1.2)
BILIRUB UR QL STRIP: NEGATIVE
BUN SERPL-MCNC: 26 MG/DL (ref 8–23)
BUN/CREAT SERPL: 28.3 (ref 7–25)
CALCIUM SPEC-SCNC: 9.6 MG/DL (ref 8.6–10.5)
CHLORIDE SERPL-SCNC: 105 MMOL/L (ref 98–107)
CLARITY UR: CLEAR
CO2 SERPL-SCNC: 21.9 MMOL/L (ref 22–29)
COLOR UR: YELLOW
CREAT SERPL-MCNC: 0.92 MG/DL (ref 0.57–1)
CRP SERPL-MCNC: 1.73 MG/DL (ref 0–0.5)
D-LACTATE SERPL-SCNC: 1.7 MMOL/L (ref 0.5–2)
DEPRECATED RDW RBC AUTO: 44.5 FL (ref 37–54)
EGFRCR SERPLBLD CKD-EPI 2021: 65.9 ML/MIN/1.73
EOSINOPHIL # BLD AUTO: 0.26 10*3/MM3 (ref 0–0.4)
EOSINOPHIL NFR BLD AUTO: 1.8 % (ref 0.3–6.2)
ERYTHROCYTE [DISTWIDTH] IN BLOOD BY AUTOMATED COUNT: 13.9 % (ref 12.3–15.4)
GLOBULIN UR ELPH-MCNC: 3.6 GM/DL
GLUCOSE SERPL-MCNC: 163 MG/DL (ref 65–99)
GLUCOSE UR STRIP-MCNC: NEGATIVE MG/DL
HCT VFR BLD AUTO: 40 % (ref 34–46.6)
HGB BLD-MCNC: 13.7 G/DL (ref 12–15.9)
HGB UR QL STRIP.AUTO: ABNORMAL
HOLD SPECIMEN: NORMAL
HOLD SPECIMEN: NORMAL
HYALINE CASTS UR QL AUTO: ABNORMAL /LPF
IMM GRANULOCYTES # BLD AUTO: 0.04 10*3/MM3 (ref 0–0.05)
IMM GRANULOCYTES NFR BLD AUTO: 0.3 % (ref 0–0.5)
KETONES UR QL STRIP: NEGATIVE
LEUKOCYTE ESTERASE UR QL STRIP.AUTO: ABNORMAL
LIPASE SERPL-CCNC: 17 U/L (ref 13–60)
LYMPHOCYTES # BLD AUTO: 3.63 10*3/MM3 (ref 0.7–3.1)
LYMPHOCYTES NFR BLD AUTO: 25.5 % (ref 19.6–45.3)
MAGNESIUM SERPL-MCNC: 1.5 MG/DL (ref 1.6–2.4)
MCH RBC QN AUTO: 30.1 PG (ref 26.6–33)
MCHC RBC AUTO-ENTMCNC: 34.3 G/DL (ref 31.5–35.7)
MCV RBC AUTO: 87.9 FL (ref 79–97)
MONOCYTES # BLD AUTO: 0.61 10*3/MM3 (ref 0.1–0.9)
MONOCYTES NFR BLD AUTO: 4.3 % (ref 5–12)
NEUTROPHILS NFR BLD AUTO: 67.5 % (ref 42.7–76)
NEUTROPHILS NFR BLD AUTO: 9.64 10*3/MM3 (ref 1.7–7)
NITRITE UR QL STRIP: POSITIVE
NRBC BLD AUTO-RTO: 0 /100 WBC (ref 0–0.2)
PH UR STRIP.AUTO: 5.5 [PH] (ref 5–8)
PLATELET # BLD AUTO: 245 10*3/MM3 (ref 140–450)
PMV BLD AUTO: 9.6 FL (ref 6–12)
POTASSIUM SERPL-SCNC: 4.2 MMOL/L (ref 3.5–5.2)
PROCALCITONIN SERPL-MCNC: 0.07 NG/ML (ref 0–0.25)
PROT SERPL-MCNC: 7.5 G/DL (ref 6–8.5)
PROT UR QL STRIP: ABNORMAL
RBC # BLD AUTO: 4.55 10*6/MM3 (ref 3.77–5.28)
RBC # UR STRIP: ABNORMAL /HPF
REF LAB TEST METHOD: ABNORMAL
SODIUM SERPL-SCNC: 141 MMOL/L (ref 136–145)
SP GR UR STRIP: 1.02 (ref 1–1.03)
SQUAMOUS #/AREA URNS HPF: ABNORMAL /HPF
UROBILINOGEN UR QL STRIP: ABNORMAL
WBC # UR STRIP: ABNORMAL /HPF
WBC NRBC COR # BLD: 14.26 10*3/MM3 (ref 3.4–10.8)
WHOLE BLOOD HOLD COAG: NORMAL
WHOLE BLOOD HOLD SPECIMEN: NORMAL

## 2022-10-25 PROCEDURE — 84145 PROCALCITONIN (PCT): CPT | Performed by: PHYSICIAN ASSISTANT

## 2022-10-25 PROCEDURE — 25010000002 MAGNESIUM SULFATE 2 GM/50ML SOLUTION: Performed by: PHYSICIAN ASSISTANT

## 2022-10-25 PROCEDURE — 87147 CULTURE TYPE IMMUNOLOGIC: CPT | Performed by: PHYSICIAN ASSISTANT

## 2022-10-25 PROCEDURE — 99284 EMERGENCY DEPT VISIT MOD MDM: CPT

## 2022-10-25 PROCEDURE — 87086 URINE CULTURE/COLONY COUNT: CPT | Performed by: PHYSICIAN ASSISTANT

## 2022-10-25 PROCEDURE — 81001 URINALYSIS AUTO W/SCOPE: CPT | Performed by: PHYSICIAN ASSISTANT

## 2022-10-25 PROCEDURE — 86140 C-REACTIVE PROTEIN: CPT | Performed by: PHYSICIAN ASSISTANT

## 2022-10-25 PROCEDURE — 87150 DNA/RNA AMPLIFIED PROBE: CPT | Performed by: PHYSICIAN ASSISTANT

## 2022-10-25 PROCEDURE — 96365 THER/PROPH/DIAG IV INF INIT: CPT

## 2022-10-25 PROCEDURE — 83735 ASSAY OF MAGNESIUM: CPT | Performed by: PHYSICIAN ASSISTANT

## 2022-10-25 PROCEDURE — 74176 CT ABD & PELVIS W/O CONTRAST: CPT

## 2022-10-25 PROCEDURE — 87040 BLOOD CULTURE FOR BACTERIA: CPT | Performed by: PHYSICIAN ASSISTANT

## 2022-10-25 PROCEDURE — 85025 COMPLETE CBC W/AUTO DIFF WBC: CPT | Performed by: PHYSICIAN ASSISTANT

## 2022-10-25 PROCEDURE — 83605 ASSAY OF LACTIC ACID: CPT | Performed by: PHYSICIAN ASSISTANT

## 2022-10-25 PROCEDURE — 87077 CULTURE AEROBIC IDENTIFY: CPT | Performed by: PHYSICIAN ASSISTANT

## 2022-10-25 PROCEDURE — 51702 INSERT TEMP BLADDER CATH: CPT

## 2022-10-25 PROCEDURE — 81003 URINALYSIS AUTO W/O SCOPE: CPT | Performed by: PHYSICIAN ASSISTANT

## 2022-10-25 PROCEDURE — 87186 SC STD MICRODIL/AGAR DIL: CPT | Performed by: PHYSICIAN ASSISTANT

## 2022-10-25 PROCEDURE — 80053 COMPREHEN METABOLIC PANEL: CPT | Performed by: PHYSICIAN ASSISTANT

## 2022-10-25 PROCEDURE — 36415 COLL VENOUS BLD VENIPUNCTURE: CPT

## 2022-10-25 PROCEDURE — 83690 ASSAY OF LIPASE: CPT | Performed by: PHYSICIAN ASSISTANT

## 2022-10-25 PROCEDURE — 96366 THER/PROPH/DIAG IV INF ADDON: CPT

## 2022-10-25 RX ORDER — NITROFURANTOIN 25; 75 MG/1; MG/1
100 CAPSULE ORAL 2 TIMES DAILY
Qty: 14 CAPSULE | Refills: 0 | Status: SHIPPED | OUTPATIENT
Start: 2022-10-25 | End: 2022-11-01

## 2022-10-25 RX ORDER — NITROFURANTOIN 25; 75 MG/1; MG/1
100 CAPSULE ORAL ONCE
Status: COMPLETED | OUTPATIENT
Start: 2022-10-25 | End: 2022-10-25

## 2022-10-25 RX ORDER — DICYCLOMINE HYDROCHLORIDE 10 MG/1
20 CAPSULE ORAL ONCE
Status: COMPLETED | OUTPATIENT
Start: 2022-10-25 | End: 2022-10-25

## 2022-10-25 RX ORDER — SODIUM CHLORIDE 0.9 % (FLUSH) 0.9 %
10 SYRINGE (ML) INJECTION AS NEEDED
Status: DISCONTINUED | OUTPATIENT
Start: 2022-10-25 | End: 2022-10-25 | Stop reason: HOSPADM

## 2022-10-25 RX ORDER — LOPERAMIDE HYDROCHLORIDE 2 MG/1
4 CAPSULE ORAL ONCE
Status: COMPLETED | OUTPATIENT
Start: 2022-10-25 | End: 2022-10-25

## 2022-10-25 RX ORDER — DICYCLOMINE HCL 20 MG
20 TABLET ORAL EVERY 8 HOURS PRN
Qty: 20 TABLET | Refills: 0 | Status: ON HOLD | OUTPATIENT
Start: 2022-10-25 | End: 2022-12-01

## 2022-10-25 RX ORDER — MAGNESIUM SULFATE HEPTAHYDRATE 40 MG/ML
2 INJECTION, SOLUTION INTRAVENOUS ONCE
Status: COMPLETED | OUTPATIENT
Start: 2022-10-25 | End: 2022-10-25

## 2022-10-25 RX ADMIN — MAGNESIUM SULFATE HEPTAHYDRATE 2 G: 2 INJECTION, SOLUTION INTRAVENOUS at 19:19

## 2022-10-25 RX ADMIN — SODIUM CHLORIDE 500 ML: 9 INJECTION, SOLUTION INTRAVENOUS at 19:24

## 2022-10-25 RX ADMIN — LOPERAMIDE HYDROCHLORIDE 4 MG: 2 CAPSULE ORAL at 20:53

## 2022-10-25 RX ADMIN — DICYCLOMINE HYDROCHLORIDE 20 MG: 10 CAPSULE ORAL at 20:53

## 2022-10-25 RX ADMIN — NITROFURANTOIN MONOHYDRATE/MACROCRYSTALLINE 100 MG: 25; 75 CAPSULE ORAL at 20:53

## 2022-10-26 LAB — BACTERIA BLD CULT: ABNORMAL

## 2022-10-26 NOTE — ED NOTES
Called [facility] for transport, they stated that they think it is out of their area, that it should be [facility] to try to call them or [facility] and check.

## 2022-10-26 NOTE — ED PROVIDER NOTES
Subjective   History of Present Illness  72-year-old female who presents to the ED today via EMS for trouble with her indwelling Flanagan catheter.  She states that it has been leaking for the last 3 days.  She states it is painful in that area and it burns when she tries to urinate.  She denies any fever.  She states she has had some nausea but no vomiting.  She states she was recently on cefdinir for pneumonia and started to have some diarrhea a couple days ago.  She also reports a wound on her buttock that has been there for 2 years.  She states it is painful.  She states home health comes out once a week and packs it.  She does not currently follow with wound care.  She is chronically nonambulatory due to a history of stroke.    History provided by:  Patient  Difficulty Urinating  Pain quality:  Burning  Pain severity:  Moderate  Onset quality:  Gradual  Duration:  3 days  Timing:  Constant  Progression:  Worsening  Chronicity:  New  Relieved by:  Nothing  Worsened by:  Nothing  Associated symptoms: abdominal pain and nausea    Associated symptoms: no fever and no vomiting    Risk factors: urinary catheter use        Review of Systems   Constitutional: Negative.  Negative for fever.   HENT: Negative.    Eyes: Negative.    Respiratory: Negative.    Cardiovascular: Negative.    Gastrointestinal: Positive for abdominal pain, diarrhea and nausea. Negative for vomiting.   Genitourinary: Positive for difficulty urinating and dysuria.   Musculoskeletal: Negative.    Skin: Negative.    Neurological: Negative.    Psychiatric/Behavioral: Negative.    All other systems reviewed and are negative.      Past Medical History:   Diagnosis Date   • Arthritis    • Atrial fibrillation (HCC)     Not on anticoagulation due to postmenopausal bleeding   • Diabetes mellitus (HCC)    • Gout    • History of CVA (cerebrovascular accident)     residual right-sided weakness   • Hyperlipidemia    • Hypertension    • Stage IV pressure ulcer of  sacral region (HCC)        Allergies   Allergen Reactions   • Contrast Dye Itching       Past Surgical History:   Procedure Laterality Date   • CHOLECYSTECTOMY         Family History   Problem Relation Age of Onset   • Diabetes Mother    • Hypertension Father        Social History     Socioeconomic History   • Marital status:    Tobacco Use   • Smoking status: Never   • Smokeless tobacco: Never   Substance and Sexual Activity   • Alcohol use: No   • Drug use: No   • Sexual activity: Defer           Objective   Physical Exam  Vitals and nursing note reviewed.   Constitutional:       General: She is not in acute distress.     Appearance: Normal appearance. She is obese. She is not diaphoretic.   HENT:      Head: Normocephalic and atraumatic.      Right Ear: External ear normal.      Left Ear: External ear normal.   Eyes:      Conjunctiva/sclera: Conjunctivae normal.      Pupils: Pupils are equal, round, and reactive to light.   Cardiovascular:      Rate and Rhythm: Normal rate and regular rhythm.      Pulses: Normal pulses.      Heart sounds: Normal heart sounds.   Pulmonary:      Effort: Pulmonary effort is normal.      Breath sounds: Normal breath sounds.   Abdominal:      General: Bowel sounds are normal.      Palpations: Abdomen is soft.      Tenderness: There is no abdominal tenderness. There is no right CVA tenderness, left CVA tenderness, guarding or rebound.   Genitourinary:     Comments: Flanagan catheter is currently leaking and appears to have become dislodged  Musculoskeletal:      Cervical back: Normal range of motion and neck supple.   Skin:     General: Skin is warm and dry.      Capillary Refill: Capillary refill takes less than 2 seconds.      Comments: Pressure wound to sacral area, no evidence of an acute infection   Neurological:      Mental Status: She is alert and oriented to person, place, and time.   Psychiatric:         Mood and Affect: Mood normal.         Procedures           ED  Course  ED Course as of 10/25/22 2137   Tue Oct 25, 2022   1951 Reviewed previous urine culture from earlier this month. Patient grew out ESBL e. Coli that was susceptible to macrobid - will order this for her. [AH]   2020 CT Abdomen Pelvis Without Contrast  FINDINGS:     Hypoventilatory changes in the lung bases. No destructive osseous lesion.     Evaluation of the solid viscera is compromised by lack of IV contrast. Allowing for this:     Normal noncontrast appearance of the liver, gallbladder, pancreas, spleen and both adrenal glands.     The kidneys are symmetric in size. Evaluation for solid renal lesion is largely precluded by lack of IV contrast. No hydronephrosis. Small nonobstructing right renal calculus. No ureteral calculus.     Flanagan catheter decompresses the urinary bladder. Uterus is present. No large adnexal mass.     No evidence of bowel obstruction. No localizing perienteric inflammatory change. Numerous colonic diverticuli. No evidence of acute diverticulitis.     Normal caliber of the abdominal aorta. No lymphadenopathy within the abdomen or pelvis by size criteria.     IMPRESSION:     1.  No change from prior. Colonic diverticulosis without acute diverticulitis. Nonobstructing right renal calculus. [AH]   2132 Patient's Flanagan catheter has been replaced, no longer leaking.  She will be treated for a UTI.  She never was able to give a stool sample while in the ED.  She was advised to follow-up outpatient in the next available appointment.  I did offer a referral to wound care but she tells me that she is moving to [place] on November 7 to live with her daughter and she will get a referral to wound care once established there.  She was advised she could return to the ED at anytime for symptoms change or worsen. []      ED Course User Index  [] Karissa Santillan PA                                           MDM  Number of Diagnoses or Management Options     Amount and/or Complexity of Data  Reviewed  Clinical lab tests: reviewed  Tests in the radiology section of CPT®: reviewed  Decide to obtain previous medical records or to obtain history from someone other than the patient: yes    Patient Progress  Patient progress: stable      Final diagnoses:   Urinary tract infection associated with indwelling urethral catheter, initial encounter (HCC)   Malfunction of Flanagan catheter, initial encounter (HCC)       ED Disposition  ED Disposition     ED Disposition   Discharge    Condition   Stable    Comment   --             Barbara Gaona, APRN  [address] [id number]  [phone]    Schedule an appointment as soon as possible for a visit in 2 days           Medication List      New Prescriptions    dicyclomine 20 MG tablet  Commonly known as: BENTYL  Take 1 tablet by mouth Every 8 (Eight) Hours As Needed (abd pain).     nitrofurantoin (macrocrystal-monohydrate) 100 MG capsule  Commonly known as: MACROBID  Take 1 capsule by mouth 2 (Two) Times a Day for 7 days.        Stop    cefdinir 300 MG capsule  Commonly known as: OMNICEF           Where to Get Your Medications      You can get these medications from any pharmacy    Bring a paper prescription for each of these medications  · dicyclomine 20 MG tablet  · nitrofurantoin (macrocrystal-monohydrate) 100 MG capsule          Karissa Santillan PA  10/25/22 0837

## 2022-10-27 LAB
BACTERIA SPEC AEROBE CULT: ABNORMAL
GRAM STN SPEC: ABNORMAL
ISOLATED FROM: ABNORMAL

## 2022-10-28 ENCOUNTER — HOSPITAL ENCOUNTER (EMERGENCY)
Facility: HOSPITAL | Age: 72
Discharge: HOME | End: 2022-10-28
Attending: STUDENT IN AN ORGANIZED HEALTH CARE EDUCATION/TRAINING PROGRAM | Admitting: STUDENT IN AN ORGANIZED HEALTH CARE EDUCATION/TRAINING PROGRAM

## 2022-10-28 VITALS
WEIGHT: 208 LBS | RESPIRATION RATE: 18 BRPM | BODY MASS INDEX: 38.28 KG/M2 | SYSTOLIC BLOOD PRESSURE: 132 MMHG | DIASTOLIC BLOOD PRESSURE: 69 MMHG | HEIGHT: 62 IN | OXYGEN SATURATION: 99 % | HEART RATE: 91 BPM | TEMPERATURE: 98.7 F

## 2022-10-28 DIAGNOSIS — L89.150 PRESSURE INJURY OF SACRAL REGION, UNSTAGEABLE: Primary | ICD-10-CM

## 2022-10-28 LAB — BACTERIA SPEC AEROBE CULT: ABNORMAL

## 2022-10-28 PROCEDURE — 99284 EMERGENCY DEPT VISIT MOD MDM: CPT

## 2022-10-28 PROCEDURE — 51702 INSERT TEMP BLADDER CATH: CPT

## 2022-10-28 RX ORDER — HYDROCODONE BITARTRATE AND ACETAMINOPHEN 5; 325 MG/1; MG/1
1 TABLET ORAL ONCE
Status: COMPLETED | OUTPATIENT
Start: 2022-10-28 | End: 2022-10-28

## 2022-10-28 RX ADMIN — HYDROCODONE BITARTRATE AND ACETAMINOPHEN 1 TABLET: 5; 325 TABLET ORAL at 16:37

## 2022-10-30 LAB — BACTERIA SPEC AEROBE CULT: NORMAL

## 2022-11-01 ENCOUNTER — TRANSCRIBE ORDERS (OUTPATIENT)
Dept: WOUND CARE | Facility: HOSPITAL | Age: 72
End: 2022-11-01

## 2022-11-01 DIAGNOSIS — L89.150 PRESSURE INJURY OF SACRAL REGION, UNSTAGEABLE: Primary | ICD-10-CM

## 2022-11-30 ENCOUNTER — HOSPITAL ENCOUNTER (INPATIENT)
Facility: HOSPITAL | Age: 72
LOS: 8 days | Discharge: NURSING, REHAB OR LONG-TERM CARE | End: 2022-12-08
Attending: STUDENT IN AN ORGANIZED HEALTH CARE EDUCATION/TRAINING PROGRAM | Admitting: INTERNAL MEDICINE

## 2022-11-30 DIAGNOSIS — T83.511A URINARY TRACT INFECTION ASSOCIATED WITH INDWELLING URETHRAL CATHETER, INITIAL ENCOUNTER: Primary | ICD-10-CM

## 2022-11-30 DIAGNOSIS — N39.0 URINARY TRACT INFECTION ASSOCIATED WITH INDWELLING URETHRAL CATHETER, INITIAL ENCOUNTER: Primary | ICD-10-CM

## 2022-11-30 DIAGNOSIS — Z97.8 FOLEY CATHETER IN PLACE ON ADMISSION: ICD-10-CM

## 2022-11-30 DIAGNOSIS — Z86.19 HISTORY OF ESBL E. COLI INFECTION: ICD-10-CM

## 2022-11-30 LAB
ALBUMIN SERPL-MCNC: 3.18 G/DL (ref 3.5–5.2)
ALBUMIN/GLOB SERPL: 0.9 G/DL
ALP SERPL-CCNC: 118 U/L (ref 39–117)
ALT SERPL W P-5'-P-CCNC: 9 U/L (ref 1–33)
ANION GAP SERPL CALCULATED.3IONS-SCNC: 12.2 MMOL/L (ref 5–15)
AST SERPL-CCNC: 13 U/L (ref 1–32)
BACTERIA UR QL AUTO: ABNORMAL /HPF
BASOPHILS # BLD AUTO: 0.05 10*3/MM3 (ref 0–0.2)
BASOPHILS NFR BLD AUTO: 0.5 % (ref 0–1.5)
BILIRUB SERPL-MCNC: 0.3 MG/DL (ref 0–1.2)
BILIRUB UR QL STRIP: NEGATIVE
BUN SERPL-MCNC: 19 MG/DL (ref 8–23)
BUN/CREAT SERPL: 20.4 (ref 7–25)
CALCIUM SPEC-SCNC: 8.7 MG/DL (ref 8.6–10.5)
CHLORIDE SERPL-SCNC: 107 MMOL/L (ref 98–107)
CLARITY UR: ABNORMAL
CO2 SERPL-SCNC: 22.8 MMOL/L (ref 22–29)
COLOR UR: YELLOW
CREAT SERPL-MCNC: 0.93 MG/DL (ref 0.57–1)
D-LACTATE SERPL-SCNC: 1.8 MMOL/L (ref 0.5–2)
DEPRECATED RDW RBC AUTO: 47.5 FL (ref 37–54)
EGFRCR SERPLBLD CKD-EPI 2021: 65 ML/MIN/1.73
EOSINOPHIL # BLD AUTO: 0.31 10*3/MM3 (ref 0–0.4)
EOSINOPHIL NFR BLD AUTO: 2.9 % (ref 0.3–6.2)
ERYTHROCYTE [DISTWIDTH] IN BLOOD BY AUTOMATED COUNT: 13.8 % (ref 12.3–15.4)
FLUAV RNA RESP QL NAA+PROBE: NOT DETECTED
FLUBV RNA RESP QL NAA+PROBE: NOT DETECTED
GLOBULIN UR ELPH-MCNC: 3.6 GM/DL
GLUCOSE BLDC GLUCOMTR-MCNC: 138 MG/DL (ref 70–130)
GLUCOSE SERPL-MCNC: 244 MG/DL (ref 65–99)
GLUCOSE UR STRIP-MCNC: NEGATIVE MG/DL
HCT VFR BLD AUTO: 37.2 % (ref 34–46.6)
HGB BLD-MCNC: 12 G/DL (ref 12–15.9)
HGB UR QL STRIP.AUTO: ABNORMAL
HOLD SPECIMEN: NORMAL
HOLD SPECIMEN: NORMAL
HYALINE CASTS UR QL AUTO: ABNORMAL /LPF
IMM GRANULOCYTES # BLD AUTO: 0.03 10*3/MM3 (ref 0–0.05)
IMM GRANULOCYTES NFR BLD AUTO: 0.3 % (ref 0–0.5)
KETONES UR QL STRIP: NEGATIVE
LEUKOCYTE ESTERASE UR QL STRIP.AUTO: ABNORMAL
LYMPHOCYTES # BLD AUTO: 3.15 10*3/MM3 (ref 0.7–3.1)
LYMPHOCYTES NFR BLD AUTO: 29.8 % (ref 19.6–45.3)
MCH RBC QN AUTO: 30 PG (ref 26.6–33)
MCHC RBC AUTO-ENTMCNC: 32.3 G/DL (ref 31.5–35.7)
MCV RBC AUTO: 93 FL (ref 79–97)
MONOCYTES # BLD AUTO: 0.54 10*3/MM3 (ref 0.1–0.9)
MONOCYTES NFR BLD AUTO: 5.1 % (ref 5–12)
NEUTROPHILS NFR BLD AUTO: 6.49 10*3/MM3 (ref 1.7–7)
NEUTROPHILS NFR BLD AUTO: 61.4 % (ref 42.7–76)
NITRITE UR QL STRIP: NEGATIVE
NRBC BLD AUTO-RTO: 0 /100 WBC (ref 0–0.2)
PH UR STRIP.AUTO: 7 [PH] (ref 5–8)
PLATELET # BLD AUTO: 191 10*3/MM3 (ref 140–450)
PMV BLD AUTO: 10.2 FL (ref 6–12)
POTASSIUM SERPL-SCNC: 3.6 MMOL/L (ref 3.5–5.2)
PROT SERPL-MCNC: 6.8 G/DL (ref 6–8.5)
PROT UR QL STRIP: ABNORMAL
RBC # BLD AUTO: 4 10*6/MM3 (ref 3.77–5.28)
RBC # UR STRIP: ABNORMAL /HPF
REF LAB TEST METHOD: ABNORMAL
SARS-COV-2 RNA RESP QL NAA+PROBE: NOT DETECTED
SODIUM SERPL-SCNC: 142 MMOL/L (ref 136–145)
SP GR UR STRIP: 1.01 (ref 1–1.03)
SQUAMOUS #/AREA URNS HPF: ABNORMAL /HPF
UROBILINOGEN UR QL STRIP: ABNORMAL
WBC # UR STRIP: ABNORMAL /HPF
WBC NRBC COR # BLD: 10.57 10*3/MM3 (ref 3.4–10.8)
WHOLE BLOOD HOLD COAG: NORMAL
WHOLE BLOOD HOLD SPECIMEN: NORMAL

## 2022-11-30 PROCEDURE — 80053 COMPREHEN METABOLIC PANEL: CPT | Performed by: STUDENT IN AN ORGANIZED HEALTH CARE EDUCATION/TRAINING PROGRAM

## 2022-11-30 PROCEDURE — 87086 URINE CULTURE/COLONY COUNT: CPT | Performed by: STUDENT IN AN ORGANIZED HEALTH CARE EDUCATION/TRAINING PROGRAM

## 2022-11-30 PROCEDURE — 99285 EMERGENCY DEPT VISIT HI MDM: CPT

## 2022-11-30 PROCEDURE — 82962 GLUCOSE BLOOD TEST: CPT

## 2022-11-30 PROCEDURE — 87077 CULTURE AEROBIC IDENTIFY: CPT | Performed by: STUDENT IN AN ORGANIZED HEALTH CARE EDUCATION/TRAINING PROGRAM

## 2022-11-30 PROCEDURE — 51702 INSERT TEMP BLADDER CATH: CPT

## 2022-11-30 PROCEDURE — 25010000002 HEPARIN (PORCINE) PER 1000 UNITS: Performed by: INTERNAL MEDICINE

## 2022-11-30 PROCEDURE — 93005 ELECTROCARDIOGRAM TRACING: CPT | Performed by: PHYSICIAN ASSISTANT

## 2022-11-30 PROCEDURE — 25010000002 MEROPENEM PER 100 MG: Performed by: STUDENT IN AN ORGANIZED HEALTH CARE EDUCATION/TRAINING PROGRAM

## 2022-11-30 PROCEDURE — 81001 URINALYSIS AUTO W/SCOPE: CPT | Performed by: STUDENT IN AN ORGANIZED HEALTH CARE EDUCATION/TRAINING PROGRAM

## 2022-11-30 PROCEDURE — 83605 ASSAY OF LACTIC ACID: CPT | Performed by: STUDENT IN AN ORGANIZED HEALTH CARE EDUCATION/TRAINING PROGRAM

## 2022-11-30 PROCEDURE — 25010000002 ERTAPENEM PER 500 MG: Performed by: INTERNAL MEDICINE

## 2022-11-30 PROCEDURE — 36415 COLL VENOUS BLD VENIPUNCTURE: CPT

## 2022-11-30 PROCEDURE — 87636 SARSCOV2 & INF A&B AMP PRB: CPT | Performed by: STUDENT IN AN ORGANIZED HEALTH CARE EDUCATION/TRAINING PROGRAM

## 2022-11-30 PROCEDURE — 87186 SC STD MICRODIL/AGAR DIL: CPT | Performed by: STUDENT IN AN ORGANIZED HEALTH CARE EDUCATION/TRAINING PROGRAM

## 2022-11-30 PROCEDURE — 99223 1ST HOSP IP/OBS HIGH 75: CPT | Performed by: PHYSICIAN ASSISTANT

## 2022-11-30 PROCEDURE — 93010 ELECTROCARDIOGRAM REPORT: CPT | Performed by: INTERNAL MEDICINE

## 2022-11-30 PROCEDURE — 85025 COMPLETE CBC W/AUTO DIFF WBC: CPT | Performed by: STUDENT IN AN ORGANIZED HEALTH CARE EDUCATION/TRAINING PROGRAM

## 2022-11-30 RX ORDER — SODIUM CHLORIDE 0.9 % (FLUSH) 0.9 %
10 SYRINGE (ML) INJECTION AS NEEDED
Status: DISCONTINUED | OUTPATIENT
Start: 2022-11-30 | End: 2022-12-08 | Stop reason: HOSPADM

## 2022-11-30 RX ORDER — PANTOPRAZOLE SODIUM 40 MG/1
40 TABLET, DELAYED RELEASE ORAL
Status: DISCONTINUED | OUTPATIENT
Start: 2022-12-01 | End: 2022-12-03 | Stop reason: SDUPTHER

## 2022-11-30 RX ORDER — CALCIUM CARBONATE 200(500)MG
2 TABLET,CHEWABLE ORAL 3 TIMES DAILY PRN
Status: DISCONTINUED | OUTPATIENT
Start: 2022-11-30 | End: 2022-12-08 | Stop reason: HOSPADM

## 2022-11-30 RX ORDER — SODIUM CHLORIDE 9 MG/ML
40 INJECTION, SOLUTION INTRAVENOUS AS NEEDED
Status: DISCONTINUED | OUTPATIENT
Start: 2022-11-30 | End: 2022-12-08 | Stop reason: HOSPADM

## 2022-11-30 RX ORDER — SODIUM CHLORIDE 0.9 % (FLUSH) 0.9 %
10 SYRINGE (ML) INJECTION EVERY 12 HOURS SCHEDULED
Status: DISCONTINUED | OUTPATIENT
Start: 2022-11-30 | End: 2022-12-08 | Stop reason: HOSPADM

## 2022-11-30 RX ORDER — CHOLECALCIFEROL (VITAMIN D3) 125 MCG
10 CAPSULE ORAL NIGHTLY PRN
Status: DISCONTINUED | OUTPATIENT
Start: 2022-11-30 | End: 2022-12-08 | Stop reason: HOSPADM

## 2022-11-30 RX ORDER — NICOTINE POLACRILEX 4 MG
15 LOZENGE BUCCAL
Status: DISCONTINUED | OUTPATIENT
Start: 2022-11-30 | End: 2022-12-08 | Stop reason: HOSPADM

## 2022-11-30 RX ORDER — INSULIN ASPART 100 [IU]/ML
0-9 INJECTION, SOLUTION INTRAVENOUS; SUBCUTANEOUS
Status: DISCONTINUED | OUTPATIENT
Start: 2022-11-30 | End: 2022-12-08 | Stop reason: HOSPADM

## 2022-11-30 RX ORDER — HYDROXYZINE HYDROCHLORIDE 25 MG/1
25 TABLET, FILM COATED ORAL 3 TIMES DAILY PRN
Status: DISCONTINUED | OUTPATIENT
Start: 2022-11-30 | End: 2022-12-02

## 2022-11-30 RX ORDER — ACETAMINOPHEN 325 MG/1
650 TABLET ORAL EVERY 6 HOURS PRN
Status: DISCONTINUED | OUTPATIENT
Start: 2022-11-30 | End: 2022-12-08 | Stop reason: HOSPADM

## 2022-11-30 RX ORDER — HEPARIN SODIUM 5000 [USP'U]/ML
5000 INJECTION, SOLUTION INTRAVENOUS; SUBCUTANEOUS EVERY 12 HOURS SCHEDULED
Status: DISCONTINUED | OUTPATIENT
Start: 2022-11-30 | End: 2022-12-08 | Stop reason: HOSPADM

## 2022-11-30 RX ORDER — DEXTROSE MONOHYDRATE 25 G/50ML
25 INJECTION, SOLUTION INTRAVENOUS
Status: DISCONTINUED | OUTPATIENT
Start: 2022-11-30 | End: 2022-12-08 | Stop reason: HOSPADM

## 2022-11-30 RX ADMIN — MEROPENEM 1 G: 1 INJECTION, POWDER, FOR SOLUTION INTRAVENOUS at 20:43

## 2022-11-30 RX ADMIN — ACETAMINOPHEN 650 MG: 325 TABLET ORAL at 22:48

## 2022-11-30 RX ADMIN — ERTAPENEM 1 G: 1 INJECTION INTRAMUSCULAR; INTRAVENOUS at 22:56

## 2022-11-30 RX ADMIN — HEPARIN SODIUM 5000 UNITS: 5000 INJECTION INTRAVENOUS; SUBCUTANEOUS at 22:48

## 2022-11-30 RX ADMIN — Medication 10 ML: at 22:49

## 2022-12-01 LAB
ALBUMIN SERPL-MCNC: 3.18 G/DL (ref 3.5–5.2)
ALBUMIN/GLOB SERPL: 0.9 G/DL
ALP SERPL-CCNC: 109 U/L (ref 39–117)
ALT SERPL W P-5'-P-CCNC: 8 U/L (ref 1–33)
AMPHET+METHAMPHET UR QL: NEGATIVE
AMPHETAMINES UR QL: NEGATIVE
ANION GAP SERPL CALCULATED.3IONS-SCNC: 13.1 MMOL/L (ref 5–15)
AST SERPL-CCNC: 12 U/L (ref 1–32)
BARBITURATES UR QL SCN: NEGATIVE
BASOPHILS # BLD AUTO: 0.04 10*3/MM3 (ref 0–0.2)
BASOPHILS NFR BLD AUTO: 0.4 % (ref 0–1.5)
BENZODIAZ UR QL SCN: NEGATIVE
BILIRUB SERPL-MCNC: 0.4 MG/DL (ref 0–1.2)
BUN SERPL-MCNC: 18 MG/DL (ref 8–23)
BUN/CREAT SERPL: 22.5 (ref 7–25)
BUPRENORPHINE SERPL-MCNC: NEGATIVE NG/ML
CALCIUM SPEC-SCNC: 8.7 MG/DL (ref 8.6–10.5)
CANNABINOIDS SERPL QL: NEGATIVE
CHLORIDE SERPL-SCNC: 107 MMOL/L (ref 98–107)
CO2 SERPL-SCNC: 22.9 MMOL/L (ref 22–29)
COCAINE UR QL: NEGATIVE
CREAT SERPL-MCNC: 0.8 MG/DL (ref 0.57–1)
DEPRECATED RDW RBC AUTO: 47.3 FL (ref 37–54)
EGFRCR SERPLBLD CKD-EPI 2021: 77.9 ML/MIN/1.73
EOSINOPHIL # BLD AUTO: 0.36 10*3/MM3 (ref 0–0.4)
EOSINOPHIL NFR BLD AUTO: 3.3 % (ref 0.3–6.2)
ERYTHROCYTE [DISTWIDTH] IN BLOOD BY AUTOMATED COUNT: 13.9 % (ref 12.3–15.4)
GLOBULIN UR ELPH-MCNC: 3.6 GM/DL
GLUCOSE BLDC GLUCOMTR-MCNC: 155 MG/DL (ref 70–130)
GLUCOSE BLDC GLUCOMTR-MCNC: 158 MG/DL (ref 70–130)
GLUCOSE BLDC GLUCOMTR-MCNC: 159 MG/DL (ref 70–130)
GLUCOSE BLDC GLUCOMTR-MCNC: 187 MG/DL (ref 70–130)
GLUCOSE SERPL-MCNC: 151 MG/DL (ref 65–99)
HBA1C MFR BLD: 7.4 % (ref 4.8–5.6)
HCT VFR BLD AUTO: 37.2 % (ref 34–46.6)
HGB BLD-MCNC: 11.9 G/DL (ref 12–15.9)
IMM GRANULOCYTES # BLD AUTO: 0.03 10*3/MM3 (ref 0–0.05)
IMM GRANULOCYTES NFR BLD AUTO: 0.3 % (ref 0–0.5)
LYMPHOCYTES # BLD AUTO: 3.12 10*3/MM3 (ref 0.7–3.1)
LYMPHOCYTES NFR BLD AUTO: 28.3 % (ref 19.6–45.3)
MAGNESIUM SERPL-MCNC: 1.4 MG/DL (ref 1.6–2.4)
MCH RBC QN AUTO: 29.8 PG (ref 26.6–33)
MCHC RBC AUTO-ENTMCNC: 32 G/DL (ref 31.5–35.7)
MCV RBC AUTO: 93 FL (ref 79–97)
METHADONE UR QL SCN: NEGATIVE
MONOCYTES # BLD AUTO: 0.58 10*3/MM3 (ref 0.1–0.9)
MONOCYTES NFR BLD AUTO: 5.3 % (ref 5–12)
NEUTROPHILS NFR BLD AUTO: 6.88 10*3/MM3 (ref 1.7–7)
NEUTROPHILS NFR BLD AUTO: 62.4 % (ref 42.7–76)
NRBC BLD AUTO-RTO: 0 /100 WBC (ref 0–0.2)
OPIATES UR QL: NEGATIVE
OXYCODONE UR QL SCN: POSITIVE
PCP UR QL SCN: NEGATIVE
PLATELET # BLD AUTO: 172 10*3/MM3 (ref 140–450)
PMV BLD AUTO: 10.7 FL (ref 6–12)
POTASSIUM SERPL-SCNC: 3.6 MMOL/L (ref 3.5–5.2)
PROPOXYPH UR QL: NEGATIVE
PROT SERPL-MCNC: 6.8 G/DL (ref 6–8.5)
RBC # BLD AUTO: 4 10*6/MM3 (ref 3.77–5.28)
SODIUM SERPL-SCNC: 143 MMOL/L (ref 136–145)
TRICYCLICS UR QL SCN: NEGATIVE
TSH SERPL DL<=0.05 MIU/L-ACNC: 1.09 UIU/ML (ref 0.27–4.2)
WBC NRBC COR # BLD: 11.01 10*3/MM3 (ref 3.4–10.8)

## 2022-12-01 PROCEDURE — 25010000002 HEPARIN (PORCINE) PER 1000 UNITS: Performed by: INTERNAL MEDICINE

## 2022-12-01 PROCEDURE — 83735 ASSAY OF MAGNESIUM: CPT | Performed by: PHYSICIAN ASSISTANT

## 2022-12-01 PROCEDURE — 85025 COMPLETE CBC W/AUTO DIFF WBC: CPT | Performed by: INTERNAL MEDICINE

## 2022-12-01 PROCEDURE — 80053 COMPREHEN METABOLIC PANEL: CPT | Performed by: INTERNAL MEDICINE

## 2022-12-01 PROCEDURE — 25010000002 ERTAPENEM PER 500 MG: Performed by: INTERNAL MEDICINE

## 2022-12-01 PROCEDURE — 97162 PT EVAL MOD COMPLEX 30 MIN: CPT

## 2022-12-01 PROCEDURE — 80306 DRUG TEST PRSMV INSTRMNT: CPT | Performed by: INTERNAL MEDICINE

## 2022-12-01 PROCEDURE — 99232 SBSQ HOSP IP/OBS MODERATE 35: CPT | Performed by: INTERNAL MEDICINE

## 2022-12-01 PROCEDURE — 97166 OT EVAL MOD COMPLEX 45 MIN: CPT

## 2022-12-01 PROCEDURE — 84443 ASSAY THYROID STIM HORMONE: CPT | Performed by: PHYSICIAN ASSISTANT

## 2022-12-01 PROCEDURE — 63710000001 INSULIN ASPART PER 5 UNITS: Performed by: INTERNAL MEDICINE

## 2022-12-01 PROCEDURE — 83036 HEMOGLOBIN GLYCOSYLATED A1C: CPT | Performed by: PHYSICIAN ASSISTANT

## 2022-12-01 PROCEDURE — 82962 GLUCOSE BLOOD TEST: CPT

## 2022-12-01 RX ORDER — MAGNESIUM SULFATE HEPTAHYDRATE 40 MG/ML
2 INJECTION, SOLUTION INTRAVENOUS AS NEEDED
Status: DISCONTINUED | OUTPATIENT
Start: 2022-12-01 | End: 2022-12-08 | Stop reason: HOSPADM

## 2022-12-01 RX ORDER — CEFUROXIME AXETIL 500 MG/1
500 TABLET ORAL 2 TIMES DAILY
COMMUNITY
End: 2022-12-08 | Stop reason: HOSPADM

## 2022-12-01 RX ORDER — GABAPENTIN 800 MG/1
800 TABLET ORAL 3 TIMES DAILY
Status: ON HOLD | COMMUNITY
End: 2023-02-20

## 2022-12-01 RX ORDER — GABAPENTIN 400 MG/1
800 CAPSULE ORAL EVERY 8 HOURS PRN
Status: CANCELLED | OUTPATIENT
Start: 2022-12-01

## 2022-12-01 RX ORDER — MAGNESIUM SULFATE HEPTAHYDRATE 40 MG/ML
4 INJECTION, SOLUTION INTRAVENOUS AS NEEDED
Status: DISCONTINUED | OUTPATIENT
Start: 2022-12-01 | End: 2022-12-08 | Stop reason: HOSPADM

## 2022-12-01 RX ORDER — SODIUM HYPOCHLORITE 1.25 MG/ML
SOLUTION TOPICAL EVERY 12 HOURS SCHEDULED
Status: DISCONTINUED | OUTPATIENT
Start: 2022-12-01 | End: 2022-12-08 | Stop reason: HOSPADM

## 2022-12-01 RX ORDER — GABAPENTIN 400 MG/1
800 CAPSULE ORAL EVERY 8 HOURS SCHEDULED
Status: DISCONTINUED | OUTPATIENT
Start: 2022-12-01 | End: 2022-12-08 | Stop reason: HOSPADM

## 2022-12-01 RX ORDER — OXYCODONE AND ACETAMINOPHEN 10; 325 MG/1; MG/1
1 TABLET ORAL EVERY 6 HOURS PRN
Status: DISCONTINUED | OUTPATIENT
Start: 2022-12-01 | End: 2022-12-08 | Stop reason: HOSPADM

## 2022-12-01 RX ADMIN — ERTAPENEM 1 G: 1 INJECTION INTRAMUSCULAR; INTRAVENOUS at 21:30

## 2022-12-01 RX ADMIN — INSULIN ASPART 2 UNITS: 100 INJECTION, SOLUTION INTRAVENOUS; SUBCUTANEOUS at 18:16

## 2022-12-01 RX ADMIN — INSULIN ASPART 2 UNITS: 100 INJECTION, SOLUTION INTRAVENOUS; SUBCUTANEOUS at 08:48

## 2022-12-01 RX ADMIN — INSULIN ASPART 2 UNITS: 100 INJECTION, SOLUTION INTRAVENOUS; SUBCUTANEOUS at 11:46

## 2022-12-01 RX ADMIN — OXYCODONE HYDROCHLORIDE AND ACETAMINOPHEN 1 TABLET: 10; 325 TABLET ORAL at 20:14

## 2022-12-01 RX ADMIN — HEPARIN SODIUM 5000 UNITS: 5000 INJECTION INTRAVENOUS; SUBCUTANEOUS at 20:14

## 2022-12-01 RX ADMIN — Medication 10 ML: at 08:49

## 2022-12-01 RX ADMIN — GABAPENTIN 800 MG: 400 CAPSULE ORAL at 21:23

## 2022-12-01 RX ADMIN — HEPARIN SODIUM 5000 UNITS: 5000 INJECTION INTRAVENOUS; SUBCUTANEOUS at 08:49

## 2022-12-01 RX ADMIN — Medication 10 ML: at 20:14

## 2022-12-01 RX ADMIN — SODIUM HYPOCHLORITE: 1.25 SOLUTION TOPICAL at 18:15

## 2022-12-01 RX ADMIN — GABAPENTIN 800 MG: 400 CAPSULE ORAL at 16:22

## 2022-12-01 RX ADMIN — INSULIN ASPART 2 UNITS: 100 INJECTION, SOLUTION INTRAVENOUS; SUBCUTANEOUS at 21:23

## 2022-12-01 RX ADMIN — PANTOPRAZOLE SODIUM 40 MG: 40 TABLET, DELAYED RELEASE ORAL at 05:02

## 2022-12-01 NOTE — THERAPY EVALUATION
Acute Care - Physical Therapy Initial Evaluation   Randy     Patient Name: Anika Neri  : 1950  MRN: [id number]  Today's Date: 2022   Onset of Illness/Injury or Date of Surgery: 22 (admission date)  Visit Dx:     ICD-10-CM ICD-9-CM   1. Urinary tract infection associated with indwelling urethral catheter, initial encounter ([facility])  T83.511A 996.64    N39.0 599.0   2. History of ESBL E. coli infection  Z86.19 V12.09   3. Flanagan catheter in place on admission  Z97.8 V45.89     Patient Active Problem List   Diagnosis   • A-fib ([facility])   • Pain of left lower extremity   • Sacral decubitus ulcer   • Urinary tract infection associated with indwelling urethral catheter, initial encounter ([facility])     Past Medical History:   Diagnosis Date   • Arthritis    • Atrial fibrillation ([facility])     Not on anticoagulation due to postmenopausal bleeding   • Diabetes mellitus ([facility])    • Gout    • History of CVA (cerebrovascular accident)     residual right-sided weakness   • Hyperlipidemia    • Hypertension    • Stage IV pressure ulcer of sacral region ([facility])      Past Surgical History:   Procedure Laterality Date   • CHOLECYSTECTOMY       PT Assessment (last 12 hours)     PT Evaluation and Treatment     Row Name 22 0950          Physical Therapy Time and Intention    Document Type evaluation  -     Mode of Treatment physical therapy  -     Patient Effort good  -     Comment Pt states she lives with child/child-in-law; at home, pt states she was able to sit up and with assist stand and transfer into chair; pt has hosp bed, BSC, W/C, hasn't been using past 2 weeks. Pt states she had a CVA a couple years ago and did not receive therapy.  -     Row Name 22 1610          General Information    Patient Profile Reviewed yes  -JUAN A     Onset of Illness/Injury or Date of Surgery 22  admission date  -     General Observations of Patient Pt seen supine in hosptial bed this date  -     Equipment Currently  Used at Home hospital bed;commode, bedside;wheelchair  -     Comment, General Information --  -     Row Name 12/01/22 0950          Living Environment    Current Living Arrangements home  -     People in Home child(mak), adult  -     Row Name 12/01/22 0950          Range of Motion Comprehensive    Comment, General Range of Motion A/PROM grossly WFL, hip abd limited  -     Row Name 12/01/22 0950          Strength Comprehensive (MMT)    Comment, General Manual Muscle Testing (MMT) Assessment Gross MMT: hip flex and knee ext <3/5 (2+), knee flex 4, PF 4, hip abd 4, hip ad 4 to 5/5, DF 5/5; L LE 4/5 grossly, 5/5 DF/PF  -     Row Name 12/01/22 0950          Bed Mobility    Bed Mobility supine-sit  -     Supine-Sit [place] (Bed Mobility) modified independence  -     Comment, (Bed Mobility) Pt given maxA with sit to sup however pt may have been able to perform without assist.  -     Row Name 12/01/22 0950          Transfers    Transfers sit-stand transfer;stand-sit transfer  -     Row Name 12/01/22 0950          Sit-Stand Transfer    Sit-Stand [place] (Transfers) minimum assist (75% patient effort);2 person assist;moderate assist (50% patient effort);1 person assist  -     Assistive Device (Sit-Stand Transfers) walker, front-wheeled  -     Row Name 12/01/22 0950          Stand-Sit Transfer    Stand-Sit [place] (Transfers) standby assist;contact guard  -     Assistive Device (Stand-Sit Transfers) walker, front-wheeled  -     Row Name             Wound 09/20/22 2353 medial sacral spine    Wound - Properties Group Placement Date: 09/20/22  -KF Placement Time: 2353 -KF Orientation: medial  -KF Location: sacral spine  -KF    Retired Wound - Properties Group Placement Date: 09/20/22  -KF Placement Time: 2353 -KF Orientation: medial  -KF Location: sacral spine  -KF    Retired Wound - Properties Group Date first assessed: 09/20/22  -KF Time first assessed: 2353 -KF Location: sacral  spine  -    Row Name 12/01/22 0950          Plan of Care Review    Outcome Evaluation Pt seen for evaluation this date, pt demonstrates more mobility than she believed she could do. Pt grossly min/modA with mobility this date. D/C recommendation for SNF, IPR (daugther has connection with [facility]).  -     Row Name 12/01/22 0950          Positioning and Restraints    Pre-Treatment Position in bed  -     Post Treatment Position bed  -     In Bed supine;call light within reach  -     Row Name 12/01/22 0950          Therapy Assessment/Plan (PT)    Functional Level at Time of Evaluation (PT) min/modA  -     PT Diagnosis (PT) debility/impaired functional mobility  -     Rehab Potential (PT) other (see comments)  fair/guarded  -     Criteria for Skilled Interventions Met (PT) yes  -     Therapy Frequency (PT) 2 times/wk  2-5x/wk  -     Predicted Duration of Therapy Intervention (PT) length of stay  -     Row Name 12/01/22 0950          Physical Therapy Goals    Transfer Goal Selection (PT) transfer, PT goal 1  -     Gait Training Goal Selection (PT) gait training, PT goal 1  -     Row Name 12/01/22 0950          Transfer Goal 1 (PT)    Activity/Assistive Device (Transfer Goal 1, PT) sit-to-stand/stand-to-sit  -     Archer Level/Cues Needed (Transfer Goal 1, PT) contact guard required  -     Time Frame (Transfer Goal 1, PT) long term goal (LTG)  -     Row Name 12/01/22 0950          Gait Training Goal 1 (PT)    Activity/Assistive Device (Gait Training Goal 1, PT) gait (walking locomotion);assistive device use  -     Archer Level (Gait Training Goal 1, PT) minimum assist (75% or more patient effort)  -     Distance (Gait Training Goal 1, PT) 5'  -     Time Frame (Gait Training Goal 1, PT) long term goal (LTG)  -           User Key  (r) = Recorded By, (t) = Taken By, (c) = Cosigned By    Initials Name Provider Type    Seda Zee, RN Registered Nurse    JUAN A  Earline Cooper, PT Physical Therapist                  PT Recommendation and Plan  Anticipated Discharge Disposition (PT): skilled nursing facility, sub acute care setting, inpatient rehabilitation facility, extended care facility, home with home health, home with 24/7 care, home with assist, home with supervision  Planned Therapy Interventions (PT): bed mobility training, gait training, strengthening, transfer training  Therapy Frequency (PT): 2 times/wk (2-5x/wk)  Outcome Evaluation: Pt seen for evaluation this date, pt demonstrates more mobility than she believed she could do. Pt grossly min/modA with mobility this date. D/C recommendation for SNF, IPR ([email or web address] has connection with [facility]).       Time Calculation:    PT Charges     Row Name 12/01/22 1335             Time Calculation    Start Time 0950  -KH      PT Received On 12/01/22 -KH      PT Goal Re-Cert Due Date 12/15/22  -KH            User Key  (r) = Recorded By, (t) = Taken By, (c) = Cosigned By    Initials Name Provider Type     Earline Cooper, PT Physical Therapist              Therapy Charges for Today     Code Description Service Date Service Provider Modifiers Qty    27861445188 HC PT EVAL MOD COMPLEXITY 4 12/1/2022 Earline Cooper, PT GP 1          PT G-Codes  AM-PAC 6 Clicks Score (PT): 7    Earline Cooper PT  12/1/2022

## 2022-12-01 NOTE — PROGRESS NOTES
[facility] HOSPITALIST PROGRESS NOTE     Patient Identification:  Name:  Anika Neri  Age:  72 y.o.  Sex:  female  :  1950  MRN:  [id number]  Visit Number:  [id number]  ROOM: [address] [place] [address]     Primary Care Provider:  Barbara Gaona APRN     Date of Admission: 2022    Length of stay in inpatient status:  1    Subjective     Chief Compliant:    Chief Complaint   Patient presents with   • Dysuria     History of Presenting Illness:  The patient states that she is feeling better.  She denies chest pain, denies trouble breathing, denies nausea, denies emesis, and denies diarrhea.  She does have some residual suprapubic pain and bilateral back pain, with the right side worse than the left side.  She is asking for her chronic pain medications.    Objective     Current Hospital Meds:  ertapenem, 1 g, Intravenous, Q24H  gabapentin, 800 mg, Oral, Q8H  heparin (porcine), 5,000 Units, Subcutaneous, Q12H  Insulin Aspart, 0-9 Units, Subcutaneous, 4x Daily AC & at Bedtime  pantoprazole, 40 mg, Oral, Q AM  sodium chloride, 10 mL, Intravenous, Q12H       Current Antimicrobial Therapy:  Anti-Infectives (From admission, onward)    Ordered     Dose/Rate Route Frequency Start Stop    22  ertapenem (INVanz) 1 g in sodium chloride 0.9 % 100 mL IVPB-VTB        Ordering Provider: Stephanie Bang DO    1 g  200 mL/hr over 30 Minutes Intravenous Every 24 Hours 22 185  meropenem (MERREM) 1 g in sodium chloride 0.9 % 100 mL IVPB-VTB        Ordering Provider: Gerri Vyas, DO    1 g  over 30 Minutes Intravenous Once 22 1901 22        Current Diuretic Therapy:  Diuretics (From admission, onward)    None        ----------------------------------------------------------------------------------------------------------------------  Vital Signs:  Temp:  [97.8 °F (36.6 °C)-98.1 °F (36.7 °C)] 98.1 °F (36.7 °C)  Heart Rate:  [89-90] 89  Resp:  [16-20]  20  BP: (115-156)/(64-91) 145/64  SpO2:  [93 %-98 %] 93 %  on   ;   Device (Oxygen Therapy): room air  Body mass index is 48.52 kg/m².    Wt Readings from Last 3 Encounters:   11/30/22 116 kg (256 lb 12.8 oz)   10/28/22 94.3 kg (208 lb)   10/25/22 94.3 kg (208 lb)     Intake & Output (last 3 days)       11/28 0701 11/29 0700 11/29 0701 11/30 0700 11/30 0701 12/01 0700 12/01 0701 12/02 0700    P.O.   400 840    I.V. (mL/kg)   199 (1.7)     Total Intake(mL/kg)   599 (5.2) 840 (7.2)    Urine (mL/kg/hr)   500 450 (0.5)    Total Output   500 450    Net   +99 +390                Diet: Diabetic Diets; Consistent Carbohydrate; Texture: Regular Texture (IDDSI 7); Fluid Consistency: Thin (IDDSI 0)  ----------------------------------------------------------------------------------------------------------------------  Physical Exam  Vitals reviewed.   Constitutional:       General: She is awake. She is not in acute distress.     Appearance: She is well-developed. She is morbidly obese. She is not ill-appearing, toxic-appearing or diaphoretic.   HENT:      Head: Normocephalic and atraumatic.      Right Ear: External ear normal.      Left Ear: External ear normal.      Nose: Nose normal.   Eyes:      General: No scleral icterus.        Right eye: No discharge.         Left eye: No discharge.      Conjunctiva/sclera: Conjunctivae normal.      Pupils: Pupils are equal, round, and reactive to light.   Cardiovascular:      Rate and Rhythm: Normal rate and regular rhythm.      Pulses: Normal pulses.      Heart sounds: No murmur heard.  Pulmonary:      Effort: Pulmonary effort is normal. No respiratory distress.      Breath sounds: No wheezing or rales.   Abdominal:      General: Abdomen is protuberant. Bowel sounds are normal.      Tenderness: There is abdominal tenderness in the suprapubic area. There is right CVA tenderness and guarding.   Musculoskeletal:         General: No swelling or deformity.   Skin:     Capillary Refill:  Capillary refill takes less than 2 seconds.      Coloration: Skin is not jaundiced or pale.   Neurological:      Mental Status: She is alert and oriented to person, place, and time. Mental status is at baseline.      Cranial Nerves: No cranial nerve deficit.   Psychiatric:         Mood and Affect: Mood normal.         Behavior: Behavior normal. Behavior is cooperative.       ----------------------------------------------------------------------------------------------------------------------  Tele:  None  ----------------------------------------------------------------------------------------------------------------------  LABS:    CBC and coagulation:  Results from last 7 days   Lab Units 12/01/22 0031 11/30/22 2020 11/30/22  1638   LACTATE mmol/L  --  1.8  --    WBC 10*3/mm3 11.01*  --  10.57   HEMOGLOBIN g/dL 11.9*  --  12.0   HEMATOCRIT % 37.2  --  37.2   MCV fL 93.0  --  93.0   MCHC g/dL 32.0  --  32.3   PLATELETS 10*3/mm3 172  --  191     Renal and electrolytes:  Results from last 7 days   Lab Units 12/01/22 0031 11/30/22  1638   SODIUM mmol/L 143 142   POTASSIUM mmol/L 3.6 3.6   MAGNESIUM mg/dL 1.4*  --    CHLORIDE mmol/L 107 107   CO2 mmol/L 22.9 22.8   BUN mg/dL 18 19   CREATININE mg/dL 0.80 0.93   CALCIUM mg/dL 8.7 8.7   GLUCOSE mg/dL 151* 244*     Estimated Creatinine Clearance: 74.3 mL/min (by C-G formula based on SCr of 0.8 mg/dL).    Liver and pancreatic function:  Results from last 7 days   Lab Units 12/01/22  0031 11/30/22  1638   ALBUMIN g/dL 3.18* 3.18*   BILIRUBIN mg/dL 0.4 0.3   ALK PHOS U/L 109 118*   AST (SGOT) U/L 12 13   ALT (SGPT) U/L 8 9     Endocrine function:  Lab Results   Component Value Date    HGBA1C 7.40 (H) 12/01/2022     Point of care bedside glucose levels:  Results from last 7 days   Lab Units 12/01/22  1125 12/01/22  0616 11/30/22  2247   GLUCOSE mg/dL 158* 159* 138*     Glucose levels from the [facility]:  Results from last 7 days   Lab Units 12/01/22  0031 11/30/22  1638   GLUCOSE  mg/dL 151* 244*     Lab Results   Component Value Date    TSH 1.090 12/01/2022     Cultures:  Lab Results   Component Value Date    COLORU Yellow 11/30/2022    CLARITYU Cloudy (A) 11/30/2022    PHUR 7.0 11/30/2022    GLUCOSEU Negative 11/30/2022    KETONESU Negative 11/30/2022    BLOODU Small (1+) (A) 11/30/2022    NITRITEU Negative 11/30/2022    LEUKOCYTESUR Moderate (2+) (A) 11/30/2022    BILIRUBINUR Negative 11/30/2022    UROBILINOGEN 1.0 E.U./dL 11/30/2022    RBCUA 6-12 (A) 11/30/2022    WBCUA Too Numerous to Count (A) 11/30/2022    BACTERIA 4+ (A) 11/30/2022     Microbiology Results (last 10 days)     Procedure Component Value - Date/Time    Urine Culture - Urine, Indwelling Urethral Catheter [[id number]]  (Abnormal) Collected: 11/30/22 1704    Lab Status: Preliminary result Specimen: Urine from Indwelling Urethral Catheter Updated: 12/01/22 1313     Urine Culture >100,000 CFU/mL Gram Negative Bacilli    Narrative:      Colonization of the urinary tract without infection is common. Treatment is discouraged unless the patient is symptomatic, pregnant, or undergoing an invasive urologic procedure.    COVID PRE-OP / PRE-PROCEDURE SCREENING ORDER (NO ISOLATION) - Swab, Nasopharynx [[id number]]  (Normal) Collected: 11/30/22 1638    Lab Status: Final result Specimen: Swab from Nasopharynx Updated: 11/30/22 1713    Narrative:      The following orders were created for panel order COVID PRE-OP / PRE-PROCEDURE SCREENING ORDER (NO ISOLATION) - Swab, Nasopharynx.  Procedure                               Abnormality         Status                     ---------                               -----------         ------                     COVID-19 and FLU A/B PCR...[[id number]]  Normal              Final result                 Please view results for these tests on the individual orders.    COVID-19 and FLU A/B PCR - Swab, Nasopharynx [[id number]]  (Normal) Collected: 11/30/22 1638    Lab Status: Final result Specimen: Swab from  Nasopharynx Updated: 11/30/22 1713     COVID19 Not Detected     Influenza A PCR Not Detected     Influenza B PCR Not Detected    Narrative:      Fact sheet for providers: https://www.fda.gov/media/711114/download    Fact sheet for patients: https://www.fda.gov/media/892901/download    Test performed by PCR.        I have personally looked at the labs and they are summarized above.    Assessment & Plan      -Gram-negative bacilli catheter associated urinary tract infection that was present on admission, due to a chronic indwelling Flanagan catheter that was also present on admission  -Acute hypomagnesemia  -History of ESBL UTI in the past  -Suspect history of chronic kidney disease stage II with baseline creatinine 0.8-0.9  -History of type 2 diabetes mellitus  -History of chronic sacral decubitus ulcer  -History of chronic debility  -History of morbid obesity, BMI 48.52 kg/m², which complicates all aspects of care  -History of paroxysmal atrial fibrillation, not on chronic anticoagulation due to reports of postmenopausal bleeding  -History of essential hypertension  -History of CVA in the past with no residual deficits reported  -History of chronic pain and chronic pain medicine usage    We await the identification of the gram-negative cocci on the urine culture; meantime, we will continue with the Invanz.  I have written for magnesium replacement and we will continue to monitor the electrolytes closely.  We will also continue to trend the patient's creatinine to make sure that she does not develop acute kidney injury.  Her hemoglobin A1c level is 7.4; since her glucose levels are almost at goal with just the low to moderate sliding scale NovoLog, I have decided to not give her any long-acting insulin as the risk of hypoglycemia outweighs the benefit of preventing hyperglycemia.  We will continue monitoring her glucose levels 4 times a day.  Please note that a new Flanagan catheter was placed when the patient was in the  emergency department.  The patient's blood pressures are at goal and stable and thus we will continue to monitor these.      VTE Prophylaxis:   Mechanical Order History:     None      Pharmalogical Order History:      Ordered     Dose Route Frequency Stop    11/30/22 2129  heparin (porcine) 5000 UNIT/ML injection 5,000 Units         5,000 Units SC Every 12 Hours Scheduled --            Disposition: Patient desires to be placed at a nursing home in [place], [place]    Darell Kruger MD  [facility]  12/01/22  14:52 EST

## 2022-12-01 NOTE — PLAN OF CARE
Goal Outcome Evaluation:              Outcome Evaluation: Patient arrived to the floor from the ER this shift, alert and oriented x4, PRN medication given for complaints of pain, wound care completed per standing orders, no other complaints or request at this time, VSS, Will continue plan of care.

## 2022-12-01 NOTE — ED NOTES
Upon assessment of the patient; catheter was covered in mucus and feces. Per provider, new 18fr barajas catheter placed, urine return noted and 10cc normal saline balloon inflated. perineal/perianal area assessed and perineal/perianal. care provided as well as complete bed bath. Zinc ointment applied to all skin folds. Patient has noted un-stageable coccyx wound that had saturated 4x4 gauze covered in feces. All old dressings removed and new wet-to-dry packing performed per provider. Patient placed in hospital gown and hospital non-skid socks. Complete bed change, new chucks, and brief placed on patient. Patient is clean and dry.

## 2022-12-01 NOTE — NURSING NOTE
Patient with stage 4 PI to sacrum.  [id number].  Moderate yellow drainage noted to dressing.  Wound is malodorous. See attached flowsheet documentation.  Will cleanse with Dakin's 1/4 solution, pack with dampened Kerlix and cover with a dry dressing BID.    PI prevention orders initiated.       12/01/22 1415   Wound 09/20/22 2353 medial sacral spine   Placement Date/Time: 09/20/22 2353   Orientation: medial  Location: sacral spine   Pressure Injury Stage 4   Dressing Appearance intact   Closure None   Base moist;red;yellow   Periwound intact;redness;blanchable   Periwound Temperature warm   Periwound Skin Turgor soft   Edges open   Wound Length (cm) 5.5 cm   Wound Width (cm) 5 cm   Wound Depth (cm) 1 cm   Wound Surface Area (cm^2) 27.5 cm^2   Wound Volume (cm^3) 27.5 cm^3   Drainage Characteristics/Odor yellow;malodorous   Drainage Amount moderate

## 2022-12-01 NOTE — CASE MANAGEMENT/SOCIAL WORK
Discharge Planning Assessment   Randy     Patient Name: Anika Neri  MRN: [id number]  Today's Date: 12/1/2022    Admit Date: 11/30/2022    Plan: SS received a consult for D/C planning pt may have [facility]. SS was unable to speak with pt at bedside due to pt being in isolation. SS spoke to pt on the phone. Pt lives with daughterEsther. Pt does not utilize home health services at this time. Pt has used [facility] at home in the past. Pt states she has WC, Hospital bed and Shower chair- via [facility]. PCP is [facility] in [place]. Pt has a POA, Adam. Pt does not have a living will. Pt states she would like to do short term rehab at [facility] in [place], [place] and plans to return home with her Daughter/ROMEO Cuenca. SS to follow and assist with discharge planning.   Discharge Needs Assessment     Row Name 12/01/22 1527       Living Environment    People in Home child(mak), adult    Name(s) of People in Home Esther Xiao    Current Living Arrangements home    Primary Care Provided by self    Provides Primary Care For no one    Family Caregiver if Needed child(mak), adult    Family Caregiver Names Esther Xiao and Joellen Xiao(POA)    Quality of Family Relationships unable to assess    Able to Return to Prior Arrangements yes       Resource/Environmental Concerns    Resource/Environmental Concerns none       Transition Planning    Patient/Family Anticipates Transition to home with family;inpatient rehabilitation facility    Patient/Family Anticipated Services at Transition none    Transportation Anticipated health plan transportation       Discharge Needs Assessment    Equipment Currently Used at Home wheelchair;walker, rolling    Anticipated Changes Related to Illness none    Equipment Needed After Discharge none               Discharge Plan     Row Name 12/01/22 5908       Plan    Plan SS received a consult for D/C planning pt may have [facility]. SS was unable to speak with pt at bedside due  to pt being in isolation. SS spoke to pt on the phone. Pt lives with daughter, Esther. Pt does not utilize home health services at this time. Pt has used [facility] at home in the past. Pt states she has WC, Hospital bed and Shower chair- via [facility]. PCP is [facility] in [place]. Pt has a POA, Adam. Pt does not have a living will. Pt states she would like to do short term rehab at [facility] in [place], [place] and plans to return home with her Daughter/POA Joellen. SS to follow and assist with discharge planning.    Patient/Family in Agreement with Plan yes              Continued Care and Services - Admitted Since 11/30/2022    Coordination has not been started for this encounter.       Expected Discharge Date and Time     Expected Discharge Date Expected Discharge Time    Dec 3, 2022          Demographic Summary     Row Name 12/01/22 1526       General Information    Admission Type inpatient    Referral Source nursing    Reason for Consult discharge planning  SS received a consult for D/C planning pt may have [facility].                AZEME Mckeon

## 2022-12-01 NOTE — PLAN OF CARE
Goal Outcome Evaluation:  Plan of Care Reviewed With: patient        Progress: no change  Outcome Evaluation: Pt resting, has c/o sharp pain in RLQ and back and tenderness in lower abdomen, seems to have a trace of edema in ankles and feet, wound care team did look at pressure injury on buttocks and put wound care in orders, will continue to monitor

## 2022-12-01 NOTE — PLAN OF CARE
Goal Outcome Evaluation:              Outcome Evaluation: Pt seen for evaluation this date, pt demonstrates more mobility than she believed she could do. Pt grossly min/modA with mobility this date. D/C recommendation for SNF, IPR (anthonyther has connection with [facility]).

## 2022-12-01 NOTE — THERAPY EVALUATION
Patient Name: Anika Neri  : 1950    MRN: [id number]                              Today's Date: 2022       Admit Date: 2022    Visit Dx:     ICD-10-CM ICD-9-CM   1. Urinary tract infection associated with indwelling urethral catheter, initial encounter ([facility])  T83.511A 996.64    N39.0 599.0   2. History of ESBL E. coli infection  Z86.19 V12.09   3. Flanagan catheter in place on admission  Z97.8 V45.89     Patient Active Problem List   Diagnosis   • A-fib ([facility])   • Pain of left lower extremity   • Sacral decubitus ulcer   • Urinary tract infection associated with indwelling urethral catheter, initial encounter ([facility])     Past Medical History:   Diagnosis Date   • Arthritis    • Atrial fibrillation ([facility])     Not on anticoagulation due to postmenopausal bleeding   • Diabetes mellitus ([facility])    • Gout    • History of CVA (cerebrovascular accident)     residual right-sided weakness   • Hyperlipidemia    • Hypertension    • Stage IV pressure ulcer of sacral region ([facility])      Past Surgical History:   Procedure Laterality Date   • CHOLECYSTECTOMY        General Information     Row Name 22 1421          OT Time and Intention    Document Type evaluation  -LA     Mode of Treatment occupational therapy  -LA     Row Name 22 1421          General Information    Patient Profile Reviewed yes  -LA     Prior Level of Function max assist:;ADL's  -LA     Existing Precautions/Restrictions fall  Patient has hx of falls at home; 3 reported recently  -LA     Barriers to Rehab previous functional deficit  Patient with fear of falling as a result of falls at home previously. Anxiety noted with moving to edge of bed, standing, returning to bed.  -LA     Row Name 22 1421          Occupational Profile    Reason for Services/Referral (Occupational Profile) OT to assess for changes in level of independence and safety with ADLs. Patient daughter reported patient has had recent decline and was hoping to get her  mother in rehab in [place], [place].  -LA     Patient Goals (Occupational Profile) Go to rehab so I can take care of myself better  -[facility] Name 12/01/22 1421          Living Environment    People in Home child(mak), adult;grandchild(mak)  -[facility] Name 12/01/22 1421          Cognition    Orientation Status (Cognition) oriented x 4  -[facility] Name 12/01/22 1421          Safety Issues, Functional Mobility    Safety Issues Affecting Function (Mobility) safety precautions follow-through/compliance  Patient has anxiety and therefore states she cannot do stuff she is able to do. patient does well but requires encouragement. patient demonstrates ability to improve level of fx.  -LA     Impairments Affecting Function (Mobility) balance;endurance/activity tolerance;strength;pain  Patient with c/o back pain.  -LA           User Key  (r) = Recorded By, (t) = Taken By, (c) = Cosigned By    Initials Name Provider Type    Key Watkins OT Occupational Therapist                 Mobility/ADL's     [facility] Name 12/01/22 1425          Bed Mobility    Bed Mobility supine-sit  -LA     Supine-Sit [place] (Bed Mobility) modified independence  -[facility] Name 12/01/22 1425          Transfers    Transfers sit-stand transfer  -[facility] Name 12/01/22 1425          Sit-Stand Transfer    Sit-Stand [place] (Transfers) minimum assist (75% patient effort);2 person assist  -LA     Assistive Device (Sit-Stand Transfers) walker, front-wheeled  -[facility] Name 12/01/22 1425          Stand-Sit Transfer    Stand-Sit [place] (Transfers) standby assist;contact guard  -LA     Assistive Device (Stand-Sit Transfers) walker, front-wheeled  -[facility] Name 12/01/22 1425          Activities of Daily Living    BADL Assessment/Intervention bathing;upper body dressing;lower body dressing;grooming;feeding;toileting  -[facility] Name 12/01/22 1425          Bathing Assessment/Intervention    [place] Level (Bathing) maximum assist  (25% patient effort);dependent (less than 25% patient effort)  -LA     Row Name 12/01/22 1425          Upper Body Dressing Assessment/Training    [place] Level (Upper Body Dressing) moderate assist (50% patient effort)  -LA     Row Name 12/01/22 1425          Lower Body Dressing Assessment/Training    [place] Level (Lower Body Dressing) maximum assist (25% patient effort);dependent (less than 25% patient effort)  -LA     Row Name 12/01/22 1425          Grooming Assessment/Training    [place] Level (Grooming) moderate assist (50% patient effort)  -LA     Row Name 12/01/22 1425          Self-Feeding Assessment/Training    [place] Level (Feeding) set up  -LA     Row Name 12/01/22 1425          Toileting Assessment/Training    [place] Level (Toileting) maximum assist (25% patient effort);dependent (less than 25% patient effort)  -LA           User Key  (r) = Recorded By, (t) = Taken By, (c) = Cosigned By    Initials Name Provider Type    Key Watkins OT Occupational Therapist               Obj/Interventions     Row Name 12/01/22 1426          Sensory Assessment (Somatosensory)    Sensory Assessment (Somatosensory) sensation intact  -LA     Row Name 12/01/22 1426          Vision Assessment/Intervention    Visual Impairment/Limitations WFL  -LA     Row Name 12/01/22 1426          Range of Motion Comprehensive    General Range of Motion no range of motion deficits identified  -LAMONT Millan, General Range of Motion UE ROM grossly WFL  -LA     Row Name 12/01/22 1426          Strength Comprehensive (MMT)    General Manual Muscle Testing (MMT) Assessment upper extremity strength deficits identified  -LAMONT Millan, General Manual Muscle Testing (MMT) Assessment Minimal UE deficit; Right shoulder 4-/5  -LA     Row Name 12/01/22 1426          Upper Extremity (Manual Muscle Testing)    Upper Extremity: Manual Muscle Testing (MMT) right shoulder strength deficit  -LA     Comment, MMT: Upper  Extremity Minimal deficit right shoulder 4-/5  -LA     Row Name 12/01/22 1426          Balance    Balance Interventions sitting;standing  -LA     Comment, Balance sitting balance unsupported EOB stand by assist  -LA           User Key  (r) = Recorded By, (t) = Taken By, (c) = Cosigned By    Initials Name Provider Type    Key Watkins OT Occupational Therapist               Goals/Plan     Row Name 12/01/22 1432          Bed Mobility Goal 1 (OT)    Activity/Assistive Device (Bed Mobility Goal 1, OT) bed mobility activities, all  -LA     [place] Level/Cues Needed (Bed Mobility Goal 1, OT) modified independence  -LA     Time Frame (Bed Mobility Goal 1, OT) by discharge  -LA     Row Name 12/01/22 1432          Transfer Goal 1 (OT)    Activity/Assistive Device (Transfer Goal 1, OT) commode, 3-in-1  -LA     [place] Level/Cues Needed (Transfer Goal 1, OT) modified independence  -LA     Time Frame (Transfer Goal 1, OT) by discharge  -LA     Row Name 12/01/22 1432          Dressing Goal 1 (OT)    Activity/Device (Dressing Goal 1, OT) dressing skills, all  -LA     [place]/Cues Needed (Dressing Goal 1, OT) minimum assist (75% or more patient effort)  -LA     Time Frame (Dressing Goal 1, OT) by discharge  -LA     Row Name 12/01/22 1432          Therapy Assessment/Plan (OT)    Planned Therapy Interventions (OT) activity tolerance training;patient/caregiver education/training;adaptive equipment training;BADL retraining;ROM/therapeutic exercise;occupation/activity based interventions;functional balance retraining;transfer/mobility retraining  -LA           User Key  (r) = Recorded By, (t) = Taken By, (c) = Cosigned By    Initials Name Provider Type    Key Watkins OT Occupational Therapist               Clinical Impression     Row Name 12/01/22 1428          Plan of Care Review    Plan of Care Reviewed With patient;daughter  spoke with daughter in [place] on phone whom stated she was trying to get patient  transfered to rehab near her. OT reported she would have to speak with socal worker regarding transportation/insurance questions.  -LA     Outcome Evaluation patient seen for OT evaluation. patient does well with motiviation and is capable to do more then she thinks but has fear of falling. Patient does require significant assistance with ADLs currently. OT to address deficits while in house to promote increased strength, ADL independence and safety with mobility/ prepare for transfers. D/C recommendation is IPR vs SNF depending on insurance/transportation. Patient does demonstrate good potiential to improve level of independence.  -LA     Row Name 12/01/22 1428          Therapy Assessment/Plan (OT)    Patient/Family Therapy Goal Statement (OT) Go to rehab near by daughter.  -LA     Rehab Potential (OT) good, to achieve stated therapy goals  -LA     Criteria for Skilled Therapeutic Interventions Met (OT) skilled treatment is necessary;meets criteria;yes  -LA     Therapy Frequency (OT) other (see comments)  PRN to follow for changes/progress toward goals.  -LA     Row Name 12/01/22 1428          Therapy Plan Review/Discharge Plan (OT)    Equipment Needs Upon Discharge (OT) --  TBD  -LA     Anticipated Discharge Disposition (OT) inpatient rehabilitation facility;skilled nursing facility  -LA     Row Name 12/01/22 1428          Positioning and Restraints    Post Treatment Position bed  -LA     In Bed supine;call light within reach;encouraged to call for assist;exit alarm on  -LA           User Key  (r) = Recorded By, (t) = Taken By, (c) = Cosigned By    Initials Name Provider Type    Key Watkins, OT Occupational Therapist               Outcome Measures    No documentation.                   OT Recommendation and Plan  Planned Therapy Interventions (OT): activity tolerance training, patient/caregiver education/training, adaptive equipment training, BADL retraining, ROM/therapeutic exercise, occupation/activity  based interventions, functional balance retraining, transfer/mobility retraining  Therapy Frequency (OT): other (see comments) (PRN to follow for changes/progress toward goals.)  Plan of Care Review  Plan of Care Reviewed With: patient, daughter (spoke with daughter in [place] on phone whom stated she was trying to get patient transfered to rehab near her. OT reported she would have to speak with socal worker regarding transportation/insurance questions.)  Outcome Evaluation: patient seen for OT evaluation. patient does well with motiviation and is capable to do more then she thinks but has fear of falling. Patient does require significant assistance with ADLs currently. OT to address deficits while in house to promote increased strength, ADL independence and safety with mobility/ prepare for transfers. D/C recommendation is IPR vs SNF depending on insurance/transportation. Patient does demonstrate good potiential to improve level of independence.     Time Calculation:    Time Calculation- OT     Row Name 12/01/22 1433             Time Calculation- OT    OT Start Time 1030  -LA            User Key  (r) = Recorded By, (t) = Taken By, (c) = Cosigned By    Initials Name Provider Type    Key Watkins OT Occupational Therapist              Therapy Charges for Today     Code Description Service Date Service Provider Modifiers Qty    [id number] HC OT EVAL MOD COMPLEXITY 4 12/1/2022 Key Herrera OT GO 1               Key Herrera OT  12/1/2022

## 2022-12-01 NOTE — H&P
"     [facility]  HISTORY & PHYSICAL    Patient Identification:  Name:  Anika Neri  Age:  72 y.o.  Sex:  female  :  1950  MRN:  [id number]   Visit Number:  [id number]  Admit Date: 2022   Primary Care Physician:  Barbara Gaona APRN     Subjective     Chief complaint:   Chief Complaint   Patient presents with   • Dysuria     History of presenting illness:   Patient is a 72 y.o. female with past medical history significant for paroxysmal atrial fibrillation, debility, chronic indwelling barajas catheter, chronic decubitus ulcer, essential hypertension, history of CVA,  that presented to the Psychiatric emergency department for evaluation of nausea and abdominal pain.     The patient states she had a catheter change on 11/15 at [facility].  She reports since this time the catheter has been leaking.  She has noticed malodorous dark urine.  She reports being on antibiotics \"the whole month of October\" for a UTI.  Upon further chart review she was diagnosed with a UTI on 10/2 in our emergency department and was started on cefdinir.  Urine culture from 10/8 grew ESBL E. coli. Several attempts were made to contact the patient to notify her of the urine culture results.  A letter was sent to her address.  She was seen in the emergency department once again on 10/25 and was found to have another UTI.  She was discharged with Macrobid. Urine culture also grew ESBL e.coli that was susceptible to nitrofurantoin.  She admits to completing all antibiotics.  She reports when previously diagnosed with a UTI she had abdominal pain and nausea which has not resolved.  She states her abdominal pain is most severe in the suprapubic region and she has now developed right flank pain.  She denies any fever, chills, diaphoresis, emesis, diarrhea, hematuria.    In the ED the patient received, IV Merrem     Patient has been admitted to the medical/surgical floor for " further evaluation and treatment.   ---------------------------------------------------------------------------------------------------------------------   Review of Systems   Constitutional: Negative for chills, diaphoresis, fatigue and fever.   HENT: Negative for congestion.    Respiratory: Negative for cough, shortness of breath and wheezing.    Cardiovascular: Negative for chest pain and palpitations.   Gastrointestinal: Positive for abdominal pain (suprapubic) and nausea. Negative for constipation, diarrhea and vomiting.   Genitourinary: Positive for flank pain (right). Negative for dysuria and hematuria.   Musculoskeletal: Positive for gait problem (unable to ambulate w/o assistance). Negative for myalgias.   Skin: Positive for wound (chronic decubitus ulcer). Negative for rash.   Neurological: Positive for weakness (chronic generalized). Negative for dizziness and light-headedness.   Psychiatric/Behavioral: Negative for confusion.      ---------------------------------------------------------------------------------------------------------------------   Past Medical History:   Diagnosis Date   • Arthritis    • Atrial fibrillation (HCC)     Not on anticoagulation due to postmenopausal bleeding   • Diabetes mellitus (HCC)    • Gout    • History of CVA (cerebrovascular accident)     residual right-sided weakness   • Hyperlipidemia    • Hypertension    • Stage IV pressure ulcer of sacral region (HCC)      Past Surgical History:   Procedure Laterality Date   • CHOLECYSTECTOMY       Family History   Problem Relation Age of Onset   • Diabetes Mother    • Hypertension Father      Social History     Socioeconomic History   • Marital status:    Tobacco Use   • Smoking status: Never   • Smokeless tobacco: Never   Substance and Sexual Activity   • Alcohol use: No   • Drug use: No   • Sexual activity: Defer      ---------------------------------------------------------------------------------------------------------------------   Allergies:  Contrast dye  ---------------------------------------------------------------------------------------------------------------------   Medications below are reported home medications pulling from within the system; at this time, these medications have not been reconciled unless otherwise specified and are in the verification process for further verifcation as current home medications.    Prior to Admission Medications     Prescriptions Last Dose Informant Patient Reported? Taking?    Diclofenac Sodium (VOLTAREN) 1 % gel gel  Pharmacy Yes No    Apply 4 g topically to the appropriate area as directed 4 (Four) Times a Day As Needed (apply to knees and back).    dicyclomine (BENTYL) 20 MG tablet   No No    Take 1 tablet by mouth Every 8 (Eight) Hours As Needed (abd pain).    dilTIAZem CD (CARDIZEM CD) 300 MG 24 hr capsule  Pharmacy Yes No    Take 300 mg by mouth Daily.    gabapentin (NEURONTIN) 800 MG tablet   No No    Take 1 tablet by mouth 3 (Three) Times a Day for 3 days.    insulin glargine (LANTUS, SEMGLEE) 100 UNIT/ML injection  Pharmacy Yes No    Inject 14-24 Units under the skin into the appropriate area as directed Daily.    iron polysaccharides (NIFEREX) 150 MG capsule   No No    Take 1 capsule by mouth Daily.    multivitamin (THERAGRAN) tablet tablet   No No    Take 1 tablet by mouth Daily.        ---------------------------------------------------------------------------------------------------------------------    Objective     Hospital Scheduled Meds:  meropenem, 1 g, Intravenous, Once           Current listed hospital scheduled medications may not yet reflect those currently placed in orders that are signed and held, awaiting patient's arrival to floor/unit.     ---------------------------------------------------------------------------------------------------------------------   Vital Signs:  Temp:  [97.9 °F (36.6 °C)] 97.9 °F (36.6 °C)  Heart Rate:  [89] 89  Resp:  [20] 20  BP: (115-145)/(74-83) 120/74  Mean Arterial Pressure (Non-Invasive) for the past 24 hrs (Last 3 readings):   Noninvasive MAP (mmHg)   11/30/22 1801 101   11/30/22 1707 106   11/30/22 1633 87     SpO2 Percentage    11/30/22 1633 11/30/22 1707 11/30/22 1801   SpO2: 97% 98% 98%     SpO2:  [97 %-98 %] 98 %  on   ;   Device (Oxygen Therapy): room air    Body mass index is 38.04 kg/m².  Wt Readings from Last 3 Encounters:   11/30/22 94.3 kg (208 lb)   10/28/22 94.3 kg (208 lb)   10/25/22 94.3 kg (208 lb)     ---------------------------------------------------------------------------------------------------------------------   Physical Exam:  Physical Exam  Constitutional:       General: She is awake.      Appearance: Normal appearance. She is well-developed. She is morbidly obese.      Comments: NATY Johnson present at bedside during exam    HENT:      Head: Normocephalic and atraumatic.   Eyes:      General: Lids are normal.   Cardiovascular:      Rate and Rhythm: Normal rate and regular rhythm.      Pulses: Normal pulses.           Dorsalis pedis pulses are 2+ on the right side and 2+ on the left side.        Posterior tibial pulses are 2+ on the right side and 2+ on the left side.      Heart sounds: Normal heart sounds. No murmur heard.    No friction rub. No gallop.   Pulmonary:      Effort: Pulmonary effort is normal.      Breath sounds: Normal breath sounds.   Abdominal:      Palpations: Abdomen is soft.      Tenderness: There is generalized abdominal tenderness.      Comments: Patient refused to be checked for CVA tenderness, states she is unable to roll onto her side or sit up     Genitourinary:     Comments: Flanagan catheter in place   Musculoskeletal:      Right lower leg: No edema.      Left  lower leg: No edema.   Skin:     Comments: Decubitus ulceration    Neurological:      General: No focal deficit present.      Mental Status: She is alert and oriented to person, place, and time. Mental status is at baseline.   Psychiatric:         Speech: Speech normal.         Behavior: Behavior is cooperative.         Cognition and Memory: Cognition normal.       ---------------------------------------------------------------------------------------------------------------------  EKG:    Baseline EKG ordered and pending.     Telemetry:    Patient is not currently on telemetry.   ---------------------------------------------------------------------------------------------------------------------             Results from last 7 days   Lab Units 11/30/22  1638   WBC 10*3/mm3 10.57   HEMOGLOBIN g/dL 12.0   HEMATOCRIT % 37.2   MCV fL 93.0   MCHC g/dL 32.3   PLATELETS 10*3/mm3 191     Results from last 7 days   Lab Units 11/30/22  1638   SODIUM mmol/L 142   POTASSIUM mmol/L 3.6   CHLORIDE mmol/L 107   CO2 mmol/L 22.8   BUN mg/dL 19   CREATININE mg/dL 0.93   CALCIUM mg/dL 8.7   GLUCOSE mg/dL 244*   ALBUMIN g/dL 3.18*   BILIRUBIN mg/dL 0.3   ALK PHOS U/L 118*   AST (SGOT) U/L 13   ALT (SGPT) U/L 9   Estimated Creatinine Clearance: 57.7 mL/min (by C-G formula based on SCr of 0.93 mg/dL).  No results found for: AMMONIA    No results found for: HGBA1C, POCGLU  Lab Results   Component Value Date    HGBA1C 7.80 (H) 09/20/2022     Lab Results   Component Value Date    TSH 1.100 09/20/2022     Pain Management Panel    There is no flowsheet data to display.       I have personally reviewed the above laboratory results.   ---------------------------------------------------------------------------------------------------------------------  Imaging Results (Last 7 Days)     ** No results found for the last 168 hours. **        I have personally reviewed the above radiology results.     Last Echocardiogram:  Results for orders placed  during the hospital encounter of 10/27/20    Adult Transthoracic Echo Complete W/ Cont if Necessary Per Protocol    Interpretation Summary  · Estimated left ventricular EF = 60% Left ventricular ejection fraction appears to be 56 - 60%. Left ventricular systolic function is normal.  · Moderate to severe aortic sclerosis without stenosis  · Mild mitral valve regurgitation is present.  · No previous study to compare  · Mild tricuspid valve regurgitation is present.  · Estimated right ventricular systolic pressure from tricuspid regurgitation is mildly elevated (35-45 mmHg).    ---------------------------------------------------------------------------------------------------------------------    Assessment & Plan      Active Hospital Problems    Diagnosis  POA   • **Urinary tract infection associated with indwelling urethral catheter, initial encounter ([facility]) [T83.511A, N39.0]  Yes     #CAUTI   #Hx of ESBL UTI   #Chronic indwelling barajas catheter   -Currently does not meet sepsis criteria; no leukocytosis; vitals unremarkable; lactate within normal limits  -UA grossly abnormal  -Previous urine cultures from 10/8 in 10/25 grew ESBL E. coli  -Patient received IV Merrem in the ED; discussed with attending, we will change antibiotic therapy to IV Invanz  -Repeat urine culture pending  -Repeat a.m. CBC  -New 18 Maltese Barajas catheter placed in the ED, draining appropriately  -Patient reports right flank pain, however, refused to be checked for CVA tenderness on exam; as stated above currently does not meet sepsis criteria and renal function is within normal limits; may consider obtaining CT of A/P however is already receiving antibiotics for CAUTI, will discuss further with attending   -Continue to monitor closely    #Type 2 diabetes mellitus  -Obtain hemoglobin A1c  -Sliding scale insulin ordered; obtain Accu-Cheks 4 times daily before meals and nightly; titrate insulin therapy as necessary  -Hypoglycemia protocol in  place    #Chronic sacral decubitus wound  #Debility   #Morbid obesity   -Wound care has been consulted  -Turn every 2 hours  -PT/OT  -Up with assistance, fall precautions  -BMI 38.04 kg/m²  -Complicates all aspects of patient care    #Paroxysmal atrial fibrillation, not on chronic anticoagulation d/t reports hx of post menopausal bleeding   -Obtain baseline EKG; patient not on telemetry while in the emergency department  -Review home medications once reconciled per pharmacy    #Essential hypertension   -Review home medications once available; plan to continue antihypertensive agents with appropriate holding parameters    #History CVA in the past   -No acute focal neurological deficits on exam  -Review home medications once available    #Chronic pain   -Review home medications once reconciled per pharmacy      F/E/N: No IV fluids.  Replace electrolytes as necessary.  Consistent carb diet.  ---------------------------------------------------  DVT Prophylaxis: Subq heparin   GI Prophylaxis: Protonix   Activity: Up with assistance, fall precautions     The patient is considered to be a high risk patient due to: CAUTI     INPATIENT status due to the need for care which can only be reasonably provided in an hospital setting such as aggressive/expedited ancillary services and/or consultation services, the necessity for IV medications, close physician monitoring and/or the possible need for procedures.  In such, I feel patient’s risk for adverse outcomes and need for care warrant INPATIENT evaluation and predict the patient’s care encounter to likely last beyond 2 midnights.    Code Status: FULL CODE     Disposition/Discharge planning: Plan for home at discharge. Pending clinical course.     I have discussed the patient's assessment and plan with the patient and attending physician       Jolynn Boyce PA-C  Hospitalist Service -- [facility]       11/30/22  20:17 EST    Attending Physician: Stephanie Bang DO

## 2022-12-02 LAB
ALBUMIN SERPL-MCNC: 3.1 G/DL (ref 3.5–5.2)
ALBUMIN/GLOB SERPL: 0.9 G/DL
ALP SERPL-CCNC: 98 U/L (ref 39–117)
ALT SERPL W P-5'-P-CCNC: 9 U/L (ref 1–33)
ANION GAP SERPL CALCULATED.3IONS-SCNC: 11.4 MMOL/L (ref 5–15)
AST SERPL-CCNC: 13 U/L (ref 1–32)
BACTERIA SPEC AEROBE CULT: ABNORMAL
BASOPHILS # BLD AUTO: 0.07 10*3/MM3 (ref 0–0.2)
BASOPHILS NFR BLD AUTO: 0.7 % (ref 0–1.5)
BILIRUB SERPL-MCNC: 0.3 MG/DL (ref 0–1.2)
BUN SERPL-MCNC: 21 MG/DL (ref 8–23)
BUN/CREAT SERPL: 22.6 (ref 7–25)
CALCIUM SPEC-SCNC: 8.9 MG/DL (ref 8.6–10.5)
CHLORIDE SERPL-SCNC: 106 MMOL/L (ref 98–107)
CO2 SERPL-SCNC: 23.6 MMOL/L (ref 22–29)
CREAT SERPL-MCNC: 0.93 MG/DL (ref 0.57–1)
DEPRECATED RDW RBC AUTO: 47.8 FL (ref 37–54)
EGFRCR SERPLBLD CKD-EPI 2021: 65 ML/MIN/1.73
EOSINOPHIL # BLD AUTO: 0.36 10*3/MM3 (ref 0–0.4)
EOSINOPHIL NFR BLD AUTO: 3.7 % (ref 0.3–6.2)
ERYTHROCYTE [DISTWIDTH] IN BLOOD BY AUTOMATED COUNT: 13.8 % (ref 12.3–15.4)
GLOBULIN UR ELPH-MCNC: 3.5 GM/DL
GLUCOSE BLDC GLUCOMTR-MCNC: 119 MG/DL (ref 70–130)
GLUCOSE BLDC GLUCOMTR-MCNC: 140 MG/DL (ref 70–130)
GLUCOSE BLDC GLUCOMTR-MCNC: 161 MG/DL (ref 70–130)
GLUCOSE BLDC GLUCOMTR-MCNC: 175 MG/DL (ref 70–130)
GLUCOSE SERPL-MCNC: 129 MG/DL (ref 65–99)
HCT VFR BLD AUTO: 35.5 % (ref 34–46.6)
HGB BLD-MCNC: 11.4 G/DL (ref 12–15.9)
IMM GRANULOCYTES # BLD AUTO: 0.04 10*3/MM3 (ref 0–0.05)
IMM GRANULOCYTES NFR BLD AUTO: 0.4 % (ref 0–0.5)
LYMPHOCYTES # BLD AUTO: 3.56 10*3/MM3 (ref 0.7–3.1)
LYMPHOCYTES NFR BLD AUTO: 36.6 % (ref 19.6–45.3)
MAGNESIUM SERPL-MCNC: 1.5 MG/DL (ref 1.6–2.4)
MCH RBC QN AUTO: 30.2 PG (ref 26.6–33)
MCHC RBC AUTO-ENTMCNC: 32.1 G/DL (ref 31.5–35.7)
MCV RBC AUTO: 94.2 FL (ref 79–97)
MONOCYTES # BLD AUTO: 0.49 10*3/MM3 (ref 0.1–0.9)
MONOCYTES NFR BLD AUTO: 5 % (ref 5–12)
NEUTROPHILS NFR BLD AUTO: 5.21 10*3/MM3 (ref 1.7–7)
NEUTROPHILS NFR BLD AUTO: 53.6 % (ref 42.7–76)
NRBC BLD AUTO-RTO: 0 /100 WBC (ref 0–0.2)
PHOSPHATE SERPL-MCNC: 3.3 MG/DL (ref 2.5–4.5)
PLATELET # BLD AUTO: 181 10*3/MM3 (ref 140–450)
PMV BLD AUTO: 10.3 FL (ref 6–12)
POTASSIUM SERPL-SCNC: 3.8 MMOL/L (ref 3.5–5.2)
PROT SERPL-MCNC: 6.6 G/DL (ref 6–8.5)
RBC # BLD AUTO: 3.77 10*6/MM3 (ref 3.77–5.28)
SODIUM SERPL-SCNC: 141 MMOL/L (ref 136–145)
WBC NRBC COR # BLD: 9.73 10*3/MM3 (ref 3.4–10.8)

## 2022-12-02 PROCEDURE — 94799 UNLISTED PULMONARY SVC/PX: CPT

## 2022-12-02 PROCEDURE — 63710000001 INSULIN ASPART PER 5 UNITS: Performed by: INTERNAL MEDICINE

## 2022-12-02 PROCEDURE — 25010000002 ERTAPENEM PER 500 MG: Performed by: INTERNAL MEDICINE

## 2022-12-02 PROCEDURE — 25010000002 MAGNESIUM SULFATE 2 GM/50ML SOLUTION: Performed by: INTERNAL MEDICINE

## 2022-12-02 PROCEDURE — 80053 COMPREHEN METABOLIC PANEL: CPT | Performed by: INTERNAL MEDICINE

## 2022-12-02 PROCEDURE — 85025 COMPLETE CBC W/AUTO DIFF WBC: CPT | Performed by: INTERNAL MEDICINE

## 2022-12-02 PROCEDURE — 25010000002 HEPARIN (PORCINE) PER 1000 UNITS: Performed by: INTERNAL MEDICINE

## 2022-12-02 PROCEDURE — 83735 ASSAY OF MAGNESIUM: CPT | Performed by: INTERNAL MEDICINE

## 2022-12-02 PROCEDURE — 99232 SBSQ HOSP IP/OBS MODERATE 35: CPT | Performed by: INTERNAL MEDICINE

## 2022-12-02 PROCEDURE — 82962 GLUCOSE BLOOD TEST: CPT

## 2022-12-02 PROCEDURE — 84100 ASSAY OF PHOSPHORUS: CPT | Performed by: INTERNAL MEDICINE

## 2022-12-02 RX ORDER — MAGNESIUM SULFATE HEPTAHYDRATE 40 MG/ML
2 INJECTION, SOLUTION INTRAVENOUS
Status: COMPLETED | OUTPATIENT
Start: 2022-12-02 | End: 2022-12-02

## 2022-12-02 RX ORDER — DILTIAZEM HYDROCHLORIDE 300 MG/1
300 CAPSULE, COATED, EXTENDED RELEASE ORAL DAILY
Status: CANCELLED | OUTPATIENT
Start: 2022-12-02

## 2022-12-02 RX ORDER — FAMOTIDINE 20 MG/1
20 TABLET, FILM COATED ORAL
Status: DISCONTINUED | OUTPATIENT
Start: 2022-12-03 | End: 2022-12-08 | Stop reason: HOSPADM

## 2022-12-02 RX ORDER — CETIRIZINE HYDROCHLORIDE 10 MG/1
10 TABLET ORAL DAILY
Status: DISCONTINUED | OUTPATIENT
Start: 2022-12-02 | End: 2022-12-08 | Stop reason: HOSPADM

## 2022-12-02 RX ORDER — HYDROXYZINE HYDROCHLORIDE 25 MG/1
25 TABLET, FILM COATED ORAL EVERY 8 HOURS
Status: DISCONTINUED | OUTPATIENT
Start: 2022-12-02 | End: 2022-12-07

## 2022-12-02 RX ADMIN — MAGNESIUM SULFATE HEPTAHYDRATE 2 G: 2 INJECTION, SOLUTION INTRAVENOUS at 03:17

## 2022-12-02 RX ADMIN — MAGNESIUM SULFATE HEPTAHYDRATE 2 G: 2 INJECTION, SOLUTION INTRAVENOUS at 07:13

## 2022-12-02 RX ADMIN — HYDROXYZINE HYDROCHLORIDE 25 MG: 25 TABLET ORAL at 21:05

## 2022-12-02 RX ADMIN — CETIRIZINE HYDROCHLORIDE 10 MG: 10 TABLET, FILM COATED ORAL at 21:05

## 2022-12-02 RX ADMIN — ERTAPENEM 1 G: 1 INJECTION INTRAMUSCULAR; INTRAVENOUS at 22:25

## 2022-12-02 RX ADMIN — SODIUM HYPOCHLORITE: 1.25 SOLUTION TOPICAL at 20:58

## 2022-12-02 RX ADMIN — GABAPENTIN 800 MG: 400 CAPSULE ORAL at 14:35

## 2022-12-02 RX ADMIN — OXYCODONE HYDROCHLORIDE AND ACETAMINOPHEN 1 TABLET: 10; 325 TABLET ORAL at 21:06

## 2022-12-02 RX ADMIN — SODIUM HYPOCHLORITE: 1.25 SOLUTION TOPICAL at 09:13

## 2022-12-02 RX ADMIN — HEPARIN SODIUM 5000 UNITS: 5000 INJECTION INTRAVENOUS; SUBCUTANEOUS at 09:04

## 2022-12-02 RX ADMIN — INSULIN ASPART 2 UNITS: 100 INJECTION, SOLUTION INTRAVENOUS; SUBCUTANEOUS at 11:58

## 2022-12-02 RX ADMIN — Medication 10 ML: at 09:13

## 2022-12-02 RX ADMIN — OXYCODONE HYDROCHLORIDE AND ACETAMINOPHEN 1 TABLET: 10; 325 TABLET ORAL at 03:16

## 2022-12-02 RX ADMIN — HEPARIN SODIUM 5000 UNITS: 5000 INJECTION INTRAVENOUS; SUBCUTANEOUS at 20:59

## 2022-12-02 RX ADMIN — OXYCODONE HYDROCHLORIDE AND ACETAMINOPHEN 1 TABLET: 10; 325 TABLET ORAL at 11:58

## 2022-12-02 RX ADMIN — Medication 10 ML: at 20:59

## 2022-12-02 RX ADMIN — GABAPENTIN 800 MG: 400 CAPSULE ORAL at 21:00

## 2022-12-02 RX ADMIN — INSULIN ASPART 2 UNITS: 100 INJECTION, SOLUTION INTRAVENOUS; SUBCUTANEOUS at 21:05

## 2022-12-02 RX ADMIN — MAGNESIUM SULFATE HEPTAHYDRATE 2 G: 2 INJECTION, SOLUTION INTRAVENOUS at 05:07

## 2022-12-02 RX ADMIN — PANTOPRAZOLE SODIUM 40 MG: 40 TABLET, DELAYED RELEASE ORAL at 05:07

## 2022-12-02 RX ADMIN — GABAPENTIN 800 MG: 400 CAPSULE ORAL at 05:07

## 2022-12-02 NOTE — PLAN OF CARE
Goal Outcome Evaluation:  Plan of Care Reviewed With: patient        Progress: no change  Outcome Evaluation: prn pain medication given. wound care complete. no acute changes noted. will continue with poc

## 2022-12-02 NOTE — PROGRESS NOTES
[facility] HOSPITALIST PROGRESS NOTE     Patient Identification:  Name:  Anika Neri  Age:  72 y.o.  Sex:  female  :  1950  MRN:  [id number]  Visit Number:  [id number]  ROOM: [address] [place] [address]     Primary Care Provider:  Barbara Gaona APRN     Date of Admission: 2022    Length of stay in inpatient status:  2    Subjective     Chief Compliant:    Chief Complaint   Patient presents with   • Dysuria     History of Presenting Illness: Today, the patient feels about the same.  Her back pain is about the same.  However, her biggest issue today is leg itching.  She denies any rashes on her legs, but she states that her legs will not stop itching.  She denies chest pain, trouble breathing, nausea, vomiting, and diarrhea.    Objective     Current Hospital Meds:  ertapenem, 1 g, Intravenous, Q24H  gabapentin, 800 mg, Oral, Q8H  heparin (porcine), 5,000 Units, Subcutaneous, Q12H  Insulin Aspart, 0-9 Units, Subcutaneous, 4x Daily AC & at Bedtime  pantoprazole, 40 mg, Oral, Q AM  sodium chloride, 10 mL, Intravenous, Q12H  sodium hypochlorite, , Topical, Q12H       Current Antimicrobial Therapy:  Anti-Infectives (From admission, onward)    Ordered     Dose/Rate Route Frequency Start Stop    22  ertapenem (INVanz) 1 g in sodium chloride 0.9 % 100 mL IVPB-VTB        Ordering Provider: Stephanie Bang DO    1 g  200 mL/hr over 30 Minutes Intravenous Every 24 Hours 22 185  meropenem (MERREM) 1 g in sodium chloride 0.9 % 100 mL IVPB-VTB        Ordering Provider: Gerri Vyas DO    1 g  over 30 Minutes Intravenous Once 22 19022        Current Diuretic Therapy:  Diuretics (From admission, onward)    None        ----------------------------------------------------------------------------------------------------------------------  Vital Signs:  Temp:  [97.1 °F (36.2 °C)-98.8 °F (37.1 °C)] 98.8 °F (37.1 °C)  Heart Rate:  [60-90]  90  Resp:  [18-20] 20  BP: (106-145)/(55-86) 121/67  SpO2:  [94 %-97 %] 95 %  on   ;   Device (Oxygen Therapy): room air  Body mass index is 48.52 kg/m².    Wt Readings from Last 3 Encounters:   11/30/22 116 kg (256 lb 12.8 oz)   10/28/22 94.3 kg (208 lb)   10/25/22 94.3 kg (208 lb)     Intake & Output (last 3 days)       11/30 0701 12/01 0700 12/01 0701 12/02 0700 12/02 0701 12/03 0700    P.O. 400 1680 1680    I.V. (mL/kg) 199 (1.7)      Total Intake(mL/kg) 599 (5.2) 1680 (14.5) 1680 (14.5)    Urine (mL/kg/hr) 500 1400 (0.5) 450 (0.3)    Total Output 500 1400 450    Net +99 +280 +1230               Diet: Diabetic Diets; Consistent Carbohydrate; Texture: Regular Texture (IDDSI 7); Fluid Consistency: Thin (IDDSI 0)  ----------------------------------------------------------------------------------------------------------------------  Physical Exam   Constitutional:       General: She is awake. She is not in acute distress.     Appearance: She is well-developed. She is morbidly obese. She is not ill-appearing, toxic-appearing or diaphoretic.   Cardiovascular:      Rate and Rhythm: Normal rate and regular rhythm.      Pulses: Normal pulses.      Heart sounds: No murmur heard.  Pulmonary:      Effort: Pulmonary effort is normal. No respiratory distress.      Breath sounds: No wheezing or rales.   Abdominal:      General: Abdomen is protuberant. Bowel sounds are normal.      Tenderness: There is less abdominal tenderness in the suprapubic area. There is right CVA tenderness and guarding still.   Neurological:      Mental Status: She is alert and oriented to person, place, and time. Mental status is at baseline.      Cranial Nerves: No cranial nerve deficit.   Musculoskeletal:     I do not see any obvious rashes on her legs nor do I see any excoriations.  There is no edema.  ----------------------------------------------------------------------------------------------------------------------  Tele:  None  ----------------------------------------------------------------------------------------------------------------------  LABS:    CBC and coagulation:  Results from last 7 days   Lab Units 12/02/22 0048 12/01/22 0031 11/30/22  2020 11/30/22  1638   LACTATE mmol/L  --   --  1.8  --    WBC 10*3/mm3 9.73 11.01*  --  10.57   HEMOGLOBIN g/dL 11.4* 11.9*  --  12.0   HEMATOCRIT % 35.5 37.2  --  37.2   MCV fL 94.2 93.0  --  93.0   MCHC g/dL 32.1 32.0  --  32.3   PLATELETS 10*3/mm3 181 172  --  191     Renal and electrolytes:  Results from last 7 days   Lab Units 12/02/22 0048 12/01/22 0031 11/30/22  1638   SODIUM mmol/L 141 143 142   POTASSIUM mmol/L 3.8 3.6 3.6   MAGNESIUM mg/dL 1.5* 1.4*  --    CHLORIDE mmol/L 106 107 107   CO2 mmol/L 23.6 22.9 22.8   BUN mg/dL 21 18 19   CREATININE mg/dL 0.93 0.80 0.93   CALCIUM mg/dL 8.9 8.7 8.7   PHOSPHORUS mg/dL 3.3  --   --    GLUCOSE mg/dL 129* 151* 244*     Estimated Creatinine Clearance: 63.9 mL/min (by C-G formula based on SCr of 0.93 mg/dL).    Liver and pancreatic function:  Results from last 7 days   Lab Units 12/02/22 0048 12/01/22 0031 11/30/22  1638   ALBUMIN g/dL 3.10* 3.18* 3.18*   BILIRUBIN mg/dL 0.3 0.4 0.3   ALK PHOS U/L 98 109 118*   AST (SGOT) U/L 13 12 13   ALT (SGPT) U/L 9 8 9     Endocrine function:  Lab Results   Component Value Date    HGBA1C 7.40 (H) 12/01/2022     Point of care bedside glucose levels:  Results from last 7 days   Lab Units 12/02/22  1712 12/02/22  1130 12/02/22  0615 12/01/22  2112 12/01/22  1812 12/01/22  1125 12/01/22  0616 11/30/22  2247   GLUCOSE mg/dL 140* 175* 119 155* 187* 158* 159* 138*     Glucose levels from the [facility]:  Results from last 7 days   Lab Units 12/02/22  0048 12/01/22  0031 11/30/22  1638   GLUCOSE mg/dL 129* 151* 244*     Lab Results   Component Value Date    TSH 1.090 12/01/2022     Cultures:  Microbiology Results (last 10 days)     Procedure Component Value - Date/Time    Urine Culture - Urine, Indwelling  Urethral Catheter [[id number]]  (Abnormal)  (Susceptibility) Collected: 11/30/22 1704    Lab Status: Final result Specimen: Urine from Indwelling Urethral Catheter Updated: 12/02/22 1243     Urine Culture >100,000 CFU/mL Escherichia coli ESBL     Comment:   Consider infectious disease consult.  Susceptibility results may not correlate to clinical outcomes.       Narrative:      Colonization of the urinary tract without infection is common. Treatment is discouraged unless the patient is symptomatic, pregnant, or undergoing an invasive urologic procedure.  Recent outcomes data supports the use of pip/tazo in the treatment of susceptible ESBL infections for uncomplicated UTI. Consider use of pip/tazo as a carbapenem-sparing regimen in applicable patients.    Susceptibility      Escherichia coli ESBL      CHRISTOPHE      Ertapenem Susceptible      Gentamicin Susceptible      Levofloxacin Intermediate      Meropenem Susceptible      Nitrofurantoin Susceptible      Piperacillin + Tazobactam Susceptible      Trimethoprim + Sulfamethoxazole Susceptible                           COVID PRE-OP / PRE-PROCEDURE SCREENING ORDER (NO ISOLATION) - Swab, Nasopharynx [[id number]]  (Normal) Collected: 11/30/22 1638    Lab Status: Final result Specimen: Swab from Nasopharynx Updated: 11/30/22 1713    Narrative:      The following orders were created for panel order COVID PRE-OP / PRE-PROCEDURE SCREENING ORDER (NO ISOLATION) - Swab, Nasopharynx.  Procedure                               Abnormality         Status                     ---------                               -----------         ------                     COVID-19 and FLU A/B PCR...[[id number]]  Normal              Final result                 Please view results for these tests on the individual orders.    COVID-19 and FLU A/B PCR - Swab, Nasopharynx [[id number]]  (Normal) Collected: 11/30/22 1638    Lab Status: Final result Specimen: Swab from Nasopharynx Updated: 11/30/22 1713      COVID19 Not Detected     Influenza A PCR Not Detected     Influenza B PCR Not Detected    Narrative:      Fact sheet for providers: https://www.fda.gov/media/122069/download    Fact sheet for patients: https://www.fda.gov/media/214139/download    Test performed by PCR.        I have personally looked at the labs and they are summarized above.    Assessment & Plan      -ESBL E. coli catheter associated urinary tract infection that was present on admission, due to a chronic indwelling Flanagan catheter that was also present on admission, in a patient with known history of ESBL E. coli UTI  -Acute hypomagnesemia  -Suspect history of chronic kidney disease stage II with baseline creatinine 0.8-0.9  -History of type 2 diabetes mellitus  -History of chronic stage IV sacral decubitus ulcer (5.5 cm x 5 cm, 1 cm in depth), which was present on admission   -History of chronic debility  -History of morbid obesity, BMI 48.52 kg/m², which complicates all aspects of care  -History of paroxysmal atrial fibrillation, not on chronic anticoagulation due to reports of postmenopausal bleeding  -History of essential hypertension  -History of CVA in the past with no residual deficits reported  -History of chronic pain and chronic pain medicine usage    Now that the urine culture has resulted, I will consult the infectious disease team.  Rebound, I will bands as the UTI is complicated inflammation is symptomatic.  We will continue replacing the magnesium per replacement protocol; we will continue to monitor the electrolytes and creatinine closely.  Please note that the glucose levels are controlled and we will continue to monitor these.  The blood pressures are so at goal and stable and thus we will continue to monitor these as well.  Patient is awaiting placement at a short-term rehab named [facility] in [place], [place].    VTE Prophylaxis:   Mechanical Order History:     None      Pharmalogical Order History:      Ordered      Dose Route Frequency Stop    11/30/22 2124  heparin (porcine) 5000 UNIT/ML injection 5,000 Units         5,000 Units SC Every 12 Hours Scheduled --            Disposition: Patient desires to be placed at a nursing home in [place], [place]    Darell Kruger MD  [facility]  12/02/22  20:07 EST

## 2022-12-02 NOTE — PLAN OF CARE
Goal Outcome Evaluation:  Plan of Care Reviewed With: patient        Progress: no change  Outcome Evaluation: Pt resting, c/o back pain, gave prn pain meds, changed dsg per wound orders, will continue to monitor

## 2022-12-02 NOTE — CASE MANAGEMENT/SOCIAL WORK
Discharge Planning Assessment  [facility]     Patient Name: Anika Neri  MRN: [id number]  Today's Date: 12/2/2022    Admit Date: 11/30/2022    Plan: Pt was admitted on 11/30/22. Pt lives with daughter, Esther. Pt does not utilize home health services at this time. Pt has used [facility] at home in the past. Pt states she has WC, Hospital bed and Shower chair- via [facility]. PCP is [facility] in [place]. Pt has a POA, Lopezy. Pt does not have a living will. Pt states she would like to do short term rehab at [facility] in [place], [place] and plans to return home with her Daughter/ROMEO Cuenca. SS to follow and assist with discharge planning.   Discharge Plan     Row Name 12/02/22 1539       Plan    Plan Pt was admitted on 11/30/22. Pt lives with daughter, Esther. Pt does not utilize home health services at this time. Pt has used [facility] at home in the past. Pt states she has WC, Hospital bed and Shower chair- via [facility]. PCP is [facility] in [place]. Pt has a POA, Adam. Pt does not have a living will. Pt states she would like to do short term rehab at [facility] in [place], [place] and plans to return home with her Daughter/ROMEO Cuenca. SS to follow and assist with discharge planning.              AZEEM Mckeon

## 2022-12-03 LAB
ANION GAP SERPL CALCULATED.3IONS-SCNC: 10.6 MMOL/L (ref 5–15)
BASOPHILS # BLD AUTO: 0.05 10*3/MM3 (ref 0–0.2)
BASOPHILS NFR BLD AUTO: 0.5 % (ref 0–1.5)
BUN SERPL-MCNC: 26 MG/DL (ref 8–23)
BUN/CREAT SERPL: 26.5 (ref 7–25)
CALCIUM SPEC-SCNC: 8.8 MG/DL (ref 8.6–10.5)
CHLORIDE SERPL-SCNC: 106 MMOL/L (ref 98–107)
CO2 SERPL-SCNC: 23.4 MMOL/L (ref 22–29)
CREAT SERPL-MCNC: 0.98 MG/DL (ref 0.57–1)
DEPRECATED RDW RBC AUTO: 46.8 FL (ref 37–54)
EGFRCR SERPLBLD CKD-EPI 2021: 61.1 ML/MIN/1.73
EOSINOPHIL # BLD AUTO: 0.4 10*3/MM3 (ref 0–0.4)
EOSINOPHIL NFR BLD AUTO: 4.2 % (ref 0.3–6.2)
ERYTHROCYTE [DISTWIDTH] IN BLOOD BY AUTOMATED COUNT: 13.9 % (ref 12.3–15.4)
GLUCOSE BLDC GLUCOMTR-MCNC: 132 MG/DL (ref 70–130)
GLUCOSE BLDC GLUCOMTR-MCNC: 145 MG/DL (ref 70–130)
GLUCOSE BLDC GLUCOMTR-MCNC: 154 MG/DL (ref 70–130)
GLUCOSE BLDC GLUCOMTR-MCNC: 169 MG/DL (ref 70–130)
GLUCOSE SERPL-MCNC: 158 MG/DL (ref 65–99)
HCT VFR BLD AUTO: 34.2 % (ref 34–46.6)
HGB BLD-MCNC: 10.8 G/DL (ref 12–15.9)
IMM GRANULOCYTES # BLD AUTO: 0.03 10*3/MM3 (ref 0–0.05)
IMM GRANULOCYTES NFR BLD AUTO: 0.3 % (ref 0–0.5)
LYMPHOCYTES # BLD AUTO: 3.18 10*3/MM3 (ref 0.7–3.1)
LYMPHOCYTES NFR BLD AUTO: 33.2 % (ref 19.6–45.3)
MAGNESIUM SERPL-MCNC: 2.2 MG/DL (ref 1.6–2.4)
MCH RBC QN AUTO: 29.7 PG (ref 26.6–33)
MCHC RBC AUTO-ENTMCNC: 31.6 G/DL (ref 31.5–35.7)
MCV RBC AUTO: 94 FL (ref 79–97)
MONOCYTES # BLD AUTO: 0.57 10*3/MM3 (ref 0.1–0.9)
MONOCYTES NFR BLD AUTO: 5.9 % (ref 5–12)
NEUTROPHILS NFR BLD AUTO: 5.36 10*3/MM3 (ref 1.7–7)
NEUTROPHILS NFR BLD AUTO: 55.9 % (ref 42.7–76)
NRBC BLD AUTO-RTO: 0 /100 WBC (ref 0–0.2)
PHOSPHATE SERPL-MCNC: 3.2 MG/DL (ref 2.5–4.5)
PLATELET # BLD AUTO: 182 10*3/MM3 (ref 140–450)
PMV BLD AUTO: 10.2 FL (ref 6–12)
POTASSIUM SERPL-SCNC: 4.2 MMOL/L (ref 3.5–5.2)
RBC # BLD AUTO: 3.64 10*6/MM3 (ref 3.77–5.28)
SODIUM SERPL-SCNC: 140 MMOL/L (ref 136–145)
WBC NRBC COR # BLD: 9.59 10*3/MM3 (ref 3.4–10.8)

## 2022-12-03 PROCEDURE — 83735 ASSAY OF MAGNESIUM: CPT | Performed by: INTERNAL MEDICINE

## 2022-12-03 PROCEDURE — 25010000002 HEPARIN (PORCINE) PER 1000 UNITS: Performed by: INTERNAL MEDICINE

## 2022-12-03 PROCEDURE — 25010000002 ERTAPENEM PER 500 MG: Performed by: INTERNAL MEDICINE

## 2022-12-03 PROCEDURE — 99232 SBSQ HOSP IP/OBS MODERATE 35: CPT | Performed by: INTERNAL MEDICINE

## 2022-12-03 PROCEDURE — 80048 BASIC METABOLIC PNL TOTAL CA: CPT | Performed by: INTERNAL MEDICINE

## 2022-12-03 PROCEDURE — 84100 ASSAY OF PHOSPHORUS: CPT | Performed by: INTERNAL MEDICINE

## 2022-12-03 PROCEDURE — 63710000001 INSULIN ASPART PER 5 UNITS: Performed by: INTERNAL MEDICINE

## 2022-12-03 PROCEDURE — 82962 GLUCOSE BLOOD TEST: CPT

## 2022-12-03 PROCEDURE — 94799 UNLISTED PULMONARY SVC/PX: CPT

## 2022-12-03 PROCEDURE — 85025 COMPLETE CBC W/AUTO DIFF WBC: CPT | Performed by: INTERNAL MEDICINE

## 2022-12-03 RX ORDER — DILTIAZEM HYDROCHLORIDE 240 MG/1
240 CAPSULE, COATED, EXTENDED RELEASE ORAL
Status: DISCONTINUED | OUTPATIENT
Start: 2022-12-04 | End: 2022-12-08 | Stop reason: HOSPADM

## 2022-12-03 RX ADMIN — ERTAPENEM 1 G: 1 INJECTION INTRAMUSCULAR; INTRAVENOUS at 23:34

## 2022-12-03 RX ADMIN — HEPARIN SODIUM 5000 UNITS: 5000 INJECTION INTRAVENOUS; SUBCUTANEOUS at 20:58

## 2022-12-03 RX ADMIN — SODIUM HYPOCHLORITE: 1.25 SOLUTION TOPICAL at 20:59

## 2022-12-03 RX ADMIN — GABAPENTIN 800 MG: 400 CAPSULE ORAL at 05:21

## 2022-12-03 RX ADMIN — Medication 10 ML: at 20:59

## 2022-12-03 RX ADMIN — GABAPENTIN 800 MG: 400 CAPSULE ORAL at 21:00

## 2022-12-03 RX ADMIN — GABAPENTIN 800 MG: 400 CAPSULE ORAL at 14:30

## 2022-12-03 RX ADMIN — Medication 10 MG: at 20:58

## 2022-12-03 RX ADMIN — INSULIN ASPART 2 UNITS: 100 INJECTION, SOLUTION INTRAVENOUS; SUBCUTANEOUS at 18:20

## 2022-12-03 RX ADMIN — FAMOTIDINE 20 MG: 20 TABLET ORAL at 05:21

## 2022-12-03 RX ADMIN — HEPARIN SODIUM 5000 UNITS: 5000 INJECTION INTRAVENOUS; SUBCUTANEOUS at 09:18

## 2022-12-03 RX ADMIN — OXYCODONE HYDROCHLORIDE AND ACETAMINOPHEN 1 TABLET: 10; 325 TABLET ORAL at 20:58

## 2022-12-03 RX ADMIN — HYDROXYZINE HYDROCHLORIDE 25 MG: 25 TABLET ORAL at 05:21

## 2022-12-03 RX ADMIN — SODIUM HYPOCHLORITE: 1.25 SOLUTION TOPICAL at 09:22

## 2022-12-03 RX ADMIN — Medication 10 ML: at 09:22

## 2022-12-03 RX ADMIN — FAMOTIDINE 20 MG: 20 TABLET ORAL at 17:51

## 2022-12-03 RX ADMIN — INSULIN ASPART 2 UNITS: 100 INJECTION, SOLUTION INTRAVENOUS; SUBCUTANEOUS at 09:17

## 2022-12-03 RX ADMIN — PANTOPRAZOLE SODIUM 40 MG: 40 TABLET, DELAYED RELEASE ORAL at 05:21

## 2022-12-03 RX ADMIN — HYDROXYZINE HYDROCHLORIDE 25 MG: 25 TABLET ORAL at 20:58

## 2022-12-03 NOTE — PROGRESS NOTES
[facility] HOSPITALIST PROGRESS NOTE     Patient Identification:  Name:  Anika Neri  Age:  72 y.o.  Sex:  female  :  1950  MRN:  [id number]  Visit Number:  [id number]  ROOM: [address] [place] [address]     Primary Care Provider:  Barbara Gaona APRN     Date of Admission: 2022    Length of stay in inpatient status:  3    Subjective     Chief Compliant:    Chief Complaint   Patient presents with   • Dysuria     History of Presenting Illness: The patient states that the itching on her legs has improved; however, she still has pruritus on her posterior right leg.  She denies chest pain, trouble breathing, nausea, vomiting, and diarrhea.  She still has some back and right upper quadrant pain but it is improved.    Objective     Current Hospital Meds:  cetirizine, 10 mg, Oral, Daily  ertapenem, 1 g, Intravenous, Q24H  famotidine, 20 mg, Oral, BID AC  gabapentin, 800 mg, Oral, Q8H  heparin (porcine), 5,000 Units, Subcutaneous, Q12H  hydrOXYzine, 25 mg, Oral, Q8H  Insulin Aspart, 0-9 Units, Subcutaneous, 4x Daily AC & at Bedtime  pantoprazole, 40 mg, Oral, Q AM  sodium chloride, 10 mL, Intravenous, Q12H  sodium hypochlorite, , Topical, Q12H       Current Antimicrobial Therapy:  Anti-Infectives (From admission, onward)    Ordered     Dose/Rate Route Frequency Start Stop    22  ertapenem (INVanz) 1 g in sodium chloride 0.9 % 100 mL IVPB-VTB        Ordering Provider: Stephanie Bang DO    1 g  200 mL/hr over 30 Minutes Intravenous Every 24 Hours 22 22222 185  meropenem (MERREM) 1 g in sodium chloride 0.9 % 100 mL IVPB-VTB        Ordering Provider: Gerri Vyas DO    1 g  over 30 Minutes Intravenous Once 22 1901 22        Current Diuretic Therapy:  Diuretics (From admission, onward)    None        ----------------------------------------------------------------------------------------------------------------------  Vital Signs:  Temp:  [97.9  °F (36.6 °C)-98.8 °F (37.1 °C)] 98.2 °F (36.8 °C)  Heart Rate:  [70-91] 89  Resp:  [16-20] 16  BP: (118-150)/(65-86) 118/69  SpO2:  [94 %-97 %] 94 %  on   ;   Device (Oxygen Therapy): room air  Body mass index is 48.52 kg/m².    Wt Readings from Last 3 Encounters:   11/30/22 116 kg (256 lb 12.8 oz)   10/28/22 94.3 kg (208 lb)   10/25/22 94.3 kg (208 lb)     Intake & Output (last 3 days)       11/30 0701 12/01 0700 12/01 0701 12/02 0700 12/02 0701 12/03 0700 12/03 0701 12/04 0700    P.O. 400 1680 2160     I.V. (mL/kg) 199 (1.7)       Total Intake(mL/kg) 599 (5.2) 1680 (14.5) 2160 (18.6)     Urine (mL/kg/hr) 500 1400 (0.5) 1450 (0.5)     Total Output 500 1400 1450     Net +99 +280 +710                 Diet: Diabetic Diets; Consistent Carbohydrate; Texture: Regular Texture (IDDSI 7); Fluid Consistency: Thin (IDDSI 0)  ----------------------------------------------------------------------------------------------------------------------  Physical Exam; her exam is similar to yesterday.  However, I did not test for CVA tenderness today.  Constitutional:       General: She is awake. She is not in acute distress.     Appearance: She is well-developed. She is morbidly obese. She is not ill-appearing, toxic-appearing or diaphoretic.   Cardiovascular:      Rate and Rhythm: Normal rate and regular rhythm.      Pulses: Normal pulses.      Heart sounds: No murmur heard.  Pulmonary:      Effort: Pulmonary effort is normal. No respiratory distress.      Breath sounds: No wheezing or rales.   Abdominal:      General: Abdomen is protuberant. Bowel sounds are normal.      Tenderness: There is  minimal abdominal tenderness in the suprapubic area.   Neurological:      Mental Status: She is alert and oriented to person, place, and time. Mental status is at baseline.      Cranial Nerves: No cranial nerve deficit.   Musculoskeletal:     I do not see any obvious rashes on her legs nor do I see any excoriations.  There is no  edema.  ----------------------------------------------------------------------------------------------------------------------  Tele: None  ----------------------------------------------------------------------------------------------------------------------  LABS:    CBC and coagulation:  Results from last 7 days   Lab Units 12/03/22 0157 12/02/22 0048 12/01/22 0031 11/30/22  2020 11/30/22  1638   LACTATE mmol/L  --   --   --  1.8  --    WBC 10*3/mm3 9.59 9.73 11.01*  --  10.57   HEMOGLOBIN g/dL 10.8* 11.4* 11.9*  --  12.0   HEMATOCRIT % 34.2 35.5 37.2  --  37.2   MCV fL 94.0 94.2 93.0  --  93.0   MCHC g/dL 31.6 32.1 32.0  --  32.3   PLATELETS 10*3/mm3 182 181 172  --  191     Renal and electrolytes:  Results from last 7 days   Lab Units 12/03/22 0157 12/02/22 0048 12/01/22 0031 11/30/22  1638   SODIUM mmol/L 140 141 143 142   POTASSIUM mmol/L 4.2 3.8 3.6 3.6   MAGNESIUM mg/dL 2.2 1.5* 1.4*  --    CHLORIDE mmol/L 106 106 107 107   CO2 mmol/L 23.4 23.6 22.9 22.8   BUN mg/dL 26* 21 18 19   CREATININE mg/dL 0.98 0.93 0.80 0.93   CALCIUM mg/dL 8.8 8.9 8.7 8.7   PHOSPHORUS mg/dL 3.2 3.3  --   --    GLUCOSE mg/dL 158* 129* 151* 244*     Estimated Creatinine Clearance: 60.6 mL/min (by C-G formula based on SCr of 0.98 mg/dL).    Liver and pancreatic function:  Results from last 7 days   Lab Units 12/02/22  0048 12/01/22  0031 11/30/22  1638   ALBUMIN g/dL 3.10* 3.18* 3.18*   BILIRUBIN mg/dL 0.3 0.4 0.3   ALK PHOS U/L 98 109 118*   AST (SGOT) U/L 13 12 13   ALT (SGPT) U/L 9 8 9     Endocrine function:  Lab Results   Component Value Date    HGBA1C 7.40 (H) 12/01/2022     Point of care bedside glucose levels:  Results from last 7 days   Lab Units 12/03/22  0621 12/02/22  2057 12/02/22  1712 12/02/22  1130 12/02/22  0615 12/01/22  2112 12/01/22  1812 12/01/22  1125 12/01/22  0616 11/30/22  2247   GLUCOSE mg/dL 154* 161* 140* 175* 119 155* 187* 158* 159* 138*     Glucose levels from the [facility]:  Results from last 7 days    Lab Units 12/03/22  0157 12/02/22  0048 12/01/22  0031 11/30/22  1638   GLUCOSE mg/dL 158* 129* 151* 244*     Lab Results   Component Value Date    TSH 1.090 12/01/2022       I have personally looked at the labs and they are summarized above.    Assessment & Plan      -ESBL E. coli catheter associated urinary tract infection that was present on admission, due to a chronic indwelling Flanagan catheter that was also present on admission, in a patient with known history of ESBL E. coli UTI  -Acute hypomagnesemia  -Suspect history of chronic kidney disease stage II with baseline creatinine 0.8-0.9  -History of type 2 diabetes mellitus  -History of chronic stage IV sacral decubitus ulcer (5.5 cm x 5 cm, 1 cm in depth), which was present on admission   -History of chronic debility  -History of morbid obesity, BMI 48.52 kg/m², which complicates all aspects of care  -History of paroxysmal atrial fibrillation, not on chronic anticoagulation due to reports of postmenopausal bleeding  -History of essential hypertension  -History of CVA in the past with no residual deficits reported  -History of chronic pain and chronic pain medicine usage    Since the labs are about the same as they have been, I will not obtain the work in the morning.  Her glucose levels are controlled and thus we will continue with the low to moderate dose sliding scale NovoLog.  Her blood pressures are starting to initiate now; therefore, I will start back some of the long-acting Cardizem that she takes at home.  We will continue to monitor her blood pressures closely.  Patient is awaiting short-term rehab placement in [place], [place].    VTE Prophylaxis:   Mechanical Order History:     None      Pharmalogical Order History:      Ordered     Dose Route Frequency Stop    11/30/22 2129  heparin (porcine) 5000 UNIT/ML injection 5,000 Units         5,000 Units SC Every 12 Hours Scheduled --            Disposition:  Patient desires to be placed at a  [facility] in [place], [place]    Darell Kruger MD  [facility]  12/03/22  09:08 EST

## 2022-12-03 NOTE — PLAN OF CARE
Goal Outcome Evaluation:  Plan of Care Reviewed With: patient        Progress: no change  Outcome Evaluation: pt resting quietly this shift. prn pain medication given. will continue with poc

## 2022-12-04 LAB
GLUCOSE BLDC GLUCOMTR-MCNC: 158 MG/DL (ref 70–130)
GLUCOSE BLDC GLUCOMTR-MCNC: 159 MG/DL (ref 70–130)
GLUCOSE BLDC GLUCOMTR-MCNC: 179 MG/DL (ref 70–130)
GLUCOSE BLDC GLUCOMTR-MCNC: 188 MG/DL (ref 70–130)
QT INTERVAL: 408 MS
QTC INTERVAL: 496 MS

## 2022-12-04 PROCEDURE — 25010000002 HEPARIN (PORCINE) PER 1000 UNITS: Performed by: INTERNAL MEDICINE

## 2022-12-04 PROCEDURE — 82962 GLUCOSE BLOOD TEST: CPT

## 2022-12-04 PROCEDURE — 63710000001 INSULIN ASPART PER 5 UNITS: Performed by: INTERNAL MEDICINE

## 2022-12-04 PROCEDURE — 99232 SBSQ HOSP IP/OBS MODERATE 35: CPT | Performed by: INTERNAL MEDICINE

## 2022-12-04 PROCEDURE — 94799 UNLISTED PULMONARY SVC/PX: CPT

## 2022-12-04 PROCEDURE — 99221 1ST HOSP IP/OBS SF/LOW 40: CPT | Performed by: PHYSICIAN ASSISTANT

## 2022-12-04 PROCEDURE — 25010000002 ERTAPENEM PER 500 MG: Performed by: PHYSICIAN ASSISTANT

## 2022-12-04 RX ADMIN — HYDROXYZINE HYDROCHLORIDE 25 MG: 25 TABLET ORAL at 05:09

## 2022-12-04 RX ADMIN — INSULIN ASPART 2 UNITS: 100 INJECTION, SOLUTION INTRAVENOUS; SUBCUTANEOUS at 16:53

## 2022-12-04 RX ADMIN — HYDROXYZINE HYDROCHLORIDE 25 MG: 25 TABLET ORAL at 13:32

## 2022-12-04 RX ADMIN — ERTAPENEM 1 G: 1 INJECTION INTRAMUSCULAR; INTRAVENOUS at 21:38

## 2022-12-04 RX ADMIN — GABAPENTIN 800 MG: 400 CAPSULE ORAL at 13:32

## 2022-12-04 RX ADMIN — HYDROXYZINE HYDROCHLORIDE 25 MG: 25 TABLET ORAL at 21:07

## 2022-12-04 RX ADMIN — SODIUM HYPOCHLORITE: 1.25 SOLUTION TOPICAL at 21:08

## 2022-12-04 RX ADMIN — GABAPENTIN 800 MG: 400 CAPSULE ORAL at 05:09

## 2022-12-04 RX ADMIN — HEPARIN SODIUM 5000 UNITS: 5000 INJECTION INTRAVENOUS; SUBCUTANEOUS at 21:08

## 2022-12-04 RX ADMIN — Medication 10 ML: at 08:42

## 2022-12-04 RX ADMIN — GABAPENTIN 800 MG: 400 CAPSULE ORAL at 21:07

## 2022-12-04 RX ADMIN — CETIRIZINE HYDROCHLORIDE 10 MG: 10 TABLET, FILM COATED ORAL at 08:42

## 2022-12-04 RX ADMIN — INSULIN ASPART 2 UNITS: 100 INJECTION, SOLUTION INTRAVENOUS; SUBCUTANEOUS at 12:50

## 2022-12-04 RX ADMIN — INSULIN ASPART 2 UNITS: 100 INJECTION, SOLUTION INTRAVENOUS; SUBCUTANEOUS at 08:41

## 2022-12-04 RX ADMIN — SODIUM HYPOCHLORITE: 1.25 SOLUTION TOPICAL at 08:43

## 2022-12-04 RX ADMIN — HEPARIN SODIUM 5000 UNITS: 5000 INJECTION INTRAVENOUS; SUBCUTANEOUS at 08:42

## 2022-12-04 RX ADMIN — FAMOTIDINE 20 MG: 20 TABLET ORAL at 16:53

## 2022-12-04 RX ADMIN — Medication 10 ML: at 21:08

## 2022-12-04 RX ADMIN — OXYCODONE HYDROCHLORIDE AND ACETAMINOPHEN 1 TABLET: 10; 325 TABLET ORAL at 13:43

## 2022-12-04 RX ADMIN — Medication 10 MG: at 21:07

## 2022-12-04 RX ADMIN — FAMOTIDINE 20 MG: 20 TABLET ORAL at 08:42

## 2022-12-04 RX ADMIN — INSULIN ASPART 2 UNITS: 100 INJECTION, SOLUTION INTRAVENOUS; SUBCUTANEOUS at 21:12

## 2022-12-04 RX ADMIN — DILTIAZEM HYDROCHLORIDE 240 MG: 240 CAPSULE, COATED, EXTENDED RELEASE ORAL at 08:42

## 2022-12-04 NOTE — PLAN OF CARE
Goal Outcome Evaluation:           Progress: no change  Outcome Evaluation: Patient has done well today.  PRN pain medication given per order.  Wound care completed, tolerated well.  VSS.  Continue current plan of care.

## 2022-12-04 NOTE — CONSULTS
INFECTIOUS DISEASE CONSULTATION REPORT        Patient Identification:  Name:  Anika Neri  Age:  72 y.o.  Sex:  female  :  1950  MRN:  [id number]   Visit Number:  [id number]  Primary Care Physician:  Barbara Gaona APRN  Referring Provider: Darell Kruger MD  Reason for consult: ESBL E. coli complicated UTI with chronic indwelling Flanagan catheter       LOS: 4 days        Subjective       Subjective     History of present illness:      Thank you Dr. Kruger for allowing us to participate in the care of your patient.  As you well know, Ms. Anika Neri is a 72 y.o. female with past medical history significant for paroxysmal atrial fibrillation, debility, chronic indwelling Flanagan catheter, chronic decubitus ulcer, essential hypertension, and history of CVA, who presented to [facility] Emergency Department on 2022 for nausea and abdominal pain.    Of note, patient reports a recent history of 2 courses of antibiotics for UTIs.  She was seen at [facility] ED on 10/2/2022 at which time she was diagnosed with a UTI and prescribed cefdinir.  Urine culture subsequently finalized as ESBL E. coli and several attempts were made to contact her.  She returned to the ED on 10/25/2022 and was found to have another UTI and discharged home on Macrobid.  Urine culture at that time finalized as ESBL E. coli that was susceptible to Macrobid. She was then seen at [facility] and had a catheter change on 11/15/2022. After that time, she began to have a leaking and malodorous dark urine    Today, patient is sitting up in bed on room air in no apparent distress. Admits to lower abdominal pain and does have suprapubic tenderness.  Reports that Flanagan catheter was changed here about 3 days ago.  States that she has had a chronic indwelling Flanagan catheter for about 2 years now.  Denies fever, chills, shortness of breath, cough, nausea, vomiting, or diarrhea.  Lungs are clear to  auscultation bilaterally.  Other than suprapubic tenderness, abdomen is soft, nontender, with normal bowel sounds.  Afebrile, no diarrhea.  WBC on 12/3/2022 was normal at 9.59.  Urinalysis on 11/30/2022 was positive with WBCs too numerous to count and 4+ bacteria.  Urine culture finalized is greater than 100,000 colonies of ESBL E. coli.    Infectious Disease consultation was requested for antimicrobial management.    ---------------------------------------------------------------------------------------------------------------------     Review Of Systems:    Constitutional: no fever, chills and night sweats. No appetite change or unexpected weight change. No fatigue.  Eyes: no eye drainage, itching or redness.  HEENT: no mouth sores, dysphagia or nose bleed.  Respiratory: no for shortness of breath, cough or production of sputum.  Cardiovascular: no chest pain, no palpitations, no orthopnea.  Gastrointestinal: no nausea, vomiting or diarrhea. No abdominal pain, hematemesis or rectal bleeding.  Genitourinary: no dysuria or polyuria.  Suprapubic tenderness.  Hematologic/lymphatic: no lymph node abnormalities, no easy bruising or easy bleeding.  Musculoskeletal: no muscle or joint pain.  Skin: No rash and no itching.  Neurological: no loss of consciousness, no seizure, no headache.  Behavioral/Psych: no depression or suicidal ideation.  Endocrine: no hot flashes.  Immunologic: negative.    ---------------------------------------------------------------------------------------------------------------------     Past Medical History    Past Medical History:   Diagnosis Date    Arthritis     Atrial fibrillation (HCC)     Not on anticoagulation due to postmenopausal bleeding    Diabetes mellitus (HCC)     Gout     History of CVA (cerebrovascular accident)     residual right-sided weakness    Hyperlipidemia     Hypertension     Stage IV pressure ulcer of sacral region (HCC)        Past Surgical History    Past Surgical  History:   Procedure Laterality Date    CHOLECYSTECTOMY         Family History    Family History   Problem Relation Age of Onset    Diabetes Mother     Hypertension Father        Social History    Social History     Tobacco Use    Smoking status: Never    Smokeless tobacco: Never   Substance Use Topics    Alcohol use: No    Drug use: No       Allergies    Contrast dye  ---------------------------------------------------------------------------------------------------------------------     Home Medications:    Prior to Admission Medications       Prescriptions Last Dose Informant Patient Reported? Taking?    cefuroxime (CEFTIN) 500 MG tablet Unknown Pharmacy Yes No    Take 500 mg by mouth 2 (Two) Times a Day.    Diclofenac Sodium (VOLTAREN) 1 % gel gel Unknown Pharmacy Yes No    Apply 4 g topically to the appropriate area as directed 4 (Four) Times a Day As Needed (apply to knees and back).    dilTIAZem CD (CARDIZEM CD) 300 MG 24 hr capsule Unknown Pharmacy Yes No    Take 300 mg by mouth Daily.    gabapentin (NEURONTIN) 800 MG tablet Unknown Pharmacy Yes No    Take 800 mg by mouth Every 8 (Eight) Hours As Needed.    insulin glargine (LANTUS, SEMGLEE) 100 UNIT/ML injection Unknown Pharmacy Yes No    Inject 14-24 Units under the skin into the appropriate area as directed Daily.          ---------------------------------------------------------------------------------------------------------------------    Objective       Objective     Hospital Scheduled Meds:  cetirizine, 10 mg, Oral, Daily  dilTIAZem CD, 240 mg, Oral, Q24H  ertapenem, 1 g, Intravenous, Q24H  famotidine, 20 mg, Oral, BID AC  gabapentin, 800 mg, Oral, Q8H  heparin (porcine), 5,000 Units, Subcutaneous, Q12H  hydrOXYzine, 25 mg, Oral, Q8H  Insulin Aspart, 0-9 Units, Subcutaneous, 4x Daily AC & at Bedtime  sodium chloride, 10 mL, Intravenous, Q12H  sodium hypochlorite, , Topical, Q12H          ---------------------------------------------------------------------------------------------------------------------   Vital Signs:  Temp:  [97.5 °F (36.4 °C)-98.4 °F (36.9 °C)] 98.4 °F (36.9 °C)  Heart Rate:  [89-92] 92  Resp:  [16-20] 18  BP: (108-161)/(67-88) 123/71  Mean Arterial Pressure (Non-Invasive) for the past 24 hrs (Last 3 readings):   Noninvasive MAP (mmHg)   12/04/22 0600 92     SpO2 Percentage    12/03/22 1900 12/03/22 2300 12/04/22 0600   SpO2: 96% 96% 93%     SpO2:  [90 %-96 %] 93 %  on   ;   Device (Oxygen Therapy): room air    Body mass index is 48.52 kg/m².  Wt Readings from Last 3 Encounters:   11/30/22 116 kg (256 lb 12.8 oz)   10/28/22 94.3 kg (208 lb)   10/25/22 94.3 kg (208 lb)     ---------------------------------------------------------------------------------------------------------------------     Physical Exam:    Constitutional:  female is sitting up in bed on room air in no apparent distress.  HENT:  Head: Normocephalic and atraumatic.  Mouth:  Moist mucous membranes.    Eyes:  Conjunctivae and EOM are normal.  No scleral icterus.  Neck:  Neck supple.  No JVD present.    Cardiovascular:  Normal rate, regular rhythm and normal heart sounds with no murmur. No edema.  Pulmonary/Chest:  No respiratory distress, no wheezes, no crackles, with normal breath sounds and good air movement.  Abdominal:  Soft.  Bowel sounds are normal.  No distension and suprapubic tenderness.  Flanagan catheter in place.  Musculoskeletal:  No edema, no tenderness, and no deformity.  No swelling or redness of joints.  Neurological:  Alert and oriented to person, place, and time.  No facial droop.  No slurred speech.   Skin:  Skin is warm and dry.  No rash noted.  No pallor.   Psychiatric:  Normal mood and affect.  Behavior is normal.    ---------------------------------------------------------------------------------------------------------------------              Results from last 7 days   Lab Units  12/03/22 0157 12/02/22 0048 12/01/22 0031 11/30/22 2020   LACTATE mmol/L  --   --   --  1.8   WBC 10*3/mm3 9.59 9.73 11.01*  --    HEMOGLOBIN g/dL 10.8* 11.4* 11.9*  --    HEMATOCRIT % 34.2 35.5 37.2  --    MCV fL 94.0 94.2 93.0  --    MCHC g/dL 31.6 32.1 32.0  --    PLATELETS 10*3/mm3 182 181 172  --      Results from last 7 days   Lab Units 12/03/22 0157 12/02/22 0048 12/01/22 0031 11/30/22  1638   SODIUM mmol/L 140 141 143 142   POTASSIUM mmol/L 4.2 3.8 3.6 3.6   MAGNESIUM mg/dL 2.2 1.5* 1.4*  --    CHLORIDE mmol/L 106 106 107 107   CO2 mmol/L 23.4 23.6 22.9 22.8   BUN mg/dL 26* 21 18 19   CREATININE mg/dL 0.98 0.93 0.80 0.93   CALCIUM mg/dL 8.8 8.9 8.7 8.7   GLUCOSE mg/dL 158* 129* 151* 244*   ALBUMIN g/dL  --  3.10* 3.18* 3.18*   BILIRUBIN mg/dL  --  0.3 0.4 0.3   ALK PHOS U/L  --  98 109 118*   AST (SGOT) U/L  --  13 12 13   ALT (SGPT) U/L  --  9 8 9   Estimated Creatinine Clearance: 60.6 mL/min (by C-G formula based on SCr of 0.98 mg/dL).  No results found for: AMMONIA    Glucose   Date/Time Value Ref Range Status   12/04/2022 0617 159 (H) 70 - 130 mg/dL Final     Comment:     Meter: [id number] : [id number] OLINDA LION   12/03/2022 2053 132 (H) 70 - 130 mg/dL Final     Comment:     Meter: [id number] : [id number] JOSE MCDUFFIE   12/03/2022 1750 169 (H) 70 - 130 mg/dL Final     Comment:     Meter: [id number] : [id number] kimmie weinberg   12/03/2022 1159 145 (H) 70 - 130 mg/dL Final     Comment:     Meter: [id number] : [id number] [facility]   12/03/2022 0621 154 (H) 70 - 130 mg/dL Final     Comment:     Meter: [id number] : [id number] Jane Ramireznacho   12/02/2022 2057 161 (H) 70 - 130 mg/dL Final     Comment:     Meter: [id number] : [id number] EILEEN HARRISON   12/02/2022 1712 140 (H) 70 - 130 mg/dL Final     Comment:     Meter: [id number] : [id number] [facility]   12/02/2022 1130 175 (H) 70 - 130 mg/dL Final     Comment:     Meter: [id number] : [id number] [place]  sultana     Lab Results   Component Value Date    HGBA1C 7.40 (H) 12/01/2022     Lab Results   Component Value Date    TSH 1.090 12/01/2022       No results found for: BLOODCX  Urine Culture   Date Value Ref Range Status   11/30/2022 >100,000 CFU/mL Escherichia coli ESBL (A)  Final     Comment:       Consider infectious disease consult.  Susceptibility results may not correlate to clinical outcomes.     No results found for: WOUNDCX  No results found for: STOOLCX  No results found for: RESPCX  Pain Management Panel       Pain Management Panel Latest Ref Rng & Units 12/1/2022    AMPHETAMINES SCREEN, URINE Negative Negative    BARBITURATES SCREEN Negative Negative    BENZODIAZEPINE SCREEN, URINE Negative Negative    BUPRENORPHINEUR Negative Negative    COCAINE SCREEN, URINE Negative Negative    METHADONE SCREEN, URINE Negative Negative    METHAMPHETAMINEUR Negative Negative          I have personally reviewed the above laboratory results.   ---------------------------------------------------------------------------------------------------------------------  Imaging Results (Last 7 Days)       ** No results found for the last 168 hours. **          I have personally reviewed the above radiology results.   ---------------------------------------------------------------------------------------------------------------------      Pertinent Infectious Disease Results    Of note, patient reports a recent history of 2 courses of antibiotics for UTIs.  She was seen at [facility] ED on 10/2/2022 at which time she was diagnosed with a UTI and prescribed cefdinir.  Urine culture subsequently finalized as ESBL E. coli and several attempts were made to contact her.  She returned to the ED on 10/25/2022 and was found to have another UTI and discharged home on Macrobid.  Urine culture at that time finalized as ESBL E. coli that was susceptible to Macrobid. She was then seen at [facility] and had a catheter change on  11/15/2022. After that time, she began to have a leaking and malodorous dark urine    WBC on 12/3/2022 was normal at 9.59.  Urinalysis on 11/30/2022 was positive with WBCs too numerous to count and 4+ bacteria.  Urine culture finalized is greater than 100,000 colonies of ESBL E. coli.    Assessment & Plan      Assessment      ESBL E. coli catheter associated UTI    Plan      I examined the patient this morning and she is sitting up in bed on room air in no apparent distress. Admits to lower abdominal pain and does have suprapubic tenderness on physical exam.  Reports that Flanagan catheter was changed here about 3 days ago. States that she has had a chronic indwelling Flanagan catheter for about 2 years now. Denies fever, chills, shortness of breath, cough, nausea, vomiting, or diarrhea.  Lungs are clear to auscultation bilaterally.  Other than suprapubic tenderness, abdomen is soft, nontender, with normal bowel sounds.  Flanagan catheter remains in place.  Remains afebrile without diarrhea.  His recent white count was normal at 9.59.  Urinalysis on 11/30/2022 was positive with WBCs too numerous to count and 4+ bacteria.  Urine culture finalized is greater than 100,000 colonies of ESBL E. Coli.    As patient has a history of multiple recent UTIs with ESBL E. coli that failed treatment with oral antibiotic therapy, recommend IV Invanz for a 10-day course.  Recommend outpatient ID follow-up.    Again, thank you Dr. Kruger for allowing us to participate in the care of your patient and please feel free to call for any questions you may have.    Code Status:     Code Status and Medical Interventions:   Ordered at: 11/30/22 1955     Code Status (Patient has no pulse and is not breathing):    CPR (Attempt to Resuscitate)     Medical Interventions (Patient has pulse or is breathing):    Full Support     Jolynn Snyder PA-C  12/04/22  10:13 EST

## 2022-12-04 NOTE — PLAN OF CARE
Goal Outcome Evaluation:           Progress: no change  Outcome Evaluation: Pt resting well in bed at this time. Complaints of pain, PRN pain medication given per MAR. Will continue to monitor.

## 2022-12-05 LAB
ANION GAP SERPL CALCULATED.3IONS-SCNC: 12.2 MMOL/L (ref 5–15)
BASOPHILS # BLD AUTO: 0.06 10*3/MM3 (ref 0–0.2)
BASOPHILS NFR BLD AUTO: 0.6 % (ref 0–1.5)
BUN SERPL-MCNC: 33 MG/DL (ref 8–23)
BUN/CREAT SERPL: 34 (ref 7–25)
CALCIUM SPEC-SCNC: 9.3 MG/DL (ref 8.6–10.5)
CHLORIDE SERPL-SCNC: 102 MMOL/L (ref 98–107)
CO2 SERPL-SCNC: 22.8 MMOL/L (ref 22–29)
CREAT SERPL-MCNC: 0.97 MG/DL (ref 0.57–1)
DEPRECATED RDW RBC AUTO: 46.4 FL (ref 37–54)
EGFRCR SERPLBLD CKD-EPI 2021: 61.8 ML/MIN/1.73
EOSINOPHIL # BLD AUTO: 0.4 10*3/MM3 (ref 0–0.4)
EOSINOPHIL NFR BLD AUTO: 3.9 % (ref 0.3–6.2)
ERYTHROCYTE [DISTWIDTH] IN BLOOD BY AUTOMATED COUNT: 13.7 % (ref 12.3–15.4)
GLUCOSE BLDC GLUCOMTR-MCNC: 136 MG/DL (ref 70–130)
GLUCOSE BLDC GLUCOMTR-MCNC: 141 MG/DL (ref 70–130)
GLUCOSE BLDC GLUCOMTR-MCNC: 155 MG/DL (ref 70–130)
GLUCOSE BLDC GLUCOMTR-MCNC: 172 MG/DL (ref 70–130)
GLUCOSE SERPL-MCNC: 173 MG/DL (ref 65–99)
HCT VFR BLD AUTO: 36.4 % (ref 34–46.6)
HGB BLD-MCNC: 11.7 G/DL (ref 12–15.9)
IMM GRANULOCYTES # BLD AUTO: 0.06 10*3/MM3 (ref 0–0.05)
IMM GRANULOCYTES NFR BLD AUTO: 0.6 % (ref 0–0.5)
LYMPHOCYTES # BLD AUTO: 3.45 10*3/MM3 (ref 0.7–3.1)
LYMPHOCYTES NFR BLD AUTO: 34 % (ref 19.6–45.3)
MAGNESIUM SERPL-MCNC: 1.8 MG/DL (ref 1.6–2.4)
MCH RBC QN AUTO: 30.1 PG (ref 26.6–33)
MCHC RBC AUTO-ENTMCNC: 32.1 G/DL (ref 31.5–35.7)
MCV RBC AUTO: 93.6 FL (ref 79–97)
MONOCYTES # BLD AUTO: 0.6 10*3/MM3 (ref 0.1–0.9)
MONOCYTES NFR BLD AUTO: 5.9 % (ref 5–12)
NEUTROPHILS NFR BLD AUTO: 5.58 10*3/MM3 (ref 1.7–7)
NEUTROPHILS NFR BLD AUTO: 55 % (ref 42.7–76)
NRBC BLD AUTO-RTO: 0 /100 WBC (ref 0–0.2)
PHOSPHATE SERPL-MCNC: 3.5 MG/DL (ref 2.5–4.5)
PLATELET # BLD AUTO: 189 10*3/MM3 (ref 140–450)
PMV BLD AUTO: 10.1 FL (ref 6–12)
POTASSIUM SERPL-SCNC: 4.4 MMOL/L (ref 3.5–5.2)
RBC # BLD AUTO: 3.89 10*6/MM3 (ref 3.77–5.28)
SODIUM SERPL-SCNC: 137 MMOL/L (ref 136–145)
WBC NRBC COR # BLD: 10.15 10*3/MM3 (ref 3.4–10.8)

## 2022-12-05 PROCEDURE — 63710000001 INSULIN ASPART PER 5 UNITS: Performed by: INTERNAL MEDICINE

## 2022-12-05 PROCEDURE — 25010000002 ERTAPENEM PER 500 MG: Performed by: PHYSICIAN ASSISTANT

## 2022-12-05 PROCEDURE — 99232 SBSQ HOSP IP/OBS MODERATE 35: CPT | Performed by: INTERNAL MEDICINE

## 2022-12-05 PROCEDURE — 84100 ASSAY OF PHOSPHORUS: CPT | Performed by: INTERNAL MEDICINE

## 2022-12-05 PROCEDURE — 25010000002 HEPARIN (PORCINE) PER 1000 UNITS: Performed by: INTERNAL MEDICINE

## 2022-12-05 PROCEDURE — 85025 COMPLETE CBC W/AUTO DIFF WBC: CPT | Performed by: INTERNAL MEDICINE

## 2022-12-05 PROCEDURE — 97110 THERAPEUTIC EXERCISES: CPT

## 2022-12-05 PROCEDURE — 82962 GLUCOSE BLOOD TEST: CPT

## 2022-12-05 PROCEDURE — 97530 THERAPEUTIC ACTIVITIES: CPT

## 2022-12-05 PROCEDURE — 0 MAGNESIUM SULFATE 4 GM/100ML SOLUTION: Performed by: INTERNAL MEDICINE

## 2022-12-05 PROCEDURE — 83735 ASSAY OF MAGNESIUM: CPT | Performed by: INTERNAL MEDICINE

## 2022-12-05 PROCEDURE — 80048 BASIC METABOLIC PNL TOTAL CA: CPT | Performed by: INTERNAL MEDICINE

## 2022-12-05 RX ORDER — MAGNESIUM SULFATE HEPTAHYDRATE 40 MG/ML
4 INJECTION, SOLUTION INTRAVENOUS ONCE
Status: COMPLETED | OUTPATIENT
Start: 2022-12-05 | End: 2022-12-05

## 2022-12-05 RX ADMIN — Medication 10 ML: at 08:38

## 2022-12-05 RX ADMIN — ERTAPENEM 1 G: 1 INJECTION INTRAMUSCULAR; INTRAVENOUS at 21:35

## 2022-12-05 RX ADMIN — GABAPENTIN 800 MG: 400 CAPSULE ORAL at 13:52

## 2022-12-05 RX ADMIN — HYDROXYZINE HYDROCHLORIDE 25 MG: 25 TABLET ORAL at 13:52

## 2022-12-05 RX ADMIN — SODIUM HYPOCHLORITE: 1.25 SOLUTION TOPICAL at 20:31

## 2022-12-05 RX ADMIN — MAGNESIUM SULFATE HEPTAHYDRATE 4 G: 40 INJECTION, SOLUTION INTRAVENOUS at 04:26

## 2022-12-05 RX ADMIN — FAMOTIDINE 20 MG: 20 TABLET ORAL at 08:38

## 2022-12-05 RX ADMIN — INSULIN ASPART 2 UNITS: 100 INJECTION, SOLUTION INTRAVENOUS; SUBCUTANEOUS at 11:42

## 2022-12-05 RX ADMIN — HYDROXYZINE HYDROCHLORIDE 25 MG: 25 TABLET ORAL at 20:30

## 2022-12-05 RX ADMIN — HEPARIN SODIUM 5000 UNITS: 5000 INJECTION INTRAVENOUS; SUBCUTANEOUS at 20:30

## 2022-12-05 RX ADMIN — OXYCODONE HYDROCHLORIDE AND ACETAMINOPHEN 1 TABLET: 10; 325 TABLET ORAL at 22:19

## 2022-12-05 RX ADMIN — GABAPENTIN 800 MG: 400 CAPSULE ORAL at 21:35

## 2022-12-05 RX ADMIN — OXYCODONE HYDROCHLORIDE AND ACETAMINOPHEN 1 TABLET: 10; 325 TABLET ORAL at 11:46

## 2022-12-05 RX ADMIN — GABAPENTIN 800 MG: 400 CAPSULE ORAL at 06:01

## 2022-12-05 RX ADMIN — Medication 10 ML: at 20:31

## 2022-12-05 RX ADMIN — SODIUM HYPOCHLORITE: 1.25 SOLUTION TOPICAL at 08:39

## 2022-12-05 RX ADMIN — HEPARIN SODIUM 5000 UNITS: 5000 INJECTION INTRAVENOUS; SUBCUTANEOUS at 08:39

## 2022-12-05 RX ADMIN — DILTIAZEM HYDROCHLORIDE 240 MG: 240 CAPSULE, COATED, EXTENDED RELEASE ORAL at 08:38

## 2022-12-05 RX ADMIN — FAMOTIDINE 20 MG: 20 TABLET ORAL at 16:57

## 2022-12-05 RX ADMIN — INSULIN ASPART 2 UNITS: 100 INJECTION, SOLUTION INTRAVENOUS; SUBCUTANEOUS at 08:38

## 2022-12-05 RX ADMIN — CETIRIZINE HYDROCHLORIDE 10 MG: 10 TABLET, FILM COATED ORAL at 08:38

## 2022-12-05 NOTE — PLAN OF CARE
Goal Outcome Evaluation:           Progress: no change  Outcome Evaluation: Patient has done well today.  Tolerated wound care well.  VSS.  Continue current plan of care.

## 2022-12-05 NOTE — DISCHARGE PLACEMENT REQUEST
"Anika Gonzalez (72 y.o. Female)     Date of Birth   1950    Social Security Number       Address   [address] [place] [address]    Home Phone   [phone]    MRN   [id number]       [facility]    Marital Status                               Admission Date   11/30/22    Admission Type   Emergency    Admitting Provider   Stephanie Bang DO    Attending Provider   Ezekiel Roe MD    Department, Room/Bed   [address], 3327/       Discharge Date       Discharge Disposition       Discharge Destination                               Attending Provider: Ezekiel Roe MD    Allergies: Contrast Dye    Isolation: Contact   Infection: MRSA No Isolation this Admit (09/21/22), ESBL E coli (10/10/22)   Code Status: CPR    Ht: 154.9 cm (61\")   Wt: 116 kg (256 lb 12.8 oz)    Admission Cmt: None   Principal Problem: Urinary tract infection associated with indwelling urethral catheter, initial encounter ([facility]) [T83.511A,N39.0]                 Active Insurance as of 11/30/2022     Primary Coverage     Payor Plan Insurance Group Employer/Plan Group    HUMANA MEDICARE REPLACEMENT HUMANA MEDICARE REPLACEMENT [id number]     Payor Plan Address Payor Plan Phone Number Payor Plan Fax Number Effective Dates    [address] [address] [phone]  10/1/2022 - None Entered    Formerly [facility] [id number]       Subscriber Name Subscriber Birth Date Member ID       ANIKA GONZALEZ 1950 [id number]                 Emergency Contacts      (Rel.) Home Phone Work Phone Mobile Phone    FrantzJoellen (IRONKESHAV) (Daughter) -- -- [phone]    ParkerEsther (Daughter) [phone] -- --            Emergency Contact Information     Name Relation Home Work Mobile    Joellen Landry (ROMEO) Daughter   [phone]    Esther Parker Daughter [phone]            Insurance Information                HUMANA MEDICARE REPLACEMENT/HUMANA MEDICARE REPLACEMENT Phone: [phone]    Subscriber: Anika Gonzalez " "Subscriber#: [id number]    Group#: [id number] Precert#: --             History & Physical      Jolynn Boyce PA-C at 22     Attestation signed by Stephanie Bang DO at 22 4122    I have reviewed this documentation and agree.  Patient briefly seen and examined at bedside.  Patient was resting comfortably in bed.  Currently denies any flank pain and/or abdominal pain.  Barajas catheter is noted to have a straw-colored urine in the collection bag.    Patient with history of ESBL E. coli UTI in 2022.  Continue with empiric IV Invanz for now; if ESBL E. coli and sensitive to Invanz, could consider discharge home with midline versus arrangements at infusion clinic.                       [facility] Medicine Services  HISTORY & PHYSICAL    Patient Identification:  Name:  Anika Neri  Age:  72 y.o.  Sex:  female  :  1950  MRN:  [id number]   Visit Number:  [id number]  Admit Date: 2022   Primary Care Physician:  Barbara Gaona APRN     Subjective     Chief complaint:   Chief Complaint   Patient presents with   • Dysuria     History of presenting illness:   Patient is a 72 y.o. female with past medical history significant for paroxysmal atrial fibrillation, debility, chronic indwelling barajas catheter, chronic decubitus ulcer, essential hypertension, history of CVA,  that presented to the [facility] emergency department for evaluation of nausea and abdominal pain.     The patient states she had a catheter change on 11/15 at [facility].  She reports since this time the catheter has been leaking.  She has noticed malodorous dark urine.  She reports being on antibiotics \"the whole month of October\" for a UTI.  Upon further chart review she was diagnosed with a UTI on 10/2 in our emergency department and was started on cefdinir.  Urine culture from 10/8 grew ESBL E. coli. Several attempts were made to contact the patient to notify her of the " urine culture results.  A letter was sent to her address.  She was seen in the emergency department once again on 10/25 and was found to have another UTI.  She was discharged with Macrobid. Urine culture also grew ESBL e.coli that was susceptible to nitrofurantoin.  She admits to completing all antibiotics.  She reports when previously diagnosed with a UTI she had abdominal pain and nausea which has not resolved.  She states her abdominal pain is most severe in the suprapubic region and she has now developed right flank pain.  She denies any fever, chills, diaphoresis, emesis, diarrhea, hematuria.    In the ED the patient received, IV Merrem     Patient has been admitted to the medical/surgical floor for further evaluation and treatment.   ---------------------------------------------------------------------------------------------------------------------   Review of Systems   Constitutional: Negative for chills, diaphoresis, fatigue and fever.   HENT: Negative for congestion.    Respiratory: Negative for cough, shortness of breath and wheezing.    Cardiovascular: Negative for chest pain and palpitations.   Gastrointestinal: Positive for abdominal pain (suprapubic) and nausea. Negative for constipation, diarrhea and vomiting.   Genitourinary: Positive for flank pain (right). Negative for dysuria and hematuria.   Musculoskeletal: Positive for gait problem (unable to ambulate w/o assistance). Negative for myalgias.   Skin: Positive for wound (chronic decubitus ulcer). Negative for rash.   Neurological: Positive for weakness (chronic generalized). Negative for dizziness and light-headedness.   Psychiatric/Behavioral: Negative for confusion.      ---------------------------------------------------------------------------------------------------------------------   Past Medical History:   Diagnosis Date   • Arthritis    • Atrial fibrillation (HCC)     Not on anticoagulation due to postmenopausal bleeding   • Diabetes  mellitus (HCC)    • Gout    • History of CVA (cerebrovascular accident)     residual right-sided weakness   • Hyperlipidemia    • Hypertension    • Stage IV pressure ulcer of sacral region (HCC)      Past Surgical History:   Procedure Laterality Date   • CHOLECYSTECTOMY       Family History   Problem Relation Age of Onset   • Diabetes Mother    • Hypertension Father      Social History     Socioeconomic History   • Marital status:    Tobacco Use   • Smoking status: Never   • Smokeless tobacco: Never   Substance and Sexual Activity   • Alcohol use: No   • Drug use: No   • Sexual activity: Defer     ---------------------------------------------------------------------------------------------------------------------   Allergies:  Contrast dye  ---------------------------------------------------------------------------------------------------------------------   Medications below are reported home medications pulling from within the system; at this time, these medications have not been reconciled unless otherwise specified and are in the verification process for further verifcation as current home medications.    Prior to Admission Medications     Prescriptions Last Dose Informant Patient Reported? Taking?    Diclofenac Sodium (VOLTAREN) 1 % gel gel  Pharmacy Yes No    Apply 4 g topically to the appropriate area as directed 4 (Four) Times a Day As Needed (apply to knees and back).    dicyclomine (BENTYL) 20 MG tablet   No No    Take 1 tablet by mouth Every 8 (Eight) Hours As Needed (abd pain).    dilTIAZem CD (CARDIZEM CD) 300 MG 24 hr capsule  Pharmacy Yes No    Take 300 mg by mouth Daily.    gabapentin (NEURONTIN) 800 MG tablet   No No    Take 1 tablet by mouth 3 (Three) Times a Day for 3 days.    insulin glargine (LANTUS, SEMGLEE) 100 UNIT/ML injection  Pharmacy Yes No    Inject 14-24 Units under the skin into the appropriate area as directed Daily.    iron polysaccharides (NIFEREX) 150 MG capsule   No No     Take 1 capsule by mouth Daily.    multivitamin (THERAGRAN) tablet tablet   No No    Take 1 tablet by mouth Daily.        ---------------------------------------------------------------------------------------------------------------------    Objective     Hospital Scheduled Meds:  meropenem, 1 g, Intravenous, Once           Current listed hospital scheduled medications may not yet reflect those currently placed in orders that are signed and held, awaiting patient's arrival to floor/unit.    ---------------------------------------------------------------------------------------------------------------------   Vital Signs:  Temp:  [97.9 °F (36.6 °C)] 97.9 °F (36.6 °C)  Heart Rate:  [89] 89  Resp:  [20] 20  BP: (115-145)/(74-83) 120/74  Mean Arterial Pressure (Non-Invasive) for the past 24 hrs (Last 3 readings):   Noninvasive MAP (mmHg)   11/30/22 1801 101   11/30/22 1707 106   11/30/22 1633 87     SpO2 Percentage    11/30/22 1633 11/30/22 1707 11/30/22 1801   SpO2: 97% 98% 98%     SpO2:  [97 %-98 %] 98 %  on   ;   Device (Oxygen Therapy): room air    Body mass index is 38.04 kg/m².  Wt Readings from Last 3 Encounters:   11/30/22 94.3 kg (208 lb)   10/28/22 94.3 kg (208 lb)   10/25/22 94.3 kg (208 lb)     ---------------------------------------------------------------------------------------------------------------------   Physical Exam:  Physical Exam  Constitutional:       General: She is awake.      Appearance: Normal appearance. She is well-developed. She is morbidly obese.      Comments: NATY Johnson present at bedside during exam    HENT:      Head: Normocephalic and atraumatic.   Eyes:      General: Lids are normal.   Cardiovascular:      Rate and Rhythm: Normal rate and regular rhythm.      Pulses: Normal pulses.           Dorsalis pedis pulses are 2+ on the right side and 2+ on the left side.        Posterior tibial pulses are 2+ on the right side and 2+ on the left side.      Heart sounds: Normal heart  sounds. No murmur heard.    No friction rub. No gallop.   Pulmonary:      Effort: Pulmonary effort is normal.      Breath sounds: Normal breath sounds.   Abdominal:      Palpations: Abdomen is soft.      Tenderness: There is generalized abdominal tenderness.      Comments: Patient refused to be checked for CVA tenderness, states she is unable to roll onto her side or sit up     Genitourinary:     Comments: Flanagan catheter in place   Musculoskeletal:      Right lower leg: No edema.      Left lower leg: No edema.   Skin:     Comments: Decubitus ulceration    Neurological:      General: No focal deficit present.      Mental Status: She is alert and oriented to person, place, and time. Mental status is at baseline.   Psychiatric:         Speech: Speech normal.         Behavior: Behavior is cooperative.         Cognition and Memory: Cognition normal.       ---------------------------------------------------------------------------------------------------------------------  EKG:    Baseline EKG ordered and pending.     Telemetry:    Patient is not currently on telemetry.   ---------------------------------------------------------------------------------------------------------------------             Results from last 7 days   Lab Units 11/30/22  1638   WBC 10*3/mm3 10.57   HEMOGLOBIN g/dL 12.0   HEMATOCRIT % 37.2   MCV fL 93.0   MCHC g/dL 32.3   PLATELETS 10*3/mm3 191     Results from last 7 days   Lab Units 11/30/22  1638   SODIUM mmol/L 142   POTASSIUM mmol/L 3.6   CHLORIDE mmol/L 107   CO2 mmol/L 22.8   BUN mg/dL 19   CREATININE mg/dL 0.93   CALCIUM mg/dL 8.7   GLUCOSE mg/dL 244*   ALBUMIN g/dL 3.18*   BILIRUBIN mg/dL 0.3   ALK PHOS U/L 118*   AST (SGOT) U/L 13   ALT (SGPT) U/L 9   Estimated Creatinine Clearance: 57.7 mL/min (by C-G formula based on SCr of 0.93 mg/dL).  No results found for: AMMONIA    No results found for: HGBA1C, POCGLU  Lab Results   Component Value Date    HGBA1C 7.80 (H) 09/20/2022     Lab Results    Component Value Date    TSH 1.100 09/20/2022     Pain Management Panel    There is no flowsheet data to display.       I have personally reviewed the above laboratory results.   ---------------------------------------------------------------------------------------------------------------------  Imaging Results (Last 7 Days)     ** No results found for the last 168 hours. **        I have personally reviewed the above radiology results.     Last Echocardiogram:  Results for orders placed during the hospital encounter of 10/27/20    Adult Transthoracic Echo Complete W/ Cont if Necessary Per Protocol    Interpretation Summary  · Estimated left ventricular EF = 60% Left ventricular ejection fraction appears to be 56 - 60%. Left ventricular systolic function is normal.  · Moderate to severe aortic sclerosis without stenosis  · Mild mitral valve regurgitation is present.  · No previous study to compare  · Mild tricuspid valve regurgitation is present.  · Estimated right ventricular systolic pressure from tricuspid regurgitation is mildly elevated (35-45 mmHg).    ---------------------------------------------------------------------------------------------------------------------    Assessment & Plan      Active Hospital Problems    Diagnosis  POA   • **Urinary tract infection associated with indwelling urethral catheter, initial encounter ([facility]) [T83.511A, N39.0]  Yes     #CAUTI   #Hx of ESBL UTI   #Chronic indwelling barajas catheter   -Currently does not meet sepsis criteria; no leukocytosis; vitals unremarkable; lactate within normal limits  -UA grossly abnormal  -Previous urine cultures from 10/8 in 10/25 grew ESBL E. coli  -Patient received IV Merrem in the ED; discussed with attending, we will change antibiotic therapy to IV Invanz  -Repeat urine culture pending  -Repeat a.m. CBC  -New 18 Tajik Barajas catheter placed in the ED, draining appropriately  -Patient reports right flank pain, however, refused to be checked  for CVA tenderness on exam; as stated above currently does not meet sepsis criteria and renal function is within normal limits; may consider obtaining CT of A/P however is already receiving antibiotics for CAUTI, will discuss further with attending   -Continue to monitor closely    #Type 2 diabetes mellitus  -Obtain hemoglobin A1c  -Sliding scale insulin ordered; obtain Accu-Cheks 4 times daily before meals and nightly; titrate insulin therapy as necessary  -Hypoglycemia protocol in place    #Chronic sacral decubitus wound  #Debility   #Morbid obesity   -Wound care has been consulted  -Turn every 2 hours  -PT/OT  -Up with assistance, fall precautions  -BMI 38.04 kg/m²  -Complicates all aspects of patient care    #Paroxysmal atrial fibrillation, not on chronic anticoagulation d/t reports hx of post menopausal bleeding   -Obtain baseline EKG; patient not on telemetry while in the emergency department  -Review home medications once reconciled per pharmacy    #Essential hypertension   -Review home medications once available; plan to continue antihypertensive agents with appropriate holding parameters    #History CVA in the past   -No acute focal neurological deficits on exam  -Review home medications once available    #Chronic pain   -Review home medications once reconciled per pharmacy      F/E/N: No IV fluids.  Replace electrolytes as necessary.  Consistent carb diet.  ---------------------------------------------------  DVT Prophylaxis: Subq heparin   GI Prophylaxis: Protonix   Activity: Up with assistance, fall precautions     The patient is considered to be a high risk patient due to: CAUTI     INPATIENT status due to the need for care which can only be reasonably provided in an hospital setting such as aggressive/expedited ancillary services and/or consultation services, the necessity for IV medications, close physician monitoring and/or the possible need for procedures.  In such, I feel patient’s risk for adverse  outcomes and need for care warrant INPATIENT evaluation and predict the patient’s care encounter to likely last beyond 2 midnights.    Code Status: FULL CODE     Disposition/Discharge planning: Plan for home at discharge. Pending clinical course.     I have discussed the patient's assessment and plan with the patient and attending physician       Jolynn Boyce PA-C  Hospitalist Service -- [facility]       11/30/22  20:17 EST    Attending Physician: Stephanie Bang DO       Electronically signed by Stephanie Bang DO at 12/01/22 0722         Current Facility-Administered Medications   Medication Dose Route Frequency Provider Last Rate Last Admin   • acetaminophen (TYLENOL) tablet 650 mg  650 mg Oral Q6H PRN Jolynn Boyce PA-C   650 mg at 11/30/22 2248   • calcium carbonate (TUMS) chewable tablet 500 mg (200 mg elemental)  2 tablet Oral TID PRN Jolynn Boyce PA-C       • cetirizine (zyrTEC) tablet 10 mg  10 mg Oral Daily Darell Kruger MD   10 mg at 12/05/22 0838   • dextrose (D50W) (25 g/50 mL) IV injection 25 g  25 g Intravenous Q15 Min PRN Stephanie Bang DO       • dextrose (GLUTOSE) oral gel 15 g  15 g Oral Q15 Min PRN Stephanie Bang DO       • Diclofenac Sodium (VOLTAREN) 1 % gel 4 g  4 g Topical 4x Daily PRN Darell Kruger MD       • dilTIAZem CD (CARDIZEM CD) 24 hr capsule 240 mg  240 mg Oral Q24H Darell Kruger MD   240 mg at 12/05/22 0838   • ertapenem (INVanz) 1 g in sodium chloride 0.9 % 100 mL IVPB-VTB  1 g Intravenous Q24H Jolynn Snyder PA-C 200 mL/hr at 12/04/22 2138 1 g at 12/04/22 2138   • famotidine (PEPCID) tablet 20 mg  20 mg Oral BID AC Darell Kruger MD   20 mg at 12/05/22 0838   • gabapentin (NEURONTIN) capsule 800 mg  800 mg Oral Q8H Darell Kruger MD   800 mg at 12/05/22 1352   • glucagon (human recombinant) (GLUCAGEN DIAGNOSTIC) injection 1 mg  1 mg Intramuscular Q15 Min PRN Stephanie Bang DO       • heparin  (porcine) 5000 UNIT/ML injection 5,000 Units  5,000 Units Subcutaneous Q12H Stephanie Bang DO   5,000 Units at 22 0839   • hydrOXYzine (ATARAX) tablet 25 mg  25 mg Oral Q8H Darell Kruger MD   25 mg at 22 1352   • Insulin Aspart (novoLOG) injection 0-9 Units  0-9 Units Subcutaneous 4x Daily AC & at Bedtime Stephanie Bang DO   2 Units at 22 1142   • Magnesium Sulfate 2 gram Bolus, followed by 8 gram infusion (total Mg dose 10 grams)- Mg less than or equal to 1mg/dL  2 g Intravenous PRN Darell Kruger MD        Or   • Magnesium Sulfate 2 gram / 50mL Infusion (GIVE X 3 BAGS TO EQUAL 6GM TOTAL DOSE) - Mg 1.1 - 1.5 mg/dl  2 g Intravenous PRN Darell Kruger MD        Or   • Magnesium Sulfate 4 gram infusion- Mg 1.6-1.9 mg/dL  4 g Intravenous PRN Darell Kruger MD       • melatonin tablet 10 mg  10 mg Oral Nightly PRN Jolynn Boyce PA-C   10 mg at 22 2107   • oxyCODONE-acetaminophen (PERCOCET)  MG per tablet 1 tablet  1 tablet Oral Q6H PRN Darell Kruger MD   1 tablet at 22 1146   • sodium chloride 0.9 % flush 10 mL  10 mL Intravenous Q12H Stephanie Bang DO   10 mL at 22 0838   • sodium chloride 0.9 % flush 10 mL  10 mL Intravenous PRN Stephanie Bang DO       • sodium chloride 0.9 % infusion 40 mL  40 mL Intravenous PRN Stephanie Bang DO       • sodium hypochlorite (DAKIN'S 1/4 STRENGTH) 0.125 % topical solution 0.125% solution   Topical Q12H Darell Kruger MD   Given at 22 0839        Physician Progress Notes (most recent note)      Esha Bonilla MD at 22 0911                     PROGRESS NOTE         Patient Identification:  Name:  Anika Neri  Age:  72 y.o.  Sex:  female  :  1950  MRN:  [id number]  Visit Number:  [id number]  Primary Care Provider:  Barbara Gaona APRN         LOS: 5 days        ----------------------------------------------------------------------------------------------------------------------  Subjective       Chief Complaints:    Dysuria        Interval History:      Patient is sitting up in bed on room air in no apparent distress.  Patient continues to complain of some suprapubic tenderness and right CVA tenderness.  Otherwise, has no new complaints at this time.  Nurse reports no issues overnight.  Afebrile, no diarrhea.  WBC remains normal at 10.15.    Review of Systems:    Constitutional: no fever, chills and night sweats.  Generalized fatigue.  Eyes: no eye drainage, itching or redness.  HEENT: no mouth sores, dysphagia or nose bleed.  Respiratory: no for shortness of breath, cough or production of sputum.  Cardiovascular: no chest pain, no palpitations, no orthopnea.  Gastrointestinal: no nausea, vomiting or diarrhea. No abdominal pain, hematemesis or rectal bleeding.  Genitourinary: no dysuria or polyuria.  Suprapubic tenderness and right-sided CVA tenderness.  Hematologic/lymphatic: no lymph node abnormalities, no easy bruising or easy bleeding.  Musculoskeletal: no muscle or joint pain.  Skin: No rash and no itching.  Neurological: no loss of consciousness, no seizure, no headache.  Behavioral/Psych: no depression or suicidal ideation.  Endocrine: no hot flashes.  Immunologic: negative.    ----------------------------------------------------------------------------------------------------------------------      Objective       Current Hospital Meds:  cetirizine, 10 mg, Oral, Daily  dilTIAZem CD, 240 mg, Oral, Q24H  ertapenem, 1 g, Intravenous, Q24H  famotidine, 20 mg, Oral, BID AC  gabapentin, 800 mg, Oral, Q8H  heparin (porcine), 5,000 Units, Subcutaneous, Q12H  hydrOXYzine, 25 mg, Oral, Q8H  Insulin Aspart, 0-9 Units, Subcutaneous, 4x Daily AC & at Bedtime  sodium chloride, 10 mL, Intravenous, Q12H  sodium hypochlorite, , Topical, Q12H          ----------------------------------------------------------------------------------------------------------------------    Vital Signs:  Temp:  [97.7 °F (36.5 °C)-98.5 °F (36.9 °C)] 98.5 °F (36.9 °C)  Heart Rate:  [88-89] 89  Resp:  [16-20] 16  BP: (114-134)/(69-84) 134/84  Mean Arterial Pressure (Non-Invasive) for the past 24 hrs (Last 3 readings):   Noninvasive MAP (mmHg)   12/04/22 1400 91   12/04/22 1000 98     SpO2 Percentage    12/04/22 2300 12/05/22 0300 12/05/22 0600   SpO2: 96% 95% 92%     SpO2:  [92 %-96 %] 92 %  on   ;   Device (Oxygen Therapy): room air    Body mass index is 48.52 kg/m².  Wt Readings from Last 3 Encounters:   11/30/22 116 kg (256 lb 12.8 oz)   10/28/22 94.3 kg (208 lb)   10/25/22 94.3 kg (208 lb)        Intake/Output Summary (Last 24 hours) at 12/5/2022 0911  Last data filed at 12/5/2022 0600  Gross per 24 hour   Intake 360 ml   Output 3500 ml   Net -3140 ml     Diet: Diabetic Diets; Consistent Carbohydrate; Texture: Regular Texture (IDDSI 7); Fluid Consistency: Thin (IDDSI 0)  ----------------------------------------------------------------------------------------------------------------------      Physical Exam:    Constitutional:  female is sitting up in bed on room air in no apparent distress.  Eating breakfast and appears comfortable.  HENT:  Head: Normocephalic and atraumatic.  Mouth:  Moist mucous membranes.    Eyes:  Conjunctivae and EOM are normal.  No scleral icterus.  Neck:  Neck supple.  No JVD present.    Cardiovascular:  Normal rate, regular rhythm and normal heart sounds with no murmur. No edema.  Pulmonary/Chest:  No respiratory distress, no wheezes, no crackles, with normal breath sounds and good air movement.  Abdominal:  Soft.  Bowel sounds are normal.  No distension and suprapubic and right CVA tenderness.  Flanagan catheter in place.  Musculoskeletal:  No edema, no tenderness, and no deformity.  No swelling or redness of joints.  Neurological:  Alert and  oriented to person, place, and time.  No facial droop.  No slurred speech.   Skin:  Skin is warm and dry.  No rash noted.  No pallor.   Psychiatric:  Normal mood and affect.  Behavior is normal.    ----------------------------------------------------------------------------------------------------------------------            Results from last 7 days   Lab Units 12/05/22 0216 12/03/22 0157 12/02/22 0048 12/01/22 0031 11/30/22  2020   LACTATE mmol/L  --   --   --   --  1.8   WBC 10*3/mm3 10.15 9.59 9.73   < >  --    HEMOGLOBIN g/dL 11.7* 10.8* 11.4*   < >  --    HEMATOCRIT % 36.4 34.2 35.5   < >  --    MCV fL 93.6 94.0 94.2   < >  --    MCHC g/dL 32.1 31.6 32.1   < >  --    PLATELETS 10*3/mm3 189 182 181   < >  --     < > = values in this interval not displayed.     Results from last 7 days   Lab Units 12/05/22 0216 12/03/22 0157 12/02/22 0048 12/01/22 0031 11/30/22  1638 11/30/22  1638   SODIUM mmol/L 137 140 141 143  --  142   POTASSIUM mmol/L 4.4 4.2 3.8 3.6  --  3.6   MAGNESIUM mg/dL 1.8 2.2 1.5* 1.4*   < >  --    CHLORIDE mmol/L 102 106 106 107  --  107   CO2 mmol/L 22.8 23.4 23.6 22.9  --  22.8   BUN mg/dL 33* 26* 21 18  --  19   CREATININE mg/dL 0.97 0.98 0.93 0.80  --  0.93   CALCIUM mg/dL 9.3 8.8 8.9 8.7  --  8.7   GLUCOSE mg/dL 173* 158* 129* 151*  --  244*   ALBUMIN g/dL  --   --  3.10* 3.18*  --  3.18*   BILIRUBIN mg/dL  --   --  0.3 0.4  --  0.3   ALK PHOS U/L  --   --  98 109  --  118*   AST (SGOT) U/L  --   --  13 12  --  13   ALT (SGPT) U/L  --   --  9 8  --  9    < > = values in this interval not displayed.   Estimated Creatinine Clearance: 61.2 mL/min (by C-G formula based on SCr of 0.97 mg/dL).  No results found for: AMMONIA    Glucose   Date/Time Value Ref Range Status   12/05/2022 0724 155 (H) 70 - 130 mg/dL Final     Comment:     Meter: [id number] : [id number] Wing Van   12/04/2022 2107 179 (H) 70 - 130 mg/dL Final     Comment:     Meter: [id number] : [id number] JOSE MCDUFFIE    12/04/2022 1626 158 (H) 70 - 130 mg/dL Final     Comment:     Meter: [id number] : [id number] Jose Saucedo   12/04/2022 1046 188 (H) 70 - 130 mg/dL Final     Comment:     Meter: [id number] : [id number] Jsoe Saucedo   12/04/2022 0617 159 (H) 70 - 130 mg/dL Final     Comment:     Meter: [id number] : [id number] OLINDA LION   12/03/2022 2053 132 (H) 70 - 130 mg/dL Final     Comment:     Meter: [id number] : [id number] JOSE MCDUFFIE   12/03/2022 1750 169 (H) 70 - 130 mg/dL Final     Comment:     Meter: [id number] : [id number] kimmie weinberg   12/03/2022 1159 145 (H) 70 - 130 mg/dL Final     Comment:     Meter: [id number] : [id number] kimmie weinberg     Lab Results   Component Value Date    HGBA1C 7.40 (H) 12/01/2022     Lab Results   Component Value Date    TSH 1.090 12/01/2022       No results found for: BLOODCX  Urine Culture   Date Value Ref Range Status   11/30/2022 >100,000 CFU/mL Escherichia coli ESBL (A)  Final     Comment:       Consider infectious disease consult.  Susceptibility results may not correlate to clinical outcomes.     No results found for: WOUNDCX  No results found for: STOOLCX  No results found for: RESPCX  Pain Management Panel     Pain Management Panel Latest Ref Rng & Units 12/1/2022    AMPHETAMINES SCREEN, URINE Negative Negative    BARBITURATES SCREEN Negative Negative    BENZODIAZEPINE SCREEN, URINE Negative Negative    BUPRENORPHINEUR Negative Negative    COCAINE SCREEN, URINE Negative Negative    METHADONE SCREEN, URINE Negative Negative    METHAMPHETAMINEUR Negative Negative            ----------------------------------------------------------------------------------------------------------------------  Imaging Results (Last 24 Hours)     ** No results found for the last 24 hours. **          ------------------------------------------------------------------------------------------------    Pertinent Infectious Disease Results     Of note, patient reports a  recent history of 2 courses of antibiotics for UTIs.  She was seen at [facility] ED on 10/2/2022 at which time she was diagnosed with a UTI and prescribed cefdinir.  Urine culture subsequently finalized as ESBL E. coli and several attempts were made to contact her.  She returned to the ED on 10/25/2022 and was found to have another UTI and discharged home on Macrobid.  Urine culture at that time finalized as ESBL E. coli that was susceptible to Macrobid. She was then seen at [facility] and had a catheter change on 11/15/2022. After that time, she began to have a leaking and malodorous dark urine     Urinalysis on 11/30/2022 was positive with WBCs too numerous to count and 4+ bacteria.  Urine culture finalized is greater than 100,000 colonies of ESBL E. coli.    Assessment/Plan       ASSESSMENT:    ESBL E. coli catheter associated UTI    PLAN:    I have seen and examined the patient myself this morning with Jolynn Snyder PA-C and here are my findings:    Patient sitting up in bed on room air with no apparent distress continues to complain of some suprapubic tenderness and discomfort as well as right CVA tenderness and otherwise has no other complaints.  Nurse reported no issues overnight with no fever and no diarrhea.    Laboratory wise WBC remains normal at 10.15 and for now would recommend to continue with ertapenem for at least 7-day course especially as the patient has a complicated history of multiple recent UTIs with ESBL E. coli and failed oral outpatient antibiotic therapy.        Code Status:   Code Status and Medical Interventions:   Ordered at: 11/30/22 1955     Code Status (Patient has no pulse and is not breathing):    CPR (Attempt to Resuscitate)     Medical Interventions (Patient has pulse or is breathing):    Full Support       Jolynn Snyder PA-C  12/05/22  09:11 EST    Electronically signed by Jolynn Snyder PA-C, 12/05/22, 9:15 AM EST.    Electronically signed by Esha  Zia Bonilla MD, 22, 11:50 AM EST.      Electronically signed by Esha Bonilla MD at 22 1152          Physical Therapy Notes (most recent note)      Shavon Russell PTA at 22 1044  Version 1 of 1         Acute Care - Physical Therapy Treatment Note  RANDI Padilla     Patient Name: Anika Neri  : 1950  MRN: [id number]  Today's Date: 2022   Onset of Illness/Injury or Date of Surgery: 22 (admission date)  Visit Dx:     ICD-10-CM ICD-9-CM   1. Urinary tract infection associated with indwelling urethral catheter, initial encounter (Formerly [facility])  T83.511A 996.64    N39.0 599.0   2. History of ESBL E. coli infection  Z86.19 V12.09   3. Flanagan catheter in place on admission  Z97.8 V45.89     Patient Active Problem List   Diagnosis   • A-fib (Formerly [facility])   • Pain of left lower extremity   • Sacral decubitus ulcer   • Urinary tract infection associated with indwelling urethral catheter, initial encounter (Formerly [facility])     Past Medical History:   Diagnosis Date   • Arthritis    • Atrial fibrillation (Formerly [facility])     Not on anticoagulation due to postmenopausal bleeding   • Diabetes mellitus (Formerly [facility])    • Gout    • History of CVA (cerebrovascular accident)     residual right-sided weakness   • Hyperlipidemia    • Hypertension    • Stage IV pressure ulcer of sacral region (Formerly [facility])      Past Surgical History:   Procedure Laterality Date   • CHOLECYSTECTOMY       PT Assessment (last 12 hours)     PT Evaluation and Treatment     Row Name 22 1033          Physical Therapy Time and Intention    Subjective Information complains of;weakness;fatigue  Fear of falling  -HR     Document Type evaluation  -HR     Mode of Treatment physical therapy  -HR     Patient Effort good  -HR     Comment Pt and ALEJANDRO Saucedo in agreement for PT. Pt does well with supine exercises, but does require AAROM with most theraputic activities. Pt agrees to sit EOB, but requires extra time secondary to fear of falling and not wanting help from PTA.  Nursing aid in room throughoyut Rx. Pt requires mod/ max A x 2 for supine to sit and scooting to EOB. Pt demonstrated a posterior lean and poor sitting balance with inital sit. Pt sitting balance improved after getting her feet to the floor and improved more after standing. Pt stood x 2 with RW, and required mod/max A x 2. Bed mobility max A x 2.  -HR     Row Name 12/05/22 1033          General Information    Patient Profile Reviewed yes  -HR     Equipment Currently Used at Home hospital bed;commode, bedside;wheelchair  -HR     Row Name 12/05/22 1033          Cognition    Personal Safety Interventions fall prevention program maintained;gait belt;nonskid shoes/slippers when out of bed;supervised activity  -HR     Row Name 12/05/22 1033          Bed Mobility    Bed Mobility supine-sit;sit-supine;scooting/bridging;rolling right;rolling left  -HR     Rolling Left [place] (Bed Mobility) moderate assist (50% patient effort);maximum assist (25% patient effort);2 person assist  -HR     Rolling Right [place] (Bed Mobility) moderate assist (50% patient effort);maximum assist (25% patient effort);2 person assist  -HR     Scooting/Bridging [place] (Bed Mobility) moderate assist (50% patient effort);maximum assist (25% patient effort);2 person assist  -HR     Supine-Sit [place] (Bed Mobility) moderate assist (50% patient effort);maximum assist (25% patient effort);2 person assist  -HR     Sit-Supine [place] (Bed Mobility) moderate assist (50% patient effort);maximum assist (25% patient effort);2 person assist  -HR     Comment, (Bed Mobility) L/R rolling for draw sheet placement to pull Pt up into bed  -HR     Row Name 12/05/22 1033          Transfers    Transfers sit-stand transfer;stand-sit transfer  -HR     Row Name 12/05/22 1033          Sit-Stand Transfer    Sit-Stand [place] (Transfers) 2 person assist;moderate assist (50% patient effort);maximum assist (25% patient effort)  -HR     Assistive  Device (Sit-Stand Transfers) walker, front-wheeled  -HR     Row Name 12/05/22 1033          Stand-Sit Transfer    Stand-Sit [place] (Transfers) standby assist;contact guard;minimum assist (75% patient effort)  -HR     Assistive Device (Stand-Sit Transfers) walker, front-wheeled  -HR     Row Name 12/05/22 1033          Gait/Stairs (Locomotion)    Comment, (Gait/Stairs) unable  -HR     Row Name 12/05/22 1033          Motor Skills    Therapeutic Exercise other (see comments)  Supine: AP, Inversion/eversion, AAROM: Hip abd, Heel slide, Quad set, glut set  -HR     Row Name             Wound 09/20/22 2353 medial sacral spine    Wound - Properties Group Placement Date: 09/20/22  -KF Placement Time: 2353 -KF Orientation: medial  -KF Location: sacral spine  -KF    Retired Wound - Properties Group Placement Date: 09/20/22  -KF Placement Time: 2353 -KF Orientation: medial  -KF Location: sacral spine  -KF    Retired Wound - Properties Group Date first assessed: 09/20/22  -KF Time first assessed: 2353 -KF Location: sacral spine  -KF    Row Name 12/05/22 1033          Positioning and Restraints    Pre-Treatment Position in bed  -HR     Post Treatment Position bed  -HR     In Bed with nsg;with other staff;side rails up x2;fowlers;side lying left  -HR     Row Name 12/05/22 1033          Therapy Assessment/Plan (PT)    Rehab Potential (PT) other (see comments)  fair/guarded  -HR     Criteria for Skilled Interventions Met (PT) yes  -HR     Therapy Frequency (PT) 2 times/wk  2-5x/wk  -HR     Row Name 12/05/22 1033          Physical Therapy Goals    Transfer Goal Selection (PT) transfer, PT goal 1  -HR     Gait Training Goal Selection (PT) gait training, PT goal 1  -HR     Row Name 12/05/22 1033          Transfer Goal 1 (PT)    Activity/Assistive Device (Transfer Goal 1, PT) sit-to-stand/stand-to-sit  -HR     [place] Level/Cues Needed (Transfer Goal 1, PT) contact guard required  -HR     Time Frame (Transfer Goal 1, PT)  long term goal (LTG)  -HR     Row Name 22 1033          Gait Training Goal 1 (PT)    Activity/Assistive Device (Gait Training Goal 1, PT) gait (walking locomotion);assistive device use  -HR     [place] Level (Gait Training Goal 1, PT) minimum assist (75% or more patient effort)  -HR     Distance (Gait Training Goal 1, PT) 5'  -HR     Time Frame (Gait Training Goal 1, PT) long term goal (LTG)  -HR           User Key  (r) = Recorded By, (t) = Taken By, (c) = Cosigned By    Initials Name Provider Type    KF Seda Phipps, RN Registered Nurse    HR Shavon Russell PTA Physical Therapist Assistant                  PT Recommendation and Plan  Anticipated Discharge Disposition (PT): skilled nursing facility, sub acute care setting, inpatient rehabilitation facility, extended care facility, home with home health, home with 24/7 care, home with assist, home with supervision  Therapy Frequency (PT): 2 times/wk (2-5x/wk)          Time Calculation:    PT Charges     Row Name 22 1043             Time Calculation    Start Time 0854  -HR      PT Received On 22  -HR         Time Calculation- PT    Total Timed Code Minutes- PT 38 minute(s)  -HR            User Key  (r) = Recorded By, (t) = Taken By, (c) = Cosigned By    Initials Name Provider Type    HR Shavon Russell PTA Physical Therapist Assistant              Therapy Charges for Today     Code Description Service Date Service Provider Modifiers Qty    45649750740 HC PT THER PROC EA 15 MIN 2022 Shavon Russell PTA GP, CQ 1    18470603946 HC PT THERAPEUTIC ACT EA 15 MIN 2022 Shavon Russell PTA GP, CQ 2          PT G-Codes  AM-PAC 6 Clicks Score (PT): 7    Shavon Russell PTA  2022      Electronically signed by Shavon Russell PTA at 22 1044          Occupational Therapy Notes (most recent note)      Key Herrera, OT at 22 1434          Patient Name: Anika Neri  : 1950    MRN: [id number]                              Today's Date:  12/1/2022       Admit Date: 11/30/2022    Visit Dx:     ICD-10-CM ICD-9-CM   1. Urinary tract infection associated with indwelling urethral catheter, initial encounter (Formerly [facility])  T83.511A 996.64    N39.0 599.0   2. History of ESBL E. coli infection  Z86.19 V12.09   3. Flanagan catheter in place on admission  Z97.8 V45.89     Patient Active Problem List   Diagnosis   • A-fib (Formerly [facility])   • Pain of left lower extremity   • Sacral decubitus ulcer   • Urinary tract infection associated with indwelling urethral catheter, initial encounter (Formerly [facility])     Past Medical History:   Diagnosis Date   • Arthritis    • Atrial fibrillation (Formerly [facility])     Not on anticoagulation due to postmenopausal bleeding   • Diabetes mellitus (Formerly [facility])    • Gout    • History of CVA (cerebrovascular accident)     residual right-sided weakness   • Hyperlipidemia    • Hypertension    • Stage IV pressure ulcer of sacral region (Formerly [facility])      Past Surgical History:   Procedure Laterality Date   • CHOLECYSTECTOMY        General Information     Row Name 12/01/22 1421          OT Time and Intention    Document Type evaluation  -LA     Mode of Treatment occupational therapy  -LA     Row Name 12/01/22 1421          General Information    Patient Profile Reviewed yes  -LA     Prior Level of Function max assist:;ADL's  -LA     Existing Precautions/Restrictions fall  Patient has hx of falls at home; 3 reported recently  -LA     Barriers to Rehab previous functional deficit  Patient with fear of falling as a result of falls at home previously. Anxiety noted with moving to edge of bed, standing, returning to bed.  -LA     Row Name 12/01/22 1421          Occupational Profile    Reason for Services/Referral (Occupational Profile) OT to assess for changes in level of independence and safety with ADLs. Patient daughter reported patient has had recent decline and was hoping to get her mother in rehab in [place], [place].  -LA     Patient Goals (Occupational Profile) Go to rehab so I can take  care of myself better  -[facility] Name 12/01/22 1421          Living Environment    People in Home child(mak), adult;grandchild(mak)  -[facility] Name 12/01/22 1421          Cognition    Orientation Status (Cognition) oriented x 4  -LA     Row Name 12/01/22 1421          Safety Issues, Functional Mobility    Safety Issues Affecting Function (Mobility) safety precautions follow-through/compliance  Patient has anxiety and therefore states she cannot do stuff she is able to do. patient does well but requires encouragement. patient demonstrates ability to improve level of fx.  -LA     Impairments Affecting Function (Mobility) balance;endurance/activity tolerance;strength;pain  Patient with c/o back pain.  -LA           User Key  (r) = Recorded By, (t) = Taken By, (c) = Cosigned By    Initials Name Provider Type    Key Watkins OT Occupational Therapist                 Mobility/ADL's     [facility] Name 12/01/22 1425          Bed Mobility    Bed Mobility supine-sit  -LA     Supine-Sit [place] (Bed Mobility) modified independence  -LA     Row Name 12/01/22 1425          Transfers    Transfers sit-stand transfer  -LA     Row Name 12/01/22 1425          Sit-Stand Transfer    Sit-Stand [place] (Transfers) minimum assist (75% patient effort);2 person assist  -LA     Assistive Device (Sit-Stand Transfers) walker, front-wheeled  -LA     Row Name 12/01/22 1425          Stand-Sit Transfer    Stand-Sit [place] (Transfers) standby assist;contact guard  -LA     Assistive Device (Stand-Sit Transfers) walker, front-wheeled  -LA     Row Name 12/01/22 1425          Activities of Daily Living    BADL Assessment/Intervention bathing;upper body dressing;lower body dressing;grooming;feeding;toileting  -LA     Row Name 12/01/22 1425          Bathing Assessment/Intervention    [place] Level (Bathing) maximum assist (25% patient effort);dependent (less than 25% patient effort)  -LA     Row Name 12/01/22 1425          Upper  Body Dressing Assessment/Training    [place] Level (Upper Body Dressing) moderate assist (50% patient effort)  -LA     Row Name 12/01/22 1425          Lower Body Dressing Assessment/Training    [place] Level (Lower Body Dressing) maximum assist (25% patient effort);dependent (less than 25% patient effort)  -LA     Row Name 12/01/22 1425          Grooming Assessment/Training    [place] Level (Grooming) moderate assist (50% patient effort)  -LA     Row Name 12/01/22 1425          Self-Feeding Assessment/Training    [place] Level (Feeding) set up  -LA     Row Name 12/01/22 1425          Toileting Assessment/Training    [place] Level (Toileting) maximum assist (25% patient effort);dependent (less than 25% patient effort)  -LA           User Key  (r) = Recorded By, (t) = Taken By, (c) = Cosigned By    Initials Name Provider Type    Key Watkins OT Occupational Therapist               Obj/Interventions     Row Name 12/01/22 1426          Sensory Assessment (Somatosensory)    Sensory Assessment (Somatosensory) sensation intact  -LA     Row Name 12/01/22 1426          Vision Assessment/Intervention    Visual Impairment/Limitations WFL  -LA     Row Name 12/01/22 1426          Range of Motion Comprehensive    General Range of Motion no range of motion deficits identified  -LA     Comment, General Range of Motion UE ROM grossly WFL  -LA     Row Name 12/01/22 1426          Strength Comprehensive (MMT)    General Manual Muscle Testing (MMT) Assessment upper extremity strength deficits identified  -LAMONT Millan, General Manual Muscle Testing (MMT) Assessment Minimal UE deficit; Right shoulder 4-/5  -LA     Row Name 12/01/22 1426          Upper Extremity (Manual Muscle Testing)    Upper Extremity: Manual Muscle Testing (MMT) right shoulder strength deficit  -LA     Comment, MMT: Upper Extremity Minimal deficit right shoulder 4-/5  -LA     Row Name 12/01/22 1426          Balance    Balance  Interventions sitting;standing  -LA     Comment, Balance sitting balance unsupported EOB stand by assist  -LA           User Key  (r) = Recorded By, (t) = Taken By, (c) = Cosigned By    Initials Name Provider Type    Key Watkins OT Occupational Therapist               Goals/Plan     Row Name 12/01/22 1432          Bed Mobility Goal 1 (OT)    Activity/Assistive Device (Bed Mobility Goal 1, OT) bed mobility activities, all  -LA     [place] Level/Cues Needed (Bed Mobility Goal 1, OT) modified independence  -LA     Time Frame (Bed Mobility Goal 1, OT) by discharge  -LA     Row Name 12/01/22 1432          Transfer Goal 1 (OT)    Activity/Assistive Device (Transfer Goal 1, OT) commode, 3-in-1  -LA     [place] Level/Cues Needed (Transfer Goal 1, OT) modified independence  -LA     Time Frame (Transfer Goal 1, OT) by discharge  -LA     Row Name 12/01/22 1432          Dressing Goal 1 (OT)    Activity/Device (Dressing Goal 1, OT) dressing skills, all  -LA     [place]/Cues Needed (Dressing Goal 1, OT) minimum assist (75% or more patient effort)  -LA     Time Frame (Dressing Goal 1, OT) by discharge  -LA     Row Name 12/01/22 1432          Therapy Assessment/Plan (OT)    Planned Therapy Interventions (OT) activity tolerance training;patient/caregiver education/training;adaptive equipment training;BADL retraining;ROM/therapeutic exercise;occupation/activity based interventions;functional balance retraining;transfer/mobility retraining  -LA           User Key  (r) = Recorded By, (t) = Taken By, (c) = Cosigned By    Initials Name Provider Type    Key Watkins OT Occupational Therapist               Clinical Impression     Row Name 12/01/22 1428          Plan of Care Review    Plan of Care Reviewed With patient;daughter  spoke with daughter in [place] on phone whom stated she was trying to get patient transfered to rehab near her. OT reported she would have to speak with socal worker regarding  transportation/insurance questions.  -LA     Outcome Evaluation patient seen for OT evaluation. patient does well with motiviation and is capable to do more then she thinks but has fear of falling. Patient does require significant assistance with ADLs currently. OT to address deficits while in house to promote increased strength, ADL independence and safety with mobility/ prepare for transfers. D/C recommendation is IPR vs SNF depending on insurance/transportation. Patient does demonstrate good potiential to improve level of independence.  -LA     Row Name 12/01/22 1428          Therapy Assessment/Plan (OT)    Patient/Family Therapy Goal Statement (OT) Go to rehab near by daughter.  -LA     Rehab Potential (OT) good, to achieve stated therapy goals  -LA     Criteria for Skilled Therapeutic Interventions Met (OT) skilled treatment is necessary;meets criteria;yes  -LA     Therapy Frequency (OT) other (see comments)  PRN to follow for changes/progress toward goals.  -LA     Row Name 12/01/22 1428          Therapy Plan Review/Discharge Plan (OT)    Equipment Needs Upon Discharge (OT) --  TBD  -LA     Anticipated Discharge Disposition (OT) inpatient rehabilitation facility;skilled nursing facility  -LA     Row Name 12/01/22 1428          Positioning and Restraints    Post Treatment Position bed  -LA     In Bed supine;call light within reach;encouraged to call for assist;exit alarm on  -LA           User Key  (r) = Recorded By, (t) = Taken By, (c) = Cosigned By    Initials Name Provider Type    Key Watkins, OT Occupational Therapist               Outcome Measures    No documentation.                   OT Recommendation and Plan  Planned Therapy Interventions (OT): activity tolerance training, patient/caregiver education/training, adaptive equipment training, BADL retraining, ROM/therapeutic exercise, occupation/activity based interventions, functional balance retraining, transfer/mobility retraining  Therapy  Frequency (OT): other (see comments) (PRN to follow for changes/progress toward goals.)  Plan of Care Review  Plan of Care Reviewed With: patient, daughter (spoke with daughter in [place] on phone whom stated she was trying to get patient transfered to rehab near her. OT reported she would have to speak with socal worker regarding transportation/insurance questions.)  Outcome Evaluation: patient seen for OT evaluation. patient does well with motiviation and is capable to do more then she thinks but has fear of falling. Patient does require significant assistance with ADLs currently. OT to address deficits while in house to promote increased strength, ADL independence and safety with mobility/ prepare for transfers. D/C recommendation is IPR vs SNF depending on insurance/transportation. Patient does demonstrate good potiential to improve level of independence.     Time Calculation:    Time Calculation- OT     Row Name 12/01/22 1433             Time Calculation- OT    OT Start Time 1030  -LA            User Key  (r) = Recorded By, (t) = Taken By, (c) = Cosigned By    Initials Name Provider Type    Key Watkins OT Occupational Therapist              Therapy Charges for Today     Code Description Service Date Service Provider Modifiers Qty    [id number]  OT EVAL MOD COMPLEXITY 4 12/1/2022 Key Herrera OT GO 1               Key Herrera OT  12/1/2022    Electronically signed by Key Herrera OT at 12/01/22 1434       Speech Language Pathology Notes (most recent note)    No notes exist for this encounter.         ADL Documentation (most recent)    Flowsheet Row Most Recent Value   Transferring 4 - completely dependent   Toileting 4 - completely dependent   Bathing 3 - assistive equipment and person   Dressing 3 - assistive equipment and person   Eating 0 - independent   Communication 0 - understands/communicates without difficulty   Swallowing 0 - swallows foods/liquids without difficulty   Equipment Currently  Used at Home wheelchair, walker, rolling            Discharge Summary    No notes of this type exist for this encounter.         Discharge Order (From admission, onward)    None

## 2022-12-05 NOTE — PROGRESS NOTES
[facility] HOSPITALIST PROGRESS NOTE     Patient Identification:  Name:  Anika Neri  Age:  72 y.o.  Sex:  female  :  1950  MRN:  [id number]  Visit Number:  [id number]  ROOM: [address] [place] [address]     Primary Care Provider:  Barbara Gaona APRN    Length of stay in inpatient status:  5    Subjective     Chief Compliant:    Chief Complaint   Patient presents with   • Dysuria     History of Presenting Illness:    Patient remains ill but stable today, no acute events overnight, no new complaints, denies any fevers or chills, does still endorse some mild suprapubic tenderness, asking about her pain medications, continued on antibiotics per Infectious Disease, Social Work consulted and following and working toward placement in [place], patient plans to move closer to family thereafter.   Objective     Current Hospital Meds:  cetirizine, 10 mg, Oral, Daily  dilTIAZem CD, 240 mg, Oral, Q24H  ertapenem, 1 g, Intravenous, Q24H  famotidine, 20 mg, Oral, BID AC  gabapentin, 800 mg, Oral, Q8H  heparin (porcine), 5,000 Units, Subcutaneous, Q12H  hydrOXYzine, 25 mg, Oral, Q8H  Insulin Aspart, 0-9 Units, Subcutaneous, 4x Daily AC & at Bedtime  sodium chloride, 10 mL, Intravenous, Q12H  sodium hypochlorite, , Topical, Q12H    ----------------------------------------------------------------------------------------------------------------------  Vital Signs:  Temp:  [97.9 °F (36.6 °C)-98.5 °F (36.9 °C)] 98.2 °F (36.8 °C)  Heart Rate:  [88-90] 90  Resp:  [16-20] 18  BP: (120-134)/(72-84) 133/78  SpO2:  [92 %-96 %] 94 %  on   ;   Device (Oxygen Therapy): room air  Body mass index is 48.52 kg/m².      Intake/Output Summary (Last 24 hours) at 2022 1442  Last data filed at 2022 0600  Gross per 24 hour   Intake --   Output 2400 ml   Net -2400 ml      ----------------------------------------------------------------------------------------------------------------------  Physical exam:  Constitutional: older than stated  age, No acute distress.      HENT:  Head:  Normocephalic and atraumatic.  Mouth:  Moist mucous membranes.    Eyes:  Conjunctivae and EOM are normal. No scleral icterus.    Neck:  Neck supple.  No JVD present.    Cardiovascular:  Normal rate, regular rhythm and normal heart sounds with no murmur.  Pulmonary/Chest:  No respiratory distress, no wheezes, no crackles, on room air  Abdominal:  Soft. No distension and mild suprapubic tenderness.   Musculoskeletal:  No tenderness, and no deformity.  No red or swollen joints anywhere.    Neurological:  Alert and oriented to person, place, and time.  No cranial nerve deficit.    Skin:  Skin is warm and dry. No rash noted. No pallor.   Peripheral vascular:  No clubbing, no cyanosis, no edema.  Psychiatric: Appropriate mood and affect  : Flanagan in place    Edited by: Ezekiel Roe MD at 12/5/2022 1439  ----------------------------------------------------------------------------------------------------------------------  WBC/HGB/HCT/PLT   10.15/11.7/36.4/189 (12/05 0216)  BUN/CREAT/GLUC/ALT/AST/RODERICK/LIP    33/0.97/173/--/--/--/-- (12/05 0216)  LYTES - Na/K/Cl/CO2: 137/4.4/102/22.8 (12/05 0216)     Urine Culture   Date Value Ref Range Status   11/30/2022 >100,000 CFU/mL Escherichia coli ESBL (A)  Final     Comment:       Consider infectious disease consult.  Susceptibility results may not correlate to clinical outcomes.     No results found for: BLOODCX    I have personally looked at the labs and they are summarized above.  ----------------------------------------------------------------------------------------------------------------------  Detailed radiology reports for the last 24 hours:  No radiology results for the last day  Assessment & Plan    -ESBL E. coli catheter associated urinary tract infection that was present on admission, due to a chronic indwelling Flanagan catheter that was also present on admission, in a patient with known history of ESBL E. coli UTI  -Acute  hypomagnesemia  -Suspect history of chronic kidney disease stage II with baseline creatinine 0.8-0.9  -History of type 2 diabetes mellitus  -History of chronic stage IV sacral decubitus ulcer (5.5 cm x 5 cm, 1 cm in depth), which was present on admission   -History of chronic debility  -History of morbid obesity, BMI 48.52 kg/m², which complicates all aspects of care  -History of paroxysmal atrial fibrillation, not on chronic anticoagulation due to reports of postmenopausal bleeding  -History of essential hypertension  -History of CVA in the past with no residual deficits reported  -History of chronic pain and chronic pain medicine usage    Infectious Disease consulted and following, have on ertapenem for 10 days, barajas replaced 11/30 on admission, continued on home medications, glucose has been controlled, Social Work consulted and following and working toward placement at nursing home in [facility].    F: Oral  E: Monitor & Replace PRN  N: Diet: Diabetic Diets; Consistent Carbohydrate; Texture: Regular Texture (IDDSI 7); Fluid Consistency: Thin (IDDSI 0)  PPx: SQH   Code Status (Patient has no pulse and is not breathing): CPR (Attempt to Resuscitate)  Medical Interventions (Patient has pulse or is breathing): Full Support     Dispo: Pending clinical improvement, placement in [place]    *This patient is considered high risk secondary to ESBL Urinary Tract Infection failed outpatient therapy.    Edited by: Ezekiel Roe MD at 12/5/2022 1442  [facility] Hospitalist

## 2022-12-05 NOTE — THERAPY TREATMENT NOTE
Acute Care - Physical Therapy Treatment Note   [facility]     Patient Name: Anika Neri  : 1950  MRN: [id number]  Today's Date: 2022   Onset of Illness/Injury or Date of Surgery: 22 (admission date)  Visit Dx:     ICD-10-CM ICD-9-CM   1. Urinary tract infection associated with indwelling urethral catheter, initial encounter ([facility])  T83.511A 996.64    N39.0 599.0   2. History of ESBL E. coli infection  Z86.19 V12.09   3. Flanagan catheter in place on admission  Z97.8 V45.89     Patient Active Problem List   Diagnosis   • A-fib ([facility])   • Pain of left lower extremity   • Sacral decubitus ulcer   • Urinary tract infection associated with indwelling urethral catheter, initial encounter ([facility])     Past Medical History:   Diagnosis Date   • Arthritis    • Atrial fibrillation ([facility])     Not on anticoagulation due to postmenopausal bleeding   • Diabetes mellitus ([facility])    • Gout    • History of CVA (cerebrovascular accident)     residual right-sided weakness   • Hyperlipidemia    • Hypertension    • Stage IV pressure ulcer of sacral region ([facility])      Past Surgical History:   Procedure Laterality Date   • CHOLECYSTECTOMY       PT Assessment (last 12 hours)     PT Evaluation and Treatment     Row Name 22 1033          Physical Therapy Time and Intention    Subjective Information complains of;weakness;fatigue  Fear of falling  -HR     Document Type evaluation  -HR     Mode of Treatment physical therapy  -HR     Patient Effort good  -HR     Comment Pt and ALEJANDRO Saucedo in agreement for PT. Pt does well with supine exercises, but does require AAROM with most theraputic activities. Pt agrees to sit EOB, but requires extra time secondary to fear of falling and not wanting help from PTA. Nursing aid in room through[facility]. Pt requires mod/ max A x 2 for supine to sit and scooting to EOB. Pt demonstrated a posterior lean and poor sitting balance with inital sit. Pt sitting balance improved after getting her feet to  the floor and improved more after standing. Pt stood x 2 with RW, and required mod/max A x 2. Bed mobility max A x 2.  -HR     Row Name 12/05/22 1033          General Information    Patient Profile Reviewed yes  -HR     Equipment Currently Used at Home hospital bed;commode, bedside;wheelchair  -HR     Row Name 12/05/22 1033          Cognition    Personal Safety Interventions fall prevention program maintained;gait belt;nonskid shoes/slippers when out of bed;supervised activity  -HR     Row Name 12/05/22 1033          Bed Mobility    Bed Mobility supine-sit;sit-supine;scooting/bridging;rolling right;rolling left  -HR     Rolling Left [place] (Bed Mobility) moderate assist (50% patient effort);maximum assist (25% patient effort);2 person assist  -HR     Rolling Right [place] (Bed Mobility) moderate assist (50% patient effort);maximum assist (25% patient effort);2 person assist  -HR     Scooting/Bridging [place] (Bed Mobility) moderate assist (50% patient effort);maximum assist (25% patient effort);2 person assist  -HR     Supine-Sit [place] (Bed Mobility) moderate assist (50% patient effort);maximum assist (25% patient effort);2 person assist  -HR     Sit-Supine [place] (Bed Mobility) moderate assist (50% patient effort);maximum assist (25% patient effort);2 person assist  -HR     Comment, (Bed Mobility) L/R rolling for draw sheet placement to pull Pt up into bed  -HR     Row Name 12/05/22 1033          Transfers    Transfers sit-stand transfer;stand-sit transfer  -HR     Row Name 12/05/22 1033          Sit-Stand Transfer    Sit-Stand [place] (Transfers) 2 person assist;moderate assist (50% patient effort);maximum assist (25% patient effort)  -HR     Assistive Device (Sit-Stand Transfers) walker, front-wheeled  -HR     Row Name 12/05/22 1033          Stand-Sit Transfer    Stand-Sit [place] (Transfers) standby assist;contact guard;minimum assist (75% patient effort)  -HR     Assistive  Device (Stand-Sit Transfers) walker, front-wheeled  -HR     Row Name 12/05/22 1033          Gait/Stairs (Locomotion)    Comment, (Gait/Stairs) unable  -HR     Row Name 12/05/22 1033          Motor Skills    Therapeutic Exercise other (see comments)  Supine: AP, Inversion/eversion, AAROM: Hip abd, Heel slide, Quad set, glut set  -HR     Row Name             Wound 09/20/22 2353 medial sacral spine    Wound - Properties Group Placement Date: 09/20/22  -KF Placement Time: 2353 -KF Orientation: medial  -KF Location: sacral spine  -KF    Retired Wound - Properties Group Placement Date: 09/20/22  -KF Placement Time: 2353 -KF Orientation: medial  -KF Location: sacral spine  -KF    Retired Wound - Properties Group Date first assessed: 09/20/22 -KF Time first assessed: 2353 -KF Location: sacral spine  -KF    Row Name 12/05/22 1033          Positioning and Restraints    Pre-Treatment Position in bed  -HR     Post Treatment Position bed  -HR     In Bed with nsg;with other staff;side rails up x2;fowlers;side lying left  -HR     Row Name 12/05/22 1033          Therapy Assessment/Plan (PT)    Rehab Potential (PT) other (see comments)  fair/guarded  -HR     Criteria for Skilled Interventions Met (PT) yes  -HR     Therapy Frequency (PT) 2 times/wk  2-5x/wk  -HR     Row Name 12/05/22 1033          Physical Therapy Goals    Transfer Goal Selection (PT) transfer, PT goal 1  -HR     Gait Training Goal Selection (PT) gait training, PT goal 1  -HR     Row Name 12/05/22 1033          Transfer Goal 1 (PT)    Activity/Assistive Device (Transfer Goal 1, PT) sit-to-stand/stand-to-sit  -HR     [place] Level/Cues Needed (Transfer Goal 1, PT) contact guard required  -HR     Time Frame (Transfer Goal 1, PT) long term goal (LTG)  -HR     Row Name 12/05/22 1033          Gait Training Goal 1 (PT)    Activity/Assistive Device (Gait Training Goal 1, PT) gait (walking locomotion);assistive device use  -HR     [place] Level (Gait Training  Goal 1, PT) minimum assist (75% or more patient effort)  -HR     Distance (Gait Training Goal 1, PT) 5'  -HR     Time Frame (Gait Training Goal 1, PT) long term goal (LTG)  -HR           User Key  (r) = Recorded By, (t) = Taken By, (c) = Cosigned By    Initials Name Provider Type    Seda Zee RN Registered Nurse    HR Shavon Russell PTA Physical Therapist Assistant                  PT Recommendation and Plan  Anticipated Discharge Disposition (PT): skilled nursing facility, sub acute care setting, inpatient rehabilitation facility, extended care facility, home with home health, home with 24/7 care, home with assist, home with supervision  Therapy Frequency (PT): 2 times/wk (2-5x/wk)          Time Calculation:    PT Charges     Row Name 12/05/22 1043             Time Calculation    Start Time 0854  -HR      PT Received On 12/05/22  -HR         Time Calculation- PT    Total Timed Code Minutes- PT 38 minute(s)  -HR            User Key  (r) = Recorded By, (t) = Taken By, (c) = Cosigned By    Initials Name Provider Type    HR Shavon Russell PTA Physical Therapist Assistant              Therapy Charges for Today     Code Description Service Date Service Provider Modifiers Qty    79489457841 HC PT THER PROC EA 15 MIN 12/5/2022 Shavon Russell PTA GP, CQ 1    49784127373 HC PT THERAPEUTIC ACT EA 15 MIN 12/5/2022 Shavon Russell PTA GP, CQ 2          PT G-Codes  AM-PAC 6 Clicks Score (PT): 7    Shavon Russell PTA  12/5/2022

## 2022-12-05 NOTE — CASE MANAGEMENT/SOCIAL WORK
Discharge Planning Assessment  [facility]     Patient Name: Anika Neri  MRN: [id number]  Today's Date: 12/5/2022    Admit Date: 11/30/2022    Plan: SS contacted [facility] for Physical Rehab on this date per [facility] [phone] who states she is familiar with pt and family. She states she would like current information to be faxed. SS faxed information to [phone]. Pt lives with daughter, Esther. Pt does not utilize home health services at this time. Pt has used [facility] at home in the past. Pt states she has WC, Hospital bed and Shower chair- via [facility]. PCP is [facility] in [place]. Pt has a POA, [place]. Pt does not have a living will. Pt plans to return home with her Daughter/ROMEO Cuenca after short term rehab. SS to follow.     Discharge Plan     Row Name 12/05/22 1443       Plan    Plan SS contacted [facility] for Physical Rehab on this date per [facility] [phone] who states she is familiar with pt and family. She states she would like current information to be faxed. SS faxed information to [phone]. Pt lives with daughter, Esther. Pt does not utilize home health services at this time. Pt has used [facility] at home in the past. Pt states she has WC, Hospital bed and Shower chair- via [facility]. PCP is [facility] in [place]. Pt has a POA, [place]. Pt does not have a living will. Pt plans to return home with her Daughter/ROMEO Cuenca after short term rehab. SS to follow.                AZEEM Mckeon

## 2022-12-05 NOTE — PROGRESS NOTES
[facility] HOSPITALIST PROGRESS NOTE     Patient Identification:  Name:  Anika Neri  Age:  72 y.o.  Sex:  female  :  1950  MRN:  [id number]  Visit Number:  [id number]  ROOM: [address] [place] [address]     Primary Care Provider:  Barbara Gaona APRN     Date of Admission: 2022    Length of stay in inpatient status:  4    Subjective     Chief Compliant:    Chief Complaint   Patient presents with   • Dysuria     History of Presenting Illness:  The patient still has some abdominal pain (suprapubic area) but it has improved.  She also still endorses back pain in the right CVA area.  She denies chest pain, trouble breathing, nausea.    Objective     Current Hospital Meds:  cetirizine, 10 mg, Oral, Daily  dilTIAZem CD, 240 mg, Oral, Q24H  ertapenem, 1 g, Intravenous, Q24H  famotidine, 20 mg, Oral, BID AC  gabapentin, 800 mg, Oral, Q8H  heparin (porcine), 5,000 Units, Subcutaneous, Q12H  hydrOXYzine, 25 mg, Oral, Q8H  Insulin Aspart, 0-9 Units, Subcutaneous, 4x Daily AC & at Bedtime  sodium chloride, 10 mL, Intravenous, Q12H  sodium hypochlorite, , Topical, Q12H       Current Antimicrobial Therapy:  Anti-Infectives (From admission, onward)    Ordered     Dose/Rate Route Frequency Start Stop    22  ertapenem (INVanz) 1 g in sodium chloride 0.9 % 100 mL IVPB-VTB        Ordering Provider: Jolynn Snyder PA-C    1 g  200 mL/hr over 30 Minutes Intravenous Every 24 Hours 22 1025    22 1859  meropenem (MERREM) 1 g in sodium chloride 0.9 % 100 mL IVPB-VTB        Ordering Provider: Gerri Vyas DO    1 g  over 30 Minutes Intravenous Once 22 1901 22        Current Diuretic Therapy:  Diuretics (From admission, onward)    None        ----------------------------------------------------------------------------------------------------------------------  Vital Signs:  Temp:  [97.7 °F (36.5 °C)-98.4 °F (36.9 °C)] 97.7 °F (36.5 °C)  Heart Rate:  [89-92] 89  Resp:   [18] 18  BP: (108-131)/(67-79) 114/69  SpO2:  [93 %-96 %] 94 %  on   ;   Device (Oxygen Therapy): room air  Body mass index is 48.52 kg/m².    Wt Readings from Last 3 Encounters:   11/30/22 116 kg (256 lb 12.8 oz)   10/28/22 94.3 kg (208 lb)   10/25/22 94.3 kg (208 lb)     Intake & Output (last 3 days)       12/01 0701 12/02 0700 12/02 0701 12/03 0700 12/03 0701 12/04 0700 12/04 0701 12/05 0700    P.O. 1680 2640 1560 840    I.V. (mL/kg)        Other   1300     Total Intake(mL/kg) 1680 (14.5) 2640 (22.8) 2860 (24.7) 840 (7.2)    Urine (mL/kg/hr) 1400 (0.5) 1450 (0.5) 1100 (0.4) 1100 (0.8)    Stool   0     Total Output 1400 1450 1100 1100    Net +280 +1190 +1760 -260            Stool Unmeasured Occurrence   1 x         Diet: Diabetic Diets; Consistent Carbohydrate; Texture: Regular Texture (IDDSI 7); Fluid Consistency: Thin (IDDSI 0)  ----------------------------------------------------------------------------------------------------------------------  Physical Exam; the exam is the same as yesterday.   Constitutional:       General: She is awake. She is not in acute distress.     Appearance: She is well-developed. She is morbidly obese. She is not ill-appearing, toxic-appearing or diaphoretic.   Cardiovascular:      Rate and Rhythm: Normal rate and regular rhythm.      Pulses: Normal pulses.      Heart sounds: No murmur heard.  Pulmonary:      Effort: Pulmonary effort is normal. No respiratory distress.      Breath sounds: No wheezing or rales.   Abdominal:      General: Abdomen is protuberant. Bowel sounds are normal.      Tenderness: There is  minimal abdominal tenderness in the suprapubic area.   Neurological:      Mental Status: She is alert and oriented to person, place, and time. Mental status is at baseline.      Cranial Nerves: No cranial nerve deficit.   Musculoskeletal:     I do not see any obvious rashes on her legs nor do I see any excoriations.  There is no  edema.  ----------------------------------------------------------------------------------------------------------------------  Tele:  None  ----------------------------------------------------------------------------------------------------------------------  LABS:    CBC and coagulation:  Results from last 7 days   Lab Units 12/03/22 0157 12/02/22 0048 12/01/22 0031 11/30/22  2020 11/30/22  1638   LACTATE mmol/L  --   --   --  1.8  --    WBC 10*3/mm3 9.59 9.73 11.01*  --  10.57   HEMOGLOBIN g/dL 10.8* 11.4* 11.9*  --  12.0   HEMATOCRIT % 34.2 35.5 37.2  --  37.2   MCV fL 94.0 94.2 93.0  --  93.0   MCHC g/dL 31.6 32.1 32.0  --  32.3   PLATELETS 10*3/mm3 182 181 172  --  191     Renal and electrolytes:  Results from last 7 days   Lab Units 12/03/22 0157 12/02/22 0048 12/01/22 0031 11/30/22  1638   SODIUM mmol/L 140 141 143 142   POTASSIUM mmol/L 4.2 3.8 3.6 3.6   MAGNESIUM mg/dL 2.2 1.5* 1.4*  --    CHLORIDE mmol/L 106 106 107 107   CO2 mmol/L 23.4 23.6 22.9 22.8   BUN mg/dL 26* 21 18 19   CREATININE mg/dL 0.98 0.93 0.80 0.93   CALCIUM mg/dL 8.8 8.9 8.7 8.7   PHOSPHORUS mg/dL 3.2 3.3  --   --    GLUCOSE mg/dL 158* 129* 151* 244*     Estimated Creatinine Clearance: 60.6 mL/min (by C-G formula based on SCr of 0.98 mg/dL).    Liver and pancreatic function:  Results from last 7 days   Lab Units 12/02/22  0048 12/01/22  0031 11/30/22  1638   ALBUMIN g/dL 3.10* 3.18* 3.18*   BILIRUBIN mg/dL 0.3 0.4 0.3   ALK PHOS U/L 98 109 118*   AST (SGOT) U/L 13 12 13   ALT (SGPT) U/L 9 8 9     Endocrine function:  Lab Results   Component Value Date    HGBA1C 7.40 (H) 12/01/2022     Point of care bedside glucose levels:  Results from last 7 days   Lab Units 12/04/22  1626 12/04/22  1046 12/04/22  0617 12/03/22  2053 12/03/22  1750 12/03/22  1159 12/03/22  0621 12/02/22  2057 12/02/22  1712 12/02/22  1130 12/02/22  0615 12/01/22  2112 12/01/22  1812 12/01/22  1125   GLUCOSE mg/dL 158* 188* 159* 132* 169* 145* 154* 161* 140* 175*  119 155* 187* 158*     Glucose levels from the CMP:  Results from last 7 days   Lab Units 12/03/22  0157 12/02/22  0048 12/01/22  0031 11/30/22  1638   GLUCOSE mg/dL 158* 129* 151* 244*     Lab Results   Component Value Date    TSH 1.090 12/01/2022       I have personally looked at the labs and they are summarized above.    Assessment & Plan      -ESBL E. coli catheter associated urinary tract infection that was present on admission, due to a chronic indwelling Barajas catheter that was also present on admission, in a patient with known history of ESBL E. coli UTI  -Acute hypomagnesemia  -Suspect history of chronic kidney disease stage II with baseline creatinine 0.8-0.9  -History of type 2 diabetes mellitus  -History of chronic stage IV sacral decubitus ulcer (5.5 cm x 5 cm, 1 cm in depth), which was present on admission   -History of chronic debility  -History of morbid obesity, BMI 48.52 kg/m², which complicates all aspects of care  -History of paroxysmal atrial fibrillation, not on chronic anticoagulation due to reports of postmenopausal bleeding  -History of essential hypertension  -History of CVA in the past with no residual deficits reported  -History of chronic pain and chronic pain medicine usage     ID team has recommended 10 days of IV Invanz; will continue this as we await nursing home placement.  Please note that the blood pressures and glucose levels are at goal and stable; will continue to monitor these.  Will repeat the labs in the morning.  The chronic indwelling barajas catheter was replaced with a new 18 F barajas catheter on 11/30/2022 in our facility.      VTE Prophylaxis:   Mechanical Order History:     None      Pharmalogical Order History:      Ordered     Dose Route Frequency Stop    11/30/22 2129  heparin (porcine) 5000 UNIT/ML injection 5,000 Units         5,000 Units SC Every 12 Hours Scheduled --            Disposition: Patient desires to be placed at a nursing home in [facility]  [place]    Darell Kruger MD  [facility]  12/04/22  19:00 EST

## 2022-12-05 NOTE — PLAN OF CARE
Goal Outcome Evaluation:           Progress: no change  Outcome Evaluation: Pt resting well in bed at this time. No complaints or acute changes. VSS. Will continue to monitor.

## 2022-12-05 NOTE — PROGRESS NOTES
PROGRESS NOTE         Patient Identification:  Name:  Anika Neri  Age:  72 y.o.  Sex:  female  :  1950  MRN:  [id number]  Visit Number:  [id number]  Primary Care Provider:  Barbara Goana APRN         LOS: 5 days       ----------------------------------------------------------------------------------------------------------------------  Subjective       Chief Complaints:    Dysuria        Interval History:      Patient is sitting up in bed on room air in no apparent distress.  Patient continues to complain of some suprapubic tenderness and right CVA tenderness.  Otherwise, has no new complaints at this time.  Nurse reports no issues overnight.  Afebrile, no diarrhea.  WBC remains normal at 10.15.    Review of Systems:    Constitutional: no fever, chills and night sweats.  Generalized fatigue.  Eyes: no eye drainage, itching or redness.  HEENT: no mouth sores, dysphagia or nose bleed.  Respiratory: no for shortness of breath, cough or production of sputum.  Cardiovascular: no chest pain, no palpitations, no orthopnea.  Gastrointestinal: no nausea, vomiting or diarrhea. No abdominal pain, hematemesis or rectal bleeding.  Genitourinary: no dysuria or polyuria.  Suprapubic tenderness and right-sided CVA tenderness.  Hematologic/lymphatic: no lymph node abnormalities, no easy bruising or easy bleeding.  Musculoskeletal: no muscle or joint pain.  Skin: No rash and no itching.  Neurological: no loss of consciousness, no seizure, no headache.  Behavioral/Psych: no depression or suicidal ideation.  Endocrine: no hot flashes.  Immunologic: negative.    ----------------------------------------------------------------------------------------------------------------------      Objective       [facility] Meds:  cetirizine, 10 mg, Oral, Daily  dilTIAZem CD, 240 mg, Oral, Q24H  ertapenem, 1 g, Intravenous, Q24H  famotidine, 20 mg, Oral, BID AC  gabapentin, 800 mg, Oral, Q8H  heparin (porcine), 5,000  Units, Subcutaneous, Q12H  hydrOXYzine, 25 mg, Oral, Q8H  Insulin Aspart, 0-9 Units, Subcutaneous, 4x Daily AC & at Bedtime  sodium chloride, 10 mL, Intravenous, Q12H  sodium hypochlorite, , Topical, Q12H         ----------------------------------------------------------------------------------------------------------------------    Vital Signs:  Temp:  [97.7 °F (36.5 °C)-98.5 °F (36.9 °C)] 98.5 °F (36.9 °C)  Heart Rate:  [88-89] 89  Resp:  [16-20] 16  BP: (114-134)/(69-84) 134/84  Mean Arterial Pressure (Non-Invasive) for the past 24 hrs (Last 3 readings):   Noninvasive MAP (mmHg)   12/04/22 1400 91   12/04/22 1000 98     SpO2 Percentage    12/04/22 2300 12/05/22 0300 12/05/22 0600   SpO2: 96% 95% 92%     SpO2:  [92 %-96 %] 92 %  on   ;   Device (Oxygen Therapy): room air    Body mass index is 48.52 kg/m².  Wt Readings from Last 3 Encounters:   11/30/22 116 kg (256 lb 12.8 oz)   10/28/22 94.3 kg (208 lb)   10/25/22 94.3 kg (208 lb)        Intake/Output Summary (Last 24 hours) at 12/5/2022 0911  Last data filed at 12/5/2022 0600  Gross per 24 hour   Intake 360 ml   Output 3500 ml   Net -3140 ml     Diet: Diabetic Diets; Consistent Carbohydrate; Texture: Regular Texture (IDDSI 7); Fluid Consistency: Thin (IDDSI 0)  ----------------------------------------------------------------------------------------------------------------------      Physical Exam:    Constitutional:  female is sitting up in bed on room air in no apparent distress.  Eating breakfast and appears comfortable.  HENT:  Head: Normocephalic and atraumatic.  Mouth:  Moist mucous membranes.    Eyes:  Conjunctivae and EOM are normal.  No scleral icterus.  Neck:  Neck supple.  No JVD present.    Cardiovascular:  Normal rate, regular rhythm and normal heart sounds with no murmur. No edema.  Pulmonary/Chest:  No respiratory distress, no wheezes, no crackles, with normal breath sounds and good air movement.  Abdominal:  Soft.  Bowel sounds are normal.   No distension and suprapubic and right CVA tenderness.  Flanagan catheter in place.  Musculoskeletal:  No edema, no tenderness, and no deformity.  No swelling or redness of joints.  Neurological:  Alert and oriented to person, place, and time.  No facial droop.  No slurred speech.   Skin:  Skin is warm and dry.  No rash noted.  No pallor.   Psychiatric:  Normal mood and affect.  Behavior is normal.    ----------------------------------------------------------------------------------------------------------------------            Results from last 7 days   Lab Units 12/05/22 0216 12/03/22 0157 12/02/22 0048 12/01/22 0031 11/30/22 2020   LACTATE mmol/L  --   --   --   --  1.8   WBC 10*3/mm3 10.15 9.59 9.73   < >  --    HEMOGLOBIN g/dL 11.7* 10.8* 11.4*   < >  --    HEMATOCRIT % 36.4 34.2 35.5   < >  --    MCV fL 93.6 94.0 94.2   < >  --    MCHC g/dL 32.1 31.6 32.1   < >  --    PLATELETS 10*3/mm3 189 182 181   < >  --     < > = values in this interval not displayed.     Results from last 7 days   Lab Units 12/05/22 0216 12/03/22 0157 12/02/22 0048 12/01/22 0031 11/30/22  1638 11/30/22  1638   SODIUM mmol/L 137 140 141 143  --  142   POTASSIUM mmol/L 4.4 4.2 3.8 3.6  --  3.6   MAGNESIUM mg/dL 1.8 2.2 1.5* 1.4*   < >  --    CHLORIDE mmol/L 102 106 106 107  --  107   CO2 mmol/L 22.8 23.4 23.6 22.9  --  22.8   BUN mg/dL 33* 26* 21 18  --  19   CREATININE mg/dL 0.97 0.98 0.93 0.80  --  0.93   CALCIUM mg/dL 9.3 8.8 8.9 8.7  --  8.7   GLUCOSE mg/dL 173* 158* 129* 151*  --  244*   ALBUMIN g/dL  --   --  3.10* 3.18*  --  3.18*   BILIRUBIN mg/dL  --   --  0.3 0.4  --  0.3   ALK PHOS U/L  --   --  98 109  --  118*   AST (SGOT) U/L  --   --  13 12  --  13   ALT (SGPT) U/L  --   --  9 8  --  9    < > = values in this interval not displayed.   Estimated Creatinine Clearance: 61.2 mL/min (by C-G formula based on SCr of 0.97 mg/dL).  No results found for: AMMONIA    Glucose   Date/Time Value Ref Range Status   12/05/2022 0724  155 (H) 70 - 130 mg/dL Final     Comment:     Meter: [id number] : [id number] Wing Van   12/04/2022 2107 179 (H) 70 - 130 mg/dL Final     Comment:     Meter: [id number] : [id number] JOSE MCDUFFIE   12/04/2022 1626 158 (H) 70 - 130 mg/dL Final     Comment:     Meter: [id number] : [id number] Jose Saucedo   12/04/2022 1046 188 (H) 70 - 130 mg/dL Final     Comment:     Meter: [id number] : [id number] Jose Saucedo   12/04/2022 0617 159 (H) 70 - 130 mg/dL Final     Comment:     Meter: [id number] : [id number] OLINDA LION   12/03/2022 2053 132 (H) 70 - 130 mg/dL Final     Comment:     Meter: [id number] : [id number] JOSE MCDUFFIE   12/03/2022 1750 169 (H) 70 - 130 mg/dL Final     Comment:     Meter: [id number] : [id number] kimmie weinberg   12/03/2022 1159 145 (H) 70 - 130 mg/dL Final     Comment:     Meter: [id number] : [id number] kimmie weinberg     Lab Results   Component Value Date    HGBA1C 7.40 (H) 12/01/2022     Lab Results   Component Value Date    TSH 1.090 12/01/2022       No results found for: BLOODCX  Urine Culture   Date Value Ref Range Status   11/30/2022 >100,000 CFU/mL Escherichia coli ESBL (A)  Final     Comment:       Consider infectious disease consult.  Susceptibility results may not correlate to clinical outcomes.     No results found for: WOUNDCX  No results found for: STOOLCX  No results found for: RESPCX  Pain Management Panel     Pain Management Panel Latest Ref Rng & Units 12/1/2022    AMPHETAMINES SCREEN, URINE Negative Negative    BARBITURATES SCREEN Negative Negative    BENZODIAZEPINE SCREEN, URINE Negative Negative    BUPRENORPHINEUR Negative Negative    COCAINE SCREEN, URINE Negative Negative    METHADONE SCREEN, URINE Negative Negative    METHAMPHETAMINEUR Negative Negative            ----------------------------------------------------------------------------------------------------------------------  Imaging Results (Last 24 Hours)     ** No results found  for the last 24 hours. **          ------------------------------------------------------------------------------------------------    Pertinent Infectious Disease Results     Of note, patient reports a recent history of 2 courses of antibiotics for UTIs.  She was seen at [facility] ED on 10/2/2022 at which time she was diagnosed with a UTI and prescribed cefdinir.  Urine culture subsequently finalized as ESBL E. coli and several attempts were made to contact her.  She returned to the ED on 10/25/2022 and was found to have another UTI and discharged home on Macrobid.  Urine culture at that time finalized as ESBL E. coli that was susceptible to Macrobid. She was then seen at [facility] and had a catheter change on 11/15/2022. After that time, she began to have a leaking and malodorous dark urine     Urinalysis on 11/30/2022 was positive with WBCs too numerous to count and 4+ bacteria.  Urine culture finalized is greater than 100,000 colonies of ESBL E. coli.    Assessment/Plan       ASSESSMENT:    ESBL E. coli catheter associated UTI    PLAN:    I have seen and examined the patient myself this morning with Jolynn Snyder PA-C and here are my findings:    Patient sitting up in bed on room air with no apparent distress continues to complain of some suprapubic tenderness and discomfort as well as right CVA tenderness and otherwise has no other complaints.  Nurse reported no issues overnight with no fever and no diarrhea.    Laboratory wise WBC remains normal at 10.15 and for now would recommend to continue with ertapenem for at least 7-day course especially as the patient has a complicated history of multiple recent UTIs with ESBL E. coli and failed oral outpatient antibiotic therapy.        Code Status:   Code Status and Medical Interventions:   Ordered at: 11/30/22 1955     Code Status (Patient has no pulse and is not breathing):    CPR (Attempt to Resuscitate)     Medical Interventions (Patient  has pulse or is breathing):    Full Support       Jolynn Snyder PA-C  12/05/22  09:11 EST    Electronically signed by Jolynn Snyder PA-C, 12/05/22, 9:15 AM EST.    Electronically signed by Esha Bonilla MD, 12/05/22, 11:50 AM EST.

## 2022-12-06 LAB
ANION GAP SERPL CALCULATED.3IONS-SCNC: 11.4 MMOL/L (ref 5–15)
BUN SERPL-MCNC: 30 MG/DL (ref 8–23)
BUN/CREAT SERPL: 31.3 (ref 7–25)
CALCIUM SPEC-SCNC: 9.1 MG/DL (ref 8.6–10.5)
CHLORIDE SERPL-SCNC: 101 MMOL/L (ref 98–107)
CO2 SERPL-SCNC: 23.6 MMOL/L (ref 22–29)
CREAT SERPL-MCNC: 0.96 MG/DL (ref 0.57–1)
DEPRECATED RDW RBC AUTO: 47.2 FL (ref 37–54)
EGFRCR SERPLBLD CKD-EPI 2021: 62.6 ML/MIN/1.73
ERYTHROCYTE [DISTWIDTH] IN BLOOD BY AUTOMATED COUNT: 13.7 % (ref 12.3–15.4)
GLUCOSE BLDC GLUCOMTR-MCNC: 108 MG/DL (ref 70–130)
GLUCOSE BLDC GLUCOMTR-MCNC: 147 MG/DL (ref 70–130)
GLUCOSE BLDC GLUCOMTR-MCNC: 150 MG/DL (ref 70–130)
GLUCOSE BLDC GLUCOMTR-MCNC: 159 MG/DL (ref 70–130)
GLUCOSE SERPL-MCNC: 170 MG/DL (ref 65–99)
HCT VFR BLD AUTO: 36.9 % (ref 34–46.6)
HGB BLD-MCNC: 11.8 G/DL (ref 12–15.9)
MAGNESIUM SERPL-MCNC: 2.2 MG/DL (ref 1.6–2.4)
MCH RBC QN AUTO: 29.6 PG (ref 26.6–33)
MCHC RBC AUTO-ENTMCNC: 32 G/DL (ref 31.5–35.7)
MCV RBC AUTO: 92.7 FL (ref 79–97)
PLATELET # BLD AUTO: 198 10*3/MM3 (ref 140–450)
PMV BLD AUTO: 10.1 FL (ref 6–12)
POTASSIUM SERPL-SCNC: 4.4 MMOL/L (ref 3.5–5.2)
RBC # BLD AUTO: 3.98 10*6/MM3 (ref 3.77–5.28)
SODIUM SERPL-SCNC: 136 MMOL/L (ref 136–145)
WBC NRBC COR # BLD: 8.54 10*3/MM3 (ref 3.4–10.8)

## 2022-12-06 PROCEDURE — 82962 GLUCOSE BLOOD TEST: CPT

## 2022-12-06 PROCEDURE — 99232 SBSQ HOSP IP/OBS MODERATE 35: CPT | Performed by: INTERNAL MEDICINE

## 2022-12-06 PROCEDURE — 83735 ASSAY OF MAGNESIUM: CPT | Performed by: INTERNAL MEDICINE

## 2022-12-06 PROCEDURE — 63710000001 INSULIN ASPART PER 5 UNITS: Performed by: INTERNAL MEDICINE

## 2022-12-06 PROCEDURE — 99232 SBSQ HOSP IP/OBS MODERATE 35: CPT | Performed by: PHYSICIAN ASSISTANT

## 2022-12-06 PROCEDURE — 25010000002 HEPARIN (PORCINE) PER 1000 UNITS: Performed by: INTERNAL MEDICINE

## 2022-12-06 PROCEDURE — 85027 COMPLETE CBC AUTOMATED: CPT | Performed by: INTERNAL MEDICINE

## 2022-12-06 PROCEDURE — 80048 BASIC METABOLIC PNL TOTAL CA: CPT | Performed by: INTERNAL MEDICINE

## 2022-12-06 PROCEDURE — 25010000002 ERTAPENEM PER 500 MG: Performed by: PHYSICIAN ASSISTANT

## 2022-12-06 PROCEDURE — 99497 ADVNCD CARE PLAN 30 MIN: CPT | Performed by: INTERNAL MEDICINE

## 2022-12-06 RX ADMIN — Medication 10 ML: at 08:14

## 2022-12-06 RX ADMIN — FAMOTIDINE 20 MG: 20 TABLET ORAL at 16:56

## 2022-12-06 RX ADMIN — GABAPENTIN 800 MG: 400 CAPSULE ORAL at 04:50

## 2022-12-06 RX ADMIN — HEPARIN SODIUM 5000 UNITS: 5000 INJECTION INTRAVENOUS; SUBCUTANEOUS at 21:01

## 2022-12-06 RX ADMIN — CETIRIZINE HYDROCHLORIDE 10 MG: 10 TABLET, FILM COATED ORAL at 08:14

## 2022-12-06 RX ADMIN — OXYCODONE HYDROCHLORIDE AND ACETAMINOPHEN 1 TABLET: 10; 325 TABLET ORAL at 22:16

## 2022-12-06 RX ADMIN — ERTAPENEM 1 G: 1 INJECTION INTRAMUSCULAR; INTRAVENOUS at 22:08

## 2022-12-06 RX ADMIN — DILTIAZEM HYDROCHLORIDE 240 MG: 240 CAPSULE, COATED, EXTENDED RELEASE ORAL at 08:14

## 2022-12-06 RX ADMIN — SODIUM HYPOCHLORITE: 1.25 SOLUTION TOPICAL at 08:14

## 2022-12-06 RX ADMIN — INSULIN ASPART 2 UNITS: 100 INJECTION, SOLUTION INTRAVENOUS; SUBCUTANEOUS at 16:56

## 2022-12-06 RX ADMIN — HYDROXYZINE HYDROCHLORIDE 25 MG: 25 TABLET ORAL at 13:44

## 2022-12-06 RX ADMIN — HYDROXYZINE HYDROCHLORIDE 25 MG: 25 TABLET ORAL at 04:50

## 2022-12-06 RX ADMIN — INSULIN ASPART 2 UNITS: 100 INJECTION, SOLUTION INTRAVENOUS; SUBCUTANEOUS at 11:16

## 2022-12-06 RX ADMIN — SODIUM HYPOCHLORITE: 1.25 SOLUTION TOPICAL at 21:01

## 2022-12-06 RX ADMIN — HYDROXYZINE HYDROCHLORIDE 25 MG: 25 TABLET ORAL at 21:01

## 2022-12-06 RX ADMIN — GABAPENTIN 800 MG: 400 CAPSULE ORAL at 13:44

## 2022-12-06 RX ADMIN — GABAPENTIN 800 MG: 400 CAPSULE ORAL at 22:08

## 2022-12-06 RX ADMIN — Medication 10 ML: at 21:01

## 2022-12-06 RX ADMIN — FAMOTIDINE 20 MG: 20 TABLET ORAL at 08:14

## 2022-12-06 RX ADMIN — HEPARIN SODIUM 5000 UNITS: 5000 INJECTION INTRAVENOUS; SUBCUTANEOUS at 08:14

## 2022-12-06 NOTE — PLAN OF CARE
Consult Notes on Referred Patient        Dr. Alla Wilde MD  [address] [place] [address]       RE: Erika Reina  : 1982  OBEY: 18    HPI:  Erika Reina is 36 year old with stage 1A, grade 1 endometrial adenocarcinoma. She is accompanied today by her mother and sister.  She is doing well post-operatively.  She is eating and drinking well.  Normal bowel and bladder function.  Minimal pain.  She has had some mild vaginal bleeding.  Denies issues with her ports with the exception that the left sided one is slightly more painful.  She has not noted any menopausal symptoms of hot flashes or night sweats.    Cancer Course:    She notes she has had abnormal bleeding since her very first menses with very irregular cycles.  Recently, however she has noted a major change in the amount of bleeding with a significant increase.  Due to this she was placed on Metformin without much improvement in her bleeding and thus had the following work up.    18:  US Pelvis:  Uterus measures 7.3 x 4.9 x 5.5 cm, EMS 26.3 mm, normal ovaries    18:  EMB:  Grade 1 endometrial adenocarcinoma, MMR intact    18:  Robotic hysterectomy, bilateral salpingo-oophorectomy, bilateral pelvic sentinel lymph node dissection, cystoscopy   Pathology:  Stage 1A, grade 1 endometrial adenocarcinoma, tumor size 4.9 cm, no myometrial invasion, no LVSI, 0/11 sentinel LN    Review of Systems:  Systemic           no weight changes; no fever; no chills; no night sweats; no appetite changes; + fatigue  Skin           no rashes, or lesions  Eye           no irritation; no changes in vision  Dilcia-Laryngeal           no dysphagia; no hoarseness   Pulmonary    no cough; no shortness of breath  Cardiovascular    no chest pain; no palpitations  Gastrointestinal    no diarrhea; no constipation; + mild abdominal pain; no changes in bowel  habits; no blood in stool  Genitourinary   no urinary frequency; no urinary urgency;  Goal Outcome Evaluation:              Outcome Evaluation: patient has rested well tonight. prn medication given per request. no distress noted.   no dysuria; no pain; + abnormal vaginal discharge; no abnormal vaginal bleeding  Breast    no breast discharge; no breast changes; no breast pain  Musculoskeletal    no myalgias; no arthralgias; no back pain  Psychiatric           no depressed mood; no anxiety    Hematologic            no tender lymph nodes; no noticeable swellings or lumps   Endocrine    no hot flashes; no heat/cold intolerance         Neurological   no tremor; + numbness and tingling in right thigh-improving; no headaches; no difficulty sleeping    Obstetrics and Gynecology History:  G0  Had always thought she would have children but this diagnosis has made her consider that in depth.  She currently has no partner.      Past Medical History:  Past Medical History:   Diagnosis Date     Endometrial cancer (H) 12/19/2018     Hyperlipidemia LDL goal <160 9/15/2011       Past Surgical History:  Past Surgical History:   Procedure Laterality Date     ARTHROPLASTY KNEE       CYSTOSCOPY N/A 12/13/2018    Procedure: Cystoscopy;  Surgeon: Cleopatra Altamirano MD;  Location:  OR     DAVINCI HYSTERECTOMY TOTAL, BILATERAL SALPINGO-OOPHORECTOMY, NODE DISSECTION, COMBINED N/A 12/13/2018    Procedure: DaVinci Assisted Removal Of Uterus, Cervix, Both Ovaries And Fallopian Tubes, Bilateral [place] Lymph Node Dissection;  Surgeon: Cleopatra Altamirano MD;  Location:  OR       Health Maintenance:  Last Pap Smear: No need for further pap smear exams  She has not had a history of abnormal Pap smears.    Last Mammogram: N/A    Last Colonoscopy: N/A      Current Medications:   has a current medication list which includes the following prescription(s): acetaminophen, multivitamin w/minerals, and cholecalciferol.     Allergies:   Ibuprofen      Social History:  Social History     Socioeconomic History     Marital status: Single     Spouse name: Not on file     Number of children: Not on file     Years of education: Not on file     Highest education level: Not  "on file   Social Needs     Financial resource strain: Not on file     Food insecurity - worry: Not on file     Food insecurity - inability: Not on file     Transportation needs - medical: Not on file     Transportation needs - non-medical: Not on file   Occupational History     Not on file   Tobacco Use     Smoking status: Never Smoker     Smokeless tobacco: Never Used   Substance and Sexual Activity     Alcohol use: Yes     Alcohol/week: 0.0 oz     Comment: rarely     Drug use: No     Sexual activity: Yes     Partners: Male     Birth control/protection: Condom   Other Topics Concern     Parent/sibling w/ CABG, MI or angioplasty before 65F 55M? No   Social History Narrative     Not on file       Lives with mother, father and sister, feels safe at home.  Works as .  Enjoys crafts, spending time with friends.  Does not have an advanced directive on file and would like her mother to be her POA.  Full code.    Family History:   The patient's family history is significant for.  Family History   Problem Relation Age of Onset     Diabetes Mother      Diabetes Father      Cancer - colorectal Maternal Grandmother      Heart Disease Paternal Grandmother          Physical Exam:   /83   Pulse 99   Temp 97.5  F (36.4  C) (Oral)   Resp 18   Ht 1.753 m (5' 9\")   Wt (!) 154.2 kg (340 lb)   SpO2 98%   BMI 50.21 kg/m    Body mass index is 50.21 kg/m .    General Appearance: healthy and alert, no distress     Gastrointestinal:       abdomen soft, non-tender, non-distended, no organomegaly or masses, port sites without erythema/induration or drainage    Genitourinary: External genitalia and urethral meatus appears normal.  Vagina with a well healing vaginal cuff      Assessment:    Erika Reina is a 36 year old woman with a diagnosis of stage 1A, grade 1 endometrial adenocarcinoma.     A total of 30 minutes was spent with the patient, >50% of which were spent in counseling the patient and/or treatment " planning, this was separate from the 5 minutes spent on post-operative cares.      Plan:     1.)    Stage 1A, grade 1 endometrial adenocarcinoma.  Pathology was reviewed and she was provided with a copy of her results.  Discussed no need for further therapy.  Discussed signs and symptoms of recurrence and to call if these should occur.  Discussed role of surveillance with history and physical exam and that labs/imaging would only be done based on symptoms but not routinely.  Discussed no need for further pap smear testing.  Discussed visits every 6 months for up to 5 years (12/23) then annually thereafter.  She will return in 6 months.    2.) Menopausal symptoms -She denies any at this point.  We discussed that there are several options for treatment if these should occur.  I have asked her to call if symptoms develop.     3.) Genetic risk factors were assessed and the patient does not meet the qualifications for a referral.      4.) Labs and/or tests ordered include:  None     5.) Health maintenance issues addressed today include pt is up to date.          Thank you for allowing us to participate in the care of your patient.         Sincerely,    Cleopatra Evans MD  Gynecologic Oncology  [facility]       ZULMA CARBAJAL

## 2022-12-06 NOTE — PLAN OF CARE
Goal Outcome Evaluation:           Progress: no change  Outcome Evaluation: Patient has rested in bed today.  Tolerated wound care well. VSS.  Continue plan of care.

## 2022-12-06 NOTE — DISCHARGE PLACEMENT REQUEST
"Anika Gonzalez (72 y.o. Female)     Date of Birth   1950    Social Security Number       Address   [address] [place] [address]    Home Phone   [phone]    MRN   [id number]       [facility]    Marital Status                               Admission Date   11/30/22    Admission Type   Emergency    Admitting Provider   Stephanie Bang DO    Attending Provider   Ezekiel Roe MD    Department, Room/Bed   [address], 3327/1P       Discharge Date       Discharge Disposition       Discharge Destination                               Attending Provider: Ezekiel Roe MD    Allergies: Contrast Dye    Isolation: Contact   Infection: MRSA No Isolation this Admit (09/21/22), ESBL E coli (10/10/22)   Code Status: CPR    Ht: 154.9 cm (61\")   Wt: 116 kg (256 lb 12.8 oz)    Admission Cmt: None   Principal Problem: Urinary tract infection associated with indwelling urethral catheter, initial encounter ([facility]) [T83.511A,N39.0]                 Active Insurance as of 11/30/2022     Primary Coverage     Payor Plan Insurance Group Employer/Plan Group    HUMANA MEDICARE REPLACEMENT HUMANA MEDICARE REPLACEMENT [id number]     Payor Plan Address Payor Plan Phone Number Payor Plan Fax Number Effective Dates    [address] [address] [phone]  10/1/2022 - None Entered    Formerly [facility] [id number]       Subscriber Name Subscriber Birth Date Member ID       ANIKA GONZALEZ 1950 [id number]                 Emergency Contacts      (Rel.) Home Phone Work Phone Mobile Phone    Joellen Landry (POA) (Daughter) -- -- [phone]    Tiffany Parkera (Daughter) [phone] -- --               History & Physical      Jolynn Boyce PA-C at 11/30/22 2017     Attestation signed by Stephanie Bang DO at 12/01/22 0722    I have reviewed this documentation and agree.  Patient briefly seen and examined at bedside.  Patient was resting comfortably in bed.  Currently denies any flank " "pain and/or abdominal pain.  Barajas catheter is noted to have a straw-colored urine in the collection bag.    Patient with history of ESBL E. coli UTI in 2022.  Continue with empiric IV Invanz for now; if ESBL E. coli and sensitive to Invanz, could consider discharge home with midline versus arrangements at infusion clinic.                       [facility] Medicine Services  HISTORY & PHYSICAL    Patient Identification:  Name:  Anika Neri  Age:  72 y.o.  Sex:  female  :  1950  MRN:  [id number]   Visit Number:  [id number]  Admit Date: 2022   Primary Care Physician:  Barbara Gaona APRN     Subjective     Chief complaint:   Chief Complaint   Patient presents with   • Dysuria     History of presenting illness:   Patient is a 72 y.o. female with past medical history significant for paroxysmal atrial fibrillation, debility, chronic indwelling barajas catheter, chronic decubitus ulcer, essential hypertension, history of CVA,  that presented to the [facility] emergency department for evaluation of nausea and abdominal pain.     The patient states she had a catheter change on 11/15 at [facility].  She reports since this time the catheter has been leaking.  She has noticed malodorous dark urine.  She reports being on antibiotics \"the whole month of October\" for a UTI.  Upon further chart review she was diagnosed with a UTI on 10/2 in our emergency department and was started on cefdinir.  Urine culture from 10/8 grew ESBL E. coli. Several attempts were made to contact the patient to notify her of the urine culture results.  A letter was sent to her address.  She was seen in the emergency department once again on 10/25 and was found to have another UTI.  She was discharged with Macrobid. Urine culture also grew ESBL e.coli that was susceptible to nitrofurantoin.  She admits to completing all antibiotics.  She reports when previously diagnosed with a UTI she had " abdominal pain and nausea which has not resolved.  She states her abdominal pain is most severe in the suprapubic region and she has now developed right flank pain.  She denies any fever, chills, diaphoresis, emesis, diarrhea, hematuria.    In the ED the patient received, IV Merrem     Patient has been admitted to the medical/surgical floor for further evaluation and treatment.   ---------------------------------------------------------------------------------------------------------------------   Review of Systems   Constitutional: Negative for chills, diaphoresis, fatigue and fever.   HENT: Negative for congestion.    Respiratory: Negative for cough, shortness of breath and wheezing.    Cardiovascular: Negative for chest pain and palpitations.   Gastrointestinal: Positive for abdominal pain (suprapubic) and nausea. Negative for constipation, diarrhea and vomiting.   Genitourinary: Positive for flank pain (right). Negative for dysuria and hematuria.   Musculoskeletal: Positive for gait problem (unable to ambulate w/o assistance). Negative for myalgias.   Skin: Positive for wound (chronic decubitus ulcer). Negative for rash.   Neurological: Positive for weakness (chronic generalized). Negative for dizziness and light-headedness.   Psychiatric/Behavioral: Negative for confusion.      ---------------------------------------------------------------------------------------------------------------------   Past Medical History:   Diagnosis Date   • Arthritis    • Atrial fibrillation (HCC)     Not on anticoagulation due to postmenopausal bleeding   • Diabetes mellitus (HCC)    • Gout    • History of CVA (cerebrovascular accident)     residual right-sided weakness   • Hyperlipidemia    • Hypertension    • Stage IV pressure ulcer of sacral region (HCC)      Past Surgical History:   Procedure Laterality Date   • CHOLECYSTECTOMY       Family History   Problem Relation Age of Onset   • Diabetes Mother    • Hypertension Father       Social History     Socioeconomic History   • Marital status:    Tobacco Use   • Smoking status: Never   • Smokeless tobacco: Never   Substance and Sexual Activity   • Alcohol use: No   • Drug use: No   • Sexual activity: Defer     ---------------------------------------------------------------------------------------------------------------------   Allergies:  Contrast dye  ---------------------------------------------------------------------------------------------------------------------   Medications below are reported home medications pulling from within the system; at this time, these medications have not been reconciled unless otherwise specified and are in the verification process for further verifcation as current home medications.    Prior to Admission Medications     Prescriptions Last Dose Informant Patient Reported? Taking?    Diclofenac Sodium (VOLTAREN) 1 % gel gel  Pharmacy Yes No    Apply 4 g topically to the appropriate area as directed 4 (Four) Times a Day As Needed (apply to knees and back).    dicyclomine (BENTYL) 20 MG tablet   No No    Take 1 tablet by mouth Every 8 (Eight) Hours As Needed (abd pain).    dilTIAZem CD (CARDIZEM CD) 300 MG 24 hr capsule  Pharmacy Yes No    Take 300 mg by mouth Daily.    gabapentin (NEURONTIN) 800 MG tablet   No No    Take 1 tablet by mouth 3 (Three) Times a Day for 3 days.    insulin glargine (LANTUS, SEMGLEE) 100 UNIT/ML injection  Pharmacy Yes No    Inject 14-24 Units under the skin into the appropriate area as directed Daily.    iron polysaccharides (NIFEREX) 150 MG capsule   No No    Take 1 capsule by mouth Daily.    multivitamin (THERAGRAN) tablet tablet   No No    Take 1 tablet by mouth Daily.        ---------------------------------------------------------------------------------------------------------------------    Objective     Hospital Scheduled Meds:  meropenem, 1 g, Intravenous, Once           Current listed hospital scheduled medications  may not yet reflect those currently placed in orders that are signed and held, awaiting patient's arrival to floor/unit.    ---------------------------------------------------------------------------------------------------------------------   Vital Signs:  Temp:  [97.9 °F (36.6 °C)] 97.9 °F (36.6 °C)  Heart Rate:  [89] 89  Resp:  [20] 20  BP: (115-145)/(74-83) 120/74  Mean Arterial Pressure (Non-Invasive) for the past 24 hrs (Last 3 readings):   Noninvasive MAP (mmHg)   11/30/22 1801 101   11/30/22 1707 106   11/30/22 1633 87     SpO2 Percentage    11/30/22 1633 11/30/22 1707 11/30/22 1801   SpO2: 97% 98% 98%     SpO2:  [97 %-98 %] 98 %  on   ;   Device (Oxygen Therapy): room air    Body mass index is 38.04 kg/m².  Wt Readings from Last 3 Encounters:   11/30/22 94.3 kg (208 lb)   10/28/22 94.3 kg (208 lb)   10/25/22 94.3 kg (208 lb)     ---------------------------------------------------------------------------------------------------------------------   Physical Exam:  Physical Exam  Constitutional:       General: She is awake.      Appearance: Normal appearance. She is well-developed. She is morbidly obese.      Comments: NATY Johnson present at bedside during exam    HENT:      Head: Normocephalic and atraumatic.   Eyes:      General: Lids are normal.   Cardiovascular:      Rate and Rhythm: Normal rate and regular rhythm.      Pulses: Normal pulses.           Dorsalis pedis pulses are 2+ on the right side and 2+ on the left side.        Posterior tibial pulses are 2+ on the right side and 2+ on the left side.      Heart sounds: Normal heart sounds. No murmur heard.    No friction rub. No gallop.   Pulmonary:      Effort: Pulmonary effort is normal.      Breath sounds: Normal breath sounds.   Abdominal:      Palpations: Abdomen is soft.      Tenderness: There is generalized abdominal tenderness.      Comments: Patient refused to be checked for CVA tenderness, states she is unable to roll onto her side or sit up      Genitourinary:     Comments: Flanagan catheter in place   Musculoskeletal:      Right lower leg: No edema.      Left lower leg: No edema.   Skin:     Comments: Decubitus ulceration    Neurological:      General: No focal deficit present.      Mental Status: She is alert and oriented to person, place, and time. Mental status is at baseline.   Psychiatric:         Speech: Speech normal.         Behavior: Behavior is cooperative.         Cognition and Memory: Cognition normal.       ---------------------------------------------------------------------------------------------------------------------  EKG:    Baseline EKG ordered and pending.     Telemetry:    Patient is not currently on telemetry.   ---------------------------------------------------------------------------------------------------------------------             Results from last 7 days   Lab Units 11/30/22  1638   WBC 10*3/mm3 10.57   HEMOGLOBIN g/dL 12.0   HEMATOCRIT % 37.2   MCV fL 93.0   MCHC g/dL 32.3   PLATELETS 10*3/mm3 191     Results from last 7 days   Lab Units 11/30/22  1638   SODIUM mmol/L 142   POTASSIUM mmol/L 3.6   CHLORIDE mmol/L 107   CO2 mmol/L 22.8   BUN mg/dL 19   CREATININE mg/dL 0.93   CALCIUM mg/dL 8.7   GLUCOSE mg/dL 244*   ALBUMIN g/dL 3.18*   BILIRUBIN mg/dL 0.3   ALK PHOS U/L 118*   AST (SGOT) U/L 13   ALT (SGPT) U/L 9   Estimated Creatinine Clearance: 57.7 mL/min (by C-G formula based on SCr of 0.93 mg/dL).  No results found for: AMMONIA    No results found for: HGBA1C, POCGLU  Lab Results   Component Value Date    HGBA1C 7.80 (H) 09/20/2022     Lab Results   Component Value Date    TSH 1.100 09/20/2022     Pain Management Panel    There is no flowsheet data to display.       I have personally reviewed the above laboratory results.   ---------------------------------------------------------------------------------------------------------------------  Imaging Results (Last 7 Days)     ** No results found for the last 168 hours.  **        I have personally reviewed the above radiology results.     Last Echocardiogram:  Results for orders placed during the hospital encounter of 10/27/20    Adult Transthoracic Echo Complete W/ Cont if Necessary Per Protocol    Interpretation Summary  · Estimated left ventricular EF = 60% Left ventricular ejection fraction appears to be 56 - 60%. Left ventricular systolic function is normal.  · Moderate to severe aortic sclerosis without stenosis  · Mild mitral valve regurgitation is present.  · No previous study to compare  · Mild tricuspid valve regurgitation is present.  · Estimated right ventricular systolic pressure from tricuspid regurgitation is mildly elevated (35-45 mmHg).    ---------------------------------------------------------------------------------------------------------------------    Assessment & Plan      Active Hospital Problems    Diagnosis  POA   • **Urinary tract infection associated with indwelling urethral catheter, initial encounter (Formerly [facility]) [T83.511A, N39.0]  Yes     #CAUTI   #Hx of ESBL UTI   #Chronic indwelling barajas catheter   -Currently does not meet sepsis criteria; no leukocytosis; vitals unremarkable; lactate within normal limits  -UA grossly abnormal  -Previous urine cultures from 10/8 in 10/25 grew ESBL E. coli  -Patient received IV Merrem in the ED; discussed with attending, we will change antibiotic therapy to IV Invanz  -Repeat urine culture pending  -Repeat a.m. CBC  -New 18 Afghan Barajas catheter placed in the ED, draining appropriately  -Patient reports right flank pain, however, refused to be checked for CVA tenderness on exam; as stated above currently does not meet sepsis criteria and renal function is within normal limits; may consider obtaining CT of A/P however is already receiving antibiotics for CAUTI, will discuss further with attending   -Continue to monitor closely    #Type 2 diabetes mellitus  -Obtain hemoglobin A1c  -Sliding scale insulin ordered; obtain  Accu-Cheks 4 times daily before meals and nightly; titrate insulin therapy as necessary  -Hypoglycemia protocol in place    #Chronic sacral decubitus wound  #Debility   #Morbid obesity   -Wound care has been consulted  -Turn every 2 hours  -PT/OT  -Up with assistance, fall precautions  -BMI 38.04 kg/m²  -Complicates all aspects of patient care    #Paroxysmal atrial fibrillation, not on chronic anticoagulation d/t reports hx of post menopausal bleeding   -Obtain baseline EKG; patient not on telemetry while in the emergency department  -Review home medications once reconciled per pharmacy    #Essential hypertension   -Review home medications once available; plan to continue antihypertensive agents with appropriate holding parameters    #History CVA in the past   -No acute focal neurological deficits on exam  -Review home medications once available    #Chronic pain   -Review home medications once reconciled per pharmacy      F/E/N: No IV fluids.  Replace electrolytes as necessary.  Consistent carb diet.  ---------------------------------------------------  DVT Prophylaxis: Subq heparin   GI Prophylaxis: Protonix   Activity: Up with assistance, fall precautions     The patient is considered to be a high risk patient due to: CAUTI     INPATIENT status due to the need for care which can only be reasonably provided in an hospital setting such as aggressive/expedited ancillary services and/or consultation services, the necessity for IV medications, close physician monitoring and/or the possible need for procedures.  In such, I feel patient’s risk for adverse outcomes and need for care warrant INPATIENT evaluation and predict the patient’s care encounter to likely last beyond 2 midnights.    Code Status: FULL CODE     Disposition/Discharge planning: Plan for home at discharge. Pending clinical course.     I have discussed the patient's assessment and plan with the patient and attending physician       Jolynn Boyce,  DAVID  Hospitalist Service -- [facility]       22  20:17 EST    Attending Physician: Stephanie Bang DO       Electronically signed by Stephanie Bang DO at 22 0722          Physician Progress Notes (most recent note)      Ezekiel Roe MD at 22 1051              [facility] HOSPITALIST PROGRESS NOTE     Patient Identification:  Name:  Anika Neri  Age:  72 y.o.  Sex:  female  :  1950  MRN:  [id number]  Visit Number:  [id number]  ROOM: [address] [place] [address]     Primary Care Provider:  Barbara Gaona APRN    Length of stay in inpatient status:  6    Subjective     Chief Compliant:    Chief Complaint   Patient presents with   • Dysuria     History of Presenting Illness:    Patient remains ill but stable today, no acute events overnight, no new complaints, denies any fevers or chills, still persisting suprapubic mild tenderness, blood pressure marginally lower today, patient does endorse feeling better though, on antibiotics per Infectious Disease, has a few more days left, working towards placement.   Objective     Current Hospital Meds:  cetirizine, 10 mg, Oral, Daily  dilTIAZem CD, 240 mg, Oral, Q24H  ertapenem, 1 g, Intravenous, Q24H  famotidine, 20 mg, Oral, BID AC  gabapentin, 800 mg, Oral, Q8H  heparin (porcine), 5,000 Units, Subcutaneous, Q12H  hydrOXYzine, 25 mg, Oral, Q8H  Insulin Aspart, 0-9 Units, Subcutaneous, 4x Daily AC & at Bedtime  sodium chloride, 10 mL, Intravenous, Q12H  sodium hypochlorite, , Topical, Q12H    ----------------------------------------------------------------------------------------------------------------------  Vital Signs:  Temp:  [97.5 °F (36.4 °C)-98.4 °F (36.9 °C)] 97.6 °F (36.4 °C)  Heart Rate:  [64-90] 89  Resp:  [16-18] 16  BP: ()/(58-74) 92/58  SpO2:  [96 %] 96 %  on   ;   Device (Oxygen Therapy): room air  Body mass index is 48.52 kg/m².      Intake/Output Summary (Last 24 hours) at 2022 1051  Last data filed at  12/6/2022 0900  Gross per 24 hour   Intake 600 ml   Output 3700 ml   Net -3100 ml      ----------------------------------------------------------------------------------------------------------------------  Physical exam:  Constitutional: older than stated age, No acute distress.      HENT:  Head:  Normocephalic and atraumatic.  Mouth:  Moist mucous membranes.    Eyes:  Conjunctivae and EOM are normal. No scleral icterus.    Neck:  Neck supple.  No JVD present.    Cardiovascular:  Normal rate, regular rhythm and normal heart sounds with no murmur.  Pulmonary/Chest:  No respiratory distress, no wheezes, no crackles, on room air  Abdominal:  Soft. No distension and mild suprapubic tenderness persisting  Musculoskeletal:  No tenderness, and no deformity.  No red or swollen joints anywhere.    Neurological:  Alert and oriented to person, place, and time.  No gross focal deficits   Skin:  Skin is warm and dry. No rash noted. No pallor.   Peripheral vascular:  No clubbing, no cyanosis, no edema.  Psychiatric: Appropriate mood and affect  : Panchito in place    Edited by: Ezekiel Roe MD at 12/6/2022 1051  ----------------------------------------------------------------------------------------------------------------------    Urine Culture   Date Value Ref Range Status   11/30/2022 >100,000 CFU/mL Escherichia coli ESBL (A)  Final     Comment:       Consider infectious disease consult.  Susceptibility results may not correlate to clinical outcomes.     No results found for: BLOODCX    I have personally looked at the labs and they are summarized above.  ----------------------------------------------------------------------------------------------------------------------  Detailed radiology reports for the last 24 hours:  No radiology results for the last day  Assessment & Plan    -ESBL E. coli catheter associated urinary tract infection that was present on admission, due to a chronic indwelling Flanagan catheter that was also  present on admission, in a patient with known history of ESBL E. coli UTI  -Acute hypomagnesemia  -Suspect history of chronic kidney disease stage II with baseline creatinine 0.8-0.9  -History of type 2 diabetes mellitus  -History of chronic stage IV sacral decubitus ulcer (5.5 cm x 5 cm, 1 cm in depth), which was present on admission   -History of chronic debility  -History of morbid obesity, BMI 48.52 kg/m², which complicates all aspects of care  -History of paroxysmal atrial fibrillation, not on chronic anticoagulation due to reports of postmenopausal bleeding  -History of essential hypertension  -History of CVA in the past with no residual deficits reported  -History of chronic pain and chronic pain medicine usage    Infectious Disease consulted and following, continue ertapenem for 10 days, barajas replaced  on admission, continued on home medications, glucose has been controlled, Social Work consulted and following and working toward placement at nursing home in [facility].    F: Oral  E: Monitor & Replace PRN  N: Diet: Diabetic Diets; Consistent Carbohydrate; Texture: Regular Texture (IDDSI 7); Fluid Consistency: Thin (IDDSI 0)  PPx: SQH   Code Status (Patient has no pulse and is not breathing): CPR (Attempt to Resuscitate)  Medical Interventions (Patient has pulse or is breathing): Full Support     Dispo: Pending clinical improvement, placement in [place]    *This patient is considered high risk secondary to ESBL Urinary Tract Infection failed outpatient therapy.    Edited by: Ezekiel Roe MD at 2022 1051  [facility] Hospitalist        Electronically signed by Ezekiel Roe MD at 22 1053          Physical Therapy Notes (most recent note)      Shavon Russell PTA at 22 1044  Version 1 of 1         Acute Care - Physical Therapy Treatment Note  [facility]     Patient Name: Anika Neri  : 1950  MRN: [id number]  Today's Date: 2022   Onset of Illness/Injury or Date of  Surgery: 11/30/22 (admission date)  Visit Dx:     ICD-10-CM ICD-9-CM   1. Urinary tract infection associated with indwelling urethral catheter, initial encounter ([facility])  T83.511A 996.64    N39.0 599.0   2. History of ESBL E. coli infection  Z86.19 V12.09   3. Flanagan catheter in place on admission  Z97.8 V45.89     Patient Active Problem List   Diagnosis   • A-fib ([facility])   • Pain of left lower extremity   • Sacral decubitus ulcer   • Urinary tract infection associated with indwelling urethral catheter, initial encounter ([facility])     Past Medical History:   Diagnosis Date   • Arthritis    • Atrial fibrillation ([facility])     Not on anticoagulation due to postmenopausal bleeding   • Diabetes mellitus ([facility])    • Gout    • History of CVA (cerebrovascular accident)     residual right-sided weakness   • Hyperlipidemia    • Hypertension    • Stage IV pressure ulcer of sacral region ([facility])      Past Surgical History:   Procedure Laterality Date   • CHOLECYSTECTOMY       PT Assessment (last 12 hours)     PT Evaluation and Treatment     Row Name 12/05/22 1033          Physical Therapy Time and Intention    Subjective Information complains of;weakness;fatigue  Fear of falling  -HR     Document Type evaluation  -HR     Mode of Treatment physical therapy  -HR     Patient Effort good  -HR     Comment Pt and RN Sheryl in agreement for PT. Pt does well with supine exercises, but does require AAROM with most theraputic activities. Pt agrees to sit EOB, but requires extra time secondary to fear of falling and not wanting help from PTA. Nursing aid in room throughoyut Rx. Pt requires mod/ max A x 2 for supine to sit and scooting to EOB. Pt demonstrated a posterior lean and poor sitting balance with inital sit. Pt sitting balance improved after getting her feet to the floor and improved more after standing. Pt stood x 2 with RW, and required mod/max A x 2. Bed mobility max A x 2.  -HR     Row Name 12/05/22 1035          General Information     Patient Profile Reviewed yes  -HR     Equipment Currently Used at Home hospital bed;commode, bedside;wheelchair  -HR     Row Name 12/05/22 1033          Cognition    Personal Safety Interventions fall prevention program maintained;gait belt;nonskid shoes/slippers when out of bed;supervised activity  -HR     Row Name 12/05/22 1033          Bed Mobility    Bed Mobility supine-sit;sit-supine;scooting/bridging;rolling right;rolling left  -HR     Rolling Left [place] (Bed Mobility) moderate assist (50% patient effort);maximum assist (25% patient effort);2 person assist  -HR     Rolling Right [place] (Bed Mobility) moderate assist (50% patient effort);maximum assist (25% patient effort);2 person assist  -HR     Scooting/Bridging [place] (Bed Mobility) moderate assist (50% patient effort);maximum assist (25% patient effort);2 person assist  -HR     Supine-Sit [place] (Bed Mobility) moderate assist (50% patient effort);maximum assist (25% patient effort);2 person assist  -HR     Sit-Supine [place] (Bed Mobility) moderate assist (50% patient effort);maximum assist (25% patient effort);2 person assist  -HR     Comment, (Bed Mobility) L/R rolling for draw sheet placement to pull Pt up into bed  -HR     Row Name 12/05/22 1033          Transfers    Transfers sit-stand transfer;stand-sit transfer  -HR     Row Name 12/05/22 1033          Sit-Stand Transfer    Sit-Stand [place] (Transfers) 2 person assist;moderate assist (50% patient effort);maximum assist (25% patient effort)  -HR     Assistive Device (Sit-Stand Transfers) walker, front-wheeled  -HR     Row Name 12/05/22 1033          Stand-Sit Transfer    Stand-Sit [place] (Transfers) standby assist;contact guard;minimum assist (75% patient effort)  -HR     Assistive Device (Stand-Sit Transfers) walker, front-wheeled  -HR     Row Name 12/05/22 1033          Gait/Stairs (Locomotion)    Comment, (Gait/Stairs) unable  -HR     Row Name 12/05/22 1033           Motor Skills    Therapeutic Exercise other (see comments)  Supine: AP, Inversion/eversion, AAROM: Hip abd, Heel slide, Quad set, glut set  -HR     Row Name             Wound 09/20/22 2353 medial sacral spine    Wound - Properties Group Placement Date: 09/20/22  -KF Placement Time: 2353 -KF Orientation: medial  -KF Location: sacral spine  -KF    Retired Wound - Properties Group Placement Date: 09/20/22  -KF Placement Time: 2353 -KF Orientation: medial  -KF Location: sacral spine  -KF    Retired Wound - Properties Group Date first assessed: 09/20/22  -KF Time first assessed: 2353 -KF Location: sacral spine  -KF    Row Name 12/05/22 1033          Positioning and Restraints    Pre-Treatment Position in bed  -HR     Post Treatment Position bed  -HR     In Bed with nsg;with other staff;side rails up x2;fowlers;side lying left  -HR     Row Name 12/05/22 1033          Therapy Assessment/Plan (PT)    Rehab Potential (PT) other (see comments)  fair/guarded  -HR     Criteria for Skilled Interventions Met (PT) yes  -HR     Therapy Frequency (PT) 2 times/wk  2-5x/wk  -HR     Row Name 12/05/22 1033          Physical Therapy Goals    Transfer Goal Selection (PT) transfer, PT goal 1  -HR     Gait Training Goal Selection (PT) gait training, PT goal 1  -HR     Row Name 12/05/22 1033          Transfer Goal 1 (PT)    Activity/Assistive Device (Transfer Goal 1, PT) sit-to-stand/stand-to-sit  -HR     [place] Level/Cues Needed (Transfer Goal 1, PT) contact guard required  -HR     Time Frame (Transfer Goal 1, PT) long term goal (LTG)  -HR     Row Name 12/05/22 1033          Gait Training Goal 1 (PT)    Activity/Assistive Device (Gait Training Goal 1, PT) gait (walking locomotion);assistive device use  -HR     [place] Level (Gait Training Goal 1, PT) minimum assist (75% or more patient effort)  -HR     Distance (Gait Training Goal 1, PT) 5'  -HR     Time Frame (Gait Training Goal 1, PT) long term goal (LTG)  -HR            User Key  (r) = Recorded By, (t) = Taken By, (c) = Cosigned By    Initials Name Provider Type    KF Seda Phipps, RN Registered Nurse    HR Shavon Russell PTA Physical Therapist Assistant                  PT Recommendation and Plan  Anticipated Discharge Disposition (PT): skilled nursing facility, sub acute care setting, inpatient rehabilitation facility, extended care facility, home with home health, home with 24/7 care, home with assist, home with supervision  Therapy Frequency (PT): 2 times/wk (2-5x/wk)          Time Calculation:    PT Charges     Row Name 22 1043             Time Calculation    Start Time 0854  -HR      PT Received On 22  -HR         Time Calculation- PT    Total Timed Code Minutes- PT 38 minute(s)  -HR            User Key  (r) = Recorded By, (t) = Taken By, (c) = Cosigned By    Initials Name Provider Type    HR Shavon Russell PTA Physical Therapist Assistant              Therapy Charges for Today     Code Description Service Date Service Provider Modifiers Qty    27132258156 HC PT THER PROC EA 15 MIN 2022 Shavon Russell PTA GP, CQ 1    07266343428 HC PT THERAPEUTIC ACT EA 15 MIN 2022 Shavon Russell PTA GP, CQ 2          PT G-Codes  AM-PAC 6 Clicks Score (PT): 7    Shavon Russell PTA  2022      Electronically signed by Shavon Russell PTA at 22 1044          Occupational Therapy Notes (most recent note)      Key Herrera, OT at 22 1434          Patient Name: Anika Neri  : 1950    MRN: [id number]                              Today's Date: 2022       Admit Date: 2022    Visit Dx:     ICD-10-CM ICD-9-CM   1. Urinary tract infection associated with indwelling urethral catheter, initial encounter ([facility])  T83.511A 996.64    N39.0 599.0   2. History of ESBL E. coli infection  Z86.19 V12.09   3. Flanagan catheter in place on admission  Z97.8 V45.89     Patient Active Problem List   Diagnosis   • A-fib ([facility])   • Pain of left lower extremity   •  Sacral decubitus ulcer   • Urinary tract infection associated with indwelling urethral catheter, initial encounter ([facility])     Past Medical History:   Diagnosis Date   • Arthritis    • Atrial fibrillation (HCC)     Not on anticoagulation due to postmenopausal bleeding   • Diabetes mellitus (HCC)    • Gout    • History of CVA (cerebrovascular accident)     residual right-sided weakness   • Hyperlipidemia    • Hypertension    • Stage IV pressure ulcer of sacral region ([facility])      Past Surgical History:   Procedure Laterality Date   • CHOLECYSTECTOMY        General Information     Row Name 12/01/22 1421          OT Time and Intention    Document Type evaluation  -LA     Mode of Treatment occupational therapy  -LA     Row Name 12/01/22 1421          General Information    Patient Profile Reviewed yes  -LA     Prior Level of Function max assist:;ADL's  -LA     Existing Precautions/Restrictions fall  Patient has hx of falls at home; 3 reported recently  -LA     Barriers to Rehab previous functional deficit  Patient with fear of falling as a result of falls at home previously. Anxiety noted with moving to edge of bed, standing, returning to bed.  -LA     Row Name 12/01/22 1421          Occupational Profile    Reason for Services/Referral (Occupational Profile) OT to assess for changes in level of independence and safety with ADLs. Patient daughter reported patient has had recent decline and was hoping to get her mother in rehab in [place], [place].  -LA     Patient Goals (Occupational Profile) Go to rehab so I can take care of myself better  -LA     Row Name 12/01/22 1421          Living Environment    People in Home child(mak), adult;grandchild(mak)  -[facility] Name 12/01/22 1421          Cognition    Orientation Status (Cognition) oriented x 4  -[facility] Name 12/01/22 1421          Safety Issues, Functional Mobility    Safety Issues Affecting Function (Mobility) safety precautions follow-through/compliance  Patient has  anxiety and therefore states she cannot do stuff she is able to do. patient does well but requires encouragement. patient demonstrates ability to improve level of fx.  -LA     Impairments Affecting Function (Mobility) balance;endurance/activity tolerance;strength;pain  Patient with c/o back pain.  -LA           User Key  (r) = Recorded By, (t) = Taken By, (c) = Cosigned By    Initials Name Provider Type    Key Watkins OT Occupational Therapist                 Mobility/ADL's     Row Name 12/01/22 1425          Bed Mobility    Bed Mobility supine-sit  -LA     Supine-Sit [place] (Bed Mobility) modified independence  -LA     Row Name 12/01/22 1425          Transfers    Transfers sit-stand transfer  -LA     Row Name 12/01/22 1425          Sit-Stand Transfer    Sit-Stand [place] (Transfers) minimum assist (75% patient effort);2 person assist  -LA     Assistive Device (Sit-Stand Transfers) walker, front-wheeled  -LA     Row Name 12/01/22 1425          Stand-Sit Transfer    Stand-Sit [place] (Transfers) standby assist;contact guard  -LA     Assistive Device (Stand-Sit Transfers) walker, front-wheeled  -LA     Row Name 12/01/22 1425          Activities of Daily Living    BADL Assessment/Intervention bathing;upper body dressing;lower body dressing;grooming;feeding;toileting  -LA     Row Name 12/01/22 1425          Bathing Assessment/Intervention    [place] Level (Bathing) maximum assist (25% patient effort);dependent (less than 25% patient effort)  -LA     Row Name 12/01/22 1425          Upper Body Dressing Assessment/Training    [place] Level (Upper Body Dressing) moderate assist (50% patient effort)  -LA     Row Name 12/01/22 1425          Lower Body Dressing Assessment/Training    [place] Level (Lower Body Dressing) maximum assist (25% patient effort);dependent (less than 25% patient effort)  -LA     Row Name 12/01/22 1425          Grooming Assessment/Training    [place] Level  (Grooming) moderate assist (50% patient effort)  -[facility] Name 12/01/22 1425          Self-Feeding Assessment/Training    [place] Level (Feeding) set up  -[facility] Name 12/01/22 1425          Toileting Assessment/Training    [place] Level (Toileting) maximum assist (25% patient effort);dependent (less than 25% patient effort)  -LA           User Key  (r) = Recorded By, (t) = Taken By, (c) = Cosigned By    Initials Name Provider Type    Key Watkins OT Occupational Therapist               Obj/Interventions     [facility] Name 12/01/22 1426          Sensory Assessment (Somatosensory)    Sensory Assessment (Somatosensory) sensation intact  -[facility] Name 12/01/22 1426          Vision Assessment/Intervention    Visual Impairment/Limitations WFL  -[facility] Name 12/01/22 1426          Range of Motion Comprehensive    General Range of Motion no range of motion deficits identified  -LA     Comment, General Range of Motion UE ROM grossly WFL  -[facility] Name 12/01/22 1426          Strength Comprehensive (MMT)    General Manual Muscle Testing (MMT) Assessment upper extremity strength deficits identified  -LA     Comment, General Manual Muscle Testing (MMT) Assessment Minimal UE deficit; Right shoulder 4-/5  -[facility] Name 12/01/22 1426          Upper Extremity (Manual Muscle Testing)    Upper Extremity: Manual Muscle Testing (MMT) right shoulder strength deficit  -LA     Comment, MMT: Upper Extremity Minimal deficit right shoulder 4-/5  -[facility] Name 12/01/22 1426          Balance    Balance Interventions sitting;standing  -LA     Comment, Balance sitting balance unsupported EOB stand by assist  -LA           User Key  (r) = Recorded By, (t) = Taken By, (c) = Cosigned By    Initials Name Provider Type    Key Watkins OT Occupational Therapist               Goals/Plan     [facility] Name 12/01/22 1432          Bed Mobility Goal 1 (OT)    Activity/Assistive Device (Bed Mobility Goal 1, OT) bed mobility  activities, all  -LA     [place] Level/Cues Needed (Bed Mobility Goal 1, OT) modified independence  -LA     Time Frame (Bed Mobility Goal 1, OT) by discharge  -LA     Row Name 12/01/22 1432          Transfer Goal 1 (OT)    Activity/Assistive Device (Transfer Goal 1, OT) commode, 3-in-1  -LA     [place] Level/Cues Needed (Transfer Goal 1, OT) modified independence  -LA     Time Frame (Transfer Goal 1, OT) by discharge  -LA     Row Name 12/01/22 1432          Dressing Goal 1 (OT)    Activity/Device (Dressing Goal 1, OT) dressing skills, all  -LA     [place]/Cues Needed (Dressing Goal 1, OT) minimum assist (75% or more patient effort)  -LA     Time Frame (Dressing Goal 1, OT) by discharge  -LA     Row Name 12/01/22 1432          Therapy Assessment/Plan (OT)    Planned Therapy Interventions (OT) activity tolerance training;patient/caregiver education/training;adaptive equipment training;BADL retraining;ROM/therapeutic exercise;occupation/activity based interventions;functional balance retraining;transfer/mobility retraining  -LA           User Key  (r) = Recorded By, (t) = Taken By, (c) = Cosigned By    Initials Name Provider Type    Key Watkins OT Occupational Therapist               Clinical Impression     Row Name 12/01/22 1422          Plan of Care Review    Plan of Care Reviewed With patient;daughter  spoke with daughter in [place] on phone whom stated she was trying to get patient transfered to rehab near her. OT reported she would have to speak with socal worker regarding transportation/insurance questions.  -LA     Outcome Evaluation patient seen for OT evaluation. patient does well with motiviation and is capable to do more then she thinks but has fear of falling. Patient does require significant assistance with ADLs currently. OT to address deficits while in house to promote increased strength, ADL independence and safety with mobility/ prepare for transfers. D/C recommendation is IPR vs SNF  depending on insurance/transportation. Patient does demonstrate good potiential to improve level of independence.  -LA     Row Name 12/01/22 1428          Therapy Assessment/Plan (OT)    Patient/Family Therapy Goal Statement (OT) Go to rehab near by daughter.  -LA     Rehab Potential (OT) good, to achieve stated therapy goals  -LA     Criteria for Skilled Therapeutic Interventions Met (OT) skilled treatment is necessary;meets criteria;yes  -LA     Therapy Frequency (OT) other (see comments)  PRN to follow for changes/progress toward goals.  -LA     Row Name 12/01/22 1428          Therapy Plan Review/Discharge Plan (OT)    Equipment Needs Upon Discharge (OT) --  TBD  -LA     Anticipated Discharge Disposition (OT) inpatient rehabilitation facility;skilled nursing facility  -LA     Row Name 12/01/22 1428          Positioning and Restraints    Post Treatment Position bed  -LA     In Bed supine;call light within reach;encouraged to call for assist;exit alarm on  -LA           User Key  (r) = Recorded By, (t) = Taken By, (c) = Cosigned By    Initials Name Provider Type    Key Watkins, OT Occupational Therapist               Outcome Measures    No documentation.                   OT Recommendation and Plan  Planned Therapy Interventions (OT): activity tolerance training, patient/caregiver education/training, adaptive equipment training, BADL retraining, ROM/therapeutic exercise, occupation/activity based interventions, functional balance retraining, transfer/mobility retraining  Therapy Frequency (OT): other (see comments) (PRN to follow for changes/progress toward goals.)  Plan of Care Review  Plan of Care Reviewed With: patient, daughter (spoke with daughter in [place] on phone whom stated she was trying to get patient transfered to rehab near her. OT reported she would have to speak with socal worker regarding transportation/insurance questions.)  Outcome Evaluation: patient seen for OT evaluation. patient does well  with motiviation and is capable to do more then she thinks but has fear of falling. Patient does require significant assistance with ADLs currently. OT to address deficits while in house to promote increased strength, ADL independence and safety with mobility/ prepare for transfers. D/C recommendation is IPR vs SNF depending on insurance/transportation. Patient does demonstrate good potiential to improve level of independence.     Time Calculation:    Time Calculation- OT     Row Name 12/01/22 1433             Time Calculation- OT    OT Start Time 1030  -LA            User Key  (r) = Recorded By, (t) = Taken By, (c) = Cosigned By    Initials Name Provider Type    Key Watkins OT Occupational Therapist              Therapy Charges for Today     Code Description Service Date Service Provider Modifiers Qty    [id number]  OT EVAL MOD COMPLEXITY 4 12/1/2022 Key Herrera OT GO 1               Key Herrera OT  12/1/2022    Electronically signed by Key Herrera OT at 12/01/22 1434       Nursing Assessments (last 24 hours)     Adult PCS Body System     Row Name 12/06/22 1100 12/06/22 0900 12/06/22 0700 12/06/22 0300 12/06/22 0100       Pain/Comfort/Sleep    Preferred Pain Scale -- number (Numeric Rating Pain Scale) -- -- --    (0-10) Pain Rating: Rest -- 2 -- -- --    Pain Location -- abdomen -- -- --    Pain Side/Orientation -- right;lower -- -- --    Pain Description -- intermittent -- -- --    POSS ([place] Opioid-Induced Sed Scale) -- 1 - Awake and alert -- -- --    Sleep/Rest/Relaxation -- no problem identified -- -- --       Coping/Psychosocial    Observed Emotional State -- calm;cooperative -- -- --    Verbalized Emotional State -- acceptance -- -- --    Trust Relationship/Rapport -- care explained;choices provided;emotional support provided;empathic listening provided;questions answered;questions encouraged;reassurance provided;thoughts/feelings acknowledged -- -- --    Family/Support Persons --  family -- -- --    Involvement in Care -- not present at bedside -- -- --    Family/Support System Care -- self-care encouraged;support provided -- -- --    Diversional Activities -- television;smartphone -- -- --       HEENT    HEENT WDL -- .WDL except;eye  glasses present -- -- --       Mouth/Teeth WDL    Mouth/Teeth WDL -- .WDL except;teeth -- -- --    Teeth Symptoms -- dental appliance present -- -- --       Cognitive    Cognitive/Neuro/Behavioral WDL -- WDL -- -- --    Level of Consciousness -- Alert -- -- --    Orientation -- oriented x 4 -- -- --    Mood/Behavior -- calm;cooperative;behavior appropriate to situation -- -- --       [place] Coma Scale    Best Eye Response -- 4-->(E4) spontaneous -- -- --    Best Motor Response -- 6-->(M6) obeys commands -- -- --    Best Verbal Response -- 5-->(V5) oriented -- -- --    [place]ena Coma Scale Score -- 15 -- -- --    Assessment Qualifiers -- patient not sedated/intubated -- -- --       General Appearance WDL    General Appearance WDL -- .WDL except -- -- --    General Appearance -- unkempt -- -- --       Behavior WDL    Behavior WDL -- WDL -- -- --       Respiratory    Respiratory WDL -- WDL -- -- --    Cough And Deep Breathing -- done independently per patient -- -- --       Breath Sounds    Breath Sounds -- All Fields -- -- --    All Lung Fields Breath Sounds -- wheezes, expiratory -- -- --       Oxygen Therapy    Device (Oxygen Therapy) -- room air -- -- --       Cardiac    Cardiac WDL -- WDL -- -- --       Peripheral Neurovascular    Peripheral Neurovascular WDL -- .WDL except;edema -- -- --    VTE Prevention/Management -- other (see comments)  see mar -- -- --       Edema    Edema -- ankle, left;ankle, right;foot, left;foot, right -- -- --    Ankle, Left Edema -- 1+ (Trace) -- -- --    Ankle, Right Edema -- 1+ (Trace) -- -- --    Foot, Left Edema -- 1+ (Trace) -- -- --    Foot, Right Edema -- 1+ (Trace) -- -- --       Gastrointestinal    Gastrointestinal WDL --  .WDL except;appearance/characteristics -- -- --    Abdominal Appearance -- obese -- -- --       Genitourinary    Genitourinary WDL -- .WDL except;voiding ability/characteristics -- -- --    Voiding Characteristics -- urethral catheter (bladder) -- -- --       Skin    Skin WDL -- .WDL except;all -- -- --    Skin Color/Characteristics -- redness blanchable -- -- --    Skin Temperature -- warm -- -- --    Skin Moisture -- dry -- -- --    Skin Elasticity -- quick return to original state -- -- --    Skin Integrity -- scab;pressure injury -- -- --       [place] Risk Assessment    Sensory Perception -- 3-->slightly limited -- -- --    Moisture -- 3-->occasionally moist -- -- --    Activity -- 1-->bedfast -- -- --    Mobility -- 2-->very limited -- -- --    Nutrition -- 2-->probably inadequate -- -- --    Friction and Shear -- 1-->problem -- -- --    [place] Score -- 12 -- -- --       Wound 09/20/22 2353 medial sacral spine    Wound - Properties Group Placement Date: 09/20/22 Placement Time: 2353 Orientation: medial Location: sacral spine    Dressing Appearance -- moist drainage -- -- --    Closure -- Unable to assess -- -- --    Base -- dressing in place, unable to visualize -- -- --    Retired Wound - Properties Group Placement Date: 09/20/22 Placement Time: 2353 Orientation: medial Location: sacral spine    Retired Wound - Properties Group Date first assessed: 09/20/22 Time first assessed: 2353 Location: sacral spine       Skin Interventions    Pressure Reduction Devices -- pressure-redistributing mattress utilized -- -- --    Pressure Reduction Techniques -- frequent weight shift encouraged -- -- --    Skin Protection -- adhesive use limited -- -- --       Musculoskeletal    Musculoskeletal WDL -- .WDL except;mobility -- -- --    General Mobility -- generalized weakness;moderately impaired -- -- --       Functional Screen (every 3 days/change)    Ambulation -- 3 - assistive equipment and person -- -- --    Transferring  -- 4 - completely dependent -- -- --    Toileting -- 4 - completely dependent -- -- --    Bathing -- 3 - assistive equipment and person -- -- --    Dressing -- 3 - assistive equipment and person -- -- --    Eating -- 0 - independent -- -- --    Communication -- 0 - understands/communicates without difficulty -- -- --    Swallowing -- 0 - swallows foods/liquids without difficulty -- -- --       Nutrition    Diet/Nutrition Received -- consistent carb/diabetic diet -- -- --       Urethral Catheter Silicone 18 Fr.    Urethral Catheter Properties Placement Date: 11/30/22 Placement Time: 2015 Indications: Acute Urinary Retention Inserted by: ALEJANDRO Brennan Hand Hygiene Completed: Yes Catheter Type: Silicone Tube Size (Fr.): 18 Fr. Catheter Balloon Size: 10 mL Cleansed with: Perineal hygiene wipe Urine Returned: Yes    Daily Indications -- Chronic Urinary Retention related to Obstructive, Infectious/Inflammatory, Neurologic or Traumatic Causes -- -- --    Site Assessment -- Skin intact;Clean -- -- --    Collection Container -- Standard drainage bag -- -- --    Securement Method -- Securing device -- -- --    Catheter care complete -- Yes -- -- --       Peripheral IV 11/30/22 2035 Right;Upper Arm    IV Properties Placement Date: 11/30/22 Placement Time: 2035 Size (Gauge): 20 G Orientation: Right;Upper Location: Arm Site Prep: Chlorhexidine Technique: Anatomical landmarks Total insertion attempts: 1 Patient Tolerance: Tolerated well    Site Assessment -- Clean;Dry;Intact -- -- --    Dressing Type -- Transparent -- -- --    Line Status -- Saline locked -- -- --    Dressing Status -- Clean;Dry;Intact -- -- --    Reason Not Rotated -- Not due -- -- --    Phlebitis -- 0-->no symptoms -- -- --       Sepsis Screening Options    Which Sepsis Screen to be Used? -- Adult Sepsis Screen -- -- --       Adult Sepsis Screening Tool    Previous adult screen positive in last 48 hrs? -- no -- -- --    Are 2 or > of the above criteria present?  -- no: STOP/negative screen -- -- --       Safety    Safety WDL -- WDL -- WDL WDL    Safety Factors -- bed in low position;call light in reach;ID band on;wheels locked;upper side rails raised x 2 -- -- --    All Alarms -- alarm(s) activated and audible -- -- --       [facility] High Risk Falls Assessment (If Fall score is >/=13, add the Fall Risk CPG to the care plan)     Fallen in past 6 months -- 0--> No -- -- --    Mental Status -- 0--> no mental status change -- -- --    Elimination -- 0--> No elimination issues -- -- --    Mobility -- 2--> Requires assistance- transfer, walker, etc. -- -- --    Medications -- 1--> Narcotics;1--> Hypnotics;1--> Insulin/ Oral hypoglycemic -- -- --    Nurses' Clinical Judgement -- 10 -- -- --    Total Fall Risk Score -- 16 -- -- --       Safety Management    Safety Promotion/Fall Prevention safety round/check completed activity supervised;assistive device/personal items within reach;clutter free environment maintained;fall prevention program maintained;nonskid shoes/slippers when out of bed;safety round/check completed safety round/check completed safety round/check completed safety round/check completed    Enhanced Safety Measures -- bed alarm set -- -- --    Medication Review/Management -- medications reviewed -- -- --       Daily Care    Highest level of mobility -- 2 --> Bed activities/dependent transfer -- -- --       How much help from another person do you currently need...    Turning from your back to your side while in flat bed without using bedrails? -- total -- -- --    Moving from lying on back to sitting on the side of a flat bed without bedrails? -- total -- -- --    Moving to and from a bed to a chair (including a wheelchair)? -- total -- -- --    Standing up from a chair using your arms (e.g., wheelchair, bedside chair)? -- total -- -- --    Climbing 3-5 steps with a railing? -- total -- -- --    To walk in hospital room? -- total -- -- --    AM-PAC 6 Clicks  Score (PT) -- 6 -- -- --    Row Name 12/05/22 2249 12/05/22 2035 12/05/22 1951 12/05/22 1700 12/05/22 1500       Pain/Comfort/Sleep    Preferred Pain Scale -- number (Numeric Rating Pain Scale) -- -- --    (0-10) Pain Rating: Rest -- 7 -- -- --    Response to Pain Interventions interventions effective per patient -- -- -- --       Coping/Psychosocial    Observed Emotional State -- calm;cooperative -- -- --    Verbalized Emotional State -- acceptance -- -- --    Trust Relationship/Rapport -- care explained;choices provided;thoughts/feelings acknowledged -- -- --    Family/Support Persons -- family -- -- --    Involvement in Care -- not present at bedside -- -- --    Family/Support System Care -- support provided;self-care encouraged -- -- --    Diversional Activities -- television -- -- --       Coping/Psychosocial Interventions    Supportive Measures -- active listening utilized -- -- --       HEENT    HEENT WDL -- .WDL except;eye  GLASSES -- -- --       Mouth/Teeth WDL    Mouth/Teeth WDL -- .WDL except;teeth -- -- --    Teeth Symptoms -- dental appliance present -- -- --       Cognitive    Cognitive/Neuro/Behavioral WDL -- WDL -- -- --       [place] Coma Scale    Best Eye Response -- 4-->(E4) spontaneous -- -- --    Best Motor Response -- 6-->(M6) obeys commands -- -- --    Best Verbal Response -- 5-->(V5) oriented -- -- --    [place] Coma Scale Score -- 15 -- -- --    Assessment Qualifiers -- patient not sedated/intubated -- -- --       General Appearance WDL    General Appearance WDL -- .WDL except;appearance -- -- --    General Appearance -- unkempt -- -- --       Behavior WDL    Behavior WDL -- WDL -- -- --       Respiratory    Respiratory WDL -- breath sounds -- -- --    Cough And Deep Breathing -- done independently per patient -- -- --       Breath Sounds    Breath Sounds -- All Fields -- -- --    All Lung Fields Breath Sounds -- wheezes, expiratory -- -- --       Oxygen Therapy    Device (Oxygen Therapy) --  room air -- -- --       Cardiac    Cardiac WDL -- WDL -- -- --       Peripheral Neurovascular    Peripheral Neurovascular WDL -- .WDL except;edema -- -- --       Edema    Edema -- ankle, left;ankle, right;foot, left;foot, right -- -- --    Ankle, Left Edema -- 1+ (Trace) -- -- --    Ankle, Right Edema -- 1+ (Trace) -- -- --    Foot, Left Edema -- 1+ (Trace) -- -- --    Foot, Right Edema -- 1+ (Trace) -- -- --       Gastrointestinal    Gastrointestinal WDL -- .WDL except;appearance/characteristics -- -- --    Abdominal Appearance -- obese -- -- --       Genitourinary    Genitourinary WDL -- .WDL except;voiding ability/characteristics -- -- --    Voiding Characteristics -- urethral catheter (bladder) -- -- --       Skin    Skin WDL -- .WDL except;all -- -- --    Skin Color/Characteristics -- redness blanchable -- -- --    Skin Temperature -- warm -- -- --    Skin Moisture -- dry -- -- --    Skin Elasticity -- quick return to original state -- -- --    Skin Integrity -- scab;pressure injury -- -- --       Suhas Risk Assessment    Sensory Perception -- 3-->slightly limited -- -- --    Moisture -- 3-->occasionally moist -- -- --    Activity -- 1-->bedfast -- -- --    Mobility -- 2-->very limited -- -- --    Nutrition -- 2-->probably inadequate -- -- --    Friction and Shear -- 1-->problem -- -- --    Suhas Score -- 12 -- -- --       Wound 09/20/22 2353 medial sacral spine    Wound - Properties Group Placement Date: 09/20/22 Placement Time: 2353 Orientation: medial Location: sacral spine    Dressing Appearance -- moist drainage -- -- --    Dressing Care -- dressing changed -- -- --    Retired Wound - Properties Group Placement Date: 09/20/22 Placement Time: 2353 Orientation: medial Location: sacral spine    Retired Wound - Properties Group Date first assessed: 09/20/22 Time first assessed: 2353 Location: sacral spine       Skin Interventions    Pressure Reduction Devices -- pressure-redistributing mattress utilized --  -- --    Pressure Reduction Techniques -- frequent weight shift encouraged -- -- --    Skin Protection -- adhesive use limited -- -- --       Musculoskeletal    Musculoskeletal WDL -- .WDL except;mobility -- -- --    General Mobility -- generalized weakness;moderately impaired -- -- --    Range of Motion -- active ROM (range of motion) encouraged -- -- --       Functional Screen (every 3 days/change)    Ambulation -- 3 - assistive equipment and person -- -- --    Transferring -- 4 - completely dependent -- -- --    Toileting -- 4 - completely dependent -- -- --    Bathing -- 3 - assistive equipment and person -- -- --    Dressing -- 3 - assistive equipment and person -- -- --    Eating -- 0 - independent -- -- --    Communication -- 0 - understands/communicates without difficulty -- -- --    Swallowing -- 0 - swallows foods/liquids without difficulty -- -- --       Nutrition    Diet/Nutrition Received -- consistent carb/diabetic diet -- -- --       Nutrition Interventions    Glycemic Management -- blood glucose monitored -- -- --       Urethral Catheter Silicone 18 Fr.    Urethral Catheter Properties Placement Date: 11/30/22 Placement Time: 2015 Indications: Acute Urinary Retention Inserted by: ALEJANDRO Brennan Hand Hygiene Completed: Yes Catheter Type: Silicone Tube Size (Fr.): 18 Fr. Catheter Balloon Size: 10 mL Cleansed with: Perineal hygiene wipe Urine Returned: Yes    Daily Indications -- Chronic Urinary Retention related to Obstructive, Infectious/Inflammatory, Neurologic or Traumatic Causes -- -- --    Site Assessment -- Clean;Skin intact -- -- --    Collection Container -- Standard drainage bag -- -- --    Securement Method -- Securing device -- -- --    Catheter care complete -- Yes Yes -- --       Peripheral IV 11/30/22 2035 Right;Upper Arm    IV Properties Placement Date: 11/30/22 Placement Time: 2035 Size (Gauge): 20 G Orientation: Right;Upper Location: Arm Site Prep: Chlorhexidine Technique: Anatomical  landmarks Total insertion attempts: 1 Patient Tolerance: Tolerated well    Site Assessment -- Clean;Dry;Intact -- -- --    Dressing Type -- Transparent -- -- --    Line Status -- Saline locked -- -- --    Dressing Status -- Clean;Dry;Intact -- -- --    Reason Not Rotated -- Not due -- -- --    Phlebitis -- 0-->no symptoms -- -- --       Adult Sepsis Screening Tool    Previous adult screen positive in last 48 hrs? -- no -- -- --    Are 2 or > of the above criteria present? -- no: STOP/negative screen -- -- --       Safety    Safety WDL -- WDL -- -- --       [facility] High Risk Falls Assessment (If Fall score is >/=13, add the Fall Risk CPG to the care plan)     Fallen in past 6 months -- 0--> No -- -- --    Mental Status -- 0--> no mental status change -- -- --    Elimination -- 0--> No elimination issues -- -- --    Mobility -- 2--> Requires assistance- transfer, walker, etc. -- -- --    Medications -- 0--> No meds -- -- --    Nurses' Clinical Judgement -- 10 -- -- --    Total Fall Risk Score -- 14 -- -- --       Safety Management    Safety Promotion/Fall Prevention -- safety round/check completed -- safety round/check completed safety round/check completed    Row Name 12/05/22 1300                   Wound 09/20/22 2353 medial sacral spine    Wound - Properties Group Placement Date: 09/20/22 Placement Time: 2353 Orientation: medial Location: sacral spine    Dressing Appearance moist drainage        Closure None        Base moist;red        Periwound moist;redness        Periwound Temperature warm        Periwound Skin Turgor firm        Drainage Characteristics/Odor sanguineous        Drainage Amount scant        Care, Wound cleansed with;sterile normal saline        Dressing Care dressing changed;gauze, wet-to-moist  dakins moistened gauze        Retired Wound - Properties Group Placement Date: 09/20/22 Placement Time: 2353 Orientation: medial Location: sacral spine    Retired Wound - Properties Group Date  first assessed: 09/20/22 Time first assessed: 2353 Location: sacral spine       Urethral Catheter Silicone 18 Fr.    Urethral Catheter Properties Placement Date: 11/30/22 Placement Time: 2015 Indications: Acute Urinary Retention Inserted by: ALEJANDRO Brennan Hand Hygiene Completed: Yes Catheter Type: Silicone Tube Size (Fr.): 18 Fr. Catheter Balloon Size: 10 mL Cleansed with: Perineal hygiene wipe Urine Returned: Yes       Peripheral IV 11/30/22 2035 Right;Upper Arm    IV Properties Placement Date: 11/30/22 Placement Time: 2035 Size (Gauge): 20 G Orientation: Right;Upper Location: Arm Site Prep: Chlorhexidine Technique: Anatomical landmarks Total insertion attempts: 1 Patient Tolerance: Tolerated well       Safety Management    Safety Promotion/Fall Prevention safety round/check completed

## 2022-12-06 NOTE — PROGRESS NOTES
[facility] HOSPITALIST PROGRESS NOTE     Patient Identification:  Name:  Anika Neri  Age:  72 y.o.  Sex:  female  :  1950  MRN:  [id number]  Visit Number:  [id number]  ROOM: [address] [place] [address]     Primary Care Provider:  Barbara Gaona APRN    Length of stay in inpatient status:  6    Subjective     Chief Compliant:    Chief Complaint   Patient presents with   • Dysuria     History of Presenting Illness:    Patient remains ill but stable today, no acute events overnight, no new complaints, denies any fevers or chills, still persisting suprapubic mild tenderness, blood pressure marginally lower today, patient does endorse feeling better though, on antibiotics per Infectious Disease, has a few more days left, working towards placement.   -Update- I went back to check on patient this afternoon and was stable, labs still pending, patient noted to not have any advanced directives on file and took the opportunity given acute hospitalization to discuss this further, upon our discussion patient reports her daughter is her POA and that following a serious hospitalization in [place] is when she had this done, she was unsure if she had any other documents such as living will or other advanced directives but was ok with us reaching out to her daughter for this paperwork and potentially filling out other documents while here, Palliative Care consulted to continue these conversations.   Objective     Current Hospital Meds:  cetirizine, 10 mg, Oral, Daily  dilTIAZem CD, 240 mg, Oral, Q24H  ertapenem, 1 g, Intravenous, Q24H  famotidine, 20 mg, Oral, BID AC  gabapentin, 800 mg, Oral, Q8H  heparin (porcine), 5,000 Units, Subcutaneous, Q12H  hydrOXYzine, 25 mg, Oral, Q8H  Insulin Aspart, 0-9 Units, Subcutaneous, 4x Daily AC & at Bedtime  sodium chloride, 10 mL, Intravenous, Q12H  sodium hypochlorite, , Topical,  Q12H    ----------------------------------------------------------------------------------------------------------------------  Vital Signs:  Temp:  [97.5 °F (36.4 °C)-98.4 °F (36.9 °C)] 97.6 °F (36.4 °C)  Heart Rate:  [64-90] 89  Resp:  [16-18] 16  BP: ()/(58-74) 92/58  SpO2:  [96 %] 96 %  on   ;   Device (Oxygen Therapy): room air  Body mass index is 48.52 kg/m².      Intake/Output Summary (Last 24 hours) at 12/6/2022 1051  Last data filed at 12/6/2022 0900  Gross per 24 hour   Intake 600 ml   Output 3700 ml   Net -3100 ml      ----------------------------------------------------------------------------------------------------------------------  Physical exam:  Constitutional: older than stated age, No acute distress.      HENT:  Head:  Normocephalic and atraumatic.  Mouth:  Moist mucous membranes.    Eyes:  Conjunctivae and EOM are normal. No scleral icterus.    Neck:  Neck supple.  No JVD present.    Cardiovascular:  Normal rate, regular rhythm and normal heart sounds with no murmur.  Pulmonary/Chest:  No respiratory distress, no wheezes, no crackles, on room air  Abdominal:  Soft. No distension and mild suprapubic tenderness persisting  Musculoskeletal:  No tenderness, and no deformity.  No red or swollen joints anywhere.    Neurological:  Alert and oriented to person, place, and time.  No gross focal deficits   Skin:  Skin is warm and dry. No rash noted. No pallor.   Peripheral vascular:  No clubbing, no cyanosis, no edema.  Psychiatric: Appropriate mood and affect  : Panchito in place    Edited by: Ezekiel Roe MD at 12/6/2022 1051  ----------------------------------------------------------------------------------------------------------------------    Urine Culture   Date Value Ref Range Status   11/30/2022 >100,000 CFU/mL Escherichia coli ESBL (A)  Final     Comment:       Consider infectious disease consult.  Susceptibility results may not correlate to clinical outcomes.     No results found for:  BLOODCX    I have personally looked at the labs and they are summarized above.  ----------------------------------------------------------------------------------------------------------------------  Detailed radiology reports for the last 24 hours:  No radiology results for the last day  Assessment & Plan    -ESBL E. coli catheter associated urinary tract infection that was present on admission, due to a chronic indwelling Barjaas catheter that was also present on admission, in a patient with known history of ESBL E. coli UTI  -Acute hypomagnesemia  -Suspect history of chronic kidney disease stage II with baseline creatinine 0.8-0.9  -History of type 2 diabetes mellitus  -History of chronic stage IV sacral decubitus ulcer (5.5 cm x 5 cm, 1 cm in depth), which was present on admission   -History of chronic debility  -History of morbid obesity, BMI 48.52 kg/m², which complicates all aspects of care  -History of paroxysmal atrial fibrillation, not on chronic anticoagulation due to reports of postmenopausal bleeding  -History of essential hypertension  -History of CVA in the past with no residual deficits reported  -History of chronic pain and chronic pain medicine usage    Infectious Disease consulted and following, continue ertapenem for 10 days, barajas replaced 11/30 on admission, continued on home medications, glucose has been controlled, Social Work consulted and following and working toward placement at nursing home in [facility].    #Advanced Care Planning  - Due to patient's chronic debility, nursing home status and acute illness requiring admission to the hospital I have approached conversations regarding Advanced Care Planning.  Pertinent diagnoses to this discussion include her recurrent Multi-Drug Resistant Organism infections, Chronic Kidney Disease prior Cerebrovascular Accident, other issues that may lead her to come into the hospital incapacitated.  At the time of our discussion only the patient was  present.  I have consented the patient regarding their willingness to discuss Advanced Care Planning services and they have agreed.  Our discussion included her prior serious admission to the hospital in [place], her current POA.  We discussed the additional Advanced Care Planning documents that could be potentially addressed and filled out at this time including Living Will or Instruction Directives. At this time patient was amenable to us reaching out to her daughter for her POA paperwork but also discussing the other documents further.  Have consulted Palliative Care and appreciate recommendations. I have spent 18 minutes of time (12:32-12:50PM) in face-to-face conversation on the above described ACP services.    F: Oral  E: Monitor & Replace PRN  N: Diet: Diabetic Diets; Consistent Carbohydrate; Texture: Regular Texture (IDDSI 7); Fluid Consistency: Thin (IDDSI 0)  PPx: SQH   Code Status (Patient has no pulse and is not breathing): CPR (Attempt to Resuscitate)  Medical Interventions (Patient has pulse or is breathing): Full Support     Dispo: Pending clinical improvement, placement in [place]    *This patient is considered high risk secondary to ESBL Urinary Tract Infection failed outpatient therapy.    Edited by: Ezekiel Roe MD at 12/6/2022 1051  [facility]

## 2022-12-06 NOTE — CASE MANAGEMENT/SOCIAL WORK
Discharge Planning Assessment  [facility]     Patient Name: Anika Neri  MRN: [id number]  Today's Date: 12/6/2022    Admit Date: 11/30/2022    Plan: SS was contacted by [facility] for Physical Rehab per [facility] [phone] who is requesting some updated information to be faxed over. SS faxed information to [phone].  to follow.     Discharge Plan     Row Name 12/06/22 1258       Plan    Plan SS was contacted by [facility] for Physical Rehab per [facility] [phone] who is requesting some updated information to be faxed over. SS faxed information to [phone].  to follow.                    AZEEM Mckeon

## 2022-12-06 NOTE — PROGRESS NOTES
PROGRESS NOTE         Patient Identification:  Name:  Anika Neri  Age:  72 y.o.  Sex:  female  :  1950  MRN:  [id number]  Visit Number:  [id number]  Primary Care Provider:  Barbara Gaona APRN         LOS: 6 days       ----------------------------------------------------------------------------------------------------------------------  Subjective       Chief Complaints:    Dysuria        Interval History:      Patient is sitting up in bed on room air in no apparent distress.  Eating breakfast and appears comfortable.  Continues to complain of some suprapubic tenderness and right CVA tenderness, but this seems to be slightly improved today.  Otherwise, no new complaints at this time.  Nurse reports no issues overnight.  Afebrile, no diarrhea.  No new labs today.    Review of Systems:    Constitutional: no fever, chills and night sweats.  Generalized fatigue.  Eyes: no eye drainage, itching or redness.  HEENT: no mouth sores, dysphagia or nose bleed.  Respiratory: no for shortness of breath, cough or production of sputum.  Cardiovascular: no chest pain, no palpitations, no orthopnea.  Gastrointestinal: no nausea, vomiting or diarrhea. No abdominal pain, hematemesis or rectal bleeding.  Genitourinary: no dysuria or polyuria.  Suprapubic tenderness and right-sided CVA tenderness.  Hematologic/lymphatic: no lymph node abnormalities, no easy bruising or easy bleeding.  Musculoskeletal: no muscle or joint pain.  Skin: No rash and no itching.  Neurological: no loss of consciousness, no seizure, no headache.  Behavioral/Psych: no depression or suicidal ideation.  Endocrine: no hot flashes.  Immunologic: negative.    ----------------------------------------------------------------------------------------------------------------------      Objective       \[facility]\"" Meds:  cetirizine, 10 mg, Oral, Daily  dilTIAZem CD, 240 mg, Oral, Q24H  ertapenem, 1 g, Intravenous, Q24H  famotidine, 20 mg,  Oral, BID AC  gabapentin, 800 mg, Oral, Q8H  heparin (porcine), 5,000 Units, Subcutaneous, Q12H  hydrOXYzine, 25 mg, Oral, Q8H  Insulin Aspart, 0-9 Units, Subcutaneous, 4x Daily AC & at Bedtime  sodium chloride, 10 mL, Intravenous, Q12H  sodium hypochlorite, , Topical, Q12H         ----------------------------------------------------------------------------------------------------------------------    Vital Signs:  Temp:  [97.5 °F (36.4 °C)-98.4 °F (36.9 °C)] 97.6 °F (36.4 °C)  Heart Rate:  [64-90] 89  Resp:  [16-18] 16  BP: ()/(58-74) 92/58  No data found.  SpO2 Percentage    12/05/22 0600 12/05/22 1000 12/05/22 1400   SpO2: 92% 94% 96%     SpO2:  [96 %] 96 %  on   ;   Device (Oxygen Therapy): room air    Body mass index is 48.52 kg/m².  Wt Readings from Last 3 Encounters:   11/30/22 116 kg (256 lb 12.8 oz)   10/28/22 94.3 kg (208 lb)   10/25/22 94.3 kg (208 lb)        Intake/Output Summary (Last 24 hours) at 12/6/2022 1017  Last data filed at 12/6/2022 0900  Gross per 24 hour   Intake 600 ml   Output 3700 ml   Net -3100 ml     Diet: Diabetic Diets; Consistent Carbohydrate; Texture: Regular Texture (IDDSI 7); Fluid Consistency: Thin (IDDSI 0)  ----------------------------------------------------------------------------------------------------------------------      Physical Exam:    Constitutional:  female is sitting up in bed on room air in no apparent distress.  Eating breakfast and appears comfortable.  HENT:  Head: Normocephalic and atraumatic.  Mouth:  Moist mucous membranes.    Eyes:  Conjunctivae and EOM are normal.  No scleral icterus.  Neck:  Neck supple.  No JVD present.    Cardiovascular:  Normal rate, regular rhythm and normal heart sounds with no murmur. No edema.  Pulmonary/Chest:  No respiratory distress, no wheezes, no crackles, with normal breath sounds and good air movement.  Abdominal:  Soft.  Bowel sounds are normal.  No distension and suprapubic and mild right flank tenderness.   Flanagan catheter in place.  Musculoskeletal:  No edema, no tenderness, and no deformity.  No swelling or redness of joints.  Neurological:  Alert and oriented to person, place, and time.  No facial droop.  No slurred speech.   Skin:  Skin is warm and dry.  No rash noted.  No pallor.   Psychiatric:  Normal mood and affect.  Behavior is normal.    ----------------------------------------------------------------------------------------------------------------------            Results from last 7 days   Lab Units 12/05/22 0216 12/03/22 0157 12/02/22 0048 12/01/22 0031 11/30/22 2020   LACTATE mmol/L  --   --   --   --  1.8   WBC 10*3/mm3 10.15 9.59 9.73   < >  --    HEMOGLOBIN g/dL 11.7* 10.8* 11.4*   < >  --    HEMATOCRIT % 36.4 34.2 35.5   < >  --    MCV fL 93.6 94.0 94.2   < >  --    MCHC g/dL 32.1 31.6 32.1   < >  --    PLATELETS 10*3/mm3 189 182 181   < >  --     < > = values in this interval not displayed.     Results from last 7 days   Lab Units 12/06/22  0053 12/05/22 0216 12/03/22 0157 12/02/22 0048 12/01/22 0031 11/30/22  1638 11/30/22  1638   SODIUM mmol/L  --  137 140 141 143  --  142   POTASSIUM mmol/L  --  4.4 4.2 3.8 3.6  --  3.6   MAGNESIUM mg/dL 2.2 1.8 2.2 1.5* 1.4*   < >  --    CHLORIDE mmol/L  --  102 106 106 107  --  107   CO2 mmol/L  --  22.8 23.4 23.6 22.9  --  22.8   BUN mg/dL  --  33* 26* 21 18  --  19   CREATININE mg/dL  --  0.97 0.98 0.93 0.80  --  0.93   CALCIUM mg/dL  --  9.3 8.8 8.9 8.7  --  8.7   GLUCOSE mg/dL  --  173* 158* 129* 151*  --  244*   ALBUMIN g/dL  --   --   --  3.10* 3.18*  --  3.18*   BILIRUBIN mg/dL  --   --   --  0.3 0.4  --  0.3   ALK PHOS U/L  --   --   --  98 109  --  118*   AST (SGOT) U/L  --   --   --  13 12  --  13   ALT (SGPT) U/L  --   --   --  9 8  --  9    < > = values in this interval not displayed.   Estimated Creatinine Clearance: 61.2 mL/min (by C-G formula based on SCr of 0.97 mg/dL).  No results found for: AMMONIA    Glucose   Date/Time Value Ref Range  Status   12/06/2022 0609 108 70 - 130 mg/dL Final     Comment:     Meter: [id number] : [id number] OLINDA LION   12/05/2022 2034 136 (H) 70 - 130 mg/dL Final     Comment:     Meter: [id number] : [id number] LUZ ELENA BAUMANN   12/05/2022 1635 141 (H) 70 - 130 mg/dL Final     Comment:     Meter: [id number] : [id number] Bright Sheryl   12/05/2022 1023 172 (H) 70 - 130 mg/dL Final     Comment:     Meter: [id number] : [id number] Bright Sheryl   12/05/2022 0724 155 (H) 70 - 130 mg/dL Final     Comment:     Meter: [id number] : [id number] Wing Van   12/04/2022 2107 179 (H) 70 - 130 mg/dL Final     Comment:     Meter: [id number] : [id number] JOSE ROSEERT   12/04/2022 1626 158 (H) 70 - 130 mg/dL Final     Comment:     Meter: [id number] : [id number] Bright Sheryl   12/04/2022 1046 188 (H) 70 - 130 mg/dL Final     Comment:     Meter: [id number] : [id number] Jose Sheryl     Lab Results   Component Value Date    HGBA1C 7.40 (H) 12/01/2022     Lab Results   Component Value Date    TSH 1.090 12/01/2022       No results found for: BLOODCX  Urine Culture   Date Value Ref Range Status   11/30/2022 >100,000 CFU/mL Escherichia coli ESBL (A)  Final     Comment:       Consider infectious disease consult.  Susceptibility results may not correlate to clinical outcomes.     No results found for: WOUNDCX  No results found for: STOOLCX  No results found for: RESPCX  Pain Management Panel       Pain Management Panel Latest Ref Rng & Units 12/1/2022    AMPHETAMINES SCREEN, URINE Negative Negative    BARBITURATES SCREEN Negative Negative    BENZODIAZEPINE SCREEN, URINE Negative Negative    BUPRENORPHINEUR Negative Negative    COCAINE SCREEN, URINE Negative Negative    METHADONE SCREEN, URINE Negative Negative    METHAMPHETAMINEUR Negative Negative              ----------------------------------------------------------------------------------------------------------------------  Imaging Results (Last 24 Hours)        ** No results found for the last 24 hours. **            ------------------------------------------------------------------------------------------------    Pertinent Infectious Disease Results     Of note, patient reports a recent history of 2 courses of antibiotics for UTIs.  She was seen at [facility] ED on 10/2/2022 at which time she was diagnosed with a UTI and prescribed cefdinir.  Urine culture subsequently finalized as ESBL E. coli and several attempts were made to contact her.  She returned to the ED on 10/25/2022 and was found to have another UTI and discharged home on Macrobid.  Urine culture at that time finalized as ESBL E. coli that was susceptible to Macrobid. She was then seen at [facility] and had a catheter change on 11/15/2022. After that time, she began to have a leaking and malodorous dark urine     Urinalysis on 11/30/2022 was positive with WBCs too numerous to count and 4+ bacteria.  Urine culture finalized is greater than 100,000 colonies of ESBL E. coli.    Assessment/Plan       ASSESSMENT:    ESBL E. coli catheter associated UTI    PLAN:    I examined the patient this morning and she appears very comfortable while eating breakfast.  Remains on room air in no apparent distress.  Has no new complaints at this time but continues to have some suprapubic tenderness and right flank tenderness.  Nurse reports no issues overnight.  Remains afebrile without diarrhea.  Lungs are clear to auscultation bilaterally.  Abdomen is soft with suprapubic tenderness and normal bowel sounds.  Mild right flank tenderness.  No new labs today.    Recommend to continue on ertapenem for at least a 7-day course especially as patient has a complicated history of multiple recent UTIs with ESBL E. coli and failed oral outpatient antibiotic therapy.    Code Status:   Code Status and Medical Interventions:   Ordered at: 11/30/22 1955     Code Status (Patient has no pulse and is not breathing):     CPR (Attempt to Resuscitate)     Medical Interventions (Patient has pulse or is breathing):    Full Support       Jolynn Snyder PA-C  12/06/22  10:17 EST

## 2022-12-06 NOTE — DISCHARGE PLACEMENT REQUEST
"Anika Gonzalez (72 y.o. Female)     Date of Birth   1950    Social Security Number       Address   [address] [place] [address]    Home Phone   [phone]    MRN   [id number]       [facility]    Marital Status                               Admission Date   11/30/22    Admission Type   Emergency    Admitting Provider   Stephanie Bang DO    Attending Provider   Ezekiel Roe MD    Department, Room/Bed   [address], 3327/1P       Discharge Date       Discharge Disposition       Discharge Destination                               Attending Provider: Ezekiel Roe MD    Allergies: Contrast Dye    Isolation: Contact   Infection: MRSA No Isolation this Admit (09/21/22), ESBL E coli (10/10/22)   Code Status: CPR    Ht: 154.9 cm (61\")   Wt: 116 kg (256 lb 12.8 oz)    Admission Cmt: None   Principal Problem: Urinary tract infection associated with indwelling urethral catheter, initial encounter ([facility]) [T83.511A,N39.0]                 Active Insurance as of 11/30/2022     Primary Coverage     Payor Plan Insurance Group Employer/Plan Group    HUMANA MEDICARE REPLACEMENT HUMANA MEDICARE REPLACEMENT [id number]     Payor Plan Address Payor Plan Phone Number Payor Plan Fax Number Effective Dates    [address] [address] [phone]  10/1/2022 - None Entered    [facility] [id number]       Subscriber Name Subscriber Birth Date Member ID       ANIKA GONZALEZ 1950 [id number]                 Emergency Contacts      (Rel.) Home Phone Work Phone Mobile Phone    Joellen Landry (POA) (Daughter) -- -- [phone]    Tiffany Parkera (Daughter) [phone] -- --               History & Physical      Jolynn Boyce PA-C at 11/30/22 2017     Attestation signed by Stephanie Bang DO at 12/01/22 0722    I have reviewed this documentation and agree.  Patient briefly seen and examined at bedside.  Patient was resting comfortably in bed.  Currently denies any flank " "pain and/or abdominal pain.  Barajas catheter is noted to have a straw-colored urine in the collection bag.    Patient with history of ESBL E. coli UTI in 2022.  Continue with empiric IV Invanz for now; if ESBL E. coli and sensitive to Invanz, could consider discharge home with midline versus arrangements at infusion clinic.                       [facility]  HISTORY & PHYSICAL    Patient Identification:  Name:  Anika Neri  Age:  72 y.o.  Sex:  female  :  1950  MRN:  [id number]   Visit Number:  [id number]  Admit Date: 2022   Primary Care Physician:  Barbara Gaona APRN     Subjective     Chief complaint:   Chief Complaint   Patient presents with   • Dysuria     History of presenting illness:   Patient is a 72 y.o. female with past medical history significant for paroxysmal atrial fibrillation, debility, chronic indwelling barajas catheter, chronic decubitus ulcer, essential hypertension, history of CVA,  that presented to the [facility] emergency department for evaluation of nausea and abdominal pain.     The patient states she had a catheter change on 11/15 at [facility].  She reports since this time the catheter has been leaking.  She has noticed malodorous dark urine.  She reports being on antibiotics \"the whole month of October\" for a UTI.  Upon further chart review she was diagnosed with a UTI on 10/2 in our emergency department and was started on cefdinir.  Urine culture from 10/8 grew ESBL E. coli. Several attempts were made to contact the patient to notify her of the urine culture results.  A letter was sent to her address.  She was seen in the emergency department once again on 10/25 and was found to have another UTI.  She was discharged with Macrobid. Urine culture also grew ESBL e.coli that was susceptible to nitrofurantoin.  She admits to completing all antibiotics.  She reports when previously diagnosed with a UTI she had " abdominal pain and nausea which has not resolved.  She states her abdominal pain is most severe in the suprapubic region and she has now developed right flank pain.  She denies any fever, chills, diaphoresis, emesis, diarrhea, hematuria.    In the ED the patient received, IV Merrem     Patient has been admitted to the medical/surgical floor for further evaluation and treatment.   ---------------------------------------------------------------------------------------------------------------------   Review of Systems   Constitutional: Negative for chills, diaphoresis, fatigue and fever.   HENT: Negative for congestion.    Respiratory: Negative for cough, shortness of breath and wheezing.    Cardiovascular: Negative for chest pain and palpitations.   Gastrointestinal: Positive for abdominal pain (suprapubic) and nausea. Negative for constipation, diarrhea and vomiting.   Genitourinary: Positive for flank pain (right). Negative for dysuria and hematuria.   Musculoskeletal: Positive for gait problem (unable to ambulate w/o assistance). Negative for myalgias.   Skin: Positive for wound (chronic decubitus ulcer). Negative for rash.   Neurological: Positive for weakness (chronic generalized). Negative for dizziness and light-headedness.   Psychiatric/Behavioral: Negative for confusion.      ---------------------------------------------------------------------------------------------------------------------   Past Medical History:   Diagnosis Date   • Arthritis    • Atrial fibrillation (HCC)     Not on anticoagulation due to postmenopausal bleeding   • Diabetes mellitus (HCC)    • Gout    • History of CVA (cerebrovascular accident)     residual right-sided weakness   • Hyperlipidemia    • Hypertension    • Stage IV pressure ulcer of sacral region (HCC)      Past Surgical History:   Procedure Laterality Date   • CHOLECYSTECTOMY       Family History   Problem Relation Age of Onset   • Diabetes Mother    • Hypertension Father       Social History     Socioeconomic History   • Marital status:    Tobacco Use   • Smoking status: Never   • Smokeless tobacco: Never   Substance and Sexual Activity   • Alcohol use: No   • Drug use: No   • Sexual activity: Defer     ---------------------------------------------------------------------------------------------------------------------   Allergies:  Contrast dye  ---------------------------------------------------------------------------------------------------------------------   Medications below are reported home medications pulling from within the system; at this time, these medications have not been reconciled unless otherwise specified and are in the verification process for further verifcation as current home medications.    Prior to Admission Medications     Prescriptions Last Dose Informant Patient Reported? Taking?    Diclofenac Sodium (VOLTAREN) 1 % gel gel  Pharmacy Yes No    Apply 4 g topically to the appropriate area as directed 4 (Four) Times a Day As Needed (apply to knees and back).    dicyclomine (BENTYL) 20 MG tablet   No No    Take 1 tablet by mouth Every 8 (Eight) Hours As Needed (abd pain).    dilTIAZem CD (CARDIZEM CD) 300 MG 24 hr capsule  Pharmacy Yes No    Take 300 mg by mouth Daily.    gabapentin (NEURONTIN) 800 MG tablet   No No    Take 1 tablet by mouth 3 (Three) Times a Day for 3 days.    insulin glargine (LANTUS, SEMGLEE) 100 UNIT/ML injection  Pharmacy Yes No    Inject 14-24 Units under the skin into the appropriate area as directed Daily.    iron polysaccharides (NIFEREX) 150 MG capsule   No No    Take 1 capsule by mouth Daily.    multivitamin (THERAGRAN) tablet tablet   No No    Take 1 tablet by mouth Daily.        ---------------------------------------------------------------------------------------------------------------------    Objective     Hospital Scheduled Meds:  meropenem, 1 g, Intravenous, Once           Current listed hospital scheduled medications  may not yet reflect those currently placed in orders that are signed and held, awaiting patient's arrival to floor/unit.    ---------------------------------------------------------------------------------------------------------------------   Vital Signs:  Temp:  [97.9 °F (36.6 °C)] 97.9 °F (36.6 °C)  Heart Rate:  [89] 89  Resp:  [20] 20  BP: (115-145)/(74-83) 120/74  Mean Arterial Pressure (Non-Invasive) for the past 24 hrs (Last 3 readings):   Noninvasive MAP (mmHg)   11/30/22 1801 101   11/30/22 1707 106   11/30/22 1633 87     SpO2 Percentage    11/30/22 1633 11/30/22 1707 11/30/22 1801   SpO2: 97% 98% 98%     SpO2:  [97 %-98 %] 98 %  on   ;   Device (Oxygen Therapy): room air    Body mass index is 38.04 kg/m².  Wt Readings from Last 3 Encounters:   11/30/22 94.3 kg (208 lb)   10/28/22 94.3 kg (208 lb)   10/25/22 94.3 kg (208 lb)     ---------------------------------------------------------------------------------------------------------------------   Physical Exam:  Physical Exam  Constitutional:       General: She is awake.      Appearance: Normal appearance. She is well-developed. She is morbidly obese.      Comments: NATY Johnson present at bedside during exam    HENT:      Head: Normocephalic and atraumatic.   Eyes:      General: Lids are normal.   Cardiovascular:      Rate and Rhythm: Normal rate and regular rhythm.      Pulses: Normal pulses.           Dorsalis pedis pulses are 2+ on the right side and 2+ on the left side.        Posterior tibial pulses are 2+ on the right side and 2+ on the left side.      Heart sounds: Normal heart sounds. No murmur heard.    No friction rub. No gallop.   Pulmonary:      Effort: Pulmonary effort is normal.      Breath sounds: Normal breath sounds.   Abdominal:      Palpations: Abdomen is soft.      Tenderness: There is generalized abdominal tenderness.      Comments: Patient refused to be checked for CVA tenderness, states she is unable to roll onto her side or sit up      Genitourinary:     Comments: Flanagan catheter in place   Musculoskeletal:      Right lower leg: No edema.      Left lower leg: No edema.   Skin:     Comments: Decubitus ulceration    Neurological:      General: No focal deficit present.      Mental Status: She is alert and oriented to person, place, and time. Mental status is at baseline.   Psychiatric:         Speech: Speech normal.         Behavior: Behavior is cooperative.         Cognition and Memory: Cognition normal.       ---------------------------------------------------------------------------------------------------------------------  EKG:    Baseline EKG ordered and pending.     Telemetry:    Patient is not currently on telemetry.   ---------------------------------------------------------------------------------------------------------------------             Results from last 7 days   Lab Units 11/30/22  1638   WBC 10*3/mm3 10.57   HEMOGLOBIN g/dL 12.0   HEMATOCRIT % 37.2   MCV fL 93.0   MCHC g/dL 32.3   PLATELETS 10*3/mm3 191     Results from last 7 days   Lab Units 11/30/22  1638   SODIUM mmol/L 142   POTASSIUM mmol/L 3.6   CHLORIDE mmol/L 107   CO2 mmol/L 22.8   BUN mg/dL 19   CREATININE mg/dL 0.93   CALCIUM mg/dL 8.7   GLUCOSE mg/dL 244*   ALBUMIN g/dL 3.18*   BILIRUBIN mg/dL 0.3   ALK PHOS U/L 118*   AST (SGOT) U/L 13   ALT (SGPT) U/L 9   Estimated Creatinine Clearance: 57.7 mL/min (by C-G formula based on SCr of 0.93 mg/dL).  No results found for: AMMONIA    No results found for: HGBA1C, POCGLU  Lab Results   Component Value Date    HGBA1C 7.80 (H) 09/20/2022     Lab Results   Component Value Date    TSH 1.100 09/20/2022     Pain Management Panel    There is no flowsheet data to display.       I have personally reviewed the above laboratory results.   ---------------------------------------------------------------------------------------------------------------------  Imaging Results (Last 7 Days)     ** No results found for the last 168 hours.  **        I have personally reviewed the above radiology results.     Last Echocardiogram:  Results for orders placed during the hospital encounter of 10/27/20    Adult Transthoracic Echo Complete W/ Cont if Necessary Per Protocol    Interpretation Summary  · Estimated left ventricular EF = 60% Left ventricular ejection fraction appears to be 56 - 60%. Left ventricular systolic function is normal.  · Moderate to severe aortic sclerosis without stenosis  · Mild mitral valve regurgitation is present.  · No previous study to compare  · Mild tricuspid valve regurgitation is present.  · Estimated right ventricular systolic pressure from tricuspid regurgitation is mildly elevated (35-45 mmHg).    ---------------------------------------------------------------------------------------------------------------------    Assessment & Plan      Active Hospital Problems    Diagnosis  POA   • **Urinary tract infection associated with indwelling urethral catheter, initial encounter ([facility]) [T83.511A, N39.0]  Yes     #CAUTI   #Hx of ESBL UTI   #Chronic indwelling barajas catheter   -Currently does not meet sepsis criteria; no leukocytosis; vitals unremarkable; lactate within normal limits  -UA grossly abnormal  -Previous urine cultures from 10/8 in 10/25 grew ESBL E. coli  -Patient received IV Merrem in the ED; discussed with attending, we will change antibiotic therapy to IV Invanz  -Repeat urine culture pending  -Repeat a.m. CBC  -New 18 [place] Barajas catheter placed in the ED, draining appropriately  -Patient reports right flank pain, however, refused to be checked for CVA tenderness on exam; as stated above currently does not meet sepsis criteria and renal function is within normal limits; may consider obtaining CT of A/P however is already receiving antibiotics for CAUTI, will discuss further with attending   -Continue to monitor closely    #Type 2 diabetes mellitus  -Obtain hemoglobin A1c  -Sliding scale insulin ordered; obtain  Accu-Cheks 4 times daily before meals and nightly; titrate insulin therapy as necessary  -Hypoglycemia protocol in place    #Chronic sacral decubitus wound  #Debility   #Morbid obesity   -Wound care has been consulted  -Turn every 2 hours  -PT/OT  -Up with assistance, fall precautions  -BMI 38.04 kg/m²  -Complicates all aspects of patient care    #Paroxysmal atrial fibrillation, not on chronic anticoagulation d/t reports hx of post menopausal bleeding   -Obtain baseline EKG; patient not on telemetry while in the emergency department  -Review home medications once reconciled per pharmacy    #Essential hypertension   -Review home medications once available; plan to continue antihypertensive agents with appropriate holding parameters    #History CVA in the past   -No acute focal neurological deficits on exam  -Review home medications once available    #Chronic pain   -Review home medications once reconciled per pharmacy      F/E/N: No IV fluids.  Replace electrolytes as necessary.  Consistent carb diet.  ---------------------------------------------------  DVT Prophylaxis: Subq heparin   GI Prophylaxis: Protonix   Activity: Up with assistance, fall precautions     The patient is considered to be a high risk patient due to: CAUTI     INPATIENT status due to the need for care which can only be reasonably provided in an hospital setting such as aggressive/expedited ancillary services and/or consultation services, the necessity for IV medications, close physician monitoring and/or the possible need for procedures.  In such, I feel patient’s risk for adverse outcomes and need for care warrant INPATIENT evaluation and predict the patient’s care encounter to likely last beyond 2 midnights.    Code Status: FULL CODE     Disposition/Discharge planning: Plan for home at discharge. Pending clinical course.     I have discussed the patient's assessment and plan with the patient and attending physician       Jolynn Boyce,  DAVID  Hospitalist Service -- [facility]       11/30/22  20:17 EST    Attending Physician: Stephanie Bang DO       Electronically signed by Stephanie Bang DO at 12/01/22 0722         Skin Assessments (last 2 days)     Date/Time Skin WDL Skin Color/Characteristics Skin Temperature Skin Moisture Skin Elasticity Skin Integrity Sensory Perception Moisture Activity Mobility Nutrition Friction and Shear Suhas Score Pressure Reduction Devices Pressure Reduction Techniques    12/04/22 0900 X;all redness blanchable warm dry quick return to original state scab;pressure injury 3-->slightly limited 3-->occasionally moist 1-->bedfast 2-->very limited 2-->probably inadequate 1-->problem 12 pressure-redistributing mattress utilized frequent weight shift encouraged    12/04/22 2000 -- -- -- -- -- -- -- -- -- -- -- -- -- pressure-redistributing mattress utilized;positioning supports utilized;heel offloading device utilized frequent weight shift encouraged;heels elevated off bed;weight shift assistance provided    12/04/22 2105 X;all redness blanchable warm dry;moist quick return to original state pressure injury;scab 3-->slightly limited 3-->occasionally moist 1-->bedfast 2-->very limited 2-->probably inadequate 1-->problem 12 pressure-redistributing mattress utilized frequent weight shift encouraged    12/05/22 0900 X;all redness blanchable warm dry quick return to original state scab;pressure injury 3-->slightly limited 3-->occasionally moist 1-->bedfast 2-->very limited 2-->probably inadequate 1-->problem 12 pressure-redistributing mattress utilized frequent weight shift encouraged    12/05/22 2035 X;all redness blanchable warm dry quick return to original state scab;pressure injury 3-->slightly limited 3-->occasionally moist 1-->bedfast 2-->very limited 2-->probably inadequate 1-->problem 12 pressure-redistributing mattress utilized frequent weight shift encouraged    12/06/22 0900 X;all redness blanchable warm  dry quick return to original state scab;pressure injury 3-->slightly limited 3-->occasionally moist 1-->bedfast 2-->very limited 2-->probably inadequate 1-->problem 12 pressure-redistributing mattress utilized frequent weight shift encouraged

## 2022-12-06 NOTE — DISCHARGE PLACEMENT REQUEST
"Anika Gonzalez (72 y.o. Female)     Date of Birth   1950    Social Security Number       Address   [address] [place] [address]    Home Phone   [phone]    MRN   [id number]       [facility]    Marital Status                               Admission Date   22    Admission Type   Emergency    Admitting Provider   Stephanie Bang DO    Attending Provider   Ezekiel Roe MD    Department, Room/Bed   [address], 3327/       Discharge Date       Discharge Disposition       Discharge Destination                               Attending Provider: Ezekiel Roe MD    Allergies: Contrast Dye    Isolation: Contact   Infection: MRSA No Isolation this Admit (22), ESBL E coli (10/10/22)   Code Status: CPR    Ht: 154.9 cm (61\")   Wt: 116 kg (256 lb 12.8 oz)    Admission Cmt: None   Principal Problem: Urinary tract infection associated with indwelling urethral catheter, initial encounter ([facility]) [T83.511A,N39.0]                 Active Insurance as of 2022     Primary Coverage     Payor Plan Insurance Group Employer/Plan Group    HUMANA MEDICARE REPLACEMENT HUMANA MEDICARE REPLACEMENT [id number]     Payor Plan Address Payor Plan Phone Number Payor Plan Fax Number Effective Dates    [address] [address] [phone]  10/1/2022 - None Entered    Formerly [facility] [address]       Subscriber Name Subscriber Birth Date Member ID       ANIKA GONZALEZ 1950 [id number]                 Emergency Contacts      (Rel.) Home Phone Work Phone Mobile Phone    Joellen Landry (POA) (Daughter) -- -- [phone]    Esther Parker (Daughter) [phone] -- --               Physician Progress Notes (most recent note)      Ezekiel Roe MD at 22 1051              [facility] HOSPITALIST PROGRESS NOTE     Patient Identification:  Name:  Anika Gonzalez  Age:  72 y.o.  Sex:  female  :  1950  MRN:  [id number]  Visit Number:  [id number]  ROOM: " 3327/1P     Primary Care Provider:  Barbara Gaona APRN    Length of stay in inpatient status:  6    Subjective     Chief Compliant:    Chief Complaint   Patient presents with   • Dysuria     History of Presenting Illness:    Patient remains ill but stable today, no acute events overnight, no new complaints, denies any fevers or chills, still persisting suprapubic mild tenderness, blood pressure marginally lower today, patient does endorse feeling better though, on antibiotics per Infectious Disease, has a few more days left, working towards placement.   Objective     Current Hospital Meds:  cetirizine, 10 mg, Oral, Daily  dilTIAZem CD, 240 mg, Oral, Q24H  ertapenem, 1 g, Intravenous, Q24H  famotidine, 20 mg, Oral, BID AC  gabapentin, 800 mg, Oral, Q8H  heparin (porcine), 5,000 Units, Subcutaneous, Q12H  hydrOXYzine, 25 mg, Oral, Q8H  Insulin Aspart, 0-9 Units, Subcutaneous, 4x Daily AC & at Bedtime  sodium chloride, 10 mL, Intravenous, Q12H  sodium hypochlorite, , Topical, Q12H    ----------------------------------------------------------------------------------------------------------------------  Vital Signs:  Temp:  [97.5 °F (36.4 °C)-98.4 °F (36.9 °C)] 97.6 °F (36.4 °C)  Heart Rate:  [64-90] 89  Resp:  [16-18] 16  BP: ()/(58-74) 92/58  SpO2:  [96 %] 96 %  on   ;   Device (Oxygen Therapy): room air  Body mass index is 48.52 kg/m².      Intake/Output Summary (Last 24 hours) at 12/6/2022 1051  Last data filed at 12/6/2022 0900  Gross per 24 hour   Intake 600 ml   Output 3700 ml   Net -3100 ml      ----------------------------------------------------------------------------------------------------------------------  Physical exam:  Constitutional: older than stated age, No acute distress.      HENT:  Head:  Normocephalic and atraumatic.  Mouth:  Moist mucous membranes.    Eyes:  Conjunctivae and EOM are normal. No scleral icterus.    Neck:  Neck supple.  No JVD present.    Cardiovascular:  Normal rate,  regular rhythm and normal heart sounds with no murmur.  Pulmonary/Chest:  No respiratory distress, no wheezes, no crackles, on room air  Abdominal:  Soft. No distension and mild suprapubic tenderness persisting  Musculoskeletal:  No tenderness, and no deformity.  No red or swollen joints anywhere.    Neurological:  Alert and oriented to person, place, and time.  No gross focal deficits   Skin:  Skin is warm and dry. No rash noted. No pallor.   Peripheral vascular:  No clubbing, no cyanosis, no edema.  Psychiatric: Appropriate mood and affect  : Flanagan in place    Edited by: Ezekiel Roe MD at 12/6/2022 1051  ----------------------------------------------------------------------------------------------------------------------    Urine Culture   Date Value Ref Range Status   11/30/2022 >100,000 CFU/mL Escherichia coli ESBL (A)  Final     Comment:       Consider infectious disease consult.  Susceptibility results may not correlate to clinical outcomes.     No results found for: BLOODCX    I have personally looked at the labs and they are summarized above.  ----------------------------------------------------------------------------------------------------------------------  Detailed radiology reports for the last 24 hours:  No radiology results for the last day  Assessment & Plan    -ESBL E. coli catheter associated urinary tract infection that was present on admission, due to a chronic indwelling Flanagan catheter that was also present on admission, in a patient with known history of ESBL E. coli UTI  -Acute hypomagnesemia  -Suspect history of chronic kidney disease stage II with baseline creatinine 0.8-0.9  -History of type 2 diabetes mellitus  -History of chronic stage IV sacral decubitus ulcer (5.5 cm x 5 cm, 1 cm in depth), which was present on admission   -History of chronic debility  -History of morbid obesity, BMI 48.52 kg/m², which complicates all aspects of care  -History of paroxysmal atrial fibrillation,  not on chronic anticoagulation due to reports of postmenopausal bleeding  -History of essential hypertension  -History of CVA in the past with no residual deficits reported  -History of chronic pain and chronic pain medicine usage    Infectious Disease consulted and following, continue ertapenem for 10 days, barajas replaced  on admission, continued on home medications, glucose has been controlled, Social Work consulted and following and working toward placement at nursing home in [facility].    F: Oral  E: Monitor & Replace PRN  N: Diet: Diabetic Diets; Consistent Carbohydrate; Texture: Regular Texture (IDDSI 7); Fluid Consistency: Thin (IDDSI 0)  PPx: SQH   Code Status (Patient has no pulse and is not breathing): CPR (Attempt to Resuscitate)  Medical Interventions (Patient has pulse or is breathing): Full Support     Dispo: Pending clinical improvement, placement in [place]    *This patient is considered high risk secondary to ESBL Urinary Tract Infection failed outpatient therapy.    Edited by: Ezekiel Roe MD at 2022 1051  [facility] Hospitalist        Electronically signed by Ezekiel Roe MD at 22 1053          Consult Notes (most recent note)      Esha Bonilla MD at 22 1013      Consult Orders    1. Inpatient Infectious Diseases Consult [[id number]] ordered by Darell Kruger MD at 22 2100                       INFECTIOUS DISEASE CONSULTATION REPORT        Patient Identification:  Name:  Anika Neri  Age:  72 y.o.  Sex:  female  :  1950  MRN:  [id number]   Visit Number:  [id number]  Primary Care Physician:  Barbara Gaona APRN  Referring Provider: Darell Kruger MD  Reason for consult: ESBL E. coli complicated UTI with chronic indwelling Barajas catheter       LOS: 4 days        Subjective       Subjective     History of present illness:      Thank you Dr. Kruger for allowing us to participate in the care of your patient.  As you well know,  Ms. Anika Neri is a 72 y.o. female with past medical history significant for paroxysmal atrial fibrillation, debility, chronic indwelling Flanagan catheter, chronic decubitus ulcer, essential hypertension, and history of CVA, who presented to [facility] Emergency Department on 11/30/2022 for nausea and abdominal pain.    Of note, patient reports a recent history of 2 courses of antibiotics for UTIs.  She was seen at [facility] ED on 10/2/2022 at which time she was diagnosed with a UTI and prescribed cefdinir.  Urine culture subsequently finalized as ESBL E. coli and several attempts were made to contact her.  She returned to the ED on 10/25/2022 and was found to have another UTI and discharged home on Macrobid.  Urine culture at that time finalized as ESBL E. coli that was susceptible to Macrobid. She was then seen at [facility] and had a catheter change on 11/15/2022. After that time, she began to have a leaking and malodorous dark urine    Today, patient is sitting up in bed on room air in no apparent distress. Admits to lower abdominal pain and does have suprapubic tenderness.  Reports that Flanagan catheter was changed here about 3 days ago.  States that she has had a chronic indwelling Flanagan catheter for about 2 years now.  Denies fever, chills, shortness of breath, cough, nausea, vomiting, or diarrhea.  Lungs are clear to auscultation bilaterally.  Other than suprapubic tenderness, abdomen is soft, nontender, with normal bowel sounds.  Afebrile, no diarrhea.  WBC on 12/3/2022 was normal at 9.59.  Urinalysis on 11/30/2022 was positive with WBCs too numerous to count and 4+ bacteria.  Urine culture finalized is greater than 100,000 colonies of ESBL E. coli.    Infectious Disease consultation was requested for antimicrobial management.    ---------------------------------------------------------------------------------------------------------------------     Review Of  Systems:    Constitutional: no fever, chills and night sweats. No appetite change or unexpected weight change. No fatigue.  Eyes: no eye drainage, itching or redness.  HEENT: no mouth sores, dysphagia or nose bleed.  Respiratory: no for shortness of breath, cough or production of sputum.  Cardiovascular: no chest pain, no palpitations, no orthopnea.  Gastrointestinal: no nausea, vomiting or diarrhea. No abdominal pain, hematemesis or rectal bleeding.  Genitourinary: no dysuria or polyuria.  Suprapubic tenderness.  Hematologic/lymphatic: no lymph node abnormalities, no easy bruising or easy bleeding.  Musculoskeletal: no muscle or joint pain.  Skin: No rash and no itching.  Neurological: no loss of consciousness, no seizure, no headache.  Behavioral/Psych: no depression or suicidal ideation.  Endocrine: no hot flashes.  Immunologic: negative.    ---------------------------------------------------------------------------------------------------------------------     Past Medical History    Past Medical History:   Diagnosis Date   • Arthritis    • Atrial fibrillation (HCC)     Not on anticoagulation due to postmenopausal bleeding   • Diabetes mellitus (HCC)    • Gout    • History of CVA (cerebrovascular accident)     residual right-sided weakness   • Hyperlipidemia    • Hypertension    • Stage IV pressure ulcer of sacral region (HCC)        Past Surgical History    Past Surgical History:   Procedure Laterality Date   • CHOLECYSTECTOMY         Family History    Family History   Problem Relation Age of Onset   • Diabetes Mother    • Hypertension Father        Social History    Social History     Tobacco Use   • Smoking status: Never   • Smokeless tobacco: Never   Substance Use Topics   • Alcohol use: No   • Drug use: No       Allergies    Contrast dye  ---------------------------------------------------------------------------------------------------------------------     Home Medications:    Prior to Admission  Medications       Prescriptions Last Dose Informant Patient Reported? Taking?    cefuroxime (CEFTIN) 500 MG tablet Unknown Pharmacy Yes No    Take 500 mg by mouth 2 (Two) Times a Day.    Diclofenac Sodium (VOLTAREN) 1 % gel gel Unknown Pharmacy Yes No    Apply 4 g topically to the appropriate area as directed 4 (Four) Times a Day As Needed (apply to knees and back).    dilTIAZem CD (CARDIZEM CD) 300 MG 24 hr capsule Unknown Pharmacy Yes No    Take 300 mg by mouth Daily.    gabapentin (NEURONTIN) 800 MG tablet Unknown Pharmacy Yes No    Take 800 mg by mouth Every 8 (Eight) Hours As Needed.    insulin glargine (LANTUS, SEMGLEE) 100 UNIT/ML injection Unknown Pharmacy Yes No    Inject 14-24 Units under the skin into the appropriate area as directed Daily.          ---------------------------------------------------------------------------------------------------------------------    Objective       Objective     Hospital Scheduled Meds:  cetirizine, 10 mg, Oral, Daily  dilTIAZem CD, 240 mg, Oral, Q24H  ertapenem, 1 g, Intravenous, Q24H  famotidine, 20 mg, Oral, BID AC  gabapentin, 800 mg, Oral, Q8H  heparin (porcine), 5,000 Units, Subcutaneous, Q12H  hydrOXYzine, 25 mg, Oral, Q8H  Insulin Aspart, 0-9 Units, Subcutaneous, 4x Daily AC & at Bedtime  sodium chloride, 10 mL, Intravenous, Q12H  sodium hypochlorite, , Topical, Q12H         ---------------------------------------------------------------------------------------------------------------------   Vital Signs:  Temp:  [97.5 °F (36.4 °C)-98.4 °F (36.9 °C)] 98.4 °F (36.9 °C)  Heart Rate:  [89-92] 92  Resp:  [16-20] 18  BP: (108-161)/(67-88) 123/71  Mean Arterial Pressure (Non-Invasive) for the past 24 hrs (Last 3 readings):   Noninvasive MAP (mmHg)   12/04/22 0600 92     SpO2 Percentage    12/03/22 1900 12/03/22 2300 12/04/22 0600   SpO2: 96% 96% 93%     SpO2:  [90 %-96 %] 93 %  on   ;   Device (Oxygen Therapy): room air    Body mass index is 48.52 kg/m².  Wt Readings  from Last 3 Encounters:   11/30/22 116 kg (256 lb 12.8 oz)   10/28/22 94.3 kg (208 lb)   10/25/22 94.3 kg (208 lb)     ---------------------------------------------------------------------------------------------------------------------     Physical Exam:    Constitutional:  female is sitting up in bed on room air in no apparent distress.  HENT:  Head: Normocephalic and atraumatic.  Mouth:  Moist mucous membranes.    Eyes:  Conjunctivae and EOM are normal.  No scleral icterus.  Neck:  Neck supple.  No JVD present.    Cardiovascular:  Normal rate, regular rhythm and normal heart sounds with no murmur. No edema.  Pulmonary/Chest:  No respiratory distress, no wheezes, no crackles, with normal breath sounds and good air movement.  Abdominal:  Soft.  Bowel sounds are normal.  No distension and suprapubic tenderness.  Flanagan catheter in place.  Musculoskeletal:  No edema, no tenderness, and no deformity.  No swelling or redness of joints.  Neurological:  Alert and oriented to person, place, and time.  No facial droop.  No slurred speech.   Skin:  Skin is warm and dry.  No rash noted.  No pallor.   Psychiatric:  Normal mood and affect.  Behavior is normal.    ---------------------------------------------------------------------------------------------------------------------              Results from last 7 days   Lab Units 12/03/22  0157 12/02/22  0048 12/01/22 0031 11/30/22  2020   LACTATE mmol/L  --   --   --  1.8   WBC 10*3/mm3 9.59 9.73 11.01*  --    HEMOGLOBIN g/dL 10.8* 11.4* 11.9*  --    HEMATOCRIT % 34.2 35.5 37.2  --    MCV fL 94.0 94.2 93.0  --    MCHC g/dL 31.6 32.1 32.0  --    PLATELETS 10*3/mm3 182 181 172  --      Results from last 7 days   Lab Units 12/03/22  0157 12/02/22  0048 12/01/22 0031 11/30/22  1638   SODIUM mmol/L 140 141 143 142   POTASSIUM mmol/L 4.2 3.8 3.6 3.6   MAGNESIUM mg/dL 2.2 1.5* 1.4*  --    CHLORIDE mmol/L 106 106 107 107   CO2 mmol/L 23.4 23.6 22.9 22.8   BUN mg/dL 26* 21 18  19   CREATININE mg/dL 0.98 0.93 0.80 0.93   CALCIUM mg/dL 8.8 8.9 8.7 8.7   GLUCOSE mg/dL 158* 129* 151* 244*   ALBUMIN g/dL  --  3.10* 3.18* 3.18*   BILIRUBIN mg/dL  --  0.3 0.4 0.3   ALK PHOS U/L  --  98 109 118*   AST (SGOT) U/L  --  13 12 13   ALT (SGPT) U/L  --  9 8 9   Estimated Creatinine Clearance: 60.6 mL/min (by C-G formula based on SCr of 0.98 mg/dL).  No results found for: AMMONIA    Glucose   Date/Time Value Ref Range Status   12/04/2022 0617 159 (H) 70 - 130 mg/dL Final     Comment:     Meter: [id number] : [id number] OLINDA LION   12/03/2022 2053 132 (H) 70 - 130 mg/dL Final     Comment:     Meter: [id number] : [id number] JOSE MCDUFFIE   12/03/2022 1750 169 (H) 70 - 130 mg/dL Final     Comment:     Meter: [id number] : [id number] kimmie weinberg   12/03/2022 1159 145 (H) 70 - 130 mg/dL Final     Comment:     Meter: [id number] : [id number] kimmie weinberg   12/03/2022 0621 154 (H) 70 - 130 mg/dL Final     Comment:     Meter: [id number] : [id number] Jane Mart   12/02/2022 2057 161 (H) 70 - 130 mg/dL Final     Comment:     Meter: [id number] : [id number] EILEENKESHAV HARRISON   12/02/2022 1712 140 (H) 70 - 130 mg/dL Final     Comment:     Meter: [id number] : [id number] kimmie weinberg   12/02/2022 1130 175 (H) 70 - 130 mg/dL Final     Comment:     Meter: [id number] : [id number] kimmie weinberg     Lab Results   Component Value Date    HGBA1C 7.40 (H) 12/01/2022     Lab Results   Component Value Date    TSH 1.090 12/01/2022       No results found for: BLOODCX  Urine Culture   Date Value Ref Range Status   11/30/2022 >100,000 CFU/mL Escherichia coli ESBL (A)  Final     Comment:       Consider infectious disease consult.  Susceptibility results may not correlate to clinical outcomes.     No results found for: WOUNDCX  No results found for: STOOLCX  No results found for: RESPCX  Pain Management Panel       Pain Management Panel Latest Ref Rng & Units 12/1/2022    AMPHETAMINES  SCREEN, URINE Negative Negative    BARBITURATES SCREEN Negative Negative    BENZODIAZEPINE SCREEN, URINE Negative Negative    BUPRENORPHINEUR Negative Negative    COCAINE SCREEN, URINE Negative Negative    METHADONE SCREEN, URINE Negative Negative    METHAMPHETAMINEUR Negative Negative          I have personally reviewed the above laboratory results.   ---------------------------------------------------------------------------------------------------------------------  Imaging Results (Last 7 Days)       ** No results found for the last 168 hours. **          I have personally reviewed the above radiology results.   ---------------------------------------------------------------------------------------------------------------------      Pertinent Infectious Disease Results    Of note, patient reports a recent history of 2 courses of antibiotics for UTIs.  She was seen at [facility] ED on 10/2/2022 at which time she was diagnosed with a UTI and prescribed cefdinir.  Urine culture subsequently finalized as ESBL E. coli and several attempts were made to contact her.  She returned to the ED on 10/25/2022 and was found to have another UTI and discharged home on Macrobid.  Urine culture at that time finalized as ESBL E. coli that was susceptible to Macrobid. She was then seen at [facility] and had a catheter change on 11/15/2022. After that time, she began to have a leaking and malodorous dark urine    WBC on 12/3/2022 was normal at 9.59.  Urinalysis on 11/30/2022 was positive with WBCs too numerous to count and 4+ bacteria.  Urine culture finalized is greater than 100,000 colonies of ESBL E. coli.    Assessment & Plan      Assessment      ESBL E. coli catheter associated UTI    Plan      I examined the patient this morning and she is sitting up in bed on room air in no apparent distress. Admits to lower abdominal pain and does have suprapubic tenderness on physical exam.  Reports that Flanagan catheter was  changed here about 3 days ago. States that she has had a chronic indwelling Flanagan catheter for about 2 years now. Denies fever, chills, shortness of breath, cough, nausea, vomiting, or diarrhea.  Lungs are clear to auscultation bilaterally.  Other than suprapubic tenderness, abdomen is soft, nontender, with normal bowel sounds.  Flanagan catheter remains in place.  Remains afebrile without diarrhea.  His recent white count was normal at 9.59.  Urinalysis on 11/30/2022 was positive with WBCs too numerous to count and 4+ bacteria.  Urine culture finalized is greater than 100,000 colonies of ESBL E. Coli.    As patient has a history of multiple recent UTIs with ESBL E. coli that failed treatment with oral antibiotic therapy, recommend IV Invanz for a 10-day course.  Recommend outpatient ID follow-up.    Again, thank you Dr. Kruger for allowing us to participate in the care of your patient and please feel free to call for any questions you may have.    Code Status:     Code Status and Medical Interventions:   Ordered at: 11/30/22 1955     Code Status (Patient has no pulse and is not breathing):    CPR (Attempt to Resuscitate)     Medical Interventions (Patient has pulse or is breathing):    Full Support     Jolynn Snyder PA-C  12/04/22  10:13 EST      Electronically signed by Esha Bonilla MD at 12/05/22 0722       Skin Assessments (last 2 days)     Date/Time Skin WDL Skin Color/Characteristics Skin Temperature Skin Moisture Skin Elasticity Skin Integrity Sensory Perception Moisture Activity Mobility Nutrition Friction and Shear Suhas Score Pressure Reduction Devices Pressure Reduction Techniques    12/04/22 0900 X;all redness blanchable warm dry quick return to original state scab;pressure injury 3-->slightly limited 3-->occasionally moist 1-->bedfast 2-->very limited 2-->probably inadequate 1-->problem 12 pressure-redistributing mattress utilized frequent weight shift encouraged    12/04/22 2000 -- -- -- --  -- -- -- -- -- -- -- -- -- pressure-redistributing mattress utilized;positioning supports utilized;heel offloading device utilized frequent weight shift encouraged;heels elevated off bed;weight shift assistance provided    12/04/22 2105 X;all redness blanchable warm dry;moist quick return to original state pressure injury;scab 3-->slightly limited 3-->occasionally moist 1-->bedfast 2-->very limited 2-->probably inadequate 1-->problem 12 pressure-redistributing mattress utilized frequent weight shift encouraged    12/05/22 0900 X;all redness blanchable warm dry quick return to original state scab;pressure injury 3-->slightly limited 3-->occasionally moist 1-->bedfast 2-->very limited 2-->probably inadequate 1-->problem 12 pressure-redistributing mattress utilized frequent weight shift encouraged    12/05/22 2035 X;all redness blanchable warm dry quick return to original state scab;pressure injury 3-->slightly limited 3-->occasionally moist 1-->bedfast 2-->very limited 2-->probably inadequate 1-->problem 12 pressure-redistributing mattress utilized frequent weight shift encouraged    12/06/22 0900 X;all redness blanchable warm dry quick return to original state scab;pressure injury 3-->slightly limited 3-->occasionally moist 1-->bedfast 2-->very limited 2-->probably inadequate 1-->problem 12 pressure-redistributing mattress utilized frequent weight shift encouraged

## 2022-12-06 NOTE — CONSULTS
Palliative Care Initial Consult     Attending Physician: Ezekiel Roe MD  Referring Provider: Ezekiel Roe    Palliative care reason for consults:GOC/ACP support for pt and family  Code Status:   Code Status and Medical Interventions:   Ordered at: 11/30/22 1955     Code Status (Patient has no pulse and is not breathing):    CPR (Attempt to Resuscitate)     Medical Interventions (Patient has pulse or is breathing):    Full Support      Advanced Directives: Advance Directive Status: Patient does not have advance directive   Healthcare surrogate: ?POA daughter Joellen Landry, and daughter Esther Parker daughter.  Goals of Care: I have attempted to reach out to daughter Joellen without success, pt states that she would want to be resuscitated and intubated on a ventilator, she remains a full code full treat at this time.    HPI:  Anika Neri is a 72 y.o. female admitted on 11/30/2022 with dysuria, nausea and abdominal pain. She has a medical history of  paroxysmal atrial fibrillation, debility, chronic indwelling barajas catheter, chronic decubitus ulcer, essential hypertension, history of CVA. Pt states that she has a chronic catheter and since last change had been leaking with malodorous dark urine. Pt has had multiple recurrent UTI infections recently growing out ESBL Ecoli. Pt states that in the past when she has had UTI's she gets abdominal pain and nausea.  Today 12/6 pt is still c/o mild suprapubic tenderness, mild hypotension, she is currently on antibiotic regimen per infectious disease.         ROS: Negative except as above in HPI.     Past Medical History:   Diagnosis Date   • Arthritis    • Atrial fibrillation (HCC)     Not on anticoagulation due to postmenopausal bleeding   • Diabetes mellitus (HCC)    • Gout    • History of CVA (cerebrovascular accident)     residual right-sided weakness   • Hyperlipidemia    • Hypertension    • Stage IV pressure ulcer of sacral region (HCC)      Past Surgical History:    Procedure Laterality Date   • CHOLECYSTECTOMY       Social History     Socioeconomic History   • Marital status:    Tobacco Use   • Smoking status: Never   • Smokeless tobacco: Never   Substance and Sexual Activity   • Alcohol use: No   • Drug use: No   • Sexual activity: Defer     Family History   Problem Relation Age of Onset   • Diabetes Mother    • Hypertension Father        Allergies   Allergen Reactions   • Contrast Dye Itching       Current Facility-Administered Medications   Medication Dose Route Frequency Provider Last Rate Last Admin   • acetaminophen (TYLENOL) tablet 650 mg  650 mg Oral Q6H PRN Jolynn Boyce PA-C   650 mg at 11/30/22 2248   • calcium carbonate (TUMS) chewable tablet 500 mg (200 mg elemental)  2 tablet Oral TID PRN Jolynn Boyce PA-C       • cetirizine (zyrTEC) tablet 10 mg  10 mg Oral Daily Darell Kruger MD   10 mg at 12/06/22 0814   • dextrose (D50W) (25 g/50 mL) IV injection 25 g  25 g Intravenous Q15 Min PRN Stephanie Bang DO       • dextrose (GLUTOSE) oral gel 15 g  15 g Oral Q15 Min PRN Stephanie Bang DO       • Diclofenac Sodium (VOLTAREN) 1 % gel 4 g  4 g Topical 4x Daily PRN Darell Kruger MD       • dilTIAZem CD (CARDIZEM CD) 24 hr capsule 240 mg  240 mg Oral Q24H Darell Kruger MD   240 mg at 12/06/22 0814   • ertapenem (INVanz) 1 g in sodium chloride 0.9 % 100 mL IVPB-VTB  1 g Intravenous Q24H Jolynn Snyder PA-C 200 mL/hr at 12/05/22 2135 1 g at 12/05/22 2135   • famotidine (PEPCID) tablet 20 mg  20 mg Oral BID AC Darell Kruger MD   20 mg at 12/06/22 0814   • gabapentin (NEURONTIN) capsule 800 mg  800 mg Oral Q8H Darell Kruger MD   800 mg at 12/06/22 1344   • glucagon (human recombinant) (GLUCAGEN DIAGNOSTIC) injection 1 mg  1 mg Intramuscular Q15 Min PRN Stephanie Bang DO       • heparin (porcine) 5000 UNIT/ML injection 5,000 Units  5,000 Units Subcutaneous Q12H Stephanie Bang, DO   5,000 Units at  "12/06/22 0814   • hydrOXYzine (ATARAX) tablet 25 mg  25 mg Oral Q8H Darell Kruger MD   25 mg at 12/06/22 1344   • Insulin Aspart (novoLOG) injection 0-9 Units  0-9 Units Subcutaneous 4x Daily AC & at Bedtime Stephanie Bang DO   2 Units at 12/06/22 1116   • Magnesium Sulfate 2 gram Bolus, followed by 8 gram infusion (total Mg dose 10 grams)- Mg less than or equal to 1mg/dL  2 g Intravenous PRN Darell Kruger MD        Or   • Magnesium Sulfate 2 gram / 50mL Infusion (GIVE X 3 BAGS TO EQUAL 6GM TOTAL DOSE) - Mg 1.1 - 1.5 mg/dl  2 g Intravenous PRN Darell Kruger MD        Or   • Magnesium Sulfate 4 gram infusion- Mg 1.6-1.9 mg/dL  4 g Intravenous PRN Darell Kruger MD       • melatonin tablet 10 mg  10 mg Oral Nightly PRN Jolynn Boyce PA-C   10 mg at 12/04/22 2107   • oxyCODONE-acetaminophen (PERCOCET)  MG per tablet 1 tablet  1 tablet Oral Q6H PRN Darell Kruger MD   1 tablet at 12/05/22 2219   • sodium chloride 0.9 % flush 10 mL  10 mL Intravenous Q12H Stephanie Bang DO   10 mL at 12/06/22 0814   • sodium chloride 0.9 % flush 10 mL  10 mL Intravenous PRN Stephanie Bang DO       • sodium chloride 0.9 % infusion 40 mL  40 mL Intravenous PRN Stephanie Bang DO       • sodium hypochlorite (DAKIN'S 1/4 STRENGTH) 0.125 % topical solution 0.125% solution   Topical Q12H Darell Kruger MD   Given at 12/06/22 0814        •  acetaminophen  •  calcium carbonate  •  dextrose  •  dextrose  •  Diclofenac Sodium  •  glucagon (human recombinant)  •  magnesium sulfate **OR** magnesium sulfate **OR** magnesium sulfate  •  melatonin  •  oxyCODONE-acetaminophen  •  sodium chloride  •  sodium chloride    Current medication reviewed for route, type, dose and frequency and are current per MAR.    Palliative Performance Scale Score:     BP 92/58 (BP Location: Right arm, Patient Position: Lying)   Pulse 89   Temp 97.6 °F (36.4 °C) (Oral)   Resp 16   Ht 154.9 cm (61\")   Wt " 116 kg (256 lb 12.8 oz)   SpO2 96%   BMI 48.52 kg/m²     Intake/Output Summary (Last 24 hours) at 12/6/2022 1422  Last data filed at 12/6/2022 1100  Gross per 24 hour   Intake 1200 ml   Output 3700 ml   Net -2500 ml       PE:  General Appearance:    Chronically ill appearing,obese, alert, cooperative, NAD   HEENT:    NC/AT, without obvious abnormality, EOMI, anicteric    Neck:   supple, trachea midline, no JVD   Lungs:     CTAB without w/r/r    Heart:    RRR, normal S1 and S2, no M/R/G   Abdomen:     Soft, NT, ND, NABS    Extremities:   Moves all extremities, no edema   Pulses:   Pulses palpable and equal bilaterally   Skin:   Warm, dry   Neurologic:   A/Ox3, cooperative   Psych:   Calm, appropriate       Labs:   Results from last 7 days   Lab Units 12/05/22  0216   WBC 10*3/mm3 10.15   HEMOGLOBIN g/dL 11.7*   HEMATOCRIT % 36.4   PLATELETS 10*3/mm3 189     Results from last 7 days   Lab Units 12/05/22  0216   SODIUM mmol/L 137   POTASSIUM mmol/L 4.4   CHLORIDE mmol/L 102   CO2 mmol/L 22.8   BUN mg/dL 33*   CREATININE mg/dL 0.97   GLUCOSE mg/dL 173*   CALCIUM mg/dL 9.3     Results from last 7 days   Lab Units 12/05/22  0216 12/03/22  0157 12/02/22  0048   SODIUM mmol/L 137   < > 141   POTASSIUM mmol/L 4.4   < > 3.8   CHLORIDE mmol/L 102   < > 106   CO2 mmol/L 22.8   < > 23.6   BUN mg/dL 33*   < > 21   CREATININE mg/dL 0.97   < > 0.93   CALCIUM mg/dL 9.3   < > 8.9   BILIRUBIN mg/dL  --   --  0.3   ALK PHOS U/L  --   --  98   ALT (SGPT) U/L  --   --  9   AST (SGOT) U/L  --   --  13   GLUCOSE mg/dL 173*   < > 129*    < > = values in this interval not displayed.     Imaging Results (Last 72 Hours)     ** No results found for the last 72 hours. **          Diagnostics: Reviewed    A: Anika Neri is a 72 y.o. female admitted on 11/30/2022 with dysuria, nausea and abdominal pain. She has a medical history of  paroxysmal atrial fibrillation, debility, chronic indwelling barajas catheter, chronic decubitus ulcer,  "essential hypertension, history of CVA. Pt states that she has a chronic catheter and since last change had been leaking with malodorous dark urine. Pt has had multiple recurrent UTI infections recently growing out ESBL Ecoli. Pt states that in the past when she has had UTI's she gets abdominal pain and nausea.  Today 12/6 pt is still c/o mild suprapubic tenderness, mild hypotension, she is currently on antibiotic regimen per infectious disease.              P: Palliative care was consulted to discuss GOC/ACP and support for pt and family. I was able to speak to anika regarding her goals for herself and she stated at this time \"she thinks she would want resuscitated and to be put on a ventilator.\" I asked her if it would be okay if I reach out to her daughter Joellen who she states has POA over her, and she stated that this would be fine. I attempted to reach out to Joellen and have left a message for her to return my call. I also reached out to daughter Esther, with no ability to leave a message. I will attempt to get a hold of daughters tomorrow to ascertain what advanced care planning documents they have and if they can be brought to the hospital. If not we will attempt to do living will with pt Anika.    We appreciate the consult and the opportunity to participate in Anika Neri's care. We will continue to follow along. Please do not hesitate to contact us regarding further symptom management or goals of care needs, including after hours or on weekends via our on call provider at [phone].     Time: 35 minutes spent reviewing medical and medication records, assessing and examining patient, discussing with family, answering questions, providing some guidance about a plan and documentation of care, and coordinating care with other healthcare members, with > 50% time spent face to face.     Belinda Carson, APRN    12/6/2022    "

## 2022-12-07 ENCOUNTER — APPOINTMENT (OUTPATIENT)
Dept: CT IMAGING | Facility: HOSPITAL | Age: 72
End: 2022-12-07

## 2022-12-07 LAB
ANION GAP SERPL CALCULATED.3IONS-SCNC: 11 MMOL/L (ref 5–15)
BUN SERPL-MCNC: 30 MG/DL (ref 8–23)
BUN/CREAT SERPL: 33 (ref 7–25)
CALCIUM SPEC-SCNC: 9.4 MG/DL (ref 8.6–10.5)
CHLORIDE SERPL-SCNC: 101 MMOL/L (ref 98–107)
CO2 SERPL-SCNC: 25 MMOL/L (ref 22–29)
CREAT SERPL-MCNC: 0.91 MG/DL (ref 0.57–1)
DEPRECATED RDW RBC AUTO: 46.3 FL (ref 37–54)
EGFRCR SERPLBLD CKD-EPI 2021: 66.8 ML/MIN/1.73
ERYTHROCYTE [DISTWIDTH] IN BLOOD BY AUTOMATED COUNT: 13.7 % (ref 12.3–15.4)
FLUAV RNA RESP QL NAA+PROBE: NOT DETECTED
FLUBV RNA RESP QL NAA+PROBE: NOT DETECTED
GLUCOSE BLDC GLUCOMTR-MCNC: 129 MG/DL (ref 70–130)
GLUCOSE BLDC GLUCOMTR-MCNC: 138 MG/DL (ref 70–130)
GLUCOSE BLDC GLUCOMTR-MCNC: 170 MG/DL (ref 70–130)
GLUCOSE BLDC GLUCOMTR-MCNC: 199 MG/DL (ref 70–130)
GLUCOSE SERPL-MCNC: 171 MG/DL (ref 65–99)
HCT VFR BLD AUTO: 36 % (ref 34–46.6)
HGB BLD-MCNC: 11.5 G/DL (ref 12–15.9)
MCH RBC QN AUTO: 29.5 PG (ref 26.6–33)
MCHC RBC AUTO-ENTMCNC: 31.9 G/DL (ref 31.5–35.7)
MCV RBC AUTO: 92.3 FL (ref 79–97)
PLATELET # BLD AUTO: 206 10*3/MM3 (ref 140–450)
PMV BLD AUTO: 10.3 FL (ref 6–12)
POTASSIUM SERPL-SCNC: 4.3 MMOL/L (ref 3.5–5.2)
RBC # BLD AUTO: 3.9 10*6/MM3 (ref 3.77–5.28)
SARS-COV-2 RNA RESP QL NAA+PROBE: NOT DETECTED
SODIUM SERPL-SCNC: 137 MMOL/L (ref 136–145)
WBC NRBC COR # BLD: 9.65 10*3/MM3 (ref 3.4–10.8)

## 2022-12-07 PROCEDURE — 97110 THERAPEUTIC EXERCISES: CPT

## 2022-12-07 PROCEDURE — 85027 COMPLETE CBC AUTOMATED: CPT | Performed by: INTERNAL MEDICINE

## 2022-12-07 PROCEDURE — 74176 CT ABD & PELVIS W/O CONTRAST: CPT

## 2022-12-07 PROCEDURE — 25010000002 ERTAPENEM PER 500 MG: Performed by: PHYSICIAN ASSISTANT

## 2022-12-07 PROCEDURE — 63710000001 INSULIN ASPART PER 5 UNITS: Performed by: INTERNAL MEDICINE

## 2022-12-07 PROCEDURE — 99233 SBSQ HOSP IP/OBS HIGH 50: CPT | Performed by: PHYSICIAN ASSISTANT

## 2022-12-07 PROCEDURE — 82962 GLUCOSE BLOOD TEST: CPT

## 2022-12-07 PROCEDURE — 25010000002 HEPARIN (PORCINE) PER 1000 UNITS: Performed by: INTERNAL MEDICINE

## 2022-12-07 PROCEDURE — 99232 SBSQ HOSP IP/OBS MODERATE 35: CPT | Performed by: INTERNAL MEDICINE

## 2022-12-07 PROCEDURE — 87636 SARSCOV2 & INF A&B AMP PRB: CPT | Performed by: INTERNAL MEDICINE

## 2022-12-07 PROCEDURE — 97530 THERAPEUTIC ACTIVITIES: CPT

## 2022-12-07 PROCEDURE — 74176 CT ABD & PELVIS W/O CONTRAST: CPT | Performed by: RADIOLOGY

## 2022-12-07 PROCEDURE — 80048 BASIC METABOLIC PNL TOTAL CA: CPT | Performed by: INTERNAL MEDICINE

## 2022-12-07 RX ADMIN — GABAPENTIN 800 MG: 400 CAPSULE ORAL at 22:01

## 2022-12-07 RX ADMIN — Medication 10 ML: at 22:01

## 2022-12-07 RX ADMIN — INSULIN ASPART 2 UNITS: 100 INJECTION, SOLUTION INTRAVENOUS; SUBCUTANEOUS at 17:55

## 2022-12-07 RX ADMIN — HYDROXYZINE HYDROCHLORIDE 25 MG: 25 TABLET ORAL at 05:33

## 2022-12-07 RX ADMIN — SODIUM HYPOCHLORITE: 1.25 SOLUTION TOPICAL at 22:02

## 2022-12-07 RX ADMIN — CETIRIZINE HYDROCHLORIDE 10 MG: 10 TABLET, FILM COATED ORAL at 08:28

## 2022-12-07 RX ADMIN — INSULIN ASPART 2 UNITS: 100 INJECTION, SOLUTION INTRAVENOUS; SUBCUTANEOUS at 11:39

## 2022-12-07 RX ADMIN — Medication 10 ML: at 08:29

## 2022-12-07 RX ADMIN — HEPARIN SODIUM 5000 UNITS: 5000 INJECTION INTRAVENOUS; SUBCUTANEOUS at 22:01

## 2022-12-07 RX ADMIN — GABAPENTIN 800 MG: 400 CAPSULE ORAL at 14:11

## 2022-12-07 RX ADMIN — GABAPENTIN 800 MG: 400 CAPSULE ORAL at 05:33

## 2022-12-07 RX ADMIN — FAMOTIDINE 20 MG: 20 TABLET ORAL at 08:28

## 2022-12-07 RX ADMIN — ERTAPENEM 1 G: 1 INJECTION INTRAMUSCULAR; INTRAVENOUS at 22:02

## 2022-12-07 RX ADMIN — SODIUM HYPOCHLORITE: 1.25 SOLUTION TOPICAL at 14:20

## 2022-12-07 RX ADMIN — DILTIAZEM HYDROCHLORIDE 240 MG: 240 CAPSULE, COATED, EXTENDED RELEASE ORAL at 08:28

## 2022-12-07 RX ADMIN — HEPARIN SODIUM 5000 UNITS: 5000 INJECTION INTRAVENOUS; SUBCUTANEOUS at 08:28

## 2022-12-07 RX ADMIN — FAMOTIDINE 20 MG: 20 TABLET ORAL at 17:55

## 2022-12-07 NOTE — THERAPY TREATMENT NOTE
Acute Care - Physical Therapy Treatment Note  [facility]     Patient Name: Anika Neri  : 1950  MRN: [id number]  Today's Date: 2022   Onset of Illness/Injury or Date of Surgery: 22 (admission date)  Visit Dx:     ICD-10-CM ICD-9-CM   1. Urinary tract infection associated with indwelling urethral catheter, initial encounter ([facility])  T83.511A 996.64    N39.0 599.0   2. History of ESBL E. coli infection  Z86.19 V12.09   3. Flanagan catheter in place on admission  Z97.8 V45.89     Patient Active Problem List   Diagnosis   • A-fib ([facility])   • Pain of left lower extremity   • Sacral decubitus ulcer   • Urinary tract infection associated with indwelling urethral catheter, initial encounter ([facility])     Past Medical History:   Diagnosis Date   • Arthritis    • Atrial fibrillation ([facility])     Not on anticoagulation due to postmenopausal bleeding   • Diabetes mellitus ([facility])    • Gout    • History of CVA (cerebrovascular accident)     residual right-sided weakness   • Hyperlipidemia    • Hypertension    • Stage IV pressure ulcer of sacral region ([facility])      Past Surgical History:   Procedure Laterality Date   • CHOLECYSTECTOMY       PT Assessment (last 12 hours)     PT Evaluation and Treatment     Row Name 22 1051          Physical Therapy Time and Intention    Subjective Information complains of;weakness;fatigue  -HR     Document Type therapy note (daily note)  -HR     Mode of Treatment physical therapy  -HR     Patient Effort good  -HR     Comment Pt and ALEJANDRO Will in agreement for PT. Pt is orientated x 4 but does make off-sided comments about a baby needing changedand one of her great grand daugthers being in her room. Pt completed supine exercises with AAROM being required at times. Pt sat EOB but required max A and maximal cueing to lean forward and sit up straight. No standing on this date secondary to Pt refusal and sitting balance being poor  -HR     Row Name 22 1052          General Information     Patient Profile Reviewed yes  -HR     Equipment Currently Used at Home hospital bed;commode, bedside;wheelchair  -HR     Row Name 12/07/22 1059          Cognition    Personal Safety Interventions fall prevention program maintained;gait belt;nonskid shoes/slippers when out of bed;supervised activity  -HR     Row Name 12/07/22 1059          Bed Mobility    Bed Mobility supine-sit;sit-supine;scooting/bridging;rolling right;rolling left  -HR     Rolling Left [place] (Bed Mobility) maximum assist (25% patient effort);1 person to manage equipment  -HR     Rolling Right [place] (Bed Mobility) maximum assist (25% patient effort);2 person assist  -HR     Scooting/Bridging [place] (Bed Mobility) moderate assist (50% patient effort);minimum assist (75% patient effort)  Bed in trendelenburg  -HR     Supine-Sit [place] (Bed Mobility) maximum assist (25% patient effort)  -HR     Sit-Supine [place] (Bed Mobility) maximum assist (25% patient effort)  -HR     Row Name 12/07/22 1059          Motor Skills    Therapeutic Exercise other (see comments)  Supine: Ap, inversion/eversion, SLR, Hip abd, heel slide, Quad set, Glut set, LTR.  -HR     Row Name             Wound 09/20/22 2353 medial sacral spine    Wound - Properties Group Placement Date: 09/20/22 -KF Placement Time: 2353 -KF Orientation: medial  -KF Location: sacral spine  -KF    Retired Wound - Properties Group Placement Date: 09/20/22  -KF Placement Time: 2353 -KF Orientation: medial  -KF Location: sacral spine  -KF    Retired Wound - Properties Group Date first assessed: 09/20/22 -KF Time first assessed: 2353 -KF Location: sacral spine  -KF    Row Name 12/07/22 1059          Positioning and Restraints    Pre-Treatment Position in bed  -HR     Post Treatment Position bed  -HR     In Bed call light within reach;encouraged to call for assist;exit alarm on;side rails up x3;side lying left;fowlers  -HR     Row Name 12/07/22 1059          Therapy  Assessment/Plan (PT)    Rehab Potential (PT) other (see comments)  fair/guarded  -HR     Criteria for Skilled Interventions Met (PT) yes  -HR     Therapy Frequency (PT) 2 times/wk  2-5x/wk  -HR     Row Name 12/07/22 1059          Physical Therapy Goals    Transfer Goal Selection (PT) transfer, PT goal 1  -HR     Gait Training Goal Selection (PT) gait training, PT goal 1  -HR     Row Name 12/07/22 1059          Transfer Goal 1 (PT)    Activity/Assistive Device (Transfer Goal 1, PT) sit-to-stand/stand-to-sit  -HR     Ruth Level/Cues Needed (Transfer Goal 1, PT) contact guard required  -HR     Time Frame (Transfer Goal 1, PT) long term goal (LTG)  -HR     Row Name 12/07/22 1059          Gait Training Goal 1 (PT)    Activity/Assistive Device (Gait Training Goal 1, PT) gait (walking locomotion);assistive device use  -HR     Ruth Level (Gait Training Goal 1, PT) minimum assist (75% or more patient effort)  -HR     Distance (Gait Training Goal 1, PT) 5'  -HR     Time Frame (Gait Training Goal 1, PT) long term goal (LTG)  -HR           User Key  (r) = Recorded By, (t) = Taken By, (c) = Cosigned By    Initials Name Provider Type    Seda Zee RN Registered Nurse    HR Shavon Russell PTA Physical Therapist Assistant                  PT Recommendation and Plan  Anticipated Discharge Disposition (PT): skilled nursing facility, sub acute care setting, inpatient rehabilitation facility, extended care facility, home with home health, home with 24/7 care, home with assist, home with supervision  Therapy Frequency (PT): 2 times/wk (2-5x/wk)          Time Calculation:    PT Charges     Row Name 12/07/22 1108             Time Calculation    Start Time 0924  -HR      PT Received On 12/07/22  -HR         Time Calculation- PT    Total Timed Code Minutes- PT 40 minute(s)  -HR            User Key  (r) = Recorded By, (t) = Taken By, (c) = Cosigned By    Initials Name Provider Type    HR Shavon Russell PTA Physical  Therapist Assistant              Therapy Charges for Today     Code Description Service Date Service Provider Modifiers Qty    97619823031 HC PT THER PROC EA 15 MIN 12/7/2022 Shavon Russell, ANGEL GP, CQ 1    92631831719 HC PT THERAPEUTIC ACT EA 15 MIN 12/7/2022 Shavon Russell PTA GP, CQ 2          PT G-Codes  AM-PAC 6 Clicks Score (PT): 6    Shavon Russell PTA  12/7/2022

## 2022-12-07 NOTE — CASE MANAGEMENT/SOCIAL WORK
Discharge Planning Assessment  [facility]     Patient Name: Anika Neri  MRN: [id number]  Today's Date: 12/7/2022    Admit Date: 11/30/2022    Plan:  received a call from [facility] per [facility] [phone] who states pt has been accepted by [facility]. [facility] states they can take pt tomorrow and will require discharge information to be faxed to [phone] and a covid test.  notified Physician. SS to follow.     Discharge Plan     Row Name 12/07/22 1124       Plan    Plan SS received a call from [facility] per [facility] [phone] who states pt has been accepted by [facility]. [facility] states they can take pt tomorrow and will require discharge information to be faxed to [phone] and a covid test. SS notified Physician. SS to follow.    0178- attempted to contact Pt's daughterJoellen [phone] to update on pt's acceptance to [facility].  to follow.     7891- attempted contact with pt's daughterEsther [phone] with no success. SS attempted to leave voicemail but no voicemail's are being accepted at this time.               Continued Care and Services - Admitted Since 11/30/2022     Destination     Service Provider Request Status Selected Services Address Phone Fax Patient Preferred    [facility] Pending - No Request Sent N/A [address] [place] [address] [phone] -- --                AZEEM Mckeon

## 2022-12-07 NOTE — PLAN OF CARE
Goal Outcome Evaluation:              Outcome Evaluation: pt resting in bed, pt has had moments of confusion, pts wound care done per orders, will continue plan of care

## 2022-12-07 NOTE — THERAPY TREATMENT NOTE
Acute Care - Occupational Therapy Treatment Note   [facility]     Patient Name: Anika Neri  : 1950  MRN: [id number]  Today's Date: 2022  Onset of Illness/Injury or Date of Surgery: 22 (admission date)          Admit Date: 2022       ICD-10-CM ICD-9-CM   1. Urinary tract infection associated with indwelling urethral catheter, initial encounter ([facility])  T83.511A 996.64    N39.0 599.0   2. History of ESBL E. coli infection  Z86.19 V12.09   3. Flanagan catheter in place on admission  Z97.8 V45.89     Patient Active Problem List   Diagnosis   • A-fib ([facility])   • Pain of left lower extremity   • Sacral decubitus ulcer   • Urinary tract infection associated with indwelling urethral catheter, initial encounter ([facility])     Past Medical History:   Diagnosis Date   • Arthritis    • Atrial fibrillation ([facility])     Not on anticoagulation due to postmenopausal bleeding   • Diabetes mellitus ([facility])    • Gout    • History of CVA (cerebrovascular accident)     residual right-sided weakness   • Hyperlipidemia    • Hypertension    • Stage IV pressure ulcer of sacral region ([facility])      Past Surgical History:   Procedure Laterality Date   • CHOLECYSTECTOMY           OT ASSESSMENT FLOWSHEET (last 12 hours)     OT Evaluation and Treatment     Row Name 22 1348                   OT Time and Intention    Subjective Information complains of;fatigue;weakness  -LA        Document Type therapy note (daily note)  -LA        Mode of Treatment individual therapy;occupational therapy  -LA        Patient Effort good  -LA           General Information    Patient Profile Reviewed yes  -LA        General Observations of Patient Patient agreeable to OT treatment this date. Patient tolerated treatement well but required frequent rest periods due to fatigue.  -LA        Prior Level of Function max assist:;ADL's  -LA           Cognition    Affect/Mental Status (Cognition) WFL  -LA        Orientation Status (Cognition) oriented x 3   Patient with some noted confusion; patient would at times talk to people or see things not present. MD notified  -LA        Follows Commands (Cognition) follows one-step commands;over 90% accuracy  -LA           Bed Mobility    Rolling Left [place] (Bed Mobility) maximum assist (25% patient effort);dependent (less than 25% patient effort)  -LA        Rolling Right [place] (Bed Mobility) maximum assist (25% patient effort);dependent (less than 25% patient effort)  -LA           Motor Skills    Motor Skills coordination;functional endurance  -LA        Therapeutic Exercise shoulder;elbow/forearm;wrist;hand  -LA        Additional Documentation --  AROM exercises completed in all planes 10x4 sets  -LA           Wound 09/20/22 2353 medial sacral spine    Wound - Properties Group Placement Date: 09/20/22  -KF Placement Time: 2353 -KF Orientation: medial  -KF Location: sacral spine  -KF    Retired Wound - Properties Group Placement Date: 09/20/22  -KF Placement Time: 2353 -KF Orientation: medial  -KF Location: sacral spine  -KF    Retired Wound - Properties Group Date first assessed: 09/20/22  -KF Time first assessed: 2353 -KF Location: sacral spine  -KF       Plan of Care Review    Plan of Care Reviewed With patient  -LA           Positioning and Restraints    Pre-Treatment Position in bed  -LA        Post Treatment Position bed  -LA        In Bed call light within reach;encouraged to call for assist;exit alarm on;with other staff  -LA           Therapy Plan Review/Discharge Plan (OT)    Therapy Plan Review (OT) patient  -LA              User Key  (r) = Recorded By, (t) = Taken By, (c) = Cosigned By    Initials Name Effective Dates    KF Seda Phipps RN 09/01/20 -     Key Watkins OT 02/14/22 -                        OT Recommendation and Plan  Planned Therapy Interventions (OT): activity tolerance training, patient/caregiver education/training, adaptive equipment training, BADL retraining,  ROM/therapeutic exercise, occupation/activity based interventions, functional balance retraining, transfer/mobility retraining  Therapy Frequency (OT): other (see comments) (PRN to follow for changes/progress toward goals.)  Plan of Care Review  Plan of Care Reviewed With: patient  Outcome Evaluation: patient seen for OT evaluation. patient does well with motiviation and is capable to do more then she thinks but has fear of falling. Patient does require significant assistance with ADLs currently. OT to address deficits while in house to promote increased strength, ADL independence and safety with mobility/ prepare for transfers. D/C recommendation is IPR vs SNF depending on insurance/transportation. Patient does demonstrate good potiential to improve level of independence.  Plan of Care Reviewed With: patient  Outcome Evaluation: patient seen for OT evaluation. patient does well with motiviation and is capable to do more then she thinks but has fear of falling. Patient does require significant assistance with ADLs currently. OT to address deficits while in house to promote increased strength, ADL independence and safety with mobility/ prepare for transfers. D/C recommendation is IPR vs SNF depending on insurance/transportation. Patient does demonstrate good potiential to improve level of independence.        Time Calculation:     Therapy Charges for Today     Code Description Service Date Service Provider Modifiers Qty    [id number]  OT THER PROC EA 15 MIN 12/7/2022 Key Herrera OT GO 1    [id number]  OT THERAPEUTIC ACT EA 15 MIN 12/7/2022 Key Herrera OT GO 1               Key Herrera OT  12/7/2022

## 2022-12-07 NOTE — PLAN OF CARE
Goal Outcome Evaluation:  Plan of Care Reviewed With: patient        Progress: no change  Outcome Evaluation: Pt resting in bed. Vital signs stable. Pt C/O pain, PRN med given. No acute changes. Will continue plan of care.

## 2022-12-07 NOTE — PROGRESS NOTES
PROGRESS NOTE         Patient Identification:  Name:  Anika Neri  Age:  72 y.o.  Sex:  female  :  1950  MRN:  [id number]  Visit Number:  [id number]  Primary Care Provider:  Barbara Gaona APRN         LOS: 7 days       ----------------------------------------------------------------------------------------------------------------------  Subjective       Chief Complaints:    Dysuria        Interval History:      Patient is sitting up in bed on room air in no apparent distress.  Talking to herself when I entered the room and seems somewhat confused, but reports that she is feeling better today.  Despite this, abdomen is significantly more tender this morning and she continues to have tenderness of the right flank.  Nurse also reported confusion overnight.  Afebrile, no diarrhea.  WBC remains normal at 9.65.    Review of Systems:    Constitutional: no fever, chills and night sweats.  Generalized fatigue.  Eyes: no eye drainage, itching or redness.  HEENT: no mouth sores, dysphagia or nose bleed.  Respiratory: no for shortness of breath, cough or production of sputum.  Cardiovascular: no chest pain, no palpitations, no orthopnea.  Gastrointestinal: no nausea, vomiting or diarrhea. No abdominal pain, hematemesis or rectal bleeding.  Genitourinary: no dysuria or polyuria.  Suprapubic tenderness and right-sided flank pain.  Hematologic/lymphatic: no lymph node abnormalities, no easy bruising or easy bleeding.  Musculoskeletal: no muscle or joint pain.  Skin: No rash and no itching.  Neurological: no loss of consciousness, no seizure, no headache.  Behavioral/Psych: no depression or suicidal ideation.  Endocrine: no hot flashes.  Immunologic: negative.    ----------------------------------------------------------------------------------------------------------------------      Objective       [facility] [place] Meds:  cetirizine, 10 mg, Oral, Daily  dilTIAZem CD, 240 mg, Oral, Q24H  ertapenem, 1 g,  Intravenous, Q24H  famotidine, 20 mg, Oral, BID AC  gabapentin, 800 mg, Oral, Q8H  heparin (porcine), 5,000 Units, Subcutaneous, Q12H  Insulin Aspart, 0-9 Units, Subcutaneous, 4x Daily AC & at Bedtime  sodium chloride, 10 mL, Intravenous, Q12H  sodium hypochlorite, , Topical, Q12H         ----------------------------------------------------------------------------------------------------------------------    Vital Signs:  Temp:  [97.9 °F (36.6 °C)-98.4 °F (36.9 °C)] 98 °F (36.7 °C)  Heart Rate:  [89-91] 91  Resp:  [16-20] 16  BP: ()/(55-74) 99/65  Mean Arterial Pressure (Non-Invasive) for the past 24 hrs (Last 3 readings):   Noninvasive MAP (mmHg)   12/07/22 0600 97   12/06/22 1400 82     SpO2 Percentage    12/05/22 1000 12/05/22 1400 12/06/22 1400   SpO2: 94% 96% 90%     SpO2:  [90 %] 90 %  on   ;   Device (Oxygen Therapy): room air    Body mass index is 48.52 kg/m².  Wt Readings from Last 3 Encounters:   11/30/22 116 kg (256 lb 12.8 oz)   10/28/22 94.3 kg (208 lb)   10/25/22 94.3 kg (208 lb)        Intake/Output Summary (Last 24 hours) at 12/7/2022 1253  Last data filed at 12/7/2022 0300  Gross per 24 hour   Intake 840 ml   Output 1800 ml   Net -960 ml     Diet: Diabetic Diets; Consistent Carbohydrate; Texture: Regular Texture (IDDSI 7); Fluid Consistency: Thin (IDDSI 0)  ----------------------------------------------------------------------------------------------------------------------      Physical Exam:    Constitutional:  female is sitting up in bed on room air in no apparent distress.  Eating breakfast and appears comfortable.  Somewhat confused.  HENT:  Head: Normocephalic and atraumatic.  Mouth:  Moist mucous membranes.    Eyes:  Conjunctivae and EOM are normal.  No scleral icterus.  Neck:  Neck supple.  No JVD present.    Cardiovascular:  Normal rate, regular rhythm and normal heart sounds with no murmur. No edema.  Pulmonary/Chest:  No respiratory distress, no wheezes, no crackles, with  normal breath sounds and good air movement.  Abdominal:  Soft.  Bowel sounds are normal.    No distention.  Diffuse abdominal tenderness and right flank tenderness.  Flanagan catheter in place.  Musculoskeletal:  No edema, no tenderness, and no deformity.  No swelling or redness of joints.  Neurological:  Alert and oriented to person, place, and time.  No facial droop.  No slurred speech.   Skin:  Skin is warm and dry.  No rash noted.  No pallor.   Psychiatric:  Normal mood and affect.  Behavior is normal.    ----------------------------------------------------------------------------------------------------------------------            Results from last 7 days   Lab Units 12/07/22  0155 12/06/22  1333 12/05/22  0216 12/01/22  0031 11/30/22  2020   LACTATE mmol/L  --   --   --   --  1.8   WBC 10*3/mm3 9.65 8.54 10.15   < >  --    HEMOGLOBIN g/dL 11.5* 11.8* 11.7*   < >  --    HEMATOCRIT % 36.0 36.9 36.4   < >  --    MCV fL 92.3 92.7 93.6   < >  --    MCHC g/dL 31.9 32.0 32.1   < >  --    PLATELETS 10*3/mm3 206 198 189   < >  --     < > = values in this interval not displayed.     Results from last 7 days   Lab Units 12/07/22  0155 12/06/22  1333 12/06/22  0053 12/05/22  0216 12/03/22  0157 12/02/22  0048 12/01/22  0031 11/30/22  1638 11/30/22  1638   SODIUM mmol/L 137 136  --  137 140 141 143  --  142   POTASSIUM mmol/L 4.3 4.4  --  4.4 4.2 3.8 3.6  --  3.6   MAGNESIUM mg/dL  --   --  2.2 1.8 2.2 1.5* 1.4*   < >  --    CHLORIDE mmol/L 101 101  --  102 106 106 107  --  107   CO2 mmol/L 25.0 23.6  --  22.8 23.4 23.6 22.9  --  22.8   BUN mg/dL 30* 30*  --  33* 26* 21 18  --  19   CREATININE mg/dL 0.91 0.96  --  0.97 0.98 0.93 0.80  --  0.93   CALCIUM mg/dL 9.4 9.1  --  9.3 8.8 8.9 8.7  --  8.7   GLUCOSE mg/dL 171* 170*  --  173* 158* 129* 151*  --  244*   ALBUMIN g/dL  --   --   --   --   --  3.10* 3.18*  --  3.18*   BILIRUBIN mg/dL  --   --   --   --   --  0.3 0.4  --  0.3   ALK PHOS U/L  --   --   --   --   --  98 109   --  118*   AST (SGOT) U/L  --   --   --   --   --  13 12  --  13   ALT (SGPT) U/L  --   --   --   --   --  9 8  --  9    < > = values in this interval not displayed.   Estimated Creatinine Clearance: 65.3 mL/min (by C-G formula based on SCr of 0.91 mg/dL).  No results found for: AMMONIA    Glucose   Date/Time Value Ref Range Status   12/07/2022 1111 199 (H) 70 - 130 mg/dL Final     Comment:     Meter: [id number] : [id number] ROBEL GAMBREL   12/07/2022 0611 138 (H) 70 - 130 mg/dL Final     Comment:     Meter: [id number] : [id number] SAMIRA RADHA   12/06/2022 2106 147 (H) 70 - 130 mg/dL Final     Comment:     Meter: [id number] : [id number] MARTITA SALINAS   12/06/2022 1638 150 (H) 70 - 130 mg/dL Final     Comment:     Meter: [id number] : [id number] Javy Whiting   12/06/2022 1027 159 (H) 70 - 130 mg/dL Final     Comment:     Meter: [id number] : [id number] Jose Saucedo   12/06/2022 0609 108 70 - 130 mg/dL Final     Comment:     Meter: [id number] : [id number] OLINDA LION   12/05/2022 2034 136 (H) 70 - 130 mg/dL Final     Comment:     Meter: [id number] : [id number] LUZ ELENA BAUMANN   12/05/2022 1635 141 (H) 70 - 130 mg/dL Final     Comment:     Meter: [id number] : [id number] Jose Saucedo     Lab Results   Component Value Date    HGBA1C 7.40 (H) 12/01/2022     Lab Results   Component Value Date    TSH 1.090 12/01/2022       No results found for: BLOODCX  Urine Culture   Date Value Ref Range Status   11/30/2022 >100,000 CFU/mL Escherichia coli ESBL (A)  Final     Comment:       Consider infectious disease consult.  Susceptibility results may not correlate to clinical outcomes.     No results found for: WOUNDCX  No results found for: STOOLCX  No results found for: RESPCX  Pain Management Panel       Pain Management Panel Latest Ref Rng & Units 12/1/2022    AMPHETAMINES SCREEN, URINE Negative Negative    BARBITURATES SCREEN Negative Negative    BENZODIAZEPINE SCREEN, URINE Negative Negative     BUPRENORPHINEUR Negative Negative    COCAINE SCREEN, URINE Negative Negative    METHADONE SCREEN, URINE Negative Negative    METHAMPHETAMINEUR Negative Negative              ----------------------------------------------------------------------------------------------------------------------  Imaging Results (Last 24 Hours)       ** No results found for the last 24 hours. **            ------------------------------------------------------------------------------------------------    Pertinent Infectious Disease Results     Of note, patient reports a recent history of 2 courses of antibiotics for UTIs.  She was seen at [facility] ED on 10/2/2022 at which time she was diagnosed with a UTI and prescribed cefdinir.  Urine culture subsequently finalized as ESBL E. coli and several attempts were made to contact her.  She returned to the ED on 10/25/2022 and was found to have another UTI and discharged home on Macrobid.  Urine culture at that time finalized as ESBL E. coli that was susceptible to Macrobid. She was then seen at [facility] and had a catheter change on 11/15/2022. After that time, she began to have a leaking and malodorous dark urine     Urinalysis on 11/30/2022 was positive with WBCs too numerous to count and 4+ bacteria.  Urine culture finalized is greater than 100,000 colonies of ESBL E. coli.    Assessment/Plan       ASSESSMENT:    ESBL E. coli catheter associated UTI    PLAN:    I examined the patient this morning and she appears comfortable on room air in no apparent distress, but is talking to herself upon entering the room.  She seems somewhat confused this morning, but reports that she feels better overall.  Nurse also reported confusion overnight.  Despite reporting that she feels well and has less abdominal and flank pain, abdomen is significantly more tender this morning and she continues to have tenderness of the right flank.  Remains afebrile without diarrhea.  White count  remains normal.    Patient is completing Invanz course soon.  As patient has new onset worsening of abdominal tenderness and confusion, recommend CT abdomen pelvis.    Code Status:   Code Status and Medical Interventions:   Ordered at: 11/30/22 1955     Code Status (Patient has no pulse and is not breathing):    CPR (Attempt to Resuscitate)     Medical Interventions (Patient has pulse or is breathing):    Full Support       Jolynn Snyder PA-C  12/07/22  12:53 EST

## 2022-12-07 NOTE — PROGRESS NOTES
I was still unable to get a hold of patients daughters Joellen and Esther to discuss GOC. I will attempt again tomorrow.

## 2022-12-07 NOTE — PROGRESS NOTES
[facility] HOSPITALIST PROGRESS NOTE     Patient Identification:  Name:  Anika Neri  Age:  72 y.o.  Sex:  female  :  1950  MRN:  [id number]  Visit Number:  [id number]  ROOM: [address] [place] [address]     Primary Care Provider:  Barbara Gaona APRN    Length of stay in inpatient status:  7    Subjective     Chief Compliant:    Chief Complaint   Patient presents with   • Dysuria     History of Presenting Illness:    Patient remains ill but stable today, overnight has had some new confusion but was appropriate at time I saw her, felt this was due to atarax she has been getting here, Infectious Disease still recommended CT and have ordered, vitals and labs have been stable, she remains afebrile, has been accepted for placement tomorrow and have ordered covid screen in preparation pending CT results as well.   Objective     Current Hospital Meds:  cetirizine, 10 mg, Oral, Daily  dilTIAZem CD, 240 mg, Oral, Q24H  ertapenem, 1 g, Intravenous, Q24H  famotidine, 20 mg, Oral, BID AC  gabapentin, 800 mg, Oral, Q8H  heparin (porcine), 5,000 Units, Subcutaneous, Q12H  Insulin Aspart, 0-9 Units, Subcutaneous, 4x Daily AC & at Bedtime  sodium chloride, 10 mL, Intravenous, Q12H  sodium hypochlorite, , Topical, Q12H         ----------------------------------------------------------------------------------------------------------------------  Vital Signs:  Temp:  [97.9 °F (36.6 °C)-98.4 °F (36.9 °C)] 98 °F (36.7 °C)  Heart Rate:  [89-91] 91  Resp:  [16-20] 16  BP: ()/(55-74) 99/65  SpO2:  [90 %] 90 %  on   ;   Device (Oxygen Therapy): room air  Body mass index is 48.52 kg/m².      Intake/Output Summary (Last 24 hours) at 2022 1301  Last data filed at 2022 0300  Gross per 24 hour   Intake 840 ml   Output 1800 ml   Net -960 ml      ----------------------------------------------------------------------------------------------------------------------  Physical exam:  Constitutional: older than stated age, No  acute distress.      HENT:  Head:  Normocephalic and atraumatic.  Mouth:  Moist mucous membranes.    Eyes:  Conjunctivae and EOM are normal. No scleral icterus.    Neck:  Neck supple.  No JVD present.    Cardiovascular:  Normal rate, regular rhythm and normal heart sounds with no murmur.  Pulmonary/Chest:  No respiratory distress, no wheezes, no crackles, on room air  Abdominal:  Soft. No distension and mild suprapubic tenderness persisting  Musculoskeletal:  No tenderness, and no deformity.  No red or swollen joints anywhere.    Neurological:  Alert and oriented to person, place, and time.  No gross focal deficits   Skin:  Skin is warm and dry. No rash noted. No pallor.   Peripheral vascular:  No clubbing, no cyanosis, no edema.  Psychiatric: Appropriate mood and affect  : Flanagan in place    *Examination stable today 12/7  Edited by: Ezekiel Roe MD at 12/7/2022 1300  ----------------------------------------------------------------------------------------------------------------------  WBC/HGB/HCT/PLT   9.65/11.5/36.0/206 (12/07 0155)  BUN/CREAT/GLUC/ALT/AST/RODERICK/LIP    30/0.91/171/--/--/--/-- (12/07 0155)  LYTES - Na/K/Cl/CO2: 137/4.3/101/25.0 (12/07 0155)        Urine Culture   Date Value Ref Range Status   11/30/2022 >100,000 CFU/mL Escherichia coli ESBL (A)  Final     Comment:       Consider infectious disease consult.  Susceptibility results may not correlate to clinical outcomes.     No results found for: BLOODCX    I have personally looked at the labs and they are summarized above.  ----------------------------------------------------------------------------------------------------------------------  Detailed radiology reports for the last 24 hours:  No radiology results for the last day  Assessment & Plan    -ESBL E. coli catheter associated urinary tract infection that was present on admission, due to a chronic indwelling Flanagan catheter that was also present on admission, in a patient with known history  of ESBL E. coli UTI  -Acute hypomagnesemia  -Suspect history of chronic kidney disease stage II with baseline creatinine 0.8-0.9  -History of type 2 diabetes mellitus  -History of chronic stage IV sacral decubitus ulcer (5.5 cm x 5 cm, 1 cm in depth), which was present on admission   -History of chronic debility  -History of morbid obesity, BMI 48.52 kg/m², which complicates all aspects of care  -History of paroxysmal atrial fibrillation, not on chronic anticoagulation due to reports of postmenopausal bleeding  -History of essential hypertension  -History of CVA in the past with no residual deficits reported  -History of chronic pain and chronic pain medicine usage    Infectious Disease consulted and following, continue ertapenem for 10 days, should complete soon, barajas replaced 11/30 on admission, continued on home medications, glucose has been controlled, mild confusion overnight and discontinued atarax has been getting here, Social Work consulted and following and working toward placement at nursing home in [facility], reportedly can take tomorrow, obtain covid swab.    F: Oral  E: Monitor & Replace PRN  N: Diet: Diabetic Diets; Consistent Carbohydrate; Texture: Regular Texture (IDDSI 7); Fluid Consistency: Thin (IDDSI 0)  PPx: SQH   Code Status (Patient has no pulse and is not breathing): CPR (Attempt to Resuscitate)  Medical Interventions (Patient has pulse or is breathing): Full Support     Dispo: Pending clinical improvement, placement in [place], anticipate tomorrow AM.    *This patient is considered high risk secondary to ESBL Urinary Tract Infection failed outpatient therapy.    Edited by: Ezekiel Roe MD at 12/7/2022 1301  [facility]

## 2022-12-08 VITALS
WEIGHT: 256.8 LBS | TEMPERATURE: 99 F | HEART RATE: 90 BPM | OXYGEN SATURATION: 91 % | SYSTOLIC BLOOD PRESSURE: 150 MMHG | DIASTOLIC BLOOD PRESSURE: 83 MMHG | RESPIRATION RATE: 18 BRPM | BODY MASS INDEX: 48.48 KG/M2 | HEIGHT: 61 IN

## 2022-12-08 LAB
ANION GAP SERPL CALCULATED.3IONS-SCNC: 8.7 MMOL/L (ref 5–15)
BUN SERPL-MCNC: 24 MG/DL (ref 8–23)
BUN/CREAT SERPL: 28.2 (ref 7–25)
CALCIUM SPEC-SCNC: 9.5 MG/DL (ref 8.6–10.5)
CHLORIDE SERPL-SCNC: 103 MMOL/L (ref 98–107)
CO2 SERPL-SCNC: 26.3 MMOL/L (ref 22–29)
CREAT SERPL-MCNC: 0.85 MG/DL (ref 0.57–1)
DEPRECATED RDW RBC AUTO: 46.5 FL (ref 37–54)
EGFRCR SERPLBLD CKD-EPI 2021: 72.4 ML/MIN/1.73
ERYTHROCYTE [DISTWIDTH] IN BLOOD BY AUTOMATED COUNT: 13.7 % (ref 12.3–15.4)
GLUCOSE BLDC GLUCOMTR-MCNC: 126 MG/DL (ref 70–130)
GLUCOSE BLDC GLUCOMTR-MCNC: 155 MG/DL (ref 70–130)
GLUCOSE SERPL-MCNC: 125 MG/DL (ref 65–99)
HCT VFR BLD AUTO: 37.2 % (ref 34–46.6)
HGB BLD-MCNC: 12.1 G/DL (ref 12–15.9)
MCH RBC QN AUTO: 29.8 PG (ref 26.6–33)
MCHC RBC AUTO-ENTMCNC: 32.5 G/DL (ref 31.5–35.7)
MCV RBC AUTO: 91.6 FL (ref 79–97)
PLATELET # BLD AUTO: 217 10*3/MM3 (ref 140–450)
PMV BLD AUTO: 9.6 FL (ref 6–12)
POTASSIUM SERPL-SCNC: 3.9 MMOL/L (ref 3.5–5.2)
RBC # BLD AUTO: 4.06 10*6/MM3 (ref 3.77–5.28)
SODIUM SERPL-SCNC: 138 MMOL/L (ref 136–145)
WBC NRBC COR # BLD: 8.43 10*3/MM3 (ref 3.4–10.8)

## 2022-12-08 PROCEDURE — 97530 THERAPEUTIC ACTIVITIES: CPT

## 2022-12-08 PROCEDURE — 99232 SBSQ HOSP IP/OBS MODERATE 35: CPT | Performed by: INTERNAL MEDICINE

## 2022-12-08 PROCEDURE — 82962 GLUCOSE BLOOD TEST: CPT

## 2022-12-08 PROCEDURE — 99239 HOSP IP/OBS DSCHRG MGMT >30: CPT | Performed by: INTERNAL MEDICINE

## 2022-12-08 PROCEDURE — 25010000002 HEPARIN (PORCINE) PER 1000 UNITS: Performed by: INTERNAL MEDICINE

## 2022-12-08 PROCEDURE — 85027 COMPLETE CBC AUTOMATED: CPT | Performed by: INTERNAL MEDICINE

## 2022-12-08 PROCEDURE — 97110 THERAPEUTIC EXERCISES: CPT

## 2022-12-08 PROCEDURE — 63710000001 INSULIN ASPART PER 5 UNITS: Performed by: INTERNAL MEDICINE

## 2022-12-08 PROCEDURE — 80048 BASIC METABOLIC PNL TOTAL CA: CPT | Performed by: INTERNAL MEDICINE

## 2022-12-08 RX ORDER — FAMOTIDINE 20 MG/1
20 TABLET, FILM COATED ORAL
Qty: 60 TABLET | Refills: 0 | Status: ON HOLD | OUTPATIENT
Start: 2022-12-08 | End: 2023-02-20

## 2022-12-08 RX ORDER — DILTIAZEM HYDROCHLORIDE 240 MG/1
240 CAPSULE, COATED, EXTENDED RELEASE ORAL
Qty: 30 CAPSULE | Refills: 0 | Status: SHIPPED | OUTPATIENT
Start: 2022-12-09

## 2022-12-08 RX ORDER — INSULIN ASPART 100 [IU]/ML
0-9 INJECTION, SOLUTION INTRAVENOUS; SUBCUTANEOUS
Qty: 10.8 ML | Refills: 0 | Status: ON HOLD | OUTPATIENT
Start: 2022-12-08 | End: 2023-02-20

## 2022-12-08 RX ADMIN — OXYCODONE HYDROCHLORIDE AND ACETAMINOPHEN 1 TABLET: 10; 325 TABLET ORAL at 12:02

## 2022-12-08 RX ADMIN — GABAPENTIN 800 MG: 400 CAPSULE ORAL at 13:48

## 2022-12-08 RX ADMIN — Medication 10 ML: at 08:15

## 2022-12-08 RX ADMIN — CETIRIZINE HYDROCHLORIDE 10 MG: 10 TABLET, FILM COATED ORAL at 08:12

## 2022-12-08 RX ADMIN — GABAPENTIN 800 MG: 400 CAPSULE ORAL at 05:21

## 2022-12-08 RX ADMIN — FAMOTIDINE 20 MG: 20 TABLET ORAL at 08:12

## 2022-12-08 RX ADMIN — HEPARIN SODIUM 5000 UNITS: 5000 INJECTION INTRAVENOUS; SUBCUTANEOUS at 08:12

## 2022-12-08 RX ADMIN — DILTIAZEM HYDROCHLORIDE 240 MG: 240 CAPSULE, COATED, EXTENDED RELEASE ORAL at 08:12

## 2022-12-08 RX ADMIN — INSULIN ASPART 2 UNITS: 100 INJECTION, SOLUTION INTRAVENOUS; SUBCUTANEOUS at 12:02

## 2022-12-08 RX ADMIN — SODIUM HYPOCHLORITE: 1.25 SOLUTION TOPICAL at 12:04

## 2022-12-08 NOTE — CASE MANAGEMENT/SOCIAL WORK
Discharge Planning Assessment  [facility]     Patient Name: Anika Neri  MRN: [id number]  Today's Date: 12/8/2022    Admit Date: 11/30/2022    Plan: SS attempted to contact Pt's daughterJoellen [phone] to update on pt's acceptance to [facility]. SS to follow.  SS attempted contact with pt's daughterEsther [phone] with no success. SS attempted to leave voicemail but no voicemail's are being accepted at this time.     Discharge Plan     Row Name 12/08/22 0911       Plan    Final Discharge Disposition Code 03 - skilled nursing facility (SNF)    Final Note Pt is being discharged to [facility] for Physical Rehab. SS spoke with Nithya on this date 12/8/22 and faxed discharge information to [phone]. SS to provide RN with report. Pt's Daughter, Joellen [phone] to provide transportation to [facility]. No other needs identified at this time.              Continued Care and Services - Admitted Since 11/30/2022     Destination Coordination complete.    Service Provider Request Status Selected Services Address Phone Fax Patient Preferred    [facility]  Selected Inpatient Rehabilitation [address] [facility] [place] [address] [phone] -- --              AZEEM Mckeon

## 2022-12-08 NOTE — DISCHARGE PLACEMENT REQUEST
"Anika Gonzalez (72 y.o. Female)     Date of Birth   1950    Social Security Number       Address   [address] [place] [address]    Home Phone   [phone]    MRN   [id number]       [facility]    Marital Status                               Admission Date   11/30/22    Admission Type   Emergency    Admitting Provider   Stephanie Bang DO    Attending Provider   Ezekiel Roe MD    Department, Room/Bed   [address], 3327/       Discharge Date       Discharge Disposition   Skilled Nursing Facility (DC - External)    Discharge Destination                               Attending Provider: Ezekiel Roe MD    Allergies: Contrast Dye    Isolation: Contact   Infection: MRSA No Isolation this Admit (09/21/22), ESBL E coli (10/10/22)   Code Status: CPR    Ht: 154.9 cm (61\")   Wt: 116 kg (256 lb 12.8 oz)    Admission Cmt: None   Principal Problem: Urinary tract infection associated with indwelling urethral catheter, initial encounter ([facility]) [T83.511A,N39.0]                 Active Insurance as of 11/30/2022     Primary Coverage     Payor Plan Insurance Group Employer/Plan Group    HUMANA MEDICARE REPLACEMENT HUMANA MEDICARE REPLACEMENT [id number]     Payor Plan Address Payor Plan Phone Number Payor Plan Fax Number Effective Dates    [address] [address] [phone]  10/1/2022 - None Entered    Formerly [facility] [address]       Subscriber Name Subscriber Birth Date Member ID       ANIKA GONZALEZ 1950 [id number]                 Emergency Contacts      (Rel.) Home Phone Work Phone Mobile Phone    Joellen Landry (POA) (Daughter) [phone] -- [phone]    Esther Parker (Daughter) [phone] -- --            Insurance Information                HUMANA MEDICARE REPLACEMENT/HUMANA MEDICARE REPLACEMENT Phone: [phone]    Subscriber: Anika Gonzalez Subscriber#: [id number]    Group#: [id number] Precert#: --             History & Physical      Jolynn Boyce " "DAVID Son at 22 2017     Attestation signed by Stephanie Bang DO at 22 0722    I have reviewed this documentation and agree.  Patient briefly seen and examined at bedside.  Patient was resting comfortably in bed.  Currently denies any flank pain and/or abdominal pain.  Barajas catheter is noted to have a straw-colored urine in the collection bag.    Patient with history of ESBL E. coli UTI in 2022.  Continue with empiric IV Invanz for now; if ESBL E. coli and sensitive to Invanz, could consider discharge home with midline versus arrangements at infusion clinic.                       [facility]  HISTORY & PHYSICAL    Patient Identification:  Name:  Anika Neri  Age:  72 y.o.  Sex:  female  :  1950  MRN:  [id number]   Visit Number:  [id number]  Admit Date: 2022   Primary Care Physician:  Barbara Gaona APRN     Subjective     Chief complaint:   Chief Complaint   Patient presents with   • Dysuria     History of presenting illness:   Patient is a 72 y.o. female with past medical history significant for paroxysmal atrial fibrillation, debility, chronic indwelling barajas catheter, chronic decubitus ulcer, essential hypertension, history of CVA,  that presented to the [facility] emergency department for evaluation of nausea and abdominal pain.     The patient states she had a catheter change on 11/15 at [facility].  She reports since this time the catheter has been leaking.  She has noticed malodorous dark urine.  She reports being on antibiotics \"the whole month of October\" for a UTI.  Upon further chart review she was diagnosed with a UTI on 10/2 in our emergency department and was started on cefdinir.  Urine culture from 10/8 grew ESBL E. coli. Several attempts were made to contact the patient to notify her of the urine culture results.  A letter was sent to her address.  She was seen in the emergency department once " again on 10/25 and was found to have another UTI.  She was discharged with Macrobid. Urine culture also grew ESBL e.coli that was susceptible to nitrofurantoin.  She admits to completing all antibiotics.  She reports when previously diagnosed with a UTI she had abdominal pain and nausea which has not resolved.  She states her abdominal pain is most severe in the suprapubic region and she has now developed right flank pain.  She denies any fever, chills, diaphoresis, emesis, diarrhea, hematuria.    In the ED the patient received, IV Merrem     Patient has been admitted to the medical/surgical floor for further evaluation and treatment.   ---------------------------------------------------------------------------------------------------------------------   Review of Systems   Constitutional: Negative for chills, diaphoresis, fatigue and fever.   HENT: Negative for congestion.    Respiratory: Negative for cough, shortness of breath and wheezing.    Cardiovascular: Negative for chest pain and palpitations.   Gastrointestinal: Positive for abdominal pain (suprapubic) and nausea. Negative for constipation, diarrhea and vomiting.   Genitourinary: Positive for flank pain (right). Negative for dysuria and hematuria.   Musculoskeletal: Positive for gait problem (unable to ambulate w/o assistance). Negative for myalgias.   Skin: Positive for wound (chronic decubitus ulcer). Negative for rash.   Neurological: Positive for weakness (chronic generalized). Negative for dizziness and light-headedness.   Psychiatric/Behavioral: Negative for confusion.      ---------------------------------------------------------------------------------------------------------------------   Past Medical History:   Diagnosis Date   • Arthritis    • Atrial fibrillation (HCC)     Not on anticoagulation due to postmenopausal bleeding   • Diabetes mellitus (HCC)    • Gout    • History of CVA (cerebrovascular accident)     residual right-sided weakness    • Hyperlipidemia    • Hypertension    • Stage IV pressure ulcer of sacral region (HCC)      Past Surgical History:   Procedure Laterality Date   • CHOLECYSTECTOMY       Family History   Problem Relation Age of Onset   • Diabetes Mother    • Hypertension Father      Social History     Socioeconomic History   • Marital status:    Tobacco Use   • Smoking status: Never   • Smokeless tobacco: Never   Substance and Sexual Activity   • Alcohol use: No   • Drug use: No   • Sexual activity: Defer     ---------------------------------------------------------------------------------------------------------------------   Allergies:  Contrast dye  ---------------------------------------------------------------------------------------------------------------------   Medications below are reported home medications pulling from within the system; at this time, these medications have not been reconciled unless otherwise specified and are in the verification process for further verifcation as current home medications.    Prior to Admission Medications     Prescriptions Last Dose Informant Patient Reported? Taking?    Diclofenac Sodium (VOLTAREN) 1 % gel gel  Pharmacy Yes No    Apply 4 g topically to the appropriate area as directed 4 (Four) Times a Day As Needed (apply to knees and back).    dicyclomine (BENTYL) 20 MG tablet   No No    Take 1 tablet by mouth Every 8 (Eight) Hours As Needed (abd pain).    dilTIAZem CD (CARDIZEM CD) 300 MG 24 hr capsule  Pharmacy Yes No    Take 300 mg by mouth Daily.    gabapentin (NEURONTIN) 800 MG tablet   No No    Take 1 tablet by mouth 3 (Three) Times a Day for 3 days.    insulin glargine (LANTUS, SEMGLEE) 100 UNIT/ML injection  Pharmacy Yes No    Inject 14-24 Units under the skin into the appropriate area as directed Daily.    iron polysaccharides (NIFEREX) 150 MG capsule   No No    Take 1 capsule by mouth Daily.    multivitamin (THERAGRAN) tablet tablet   No No    Take 1 tablet by mouth  Daily.        ---------------------------------------------------------------------------------------------------------------------    Objective     Hospital Scheduled Meds:  meropenem, 1 g, Intravenous, Once           Current listed hospital scheduled medications may not yet reflect those currently placed in orders that are signed and held, awaiting patient's arrival to floor/unit.    ---------------------------------------------------------------------------------------------------------------------   Vital Signs:  Temp:  [97.9 °F (36.6 °C)] 97.9 °F (36.6 °C)  Heart Rate:  [89] 89  Resp:  [20] 20  BP: (115-145)/(74-83) 120/74  Mean Arterial Pressure (Non-Invasive) for the past 24 hrs (Last 3 readings):   Noninvasive MAP (mmHg)   11/30/22 1801 101   11/30/22 1707 106   11/30/22 1633 87     SpO2 Percentage    11/30/22 1633 11/30/22 1707 11/30/22 1801   SpO2: 97% 98% 98%     SpO2:  [97 %-98 %] 98 %  on   ;   Device (Oxygen Therapy): room air    Body mass index is 38.04 kg/m².  Wt Readings from Last 3 Encounters:   11/30/22 94.3 kg (208 lb)   10/28/22 94.3 kg (208 lb)   10/25/22 94.3 kg (208 lb)     ---------------------------------------------------------------------------------------------------------------------   Physical Exam:  Physical Exam  Constitutional:       General: She is awake.      Appearance: Normal appearance. She is well-developed. She is morbidly obese.      Comments: NATY Johnson present at bedside during exam    HENT:      Head: Normocephalic and atraumatic.   Eyes:      General: Lids are normal.   Cardiovascular:      Rate and Rhythm: Normal rate and regular rhythm.      Pulses: Normal pulses.           Dorsalis pedis pulses are 2+ on the right side and 2+ on the left side.        Posterior tibial pulses are 2+ on the right side and 2+ on the left side.      Heart sounds: Normal heart sounds. No murmur heard.    No friction rub. No gallop.   Pulmonary:      Effort: Pulmonary effort is normal.       Breath sounds: Normal breath sounds.   Abdominal:      Palpations: Abdomen is soft.      Tenderness: There is generalized abdominal tenderness.      Comments: Patient refused to be checked for CVA tenderness, states she is unable to roll onto her side or sit up     Genitourinary:     Comments: Flanagan catheter in place   Musculoskeletal:      Right lower leg: No edema.      Left lower leg: No edema.   Skin:     Comments: Decubitus ulceration    Neurological:      General: No focal deficit present.      Mental Status: She is alert and oriented to person, place, and time. Mental status is at baseline.   Psychiatric:         Speech: Speech normal.         Behavior: Behavior is cooperative.         Cognition and Memory: Cognition normal.       ---------------------------------------------------------------------------------------------------------------------  EKG:    Baseline EKG ordered and pending.     Telemetry:    Patient is not currently on telemetry.   ---------------------------------------------------------------------------------------------------------------------             Results from last 7 days   Lab Units 11/30/22  1638   WBC 10*3/mm3 10.57   HEMOGLOBIN g/dL 12.0   HEMATOCRIT % 37.2   MCV fL 93.0   MCHC g/dL 32.3   PLATELETS 10*3/mm3 191     Results from last 7 days   Lab Units 11/30/22  1638   SODIUM mmol/L 142   POTASSIUM mmol/L 3.6   CHLORIDE mmol/L 107   CO2 mmol/L 22.8   BUN mg/dL 19   CREATININE mg/dL 0.93   CALCIUM mg/dL 8.7   GLUCOSE mg/dL 244*   ALBUMIN g/dL 3.18*   BILIRUBIN mg/dL 0.3   ALK PHOS U/L 118*   AST (SGOT) U/L 13   ALT (SGPT) U/L 9   Estimated Creatinine Clearance: 57.7 mL/min (by C-G formula based on SCr of 0.93 mg/dL).  No results found for: AMMONIA    No results found for: HGBA1C, POCGLU  Lab Results   Component Value Date    HGBA1C 7.80 (H) 09/20/2022     Lab Results   Component Value Date    TSH 1.100 09/20/2022     Pain Management Panel    There is no flowsheet data to  display.       I have personally reviewed the above laboratory results.   ---------------------------------------------------------------------------------------------------------------------  Imaging Results (Last 7 Days)     ** No results found for the last 168 hours. **        I have personally reviewed the above radiology results.     Last Echocardiogram:  Results for orders placed during the hospital encounter of 10/27/20    Adult Transthoracic Echo Complete W/ Cont if Necessary Per Protocol    Interpretation Summary  · Estimated left ventricular EF = 60% Left ventricular ejection fraction appears to be 56 - 60%. Left ventricular systolic function is normal.  · Moderate to severe aortic sclerosis without stenosis  · Mild mitral valve regurgitation is present.  · No previous study to compare  · Mild tricuspid valve regurgitation is present.  · Estimated right ventricular systolic pressure from tricuspid regurgitation is mildly elevated (35-45 mmHg).    ---------------------------------------------------------------------------------------------------------------------    Assessment & Plan      Active Hospital Problems    Diagnosis  POA   • **Urinary tract infection associated with indwelling urethral catheter, initial encounter ([facility]) [T83.511A, N39.0]  Yes     #CAUTI   #Hx of ESBL UTI   #Chronic indwelling barajas catheter   -Currently does not meet sepsis criteria; no leukocytosis; vitals unremarkable; lactate within normal limits  -UA grossly abnormal  -Previous urine cultures from 10/8 in 10/25 grew ESBL E. coli  -Patient received IV Merrem in the ED; discussed with attending, we will change antibiotic therapy to IV Invanz  -Repeat urine culture pending  -Repeat a.m. CBC  -New 18 Samoan Barajas catheter placed in the ED, draining appropriately  -Patient reports right flank pain, however, refused to be checked for CVA tenderness on exam; as stated above currently does not meet sepsis criteria and renal function is  within normal limits; may consider obtaining CT of A/P however is already receiving antibiotics for CAUTI, will discuss further with attending   -Continue to monitor closely    #Type 2 diabetes mellitus  -Obtain hemoglobin A1c  -Sliding scale insulin ordered; obtain Accu-Cheks 4 times daily before meals and nightly; titrate insulin therapy as necessary  -Hypoglycemia protocol in place    #Chronic sacral decubitus wound  #Debility   #Morbid obesity   -Wound care has been consulted  -Turn every 2 hours  -PT/OT  -Up with assistance, fall precautions  -BMI 38.04 kg/m²  -Complicates all aspects of patient care    #Paroxysmal atrial fibrillation, not on chronic anticoagulation d/t reports hx of post menopausal bleeding   -Obtain baseline EKG; patient not on telemetry while in the emergency department  -Review home medications once reconciled per pharmacy    #Essential hypertension   -Review home medications once available; plan to continue antihypertensive agents with appropriate holding parameters    #History CVA in the past   -No acute focal neurological deficits on exam  -Review home medications once available    #Chronic pain   -Review home medications once reconciled per pharmacy      F/E/N: No IV fluids.  Replace electrolytes as necessary.  Consistent carb diet.  ---------------------------------------------------  DVT Prophylaxis: Subq heparin   GI Prophylaxis: Protonix   Activity: Up with assistance, fall precautions     The patient is considered to be a high risk patient due to: CAUTI     INPATIENT status due to the need for care which can only be reasonably provided in an hospital setting such as aggressive/expedited ancillary services and/or consultation services, the necessity for IV medications, close physician monitoring and/or the possible need for procedures.  In such, I feel patient’s risk for adverse outcomes and need for care warrant INPATIENT evaluation and predict the patient’s care encounter to  likely last beyond 2 midnights.    Code Status: FULL CODE     Disposition/Discharge planning: Plan for home at discharge. Pending clinical course.     I have discussed the patient's assessment and plan with the patient and attending physician       Jolynn Boyce PA-C  Hospitalist Service -- [facility]       11/30/22  20:17 EST    Attending Physician: Stephanie Bang DO       Electronically signed by Stephanie Bang DO at 12/01/22 0722         Current Facility-Administered Medications   Medication Dose Route Frequency Provider Last Rate Last Admin   • acetaminophen (TYLENOL) tablet 650 mg  650 mg Oral Q6H PRN Jolynn Boyce PA-C   650 mg at 11/30/22 2248   • calcium carbonate (TUMS) chewable tablet 500 mg (200 mg elemental)  2 tablet Oral TID PRN Jolynn Boyce PA-C       • cetirizine (zyrTEC) tablet 10 mg  10 mg Oral Daily Darell Kruger MD   10 mg at 12/08/22 0812   • dextrose (D50W) (25 g/50 mL) IV injection 25 g  25 g Intravenous Q15 Min PRN Stephanie Bang DO       • dextrose (GLUTOSE) oral gel 15 g  15 g Oral Q15 Min PRN Stephanie Bang DO       • Diclofenac Sodium (VOLTAREN) 1 % gel 4 g  4 g Topical 4x Daily PRN Darell Kruger MD       • dilTIAZem CD (CARDIZEM CD) 24 hr capsule 240 mg  240 mg Oral Q24H Darell Kruger MD   240 mg at 12/08/22 0812   • ertapenem (INVanz) 1 g in sodium chloride 0.9 % 100 mL IVPB-VTB  1 g Intravenous Q24H Jolynn Snyder PA-C 200 mL/hr at 12/07/22 2202 1 g at 12/07/22 2202   • famotidine (PEPCID) tablet 20 mg  20 mg Oral BID AC Darell Kruger MD   20 mg at 12/08/22 0812   • gabapentin (NEURONTIN) capsule 800 mg  800 mg Oral Q8H Darell Kruger MD   800 mg at 12/08/22 0521   • glucagon (human recombinant) (GLUCAGEN DIAGNOSTIC) injection 1 mg  1 mg Intramuscular Q15 Min PRN Stephanie Bang, DO       • heparin (porcine) 5000 UNIT/ML injection 5,000 Units  5,000 Units Subcutaneous Q12H Stephanie Bang, DO    5,000 Units at 22 0812   • Insulin Aspart (novoLOG) injection 0-9 Units  0-9 Units Subcutaneous 4x Daily AC & at Bedtime Stephanie Bang DO   2 Units at 22 1755   • Magnesium Sulfate 2 gram Bolus, followed by 8 gram infusion (total Mg dose 10 grams)- Mg less than or equal to 1mg/dL  2 g Intravenous PRN Darell Kruger MD        Or   • Magnesium Sulfate 2 gram / 50mL Infusion (GIVE X 3 BAGS TO EQUAL 6GM TOTAL DOSE) - Mg 1.1 - 1.5 mg/dl  2 g Intravenous PRN Darell Kruger MD        Or   • Magnesium Sulfate 4 gram infusion- Mg 1.6-1.9 mg/dL  4 g Intravenous PRN Darell Kruger MD       • melatonin tablet 10 mg  10 mg Oral Nightly PRN Jolynn Boyce PA-C   10 mg at 22 2107   • oxyCODONE-acetaminophen (PERCOCET)  MG per tablet 1 tablet  1 tablet Oral Q6H PRN Darell Kruger MD   1 tablet at 22 2216   • sodium chloride 0.9 % flush 10 mL  10 mL Intravenous Q12H Stephanie Bang DO   10 mL at 22 0815   • sodium chloride 0.9 % flush 10 mL  10 mL Intravenous PRN Stephanie Bang DO       • sodium chloride 0.9 % infusion 40 mL  40 mL Intravenous PRN Stephanie Bang DO       • sodium hypochlorite (DAKIN'S 1/4 STRENGTH) 0.125 % topical solution 0.125% solution   Topical Q12H Darell Kruger MD   Given at 22 2202        Physician Progress Notes (most recent note)      Belinda Carson APRN at 22 1705        I was still unable to get a hold of patients varun Cuenca and Esther to discuss GOC. I will attempt again tomorrow.    Electronically signed by Belinda Carson APRN at 22 1705          Physical Therapy Notes (most recent note)      Shavon Russell PTA at 22 1108  Version 1 of 1         Acute Care - Physical Therapy Treatment Note  RANDI Padilla     Patient Name: Anika Neri  : 1950  MRN: [id number]  Today's Date: 2022   Onset of Illness/Injury or Date of Surgery: 22 (admission date)  Visit Dx:      ICD-10-CM ICD-9-CM   1. Urinary tract infection associated with indwelling urethral catheter, initial encounter ([facility])  T83.511A 996.64    N39.0 599.0   2. History of ESBL E. coli infection  Z86.19 V12.09   3. Flanagan catheter in place on admission  Z97.8 V45.89     Patient Active Problem List   Diagnosis   • A-fib ([facility])   • Pain of left lower extremity   • Sacral decubitus ulcer   • Urinary tract infection associated with indwelling urethral catheter, initial encounter ([facility])     Past Medical History:   Diagnosis Date   • Arthritis    • Atrial fibrillation ([facility])     Not on anticoagulation due to postmenopausal bleeding   • Diabetes mellitus ([facility])    • Gout    • History of CVA (cerebrovascular accident)     residual right-sided weakness   • Hyperlipidemia    • Hypertension    • Stage IV pressure ulcer of sacral region ([facility])      Past Surgical History:   Procedure Laterality Date   • CHOLECYSTECTOMY       PT Assessment (last 12 hours)     PT Evaluation and Treatment     Row Name 12/07/22 7526          Physical Therapy Time and Intention    Subjective Information complains of;weakness;fatigue  -HR     Document Type therapy note (daily note)  -HR     Mode of Treatment physical therapy  -HR     Patient Effort good  -HR     Comment Pt and ALEJANDRO Will in agreement for PT. Pt is orientated x 4 but does make off-sided comments about a baby needing changedand one of her great grand daugthers being in her room. Pt completed supine exercises with AAROM being required at times. Pt sat EOB but required max A and maximal cueing to lean forward and sit up straight. No standing on this date secondary to Pt refusal and sitting balance being poor  -HR     Row Name 12/07/22 1053          General Information    Patient Profile Reviewed yes  -HR     Equipment Currently Used at Home hospital bed;commode, bedside;wheelchair  -HR     Row Name 12/07/22 1057          Cognition    Personal Safety Interventions fall prevention program maintained;gait  belt;nonskid shoes/slippers when out of bed;supervised activity  -HR     Row Name 12/07/22 1059          Bed Mobility    Bed Mobility supine-sit;sit-supine;scooting/bridging;rolling right;rolling left  -HR     Rolling Left [place] (Bed Mobility) maximum assist (25% patient effort);1 person to manage equipment  -HR     Rolling Right [place] (Bed Mobility) maximum assist (25% patient effort);2 person assist  -HR     Scooting/Bridging [place] (Bed Mobility) moderate assist (50% patient effort);minimum assist (75% patient effort)  Bed in trendelenburg  -HR     Supine-Sit [place] (Bed Mobility) maximum assist (25% patient effort)  -HR     Sit-Supine [place] (Bed Mobility) maximum assist (25% patient effort)  -HR     Row Name 12/07/22 1059          Motor Skills    Therapeutic Exercise other (see comments)  Supine: Ap, inversion/eversion, SLR, Hip abd, heel slide, Quad set, Glut set, LTR.  -HR     Row Name             Wound 09/20/22 2353 medial sacral spine    Wound - Properties Group Placement Date: 09/20/22 -KF Placement Time: 2353 -KF Orientation: medial  -KF Location: sacral spine  -KF    Retired Wound - Properties Group Placement Date: 09/20/22  -KF Placement Time: 2353 -KF Orientation: medial  -KF Location: sacral spine  -KF    Retired Wound - Properties Group Date first assessed: 09/20/22 -KF Time first assessed: 2353 -KF Location: sacral spine  -KF    Row Name 12/07/22 1059          Positioning and Restraints    Pre-Treatment Position in bed  -HR     Post Treatment Position bed  -HR     In Bed call light within reach;encouraged to call for assist;exit alarm on;side rails up x3;side lying left;fowlers  -HR     Row Name 12/07/22 1059          Therapy Assessment/Plan (PT)    Rehab Potential (PT) other (see comments)  fair/guarded  -HR     Criteria for Skilled Interventions Met (PT) yes  -HR     Therapy Frequency (PT) 2 times/wk  2-5x/wk  -HR     Row Name 12/07/22 1059          Physical  Therapy Goals    Transfer Goal Selection (PT) transfer, PT goal 1  -HR     Gait Training Goal Selection (PT) gait training, PT goal 1  -HR     Row Name 12/07/22 1059          Transfer Goal 1 (PT)    Activity/Assistive Device (Transfer Goal 1, PT) sit-to-stand/stand-to-sit  -HR     Nenzel Level/Cues Needed (Transfer Goal 1, PT) contact guard required  -HR     Time Frame (Transfer Goal 1, PT) long term goal (LTG)  -HR     Row Name 12/07/22 1059          Gait Training Goal 1 (PT)    Activity/Assistive Device (Gait Training Goal 1, PT) gait (walking locomotion);assistive device use  -HR     Nenzel Level (Gait Training Goal 1, PT) minimum assist (75% or more patient effort)  -HR     Distance (Gait Training Goal 1, PT) 5'  -HR     Time Frame (Gait Training Goal 1, PT) long term goal (LTG)  -HR           User Key  (r) = Recorded By, (t) = Taken By, (c) = Cosigned By    Initials Name Provider Type    KF Seda Phipps RN Registered Nurse    HR Shavon Russell PTA Physical Therapist Assistant                  PT Recommendation and Plan  Anticipated Discharge Disposition (PT): skilled nursing facility, sub acute care setting, inpatient rehabilitation facility, extended care facility, home with home health, home with 24/7 care, home with assist, home with supervision  Therapy Frequency (PT): 2 times/wk (2-5x/wk)          Time Calculation:    PT Charges     Row Name 12/07/22 1108             Time Calculation    Start Time 0924  -HR      PT Received On 12/07/22  -HR         Time Calculation- PT    Total Timed Code Minutes- PT 40 minute(s)  -HR            User Key  (r) = Recorded By, (t) = Taken By, (c) = Cosigned By    Initials Name Provider Type    HR Shavon Russell PTA Physical Therapist Assistant              Therapy Charges for Today     Code Description Service Date Service Provider Modifiers Qty    86617555204  PT THER PROC EA 15 MIN 12/7/2022 Shavon Russell PTA GP, CQ 1    [id number]  PT THERAPEUTIC ACT EA 15  MIN 2022 Shavon Russell PTA GP, CQ 2          PT G-Codes  AM-PAC 6 Clicks Score (PT): 6    Shavon Russell PTA  2022      Electronically signed by Shavon Russell PTA at 22 1109          Occupational Therapy Notes (most recent note)      Key Herrera, OT at 22 1357          Acute Care - Occupational Therapy Treatment Note   Randy     Patient Name: Anika Neri  : 1950  MRN: [id number]  Today's Date: 2022  Onset of Illness/Injury or Date of Surgery: 22 (admission date)          Admit Date: 2022       ICD-10-CM ICD-9-CM   1. Urinary tract infection associated with indwelling urethral catheter, initial encounter (Formerly [facility])  T83.511A 996.64    N39.0 599.0   2. History of ESBL E. coli infection  Z86.19 V12.09   3. Flanagan catheter in place on admission  Z97.8 V45.89     Patient Active Problem List   Diagnosis   • A-fib (HCC)   • Pain of left lower extremity   • Sacral decubitus ulcer   • Urinary tract infection associated with indwelling urethral catheter, initial encounter (Formerly [facility])     Past Medical History:   Diagnosis Date   • Arthritis    • Atrial fibrillation (HCC)     Not on anticoagulation due to postmenopausal bleeding   • Diabetes mellitus (HCC)    • Gout    • History of CVA (cerebrovascular accident)     residual right-sided weakness   • Hyperlipidemia    • Hypertension    • Stage IV pressure ulcer of sacral region (HCC)      Past Surgical History:   Procedure Laterality Date   • CHOLECYSTECTOMY           OT ASSESSMENT FLOWSHEET (last 12 hours)     OT Evaluation and Treatment     Row Name 22 1348                   OT Time and Intention    Subjective Information complains of;fatigue;weakness  -LA        Document Type therapy note (daily note)  -LA        Mode of Treatment individual therapy;occupational therapy  -LA        Patient Effort good  -LA           General Information    Patient Profile Reviewed yes  -LA        General Observations of Patient Patient agreeable  to OT treatment this date. Patient tolerated treatement well but required frequent rest periods due to fatigue.  -LA        Prior Level of Function max assist:;ADL's  -LA           Cognition    Affect/Mental Status (Cognition) WFL  -LA        Orientation Status (Cognition) oriented x 3  Patient with some noted confusion; patient would at times talk to people or see things not present. MD notified  -LA        Follows Commands (Cognition) follows one-step commands;over 90% accuracy  -LA           Bed Mobility    Rolling Left [place] (Bed Mobility) maximum assist (25% patient effort);dependent (less than 25% patient effort)  -LA        Rolling Right [place] (Bed Mobility) maximum assist (25% patient effort);dependent (less than 25% patient effort)  -LA           Motor Skills    Motor Skills coordination;functional endurance  -LA        Therapeutic Exercise shoulder;elbow/forearm;wrist;hand  -LA        Additional Documentation --  AROM exercises completed in all planes 10x4 sets  -LA           Wound 09/20/22 2353 medial sacral spine    Wound - Properties Group Placement Date: 09/20/22 -KF Placement Time: 2353 -KF Orientation: medial  -KF Location: sacral spine  -KF    Retired Wound - Properties Group Placement Date: 09/20/22  -KF Placement Time: 2353 -KF Orientation: medial  -KF Location: sacral spine  -KF    Retired Wound - Properties Group Date first assessed: 09/20/22 -KF Time first assessed: 2353 -KF Location: sacral spine  -KF       Plan of Care Review    Plan of Care Reviewed With patient  -LA           Positioning and Restraints    Pre-Treatment Position in bed  -LA        Post Treatment Position bed  -LA        In Bed call light within reach;encouraged to call for assist;exit alarm on;with other staff  -LA           Therapy Plan Review/Discharge Plan (OT)    Therapy Plan Review (OT) patient  -LA              User Key  (r) = Recorded By, (t) = Taken By, (c) = Cosigned By    Initials Name Effective  Dates    Seda Zee RN 09/01/20 -     Key Watkins OT 02/14/22 -                        OT Recommendation and Plan  Planned Therapy Interventions (OT): activity tolerance training, patient/caregiver education/training, adaptive equipment training, BADL retraining, ROM/therapeutic exercise, occupation/activity based interventions, functional balance retraining, transfer/mobility retraining  Therapy Frequency (OT): other (see comments) (PRN to follow for changes/progress toward goals.)  Plan of Care Review  Plan of Care Reviewed With: patient  Outcome Evaluation: patient seen for OT evaluation. patient does well with motiviation and is capable to do more then she thinks but has fear of falling. Patient does require significant assistance with ADLs currently. OT to address deficits while in house to promote increased strength, ADL independence and safety with mobility/ prepare for transfers. D/C recommendation is IPR vs SNF depending on insurance/transportation. Patient does demonstrate good potiential to improve level of independence.  Plan of Care Reviewed With: patient  Outcome Evaluation: patient seen for OT evaluation. patient does well with motiviation and is capable to do more then she thinks but has fear of falling. Patient does require significant assistance with ADLs currently. OT to address deficits while in house to promote increased strength, ADL independence and safety with mobility/ prepare for transfers. D/C recommendation is IPR vs SNF depending on insurance/transportation. Patient does demonstrate good potiential to improve level of independence.        Time Calculation:     Therapy Charges for Today     Code Description Service Date Service Provider Modifiers Qty    [id number]  OT THER PROC EA 15 MIN 12/7/2022 Key Herrera OT GO 1    [id number]  OT THERAPEUTIC ACT EA 15 MIN 12/7/2022 Key Herrera OT GO 1               Key Herrera OT  12/7/2022    Electronically signed by  Key Herrera, OT at 12/07/22 7691       Speech Language Pathology Notes (most recent note)    No notes exist for this encounter.         ADL Documentation (most recent)    Flowsheet Row Most Recent Value   Transferring 4 - completely dependent   Toileting 4 - completely dependent   Bathing 3 - assistive equipment and person   Dressing 3 - assistive equipment and person   Eating 0 - independent   Communication 0 - understands/communicates without difficulty   Swallowing 0 - swallows foods/liquids without difficulty   Equipment Currently Used at Home wheelchair, walker, rolling        Discharge Summary    No notes of this type exist for this encounter.         Discharge Order (From admission, onward)     Start     Ordered    12/08/22 0853  Discharge patient  Once        Expected Discharge Date: 12/08/22    Expected Discharge Time: Morning    Discharge Disposition: Skilled Nursing Facility (DC - External)    Physician of Record for Attribution - Please select from Treatment Team: ERIN MYERS [[id number]]    Review needed by CMO to determine Physician of Record: No       Question Answer Comment   Physician of Record for Attribution - Please select from Treatment Team ERIN MYERS    Review needed by CMO to determine Physician of Record No        12/08/22 0854

## 2022-12-08 NOTE — DISCHARGE INSTR - ACTIVITY
Wound Locations Sacrum - medial sacral spine / every 12 hours  Cleanse Dakins Solution 1/4 Strength  Intervention Fluffed Gauze  Moistened? Yes  Moisten With Dakins Solution 1/4 Strength  Dressing: Silicone Border Dressing or gauze dressing with foam tape    Activity as tolerated with assistance.

## 2022-12-08 NOTE — PLAN OF CARE
Goal Outcome Evaluation:  Plan of Care Reviewed With: patient        Progress: no change  Outcome Evaluation: Pt resting in bed. Vital signs stable. Pt had moments of confusion but easily reoriented. No acute changes. Will continue plan of care.

## 2022-12-08 NOTE — CASE MANAGEMENT/SOCIAL WORK
Case Management/Social Work    Patient Name:  Anika Neri  YOB: 1950  MRN: [id number]  Admit Date:  11/30/2022        SS contacted Pt's daughter, Joellen [phone] who states she is an hour away from [facility] [place]. SS notified Lead RN.       Electronically signed by:  AZEEM Mckeon  12/08/22 15:06 EST

## 2022-12-08 NOTE — PLAN OF CARE
Goal Outcome Evaluation:           Progress: no change  Outcome Evaluation: pt resting in bed, no acute changes, awaiting daughters arrival from [place] to pick pt up, pt had moments of confusion throughout the day, wound care done per orders, no acute changes, will continue plan of care.

## 2022-12-08 NOTE — PROGRESS NOTES
PROGRESS NOTE         Patient Identification:  Name:  Anika Neri  Age:  72 y.o.  Sex:  female  :  1950  MRN:  [id number]  Visit Number:  [id number]  Primary Care Provider:  Barbara Gaona APRN         LOS: 8 days       ----------------------------------------------------------------------------------------------------------------------  Subjective       Chief Complaints:    Dysuria        Interval History:      Patient very sedated today.  Primary RN reports sleeping a lot more.  Currently on room air with no apparent distress.  WBC normal at 8.43.  Afebrile, no diarrhea.    Review of Systems:    Unable to obtain, drowsy.  ----------------------------------------------------------------------------------------------------------------------      Objective       Current Hospital Meds:  cetirizine, 10 mg, Oral, Daily  dilTIAZem CD, 240 mg, Oral, Q24H  ertapenem, 1 g, Intravenous, Q24H  famotidine, 20 mg, Oral, BID AC  gabapentin, 800 mg, Oral, Q8H  heparin (porcine), 5,000 Units, Subcutaneous, Q12H  Insulin Aspart, 0-9 Units, Subcutaneous, 4x Daily AC & at Bedtime  sodium chloride, 10 mL, Intravenous, Q12H  sodium hypochlorite, , Topical, Q12H         ----------------------------------------------------------------------------------------------------------------------    Vital Signs:  Temp:  [97.8 °F (36.6 °C)-99.2 °F (37.3 °C)] 98 °F (36.7 °C)  Heart Rate:  [90] 90  Resp:  [16-20] 16  BP: (112-147)/(55-84) 112/55  No data found.  SpO2 Percentage    22 1400 22 1400 22 1900   SpO2: 96% 90% 91%     SpO2:  [91 %] 91 %  on   ;   Device (Oxygen Therapy): room air    Body mass index is 48.52 kg/m².  Wt Readings from Last 3 Encounters:   22 116 kg (256 lb 12.8 oz)   10/28/22 94.3 kg (208 lb)   10/25/22 94.3 kg (208 lb)        Intake/Output Summary (Last 24 hours) at 2022 1147  Last data filed at 2022 0237  Gross per 24 hour   Intake 240 ml   Output 1125 ml   Net  -885 ml     Diet: Diabetic Diets; Consistent Carbohydrate; Texture: Regular Texture (IDDSI 7); Fluid Consistency: Thin (IDDSI 0)  ----------------------------------------------------------------------------------------------------------------------      Physical Exam:    Constitutional:  Elderly  female.  Drowsy this morning.  Currently on room air with no apparent distress.  HENT:  Head: Normocephalic and atraumatic.  Mouth:  Moist mucous membranes.    Eyes:  Conjunctivae and EOM are normal.  No scleral icterus.  Neck:  Neck supple.  No JVD present.    Cardiovascular:  Normal rate, regular rhythm and normal heart sounds with no murmur. No edema.  Pulmonary/Chest:  No respiratory distress, no wheezes, no crackles, with normal breath sounds and good air movement.  Abdominal:  Soft.  Bowel sounds are normal.    No distention.  Diffuse abdominal tenderness but overall improved.  Flanagan catheter in place.  Musculoskeletal:  No edema, no tenderness, and no deformity.  No swelling or redness of joints.  Neurological:  Drowsy, sedate.    Skin:  Skin is warm and dry.  No rash noted.  No pallor.   Psychiatric: Drowsy, sedate.    ----------------------------------------------------------------------------------------------------------------------            Results from last 7 days   Lab Units 12/08/22  0122 12/07/22  0155 12/06/22  1333   WBC 10*3/mm3 8.43 9.65 8.54   HEMOGLOBIN g/dL 12.1 11.5* 11.8*   HEMATOCRIT % 37.2 36.0 36.9   MCV fL 91.6 92.3 92.7   MCHC g/dL 32.5 31.9 32.0   PLATELETS 10*3/mm3 217 206 198     Results from last 7 days   Lab Units 12/08/22  0122 12/07/22  0155 12/06/22  1333 12/06/22  0053 12/05/22  0216 12/03/22  0157 12/02/22  0048   SODIUM mmol/L 138 137 136  --  137 140 141   POTASSIUM mmol/L 3.9 4.3 4.4  --  4.4 4.2 3.8   MAGNESIUM mg/dL  --   --   --  2.2 1.8 2.2 1.5*   CHLORIDE mmol/L 103 101 101  --  102 106 106   CO2 mmol/L 26.3 25.0 23.6  --  22.8 23.4 23.6   BUN mg/dL 24* 30* 30*  --   33* 26* 21   CREATININE mg/dL 0.85 0.91 0.96  --  0.97 0.98 0.93   CALCIUM mg/dL 9.5 9.4 9.1  --  9.3 8.8 8.9   GLUCOSE mg/dL 125* 171* 170*  --  173* 158* 129*   ALBUMIN g/dL  --   --   --   --   --   --  3.10*   BILIRUBIN mg/dL  --   --   --   --   --   --  0.3   ALK PHOS U/L  --   --   --   --   --   --  98   AST (SGOT) U/L  --   --   --   --   --   --  13   ALT (SGPT) U/L  --   --   --   --   --   --  9   Estimated Creatinine Clearance: 69.9 mL/min (by C-G formula based on SCr of 0.85 mg/dL).  No results found for: AMMONIA    Glucose   Date/Time Value Ref Range Status   12/08/2022 1128 155 (H) 70 - 130 mg/dL Final     Comment:     Meter: [id number] : [id number] ROBEL GAMBREL   12/08/2022 0611 126 70 - 130 mg/dL Final     Comment:     Meter: [id number] : [id number] VALERIE ALEX   12/07/2022 2212 129 70 - 130 mg/dL Final     Comment:     Meter: [id number] : [id number] EILEEN HARRISON   12/07/2022 1728 170 (H) 70 - 130 mg/dL Final     Comment:     Meter: [id number] : [id number] LAMA GAMBREL   12/07/2022 1111 199 (H) 70 - 130 mg/dL Final     Comment:     Meter: [id number] : [id number] ROBEL GAMBREL   12/07/2022 0611 138 (H) 70 - 130 mg/dL Final     Comment:     Meter: [id number] : [id number] SAMIRA GARCIA   12/06/2022 2106 147 (H) 70 - 130 mg/dL Final     Comment:     Meter: [id number] : [id number] MARTITA SALINAS   12/06/2022 1638 150 (H) 70 - 130 mg/dL Final     Comment:     Meter: [id number] : [id number] Javy Whiting     Lab Results   Component Value Date    HGBA1C 7.40 (H) 12/01/2022     Lab Results   Component Value Date    TSH 1.090 12/01/2022       No results found for: BLOODCX  Urine Culture   Date Value Ref Range Status   11/30/2022 >100,000 CFU/mL Escherichia coli ESBL (A)  Final     Comment:       Consider infectious disease consult.  Susceptibility results may not correlate to clinical outcomes.     No results found for: WOUNDCX  No results found for: STOOLCX  No  results found for: RESPCX  Pain Management Panel     Pain Management Panel Latest Ref Rng & Units 12/1/2022    AMPHETAMINES SCREEN, URINE Negative Negative    BARBITURATES SCREEN Negative Negative    BENZODIAZEPINE SCREEN, URINE Negative Negative    BUPRENORPHINEUR Negative Negative    COCAINE SCREEN, URINE Negative Negative    METHADONE SCREEN, URINE Negative Negative    METHAMPHETAMINEUR Negative Negative            ----------------------------------------------------------------------------------------------------------------------  Imaging Results (Last 24 Hours)     Procedure Component Value Units Date/Time    CT Abdomen Pelvis Without Contrast [[id number]] Collected: 12/07/22 1547     Updated: 12/07/22 1551    Narrative:      EXAM:    CT Abdomen and Pelvis Without Intravenous Contrast     EXAM DATE:    12/7/2022 3:41 PM     CLINICAL HISTORY:    abd pain; T83.511A-Infection and inflammatory reaction due to  indwelling urethral catheter, initial encounter; N39.0-Urinary tract  infection, site not specified; Z86.19-Personal history of other  infectious and parasitic diseases; Z97.8-Presence of other specified  devices     TECHNIQUE:    Axial computed tomography images of the abdomen and pelvis without  intravenous contrast.  Sagittal and coronal reformatted images were  created and reviewed.  This CT exam was performed using one or more of  the following dose reduction techniques:  automated exposure control,  adjustment of the mA and/or kV according to patient size, and/or use of  iterative reconstruction technique.     COMPARISON:    02/25/2022     FINDINGS:    LUNG BASES:  Unremarkable.  No mass.  No consolidation.      ABDOMEN:    LIVER:  Unremarkable.    GALLBLADDER AND BILE DUCTS:  Cholecystectomy clips.  No ductal  dilation.    PANCREAS:  Unremarkable.  No ductal dilation.    SPLEEN:  Unremarkable.  No splenomegaly.    ADRENALS:  Unremarkable.  No mass.    KIDNEYS AND URETERS:  Unremarkable.  No obstructing  stones.  No  hydronephrosis.    STOMACH AND BOWEL:  Scattered diverticula in the colon.  No  diverticulitis.  No obstruction.      PELVIS:    APPENDIX:  No findings to suggest acute appendicitis.    BLADDER:  Indwelling Flanagan catheter noted.  No stones.    REPRODUCTIVE:  Unremarkable as visualized.      ABDOMEN and PELVIS:    INTRAPERITONEAL SPACE:  Unremarkable.  No free air.  No significant  fluid collection.    BONES/JOINTS:  Posterior midline sacral ulcer noted.  No acute  fracture.  No dislocation.    SOFT TISSUES:  Unremarkable.    VASCULATURE:  Atherosclerotic disease.  No abdominal aortic aneurysm.    LYMPH NODES:  Unremarkable.  No enlarged lymph nodes.       Impression:        No acute findings in the abdomen or pelvis.     This report was finalized on 12/7/2022 3:49 PM by Dr. Jesus Chavez MD.             ------------------------------------------------------------------------------------------------    Pertinent Infectious Disease Results     Of note, patient reports a recent history of 2 courses of antibiotics for UTIs.  She was seen at [facility] ED on 10/2/2022 at which time she was diagnosed with a UTI and prescribed cefdinir.  Urine culture subsequently finalized as ESBL E. coli and several attempts were made to contact her.  She returned to the ED on 10/25/2022 and was found to have another UTI and discharged home on Macrobid.  Urine culture at that time finalized as ESBL E. coli that was susceptible to Macrobid. She was then seen at [facility] and had a catheter change on 11/15/2022. After that time, she began to have a leaking and malodorous dark urine     Urinalysis on 11/30/2022 was positive with WBCs too numerous to count and 4+ bacteria.  Urine culture finalized is greater than 100,000 colonies of ESBL E. Coli.      Assessment/Plan       ASSESSMENT:    ESBL E. coli catheter associated UTI    PLAN:    I saw and examined the patient myself this morning and discussed my plan of  care with MILLER Blake and primary RN and here are my findings:     Patient sedated today primary RN reports sleeping a lot more during the day currently on room air with no apparent distress.    Lungs are clear to auscultation with no crackles wheezing or rhonchi and abdomen is somewhat better with less diffuse tenderness.    Laboratory wise WBC is normal at 8.43 and patient is completing course of ertapenem today and plan for discharge back to nursing home off of antibiotic coverage.  I had the pleasure to discuss her case with Dr. Roe and related to him my plan of care.    Code Status:   Code Status and Medical Interventions:   Ordered at: 11/30/22 1955     Code Status (Patient has no pulse and is not breathing):    CPR (Attempt to Resuscitate)     Medical Interventions (Patient has pulse or is breathing):    Full Support       MILLER Blake  12/08/22  11:47 EST    Electronically signed by MILLER Blake, 12/08/22, 11:48 AM EST.    Electronically signed by Esha Bonilla MD, 12/08/22, 12:52 PM EST.

## 2022-12-08 NOTE — DISCHARGE SUMMARY
"    [facility] HOSPITALISTS DISCHARGE SUMMARY    Patient Identification:  Name:  Anika Neri  Age:  72 y.o.  Sex:  female  :  1950  MRN:  [id number]  Visit Number:  [id number]    Date of Admission: 2022  Date of Discharge:  2022     PCP: Barbara Gaona APRN    DISCHARGE DIAGNOSIS  ESBL E. coli catheter associated urinary tract infection that was present on admission, due to a chronic indwelling Barajas catheter that was also present on admission, in a patient with known history of ESBL E. coli UTI - Resolved   Acute hypomagnesemia - Resolved   #Suspect history of chronic kidney disease stage II with baseline creatinine 0.8-0.9  #History of type 2 diabetes mellitus  #History of chronic stage IV sacral decubitus ulcer (5.5 cm x 5 cm, 1 cm in depth), which was present on admission   #History of chronic debility  #History of morbid obesity, BMI 48.52 kg/m², which complicates all aspects of care  #History of paroxysmal atrial fibrillation, not on chronic anticoagulation due to reports of postmenopausal bleeding  #History of essential hypertension  #History of CVA in the past with no residual deficits reported  #History of chronic pain and chronic pain medicine usage    CONSULTS   Consults     Date and Time Order Name Status Description    2022 12:41 PM Inpatient Palliative Care MD Consult Completed     2022 12:33 AM Inpatient Infectious Diseases Consult Completed         PROCEDURES PERFORMED  None    HOSPITAL COURSE  Patient is a 72 y.o. female presented to [facility] complaining of nausea and abdominal pain.  Please see the admitting history and physical for further details.      Patient presented to [facility] emergency room  with complaints of nausea and abdominal pain.  Has chronic barajas that was last changed at [facility] 11/15, reportedly had been on antibiotics \"the whole month of October\" for a Urinary Tract Infection, was originally " diagnosed 10/2 in our emergency room and placed on Cefdinir, Urine culture from 10/8 grew ESBL and unsure if this was obtained outpatient. Appears a letter was sent to patient as she couldn't be reached though again she presented to [facility] emergency room 10/25 with noted Urinary Tract Infection. She was discharged then from emergency room on Macrobid, cultures also grew ESBL.  With her recurrent presentation this admission was called for admission, notably had flank pain, was started on Merrem at that time.  Infectious Disease consulted and followed, patient was transitioned to [facility] and has completed sufficient course of antibiotics per Infectious Disease. Today WBC count normal, has remained normotensive and afebrile since admission.  Repeat CT yesterday per Infectious Disease was without acute intra-abdominal process.  Patient is looking to relocate to [place] with family and has requested placement for rehab in [place] for which she has been accepted and will be discharged today.  Coronavirus swab negative.  Patient will need to establish with new PCP once to [place].     Edited by: Ezekiel Roe MD at 12/8/2022 1032    VITAL SIGNS:  Temp:  [97.8 °F (36.6 °C)-99.2 °F (37.3 °C)] 98 °F (36.7 °C)  Heart Rate:  [90] 90  Resp:  [16-20] 16  BP: (112-147)/(55-84) 112/55  SpO2:  [91 %] 91 %  on   ;   Device (Oxygen Therapy): room air    Body mass index is 48.52 kg/m².  Wt Readings from Last 3 Encounters:   11/30/22 116 kg (256 lb 12.8 oz)   10/28/22 94.3 kg (208 lb)   10/25/22 94.3 kg (208 lb)       PHYSICAL EXAM:  Constitutional: older than stated age, No acute distress.      HENT:  Head:  Normocephalic and atraumatic.  Mouth:  Moist mucous membranes.    Eyes:  Conjunctivae and EOM are normal. No scleral icterus.    Neck:  Neck supple.  No JVD present.    Cardiovascular:  Normal rate, regular rhythm and normal heart sounds with no murmur.  Pulmonary/Chest:  No respiratory distress, no wheezes, no crackles, on room  air  Abdominal:  Soft. No distension and mild suprapubic tenderness persisting  Musculoskeletal:  No tenderness, and no deformity.  No red or swollen joints anywhere.    Neurological:  Alert and oriented to person, place, and time.  No gross focal deficits   Skin:  Skin is warm and dry. No rash noted. No pallor.   Peripheral vascular:  No clubbing, no cyanosis, no edema.  Psychiatric: Appropriate mood and affect  : Flanagan in place    *Examination stable today 12/8  Edited by: Ezekiel Roe MD at 12/8/2022 1032    DISCHARGE DISPOSITION   Stable    DISCHARGE MEDICATIONS:     Discharge Medications      New Medications      Instructions Start Date   famotidine 20 MG tablet  Commonly known as: PEPCID   20 mg, Oral, 2 Times Daily Before Meals      Insulin Aspart 100 UNIT/ML injection  Commonly known as: novoLOG  Notes to patient: Blood glucose 150-199 mg/dL - 2 units  Blood glucose 200-249 mg/dL - 4 units  Blood glucose 250-299 mg/dL - 6 units  Blood glucose 300-349 mg/dL - 7 units  Blood glucose 350-400 mg/dL - 8 units  Blood glucose greater than 400 mg/dL - 9 units and call provider   0-9 Units, Subcutaneous, 4 Times Daily Before Meals & Nightly         Changes to Medications      Instructions Start Date   dilTIAZem  MG 24 hr capsule  Commonly known as: CARDIZEM CD  What changed:   · medication strength  · how much to take  · when to take this   240 mg, Oral, Every 24 Hours Scheduled   Start Date: December 9, 2022        Continue These Medications      Instructions Start Date   Diclofenac Sodium 1 % gel gel  Commonly known as: VOLTAREN   4 g, Topical, 4 Times Daily PRN      gabapentin 800 MG tablet  Commonly known as: NEURONTIN   800 mg, Oral, Every 8 Hours PRN         Stop These Medications    cefuroxime 500 MG tablet  Commonly known as: CEFTIN     insulin glargine 100 UNIT/ML injection  Commonly known as: LANTUS, SEMGLEE            Activity Instructions    Wound Locations Sacrum - medial sacral spine / every  12 hours  Cleanse Dakins Solution 1/4 Strength  Intervention Fluffed Gauze  Moistened? Yes  Moisten With Dakins Solution 1/4 Strength  Dressing: Silicone Border Dressing or gauze dressing with foam tape    Activity as tolerated with assistance.             Additional Instructions for the Follow-ups that You Need to Schedule     Discharge Follow-up with Specified Provider: Will need to establish with new PCP in [place] following stay at rehab in [place]   As directed      To: Will need to establish with new PCP in [place] following stay at rehab in [place]            Contact information for follow-up providers     Marce Parker, APRN .    Specialty: Infectious Diseases  Contact information:  [address] [address]  [phone]             Barbara Gaona APRN .    Specialty: Nurse Practitioner  Contact information:  [address] [facility] [address]  [phone]                   Contact information for after-discharge care     Destination     [facility] .    Service: Inpatient Rehabilitation  Contact information:  [address] [facility] [address]  [phone]                            TEST  RESULTS PENDING AT DISCHARGE  None     CODE STATUS  Code Status and Medical Interventions:   Ordered at: 11/30/22 1955     Code Status (Patient has no pulse and is not breathing):    CPR (Attempt to Resuscitate)     Medical Interventions (Patient has pulse or is breathing):    Full Support       Ezekiel Roe MD  [facility]  12/08/22  10:32 EST    Please note that this discharge summary required more than 30 minutes to complete.

## 2022-12-08 NOTE — THERAPY TREATMENT NOTE
Acute Care - Physical Therapy Treatment Note   [place]     Patient Name: Anika Neri  : 1950  MRN: [id number]  Today's Date: 2022   Onset of Illness/Injury or Date of Surgery: 22 (admission date)  Visit Dx:     ICD-10-CM ICD-9-CM   1. Urinary tract infection associated with indwelling urethral catheter, initial encounter ([facility])  T83.511A 996.64    N39.0 599.0   2. History of ESBL E. coli infection  Z86.19 V12.09   3. Flanagan catheter in place on admission  Z97.8 V45.89     Patient Active Problem List   Diagnosis   • A-fib ([facility])   • Pain of left lower extremity   • Sacral decubitus ulcer   • Urinary tract infection associated with indwelling urethral catheter, initial encounter ([facility])     Past Medical History:   Diagnosis Date   • Arthritis    • Atrial fibrillation ([facility])     Not on anticoagulation due to postmenopausal bleeding   • Diabetes mellitus ([facility])    • Gout    • History of CVA (cerebrovascular accident)     residual right-sided weakness   • Hyperlipidemia    • Hypertension    • Stage IV pressure ulcer of sacral region ([facility])      Past Surgical History:   Procedure Laterality Date   • CHOLECYSTECTOMY       PT Assessment (last 12 hours)     PT Evaluation and Treatment     Row Name 22 1100          Physical Therapy Time and Intention    Subjective Information complains of  neck pain  -HR     Document Type therapy note (daily note)  -HR     Mode of Treatment physical therapy  -HR     Patient Effort good  -HR     Comment Pt and ALEJANDRO Will in agreement for PT. Pt refused OOB activities on this date secondary to neck pain, but did complete supine exercises with minimal cueing. Pt demonstrated more ROM with B LEs on this date in supine.  -HR     Row Name 22 1100          General Information    Patient Profile Reviewed yes  -HR     Equipment Currently Used at Home hospital bed;commode, bedside;wheelchair  -HR     Row Name 22 1100          Cognition    Personal Safety Interventions  fall prevention program maintained;supervised activity  -HR     Row Name 12/08/22 1100          Bed Mobility    Bed Mobility supine-sit;sit-supine;scooting/bridging;rolling right;rolling left  -HR     Rolling Left [place] (Bed Mobility) maximum assist (25% patient effort);1 person to manage equipment  -HR     Rolling Right [place] (Bed Mobility) maximum assist (25% patient effort);2 person assist  -HR     Scooting/Bridging [place] (Bed Mobility) moderate assist (50% patient effort);minimum assist (75% patient effort)  Bed in trendelenburg  -HR     Row Name 12/08/22 1100          Motor Skills    Therapeutic Exercise other (see comments)  Supine: AP, inversion/eversion, SLR, hip abd, heel slide, quad and glut set  -HR     Row Name             Wound 09/20/22 2353 medial sacral spine    Wound - Properties Group Placement Date: 09/20/22 -KF Placement Time: 2353 -KF Orientation: medial  -KF Location: sacral spine  -KF    Retired Wound - Properties Group Placement Date: 09/20/22 -KF Placement Time: 2353 -KF Orientation: medial  -KF Location: sacral spine  -KF    Retired Wound - Properties Group Date first assessed: 09/20/22  -KF Time first assessed: 2353 -KF Location: sacral spine  -KF    Row Name 12/08/22 1100          Positioning and Restraints    Pre-Treatment Position in bed  -HR     Post Treatment Position bed  -HR     In Bed notified nsg;fowlers;call light within reach;encouraged to call for assist;exit alarm on;side rails up x2  -HR     Row Name 12/08/22 1100          Therapy Assessment/Plan (PT)    Rehab Potential (PT) other (see comments)  fair/guarded  -HR     Criteria for Skilled Interventions Met (PT) yes  -HR     Therapy Frequency (PT) 2 times/wk  2-5x/wk  -HR     Row Name 12/08/22 1100          Physical Therapy Goals    Transfer Goal Selection (PT) transfer, PT goal 1  -HR     Gait Training Goal Selection (PT) gait training, PT goal 1  -HR     Row Name 12/08/22 1100          Transfer Goal  1 (PT)    Activity/Assistive Device (Transfer Goal 1, PT) sit-to-stand/stand-to-sit  -HR     Kimberly Level/Cues Needed (Transfer Goal 1, PT) contact guard required  -HR     Time Frame (Transfer Goal 1, PT) long term goal (LTG)  -HR     Row Name 12/08/22 1100          Gait Training Goal 1 (PT)    Activity/Assistive Device (Gait Training Goal 1, PT) gait (walking locomotion);assistive device use  -HR     Kimberly Level (Gait Training Goal 1, PT) minimum assist (75% or more patient effort)  -HR     Distance (Gait Training Goal 1, PT) 5'  -HR     Time Frame (Gait Training Goal 1, PT) long term goal (LTG)  -HR           User Key  (r) = Recorded By, (t) = Taken By, (c) = Cosigned By    Initials Name Provider Type    KF Seda Phipps RN Registered Nurse    HR Shavon Russell PTA Physical Therapist Assistant                  PT Recommendation and Plan  Anticipated Discharge Disposition (PT): skilled nursing facility, sub acute care setting, inpatient rehabilitation facility, extended care facility, home with home health, home with 24/7 care, home with assist, home with supervision  Therapy Frequency (PT): 2 times/wk (2-5x/wk)          Time Calculation:    PT Charges     Row Name 12/08/22 1113             Time Calculation    Start Time 1030  -HR      PT Received On 12/08/22  -HR      PT Goal Re-Cert Due Date 12/08/22  -HR         Time Calculation- PT    Total Timed Code Minutes- PT 23 minute(s)  -HR            User Key  (r) = Recorded By, (t) = Taken By, (c) = Cosigned By    Initials Name Provider Type    HR Shavon Russell PTA Physical Therapist Assistant              Therapy Charges for Today     Code Description Service Date Service Provider Modifiers Qty    64997109866 HC PT THER PROC EA 15 MIN 12/7/2022 Shavon Russell PTA GP, CQ 1    30197895222 HC PT THERAPEUTIC ACT EA 15 MIN 12/7/2022 Shavon Russell PTA GP, CQ 2    68387366996 HC PT THER PROC EA 15 MIN 12/8/2022 Shavon Russell PTA GP, CQ 1    75534439249 HC PT  THERAPEUTIC ACT EA 15 MIN 12/8/2022 Shavon Russell, ANGEL GP, CQ 1          PT G-Codes  AM-PAC 6 Clicks Score (PT): 6    Shavon Russell PTA  12/8/2022     Home

## 2022-12-21 NOTE — CASE MANAGEMENT/SOCIAL WORK
"Per patient message with refill request \"My Restless leg is still bothering me. My neurologist up my Mirapex to 2.25. She wanted to see if you could up my gabapentin at night time.\"    Her neurologist recommended to increase gabapentin if the Mirapex dose increase is ineffective.  Try increasing Mirapex first and if that is ineffective, then I will increase her gabapentin to 300 mg twice daily.  " Discharge Planning Assessment  [facility]     Patient Name: Anika Neri  MRN: [id number]  Today's Date: 9/22/2022    Admit Date: 9/20/2022    Plan: SS visited pt on this date. Pt voices plans to return home with her daughter, Esther Parker. Pt voices that she has been bed ridden for two years. Pt moved here from [place] one week ago and voiced having home health services in [place]. Pt had an appointment with [facility] ARCHER yesterday, however missed this appointment due to being at [facility]. Pt is requesting a hospital bed and wilma lift. Pt has a hospital bed and wilma lift in [place], however is unable to get the DME moved. SS advised pt to have DME in [place] picked up by DME company. Pt voices that she owns the hospital bed. Pt voices that she has required the DME for the past two years. SS spoke to Durga JAMISON who states he visited the home today and feels that the daughterEsther is an appropriate caregiver. SS notified Dr. Bang. SS to follow.     Discharge Plan     Row Name 09/22/22 1641       Plan    Plan SS visited pt on this date. Pt voices plans to return home with her daughter, Esther Parker. Pt voices that she has been bed ridden for two years. Pt moved here from [place] one week ago and voiced having home health services in [place]. Pt had an appointment with [facility] ARCHER yesterday, however missed this appointment due to being at [facility]. Pt is requesting a hospital bed and wilma lift. Pt has a hospital bed and wilma lift in [place], however is unable to get the DME moved. SS advised pt to have DME in [place] picked up by DME company. Pt voices that she owns the hospital bed. Pt voices that she has required the DME for the past two years. SS spoke to Durga JAMISON who states he visited the home today and feels that the daughterEsther is an appropriate caregiver. SS notified Dr. Bang. SS to follow.              Expected Discharge Date and Time     Expected  Discharge Date Expected Discharge Time    Sep 22, 2022       ABI Lemons

## 2023-02-18 ENCOUNTER — HOSPITAL ENCOUNTER (INPATIENT)
Facility: HOSPITAL | Age: 73
LOS: 3 days | Discharge: HOME | DRG: 698 | End: 2023-02-21
Attending: EMERGENCY MEDICINE | Admitting: HOSPITALIST
Payer: COMMERCIAL

## 2023-02-18 ENCOUNTER — APPOINTMENT (OUTPATIENT)
Dept: GENERAL RADIOLOGY | Facility: HOSPITAL | Age: 73
DRG: 698 | End: 2023-02-18
Payer: COMMERCIAL

## 2023-02-18 DIAGNOSIS — T83.511A URINARY TRACT INFECTION ASSOCIATED WITH INDWELLING URETHRAL CATHETER, INITIAL ENCOUNTER: Primary | ICD-10-CM

## 2023-02-18 DIAGNOSIS — E11.40 CONTROLLED TYPE 2 DIABETES MELLITUS WITH DIABETIC NEUROPATHY, WITH LONG-TERM CURRENT USE OF INSULIN: ICD-10-CM

## 2023-02-18 DIAGNOSIS — N39.0 URINARY TRACT INFECTION ASSOCIATED WITH INDWELLING URETHRAL CATHETER, INITIAL ENCOUNTER: Primary | ICD-10-CM

## 2023-02-18 DIAGNOSIS — T83.511D URINARY TRACT INFECTION ASSOCIATED WITH INDWELLING URETHRAL CATHETER, SUBSEQUENT ENCOUNTER: ICD-10-CM

## 2023-02-18 DIAGNOSIS — N39.0 URINARY TRACT INFECTION ASSOCIATED WITH INDWELLING URETHRAL CATHETER, SUBSEQUENT ENCOUNTER: ICD-10-CM

## 2023-02-18 DIAGNOSIS — L89.154 PRESSURE INJURY OF SACRAL REGION, STAGE 4: ICD-10-CM

## 2023-02-18 DIAGNOSIS — Z79.4 CONTROLLED TYPE 2 DIABETES MELLITUS WITH DIABETIC NEUROPATHY, WITH LONG-TERM CURRENT USE OF INSULIN: ICD-10-CM

## 2023-02-18 LAB
A-A DO2: 37 MMHG (ref 0–300)
ALBUMIN SERPL-MCNC: 3.4 G/DL (ref 3.5–5.2)
ALBUMIN/GLOB SERPL: 0.8 G/DL
ALP SERPL-CCNC: 125 U/L (ref 39–117)
ALT SERPL W P-5'-P-CCNC: 12 U/L (ref 1–33)
ANION GAP SERPL CALCULATED.3IONS-SCNC: 10.7 MMOL/L (ref 5–15)
ARTERIAL PATENCY WRIST A: ABNORMAL
AST SERPL-CCNC: 19 U/L (ref 1–32)
ATMOSPHERIC PRESS: 734 MMHG
BACTERIA UR QL AUTO: ABNORMAL /HPF
BASE EXCESS BLDA CALC-SCNC: 2.2 MMOL/L (ref 0–2)
BASOPHILS # BLD AUTO: 0.06 10*3/MM3 (ref 0–0.2)
BASOPHILS NFR BLD AUTO: 0.5 % (ref 0–1.5)
BDY SITE: ABNORMAL
BILIRUB SERPL-MCNC: 0.5 MG/DL (ref 0–1.2)
BILIRUB UR QL STRIP: NEGATIVE
BODY TEMPERATURE: 0 C
BUN SERPL-MCNC: 20 MG/DL (ref 8–23)
BUN/CREAT SERPL: 23.5 (ref 7–25)
CALCIUM SPEC-SCNC: 9.7 MG/DL (ref 8.6–10.5)
CHLORIDE SERPL-SCNC: 99 MMOL/L (ref 98–107)
CLARITY UR: ABNORMAL
CO2 BLDA-SCNC: 27.7 MMOL/L (ref 22–33)
CO2 SERPL-SCNC: 25.3 MMOL/L (ref 22–29)
COHGB MFR BLD: 1.4 % (ref 0–5)
COLOR UR: YELLOW
CREAT SERPL-MCNC: 0.85 MG/DL (ref 0.57–1)
CRP SERPL-MCNC: 3.69 MG/DL (ref 0–0.5)
D-LACTATE SERPL-SCNC: 2 MMOL/L (ref 0.5–2)
DEPRECATED RDW RBC AUTO: 45.7 FL (ref 37–54)
EGFRCR SERPLBLD CKD-EPI 2021: 72.4 ML/MIN/1.73
EOSINOPHIL # BLD AUTO: 0.46 10*3/MM3 (ref 0–0.4)
EOSINOPHIL NFR BLD AUTO: 3.5 % (ref 0.3–6.2)
ERYTHROCYTE [DISTWIDTH] IN BLOOD BY AUTOMATED COUNT: 13.7 % (ref 12.3–15.4)
FLUAV RNA RESP QL NAA+PROBE: NOT DETECTED
FLUBV RNA ISLT QL NAA+PROBE: NOT DETECTED
GEN 5 2HR TROPONIN T REFLEX: 18 NG/L
GLOBULIN UR ELPH-MCNC: 4.2 GM/DL
GLUCOSE SERPL-MCNC: 189 MG/DL (ref 65–99)
GLUCOSE UR STRIP-MCNC: NEGATIVE MG/DL
HCO3 BLDA-SCNC: 26.5 MMOL/L (ref 20–26)
HCT VFR BLD AUTO: 39.6 % (ref 34–46.6)
HCT VFR BLD CALC: 39.2 % (ref 38–51)
HGB BLD-MCNC: 12.7 G/DL (ref 12–15.9)
HGB BLDA-MCNC: 12.8 G/DL (ref 13.5–17.5)
HGB UR QL STRIP.AUTO: ABNORMAL
HOLD SPECIMEN: NORMAL
HOLD SPECIMEN: NORMAL
HYALINE CASTS UR QL AUTO: ABNORMAL /LPF
IMM GRANULOCYTES # BLD AUTO: 0.05 10*3/MM3 (ref 0–0.05)
IMM GRANULOCYTES NFR BLD AUTO: 0.4 % (ref 0–0.5)
INHALED O2 CONCENTRATION: 21 %
KETONES UR QL STRIP: NEGATIVE
LEUKOCYTE ESTERASE UR QL STRIP.AUTO: ABNORMAL
LYMPHOCYTES # BLD AUTO: 2.67 10*3/MM3 (ref 0.7–3.1)
LYMPHOCYTES NFR BLD AUTO: 20.4 % (ref 19.6–45.3)
Lab: ABNORMAL
MAGNESIUM SERPL-MCNC: 1.5 MG/DL (ref 1.6–2.4)
MCH RBC QN AUTO: 29.1 PG (ref 26.6–33)
MCHC RBC AUTO-ENTMCNC: 32.1 G/DL (ref 31.5–35.7)
MCV RBC AUTO: 90.8 FL (ref 79–97)
METHGB BLD QL: 0.1 % (ref 0–3)
MODALITY: ABNORMAL
MONOCYTES # BLD AUTO: 0.59 10*3/MM3 (ref 0.1–0.9)
MONOCYTES NFR BLD AUTO: 4.5 % (ref 5–12)
NEUTROPHILS NFR BLD AUTO: 70.7 % (ref 42.7–76)
NEUTROPHILS NFR BLD AUTO: 9.24 10*3/MM3 (ref 1.7–7)
NITRITE UR QL STRIP: POSITIVE
NOTE: ABNORMAL
NRBC BLD AUTO-RTO: 0 /100 WBC (ref 0–0.2)
NT-PROBNP SERPL-MCNC: 907.8 PG/ML (ref 0–900)
OXYHGB MFR BLDV: 92 % (ref 94–99)
PCO2 BLDA: 39.1 MM HG (ref 35–45)
PCO2 TEMP ADJ BLD: ABNORMAL MM[HG]
PH BLDA: 7.44 PH UNITS (ref 7.35–7.45)
PH UR STRIP.AUTO: 5.5 [PH] (ref 5–8)
PH, TEMP CORRECTED: ABNORMAL
PLATELET # BLD AUTO: 218 10*3/MM3 (ref 140–450)
PMV BLD AUTO: 9.5 FL (ref 6–12)
PO2 BLDA: 63.1 MM HG (ref 83–108)
PO2 TEMP ADJ BLD: ABNORMAL MM[HG]
POTASSIUM SERPL-SCNC: 4.2 MMOL/L (ref 3.5–5.2)
PROT SERPL-MCNC: 7.6 G/DL (ref 6–8.5)
PROT UR QL STRIP: ABNORMAL
RBC # BLD AUTO: 4.36 10*6/MM3 (ref 3.77–5.28)
RBC # UR STRIP: ABNORMAL /HPF
REF LAB TEST METHOD: ABNORMAL
SAO2 % BLDCOA: 93.4 % (ref 94–99)
SARS-COV-2 RNA RESP QL NAA+PROBE: NOT DETECTED
SODIUM SERPL-SCNC: 135 MMOL/L (ref 136–145)
SP GR UR STRIP: 1.02 (ref 1–1.03)
SQUAMOUS #/AREA URNS HPF: ABNORMAL /HPF
TROPONIN T DELTA: -4 NG/L
TROPONIN T SERPL HS-MCNC: 22 NG/L
TSH SERPL DL<=0.05 MIU/L-ACNC: 1.2 UIU/ML (ref 0.27–4.2)
UROBILINOGEN UR QL STRIP: ABNORMAL
VENTILATOR MODE: ABNORMAL
WBC # UR STRIP: ABNORMAL /HPF
WBC NRBC COR # BLD: 13.07 10*3/MM3 (ref 3.4–10.8)
WHOLE BLOOD HOLD COAG: NORMAL
WHOLE BLOOD HOLD SPECIMEN: NORMAL

## 2023-02-18 PROCEDURE — 87086 URINE CULTURE/COLONY COUNT: CPT | Performed by: EMERGENCY MEDICINE

## 2023-02-18 PROCEDURE — 83735 ASSAY OF MAGNESIUM: CPT | Performed by: EMERGENCY MEDICINE

## 2023-02-18 PROCEDURE — 83036 HEMOGLOBIN GLYCOSYLATED A1C: CPT | Performed by: HOSPITALIST

## 2023-02-18 PROCEDURE — 99222 1ST HOSP IP/OBS MODERATE 55: CPT | Performed by: NURSE PRACTITIONER

## 2023-02-18 PROCEDURE — 99285 EMERGENCY DEPT VISIT HI MDM: CPT

## 2023-02-18 PROCEDURE — 83605 ASSAY OF LACTIC ACID: CPT | Performed by: EMERGENCY MEDICINE

## 2023-02-18 PROCEDURE — 82375 ASSAY CARBOXYHB QUANT: CPT

## 2023-02-18 PROCEDURE — 87636 SARSCOV2 & INF A&B AMP PRB: CPT | Performed by: EMERGENCY MEDICINE

## 2023-02-18 PROCEDURE — 87040 BLOOD CULTURE FOR BACTERIA: CPT | Performed by: EMERGENCY MEDICINE

## 2023-02-18 PROCEDURE — 36600 WITHDRAWAL OF ARTERIAL BLOOD: CPT

## 2023-02-18 PROCEDURE — 81001 URINALYSIS AUTO W/SCOPE: CPT | Performed by: EMERGENCY MEDICINE

## 2023-02-18 PROCEDURE — 86140 C-REACTIVE PROTEIN: CPT | Performed by: EMERGENCY MEDICINE

## 2023-02-18 PROCEDURE — 84484 ASSAY OF TROPONIN QUANT: CPT | Performed by: EMERGENCY MEDICINE

## 2023-02-18 PROCEDURE — 36415 COLL VENOUS BLD VENIPUNCTURE: CPT

## 2023-02-18 PROCEDURE — 51702 INSERT TEMP BLADDER CATH: CPT

## 2023-02-18 PROCEDURE — 85025 COMPLETE CBC W/AUTO DIFF WBC: CPT | Performed by: EMERGENCY MEDICINE

## 2023-02-18 PROCEDURE — 83880 ASSAY OF NATRIURETIC PEPTIDE: CPT | Performed by: EMERGENCY MEDICINE

## 2023-02-18 PROCEDURE — 83050 HGB METHEMOGLOBIN QUAN: CPT

## 2023-02-18 PROCEDURE — 93005 ELECTROCARDIOGRAM TRACING: CPT | Performed by: EMERGENCY MEDICINE

## 2023-02-18 PROCEDURE — 87186 SC STD MICRODIL/AGAR DIL: CPT | Performed by: EMERGENCY MEDICINE

## 2023-02-18 PROCEDURE — 84443 ASSAY THYROID STIM HORMONE: CPT | Performed by: EMERGENCY MEDICINE

## 2023-02-18 PROCEDURE — 71045 X-RAY EXAM CHEST 1 VIEW: CPT

## 2023-02-18 PROCEDURE — 82805 BLOOD GASES W/O2 SATURATION: CPT

## 2023-02-18 PROCEDURE — 80053 COMPREHEN METABOLIC PANEL: CPT | Performed by: EMERGENCY MEDICINE

## 2023-02-18 RX ORDER — NICOTINE POLACRILEX 4 MG
15 LOZENGE BUCCAL
Status: DISCONTINUED | OUTPATIENT
Start: 2023-02-18 | End: 2023-02-19

## 2023-02-18 RX ORDER — SODIUM CHLORIDE 0.9 % (FLUSH) 0.9 %
10 SYRINGE (ML) INJECTION AS NEEDED
Status: DISCONTINUED | OUTPATIENT
Start: 2023-02-18 | End: 2023-02-21 | Stop reason: HOSPADM

## 2023-02-18 RX ORDER — HYDROCODONE BITARTRATE AND ACETAMINOPHEN 7.5; 325 MG/1; MG/1
1 TABLET ORAL ONCE
Status: COMPLETED | OUTPATIENT
Start: 2023-02-18 | End: 2023-02-19

## 2023-02-18 RX ORDER — DEXTROSE MONOHYDRATE 25 G/50ML
25 INJECTION, SOLUTION INTRAVENOUS
Status: DISCONTINUED | OUTPATIENT
Start: 2023-02-18 | End: 2023-02-19

## 2023-02-18 RX ORDER — INSULIN ASPART 100 [IU]/ML
0-7 INJECTION, SOLUTION INTRAVENOUS; SUBCUTANEOUS
Status: DISCONTINUED | OUTPATIENT
Start: 2023-02-18 | End: 2023-02-19

## 2023-02-19 ENCOUNTER — APPOINTMENT (OUTPATIENT)
Dept: CARDIOLOGY | Facility: HOSPITAL | Age: 73
DRG: 698 | End: 2023-02-19
Payer: COMMERCIAL

## 2023-02-19 LAB
ALBUMIN SERPL-MCNC: 3.2 G/DL (ref 3.5–5.2)
ALBUMIN/GLOB SERPL: 0.8 G/DL
ALP SERPL-CCNC: 120 U/L (ref 39–117)
ALT SERPL W P-5'-P-CCNC: 11 U/L (ref 1–33)
ANION GAP SERPL CALCULATED.3IONS-SCNC: 8.6 MMOL/L (ref 5–15)
AST SERPL-CCNC: 18 U/L (ref 1–32)
BASOPHILS # BLD AUTO: 0.06 10*3/MM3 (ref 0–0.2)
BASOPHILS NFR BLD AUTO: 0.5 % (ref 0–1.5)
BILIRUB SERPL-MCNC: 0.5 MG/DL (ref 0–1.2)
BUN SERPL-MCNC: 19 MG/DL (ref 8–23)
BUN/CREAT SERPL: 24.4 (ref 7–25)
CALCIUM SPEC-SCNC: 9.2 MG/DL (ref 8.6–10.5)
CHLORIDE SERPL-SCNC: 100 MMOL/L (ref 98–107)
CO2 SERPL-SCNC: 25.4 MMOL/L (ref 22–29)
CREAT SERPL-MCNC: 0.78 MG/DL (ref 0.57–1)
CRP SERPL-MCNC: 3.45 MG/DL (ref 0–0.5)
DEPRECATED RDW RBC AUTO: 46.1 FL (ref 37–54)
EGFRCR SERPLBLD CKD-EPI 2021: 80.3 ML/MIN/1.73
EOSINOPHIL # BLD AUTO: 0.4 10*3/MM3 (ref 0–0.4)
EOSINOPHIL NFR BLD AUTO: 3.3 % (ref 0.3–6.2)
ERYTHROCYTE [DISTWIDTH] IN BLOOD BY AUTOMATED COUNT: 13.6 % (ref 12.3–15.4)
GLOBULIN UR ELPH-MCNC: 4.1 GM/DL
GLUCOSE BLDC GLUCOMTR-MCNC: 139 MG/DL (ref 70–130)
GLUCOSE BLDC GLUCOMTR-MCNC: 181 MG/DL (ref 70–130)
GLUCOSE BLDC GLUCOMTR-MCNC: 185 MG/DL (ref 70–130)
GLUCOSE BLDC GLUCOMTR-MCNC: 191 MG/DL (ref 70–130)
GLUCOSE BLDC GLUCOMTR-MCNC: 229 MG/DL (ref 70–130)
GLUCOSE SERPL-MCNC: 193 MG/DL (ref 65–99)
HBA1C MFR BLD: 8 % (ref 4.8–5.6)
HCT VFR BLD AUTO: 38.8 % (ref 34–46.6)
HGB BLD-MCNC: 12.6 G/DL (ref 12–15.9)
IMM GRANULOCYTES # BLD AUTO: 0.03 10*3/MM3 (ref 0–0.05)
IMM GRANULOCYTES NFR BLD AUTO: 0.3 % (ref 0–0.5)
LYMPHOCYTES # BLD AUTO: 2.81 10*3/MM3 (ref 0.7–3.1)
LYMPHOCYTES NFR BLD AUTO: 23.5 % (ref 19.6–45.3)
MAGNESIUM SERPL-MCNC: 2.3 MG/DL (ref 1.6–2.4)
MCH RBC QN AUTO: 29.6 PG (ref 26.6–33)
MCHC RBC AUTO-ENTMCNC: 32.5 G/DL (ref 31.5–35.7)
MCV RBC AUTO: 91.3 FL (ref 79–97)
MONOCYTES # BLD AUTO: 0.56 10*3/MM3 (ref 0.1–0.9)
MONOCYTES NFR BLD AUTO: 4.7 % (ref 5–12)
NEUTROPHILS NFR BLD AUTO: 67.7 % (ref 42.7–76)
NEUTROPHILS NFR BLD AUTO: 8.09 10*3/MM3 (ref 1.7–7)
NRBC BLD AUTO-RTO: 0 /100 WBC (ref 0–0.2)
PLATELET # BLD AUTO: 216 10*3/MM3 (ref 140–450)
PMV BLD AUTO: 9.7 FL (ref 6–12)
POTASSIUM SERPL-SCNC: 3.7 MMOL/L (ref 3.5–5.2)
PROT SERPL-MCNC: 7.3 G/DL (ref 6–8.5)
QT INTERVAL: 424 MS
QTC INTERVAL: 515 MS
RBC # BLD AUTO: 4.25 10*6/MM3 (ref 3.77–5.28)
SODIUM SERPL-SCNC: 134 MMOL/L (ref 136–145)
TROPONIN T SERPL HS-MCNC: 21 NG/L
WBC NRBC COR # BLD: 11.95 10*3/MM3 (ref 3.4–10.8)

## 2023-02-19 PROCEDURE — 80053 COMPREHEN METABOLIC PANEL: CPT | Performed by: HOSPITALIST

## 2023-02-19 PROCEDURE — 25010000002 HEPARIN (PORCINE) PER 1000 UNITS: Performed by: HOSPITALIST

## 2023-02-19 PROCEDURE — 93306 TTE W/DOPPLER COMPLETE: CPT

## 2023-02-19 PROCEDURE — 84484 ASSAY OF TROPONIN QUANT: CPT | Performed by: HOSPITALIST

## 2023-02-19 PROCEDURE — 86140 C-REACTIVE PROTEIN: CPT | Performed by: HOSPITALIST

## 2023-02-19 PROCEDURE — 25010000002 MAGNESIUM SULFATE 2 GM/50ML SOLUTION: Performed by: HOSPITALIST

## 2023-02-19 PROCEDURE — 99232 SBSQ HOSP IP/OBS MODERATE 35: CPT | Performed by: HOSPITALIST

## 2023-02-19 PROCEDURE — 82962 GLUCOSE BLOOD TEST: CPT

## 2023-02-19 PROCEDURE — 25010000002 ERTAPENEM PER 500 MG: Performed by: HOSPITALIST

## 2023-02-19 PROCEDURE — 63710000001 INSULIN ASPART PER 5 UNITS: Performed by: HOSPITALIST

## 2023-02-19 PROCEDURE — 85025 COMPLETE CBC W/AUTO DIFF WBC: CPT | Performed by: HOSPITALIST

## 2023-02-19 PROCEDURE — 83735 ASSAY OF MAGNESIUM: CPT | Performed by: HOSPITALIST

## 2023-02-19 RX ORDER — IBUPROFEN 200 MG
200 TABLET ORAL ONCE
Status: COMPLETED | OUTPATIENT
Start: 2023-02-19 | End: 2023-02-19

## 2023-02-19 RX ORDER — MAGNESIUM SULFATE HEPTAHYDRATE 40 MG/ML
2 INJECTION, SOLUTION INTRAVENOUS AS NEEDED
Status: DISCONTINUED | OUTPATIENT
Start: 2023-02-19 | End: 2023-02-21 | Stop reason: HOSPADM

## 2023-02-19 RX ORDER — ATORVASTATIN CALCIUM 40 MG/1
40 TABLET, FILM COATED ORAL NIGHTLY
Status: DISCONTINUED | OUTPATIENT
Start: 2023-02-19 | End: 2023-02-21 | Stop reason: HOSPADM

## 2023-02-19 RX ORDER — MAGNESIUM SULFATE HEPTAHYDRATE 40 MG/ML
4 INJECTION, SOLUTION INTRAVENOUS AS NEEDED
Status: DISCONTINUED | OUTPATIENT
Start: 2023-02-19 | End: 2023-02-21 | Stop reason: HOSPADM

## 2023-02-19 RX ORDER — MAGNESIUM SULFATE HEPTAHYDRATE 40 MG/ML
2 INJECTION, SOLUTION INTRAVENOUS
Status: COMPLETED | OUTPATIENT
Start: 2023-02-19 | End: 2023-02-19

## 2023-02-19 RX ORDER — ACETAMINOPHEN 325 MG/1
650 TABLET ORAL EVERY 6 HOURS PRN
Status: DISCONTINUED | OUTPATIENT
Start: 2023-02-19 | End: 2023-02-21 | Stop reason: HOSPADM

## 2023-02-19 RX ORDER — SODIUM CHLORIDE 9 MG/ML
40 INJECTION, SOLUTION INTRAVENOUS AS NEEDED
Status: DISCONTINUED | OUTPATIENT
Start: 2023-02-19 | End: 2023-02-21 | Stop reason: HOSPADM

## 2023-02-19 RX ORDER — FAMOTIDINE 20 MG/1
20 TABLET, FILM COATED ORAL
Status: DISCONTINUED | OUTPATIENT
Start: 2023-02-19 | End: 2023-02-21 | Stop reason: HOSPADM

## 2023-02-19 RX ORDER — SODIUM CHLORIDE 9 MG/ML
75 INJECTION, SOLUTION INTRAVENOUS CONTINUOUS
Status: DISCONTINUED | OUTPATIENT
Start: 2023-02-19 | End: 2023-02-19

## 2023-02-19 RX ORDER — METOPROLOL TARTRATE 50 MG/1
50 TABLET, FILM COATED ORAL 2 TIMES DAILY
COMMUNITY

## 2023-02-19 RX ORDER — NICOTINE POLACRILEX 4 MG
15 LOZENGE BUCCAL
Status: DISCONTINUED | OUTPATIENT
Start: 2023-02-19 | End: 2023-02-21 | Stop reason: HOSPADM

## 2023-02-19 RX ORDER — LIDOCAINE 50 MG/G
1 PATCH TOPICAL
Status: DISCONTINUED | OUTPATIENT
Start: 2023-02-19 | End: 2023-02-21 | Stop reason: HOSPADM

## 2023-02-19 RX ORDER — ASPIRIN 81 MG/1
81 TABLET ORAL DAILY
Status: DISCONTINUED | OUTPATIENT
Start: 2023-02-19 | End: 2023-02-20

## 2023-02-19 RX ORDER — OXYCODONE AND ACETAMINOPHEN 10; 325 MG/1; MG/1
1 TABLET ORAL EVERY 6 HOURS PRN
Status: ON HOLD | COMMUNITY
End: 2023-02-20

## 2023-02-19 RX ORDER — INSULIN GLARGINE 100 [IU]/ML
0-10 INJECTION, SOLUTION SUBCUTANEOUS TAKE AS DIRECTED
COMMUNITY

## 2023-02-19 RX ORDER — SODIUM CHLORIDE 0.9 % (FLUSH) 0.9 %
10 SYRINGE (ML) INJECTION EVERY 12 HOURS SCHEDULED
Status: DISCONTINUED | OUTPATIENT
Start: 2023-02-19 | End: 2023-02-21 | Stop reason: HOSPADM

## 2023-02-19 RX ORDER — DEXTROSE MONOHYDRATE 25 G/50ML
25 INJECTION, SOLUTION INTRAVENOUS
Status: DISCONTINUED | OUTPATIENT
Start: 2023-02-19 | End: 2023-02-21 | Stop reason: HOSPADM

## 2023-02-19 RX ORDER — INSULIN ASPART 100 [IU]/ML
0-9 INJECTION, SOLUTION INTRAVENOUS; SUBCUTANEOUS
Status: DISCONTINUED | OUTPATIENT
Start: 2023-02-19 | End: 2023-02-21 | Stop reason: HOSPADM

## 2023-02-19 RX ORDER — PROMETHAZINE HYDROCHLORIDE 12.5 MG/1
6.25 TABLET ORAL ONCE
Status: DISCONTINUED | OUTPATIENT
Start: 2023-02-19 | End: 2023-02-19

## 2023-02-19 RX ORDER — HEPARIN SODIUM 5000 [USP'U]/ML
5000 INJECTION, SOLUTION INTRAVENOUS; SUBCUTANEOUS EVERY 12 HOURS SCHEDULED
Status: DISCONTINUED | OUTPATIENT
Start: 2023-02-19 | End: 2023-02-21 | Stop reason: HOSPADM

## 2023-02-19 RX ORDER — NYSTATIN 100000 [USP'U]/G
POWDER TOPICAL EVERY 8 HOURS SCHEDULED
Status: DISCONTINUED | OUTPATIENT
Start: 2023-02-19 | End: 2023-02-21 | Stop reason: HOSPADM

## 2023-02-19 RX ORDER — SODIUM CHLORIDE 0.9 % (FLUSH) 0.9 %
10 SYRINGE (ML) INJECTION AS NEEDED
Status: DISCONTINUED | OUTPATIENT
Start: 2023-02-19 | End: 2023-02-21 | Stop reason: HOSPADM

## 2023-02-19 RX ORDER — NITROGLYCERIN 0.4 MG/1
0.4 TABLET SUBLINGUAL
Status: DISCONTINUED | OUTPATIENT
Start: 2023-02-19 | End: 2023-02-21 | Stop reason: HOSPADM

## 2023-02-19 RX ORDER — PROMETHAZINE HYDROCHLORIDE 12.5 MG/1
12.5 TABLET ORAL EVERY 6 HOURS PRN
Status: DISCONTINUED | OUTPATIENT
Start: 2023-02-19 | End: 2023-02-21 | Stop reason: HOSPADM

## 2023-02-19 RX ADMIN — NYSTATIN: 100000 POWDER TOPICAL at 16:17

## 2023-02-19 RX ADMIN — ASPIRIN 81 MG: 81 TABLET, COATED ORAL at 18:29

## 2023-02-19 RX ADMIN — INSULIN ASPART 2 UNITS: 100 INJECTION, SOLUTION INTRAVENOUS; SUBCUTANEOUS at 17:37

## 2023-02-19 RX ADMIN — INSULIN ASPART 2 UNITS: 100 INJECTION, SOLUTION INTRAVENOUS; SUBCUTANEOUS at 09:34

## 2023-02-19 RX ADMIN — HEPARIN SODIUM 5000 UNITS: 5000 INJECTION, SOLUTION INTRAVENOUS; SUBCUTANEOUS at 20:25

## 2023-02-19 RX ADMIN — NYSTATIN: 100000 POWDER TOPICAL at 20:26

## 2023-02-19 RX ADMIN — MAGNESIUM SULFATE HEPTAHYDRATE 2 G: 40 INJECTION, SOLUTION INTRAVENOUS at 01:31

## 2023-02-19 RX ADMIN — IBUPROFEN 200 MG: 200 TABLET, FILM COATED ORAL at 16:17

## 2023-02-19 RX ADMIN — MAGNESIUM SULFATE HEPTAHYDRATE 2 G: 40 INJECTION, SOLUTION INTRAVENOUS at 05:48

## 2023-02-19 RX ADMIN — SODIUM CHLORIDE 75 ML/HR: 9 INJECTION, SOLUTION INTRAVENOUS at 02:43

## 2023-02-19 RX ADMIN — ERTAPENEM 1 G: 1 INJECTION INTRAMUSCULAR; INTRAVENOUS at 00:34

## 2023-02-19 RX ADMIN — ACETAMINOPHEN 650 MG: 325 TABLET ORAL at 11:50

## 2023-02-19 RX ADMIN — LIDOCAINE 1 PATCH: 50 PATCH TOPICAL at 16:17

## 2023-02-19 RX ADMIN — INSULIN ASPART 3 UNITS: 100 INJECTION, SOLUTION INTRAVENOUS; SUBCUTANEOUS at 11:50

## 2023-02-19 RX ADMIN — HEPARIN SODIUM 5000 UNITS: 5000 INJECTION, SOLUTION INTRAVENOUS; SUBCUTANEOUS at 09:33

## 2023-02-19 RX ADMIN — FAMOTIDINE 20 MG: 20 TABLET, FILM COATED ORAL at 17:36

## 2023-02-19 RX ADMIN — Medication 10 ML: at 20:25

## 2023-02-19 RX ADMIN — MAGNESIUM SULFATE HEPTAHYDRATE 2 G: 40 INJECTION, SOLUTION INTRAVENOUS at 04:08

## 2023-02-19 RX ADMIN — HYDROCODONE BITARTRATE AND ACETAMINOPHEN 1 TABLET: 7.5; 325 TABLET ORAL at 00:02

## 2023-02-19 RX ADMIN — Medication 10 ML: at 09:34

## 2023-02-19 RX ADMIN — ATORVASTATIN CALCIUM 40 MG: 40 TABLET, FILM COATED ORAL at 20:25

## 2023-02-20 LAB
ANION GAP SERPL CALCULATED.3IONS-SCNC: 11.4 MMOL/L (ref 5–15)
BACTERIA SPEC AEROBE CULT: ABNORMAL
BH CV ECHO MEAS - ACS: 1.2 CM
BH CV ECHO MEAS - AO MAX PG: 5.2 MMHG
BH CV ECHO MEAS - AO MEAN PG: 3 MMHG
BH CV ECHO MEAS - AO ROOT DIAM: 3.1 CM
BH CV ECHO MEAS - AO V2 MAX: 111.2 CM/SEC
BH CV ECHO MEAS - AO V2 VTI: 18 CM
BH CV ECHO MEAS - EDV(CUBED): 63 ML
BH CV ECHO MEAS - EDV(MOD-SP4): 49.8 ML
BH CV ECHO MEAS - EF(MOD-SP4): 58.8 %
BH CV ECHO MEAS - ESV(CUBED): 31 ML
BH CV ECHO MEAS - ESV(MOD-SP4): 20.5 ML
BH CV ECHO MEAS - FS: 21.1 %
BH CV ECHO MEAS - IVS/LVPW: 1.04 CM
BH CV ECHO MEAS - IVSD: 1.37 CM
BH CV ECHO MEAS - LA DIMENSION: 2.7 CM
BH CV ECHO MEAS - LAT PEAK E' VEL: 9.7 CM/SEC
BH CV ECHO MEAS - LV DIASTOLIC VOL/BSA (35-75): 23.5 CM2
BH CV ECHO MEAS - LV MASS(C)D: 195 GRAMS
BH CV ECHO MEAS - LV SYSTOLIC VOL/BSA (12-30): 9.7 CM2
BH CV ECHO MEAS - LVIDD: 4 CM
BH CV ECHO MEAS - LVIDS: 3.1 CM
BH CV ECHO MEAS - LVOT AREA: 2.8 CM2
BH CV ECHO MEAS - LVOT DIAM: 1.9 CM
BH CV ECHO MEAS - LVPWD: 1.32 CM
BH CV ECHO MEAS - MED PEAK E' VEL: 7.1 CM/SEC
BH CV ECHO MEAS - MR MAX PG: 134 MMHG
BH CV ECHO MEAS - MR MAX VEL: 578.8 CM/SEC
BH CV ECHO MEAS - MR MEAN PG: 73.7 MMHG
BH CV ECHO MEAS - MR MEAN VEL: 394.3 CM/SEC
BH CV ECHO MEAS - MR VTI: 148.3 CM
BH CV ECHO MEAS - MV A MAX VEL: 55.6 CM/SEC
BH CV ECHO MEAS - MV E MAX VEL: 98 CM/SEC
BH CV ECHO MEAS - MV E/A: 1.76
BH CV ECHO MEAS - PA ACC TIME: 0.1 SEC
BH CV ECHO MEAS - PA PR(ACCEL): 36.3 MMHG
BH CV ECHO MEAS - RAP SYSTOLE: 10 MMHG
BH CV ECHO MEAS - RVSP: 31.9 MMHG
BH CV ECHO MEAS - SI(MOD-SP4): 13.8 ML/M2
BH CV ECHO MEAS - SV(MOD-SP4): 29.3 ML
BH CV ECHO MEAS - TAPSE (>1.6): 1.84 CM
BH CV ECHO MEAS - TR MAX PG: 21.9 MMHG
BH CV ECHO MEAS - TR MAX VEL: 234 CM/SEC
BH CV ECHO MEASUREMENTS AVERAGE E/E' RATIO: 11.67
BUN SERPL-MCNC: 25 MG/DL (ref 8–23)
BUN/CREAT SERPL: 24.3 (ref 7–25)
CALCIUM SPEC-SCNC: 9.3 MG/DL (ref 8.6–10.5)
CHLORIDE SERPL-SCNC: 104 MMOL/L (ref 98–107)
CHOLEST SERPL-MCNC: 152 MG/DL (ref 0–200)
CO2 SERPL-SCNC: 22.6 MMOL/L (ref 22–29)
CREAT SERPL-MCNC: 1.03 MG/DL (ref 0.57–1)
DEPRECATED RDW RBC AUTO: 45.3 FL (ref 37–54)
EGFRCR SERPLBLD CKD-EPI 2021: 57.5 ML/MIN/1.73
ERYTHROCYTE [DISTWIDTH] IN BLOOD BY AUTOMATED COUNT: 13.6 % (ref 12.3–15.4)
GLUCOSE BLDC GLUCOMTR-MCNC: 182 MG/DL (ref 70–130)
GLUCOSE BLDC GLUCOMTR-MCNC: 185 MG/DL (ref 70–130)
GLUCOSE BLDC GLUCOMTR-MCNC: 211 MG/DL (ref 70–130)
GLUCOSE SERPL-MCNC: 229 MG/DL (ref 65–99)
HCT VFR BLD AUTO: 37.1 % (ref 34–46.6)
HDLC SERPL-MCNC: 34 MG/DL (ref 40–60)
HGB BLD-MCNC: 12.2 G/DL (ref 12–15.9)
LDLC SERPL CALC-MCNC: 84 MG/DL (ref 0–100)
LDLC/HDLC SERPL: 2.31 {RATIO}
LEFT ATRIUM VOLUME INDEX: 19.6 ML/M2
MAXIMAL PREDICTED HEART RATE: 147 BPM
MCH RBC QN AUTO: 29.8 PG (ref 26.6–33)
MCHC RBC AUTO-ENTMCNC: 32.9 G/DL (ref 31.5–35.7)
MCV RBC AUTO: 90.7 FL (ref 79–97)
PLATELET # BLD AUTO: 210 10*3/MM3 (ref 140–450)
PMV BLD AUTO: 9.9 FL (ref 6–12)
POTASSIUM SERPL-SCNC: 4.1 MMOL/L (ref 3.5–5.2)
RBC # BLD AUTO: 4.09 10*6/MM3 (ref 3.77–5.28)
SODIUM SERPL-SCNC: 138 MMOL/L (ref 136–145)
STRESS TARGET HR: 125 BPM
TRIGL SERPL-MCNC: 198 MG/DL (ref 0–150)
VLDLC SERPL-MCNC: 34 MG/DL (ref 5–40)
WBC NRBC COR # BLD: 9.85 10*3/MM3 (ref 3.4–10.8)

## 2023-02-20 PROCEDURE — 25010000002 HEPARIN (PORCINE) PER 1000 UNITS: Performed by: HOSPITALIST

## 2023-02-20 PROCEDURE — 97162 PT EVAL MOD COMPLEX 30 MIN: CPT

## 2023-02-20 PROCEDURE — 94799 UNLISTED PULMONARY SVC/PX: CPT

## 2023-02-20 PROCEDURE — 99232 SBSQ HOSP IP/OBS MODERATE 35: CPT | Performed by: INTERNAL MEDICINE

## 2023-02-20 PROCEDURE — 94761 N-INVAS EAR/PLS OXIMETRY MLT: CPT

## 2023-02-20 PROCEDURE — 80048 BASIC METABOLIC PNL TOTAL CA: CPT | Performed by: HOSPITALIST

## 2023-02-20 PROCEDURE — 82962 GLUCOSE BLOOD TEST: CPT

## 2023-02-20 PROCEDURE — 63710000001 INSULIN ASPART PER 5 UNITS: Performed by: HOSPITALIST

## 2023-02-20 PROCEDURE — 25010000002 ERTAPENEM PER 500 MG: Performed by: HOSPITALIST

## 2023-02-20 PROCEDURE — 80061 LIPID PANEL: CPT | Performed by: HOSPITALIST

## 2023-02-20 PROCEDURE — 85027 COMPLETE CBC AUTOMATED: CPT | Performed by: HOSPITALIST

## 2023-02-20 RX ORDER — PAROXETINE HYDROCHLORIDE 20 MG/1
20 TABLET, FILM COATED ORAL EVERY MORNING
COMMUNITY

## 2023-02-20 RX ORDER — PAROXETINE HYDROCHLORIDE 20 MG/1
20 TABLET, FILM COATED ORAL EVERY MORNING
Status: CANCELLED | OUTPATIENT
Start: 2023-02-21

## 2023-02-20 RX ORDER — ATORVASTATIN CALCIUM 20 MG/1
20 TABLET, FILM COATED ORAL DAILY
Status: CANCELLED | OUTPATIENT
Start: 2023-02-20

## 2023-02-20 RX ORDER — ATORVASTATIN CALCIUM 20 MG/1
20 TABLET, FILM COATED ORAL DAILY
COMMUNITY

## 2023-02-20 RX ORDER — POTASSIUM CHLORIDE 20 MEQ/1
20 TABLET, EXTENDED RELEASE ORAL 3 TIMES WEEKLY
COMMUNITY

## 2023-02-20 RX ORDER — GABAPENTIN 400 MG/1
400 CAPSULE ORAL 2 TIMES DAILY
COMMUNITY

## 2023-02-20 RX ADMIN — INSULIN ASPART 2 UNITS: 100 INJECTION, SOLUTION INTRAVENOUS; SUBCUTANEOUS at 11:22

## 2023-02-20 RX ADMIN — NYSTATIN: 100000 POWDER TOPICAL at 22:21

## 2023-02-20 RX ADMIN — ERTAPENEM 1 G: 1 INJECTION INTRAMUSCULAR; INTRAVENOUS at 00:36

## 2023-02-20 RX ADMIN — FAMOTIDINE 20 MG: 20 TABLET, FILM COATED ORAL at 17:05

## 2023-02-20 RX ADMIN — FAMOTIDINE 20 MG: 20 TABLET, FILM COATED ORAL at 08:50

## 2023-02-20 RX ADMIN — INSULIN ASPART 4 UNITS: 100 INJECTION, SOLUTION INTRAVENOUS; SUBCUTANEOUS at 17:04

## 2023-02-20 RX ADMIN — Medication 10 ML: at 22:21

## 2023-02-20 RX ADMIN — ATORVASTATIN CALCIUM 40 MG: 40 TABLET, FILM COATED ORAL at 22:21

## 2023-02-20 RX ADMIN — ACETAMINOPHEN 650 MG: 325 TABLET ORAL at 15:03

## 2023-02-20 RX ADMIN — INSULIN ASPART 2 UNITS: 100 INJECTION, SOLUTION INTRAVENOUS; SUBCUTANEOUS at 08:50

## 2023-02-20 RX ADMIN — NYSTATIN: 100000 POWDER TOPICAL at 06:03

## 2023-02-20 RX ADMIN — VITAMINS A AND D OINTMENT 1 APPLICATION: 15.5; 53.4 OINTMENT TOPICAL at 22:21

## 2023-02-20 RX ADMIN — HEPARIN SODIUM 5000 UNITS: 5000 INJECTION, SOLUTION INTRAVENOUS; SUBCUTANEOUS at 08:49

## 2023-02-20 RX ADMIN — ERTAPENEM 1 G: 1 INJECTION INTRAMUSCULAR; INTRAVENOUS at 22:22

## 2023-02-20 RX ADMIN — Medication 10 ML: at 08:50

## 2023-02-20 RX ADMIN — HEPARIN SODIUM 5000 UNITS: 5000 INJECTION, SOLUTION INTRAVENOUS; SUBCUTANEOUS at 22:21

## 2023-02-20 RX ADMIN — NYSTATIN: 100000 POWDER TOPICAL at 15:03

## 2023-02-20 RX ADMIN — ASPIRIN 81 MG: 81 TABLET, COATED ORAL at 08:50

## 2023-02-20 RX ADMIN — LIDOCAINE 1 PATCH: 50 PATCH TOPICAL at 11:24

## 2023-02-20 RX ADMIN — ACETAMINOPHEN 650 MG: 325 TABLET ORAL at 22:21

## 2023-02-21 ENCOUNTER — READMISSION MANAGEMENT (OUTPATIENT)
Dept: CALL CENTER | Facility: HOSPITAL | Age: 73
End: 2023-02-21
Payer: MEDICARE

## 2023-02-21 VITALS
RESPIRATION RATE: 18 BRPM | HEART RATE: 89 BPM | HEIGHT: 61 IN | SYSTOLIC BLOOD PRESSURE: 138 MMHG | TEMPERATURE: 98.1 F | BODY MASS INDEX: 49.69 KG/M2 | DIASTOLIC BLOOD PRESSURE: 71 MMHG | WEIGHT: 263.2 LBS | OXYGEN SATURATION: 96 %

## 2023-02-21 LAB
ANION GAP SERPL CALCULATED.3IONS-SCNC: 12.1 MMOL/L (ref 5–15)
BASOPHILS # BLD AUTO: 0.06 10*3/MM3 (ref 0–0.2)
BASOPHILS NFR BLD AUTO: 0.5 % (ref 0–1.5)
BUN SERPL-MCNC: 26 MG/DL (ref 8–23)
BUN/CREAT SERPL: 25.7 (ref 7–25)
CALCIUM SPEC-SCNC: 9.5 MG/DL (ref 8.6–10.5)
CHLORIDE SERPL-SCNC: 102 MMOL/L (ref 98–107)
CO2 SERPL-SCNC: 21.9 MMOL/L (ref 22–29)
CREAT SERPL-MCNC: 1.01 MG/DL (ref 0.57–1)
DEPRECATED RDW RBC AUTO: 45.4 FL (ref 37–54)
EGFRCR SERPLBLD CKD-EPI 2021: 58.9 ML/MIN/1.73
EOSINOPHIL # BLD AUTO: 0.33 10*3/MM3 (ref 0–0.4)
EOSINOPHIL NFR BLD AUTO: 2.8 % (ref 0.3–6.2)
ERYTHROCYTE [DISTWIDTH] IN BLOOD BY AUTOMATED COUNT: 13.5 % (ref 12.3–15.4)
GLUCOSE BLDC GLUCOMTR-MCNC: 131 MG/DL (ref 70–130)
GLUCOSE BLDC GLUCOMTR-MCNC: 183 MG/DL (ref 70–130)
GLUCOSE SERPL-MCNC: 153 MG/DL (ref 65–99)
HCT VFR BLD AUTO: 39.2 % (ref 34–46.6)
HGB BLD-MCNC: 12.5 G/DL (ref 12–15.9)
IMM GRANULOCYTES # BLD AUTO: 0.03 10*3/MM3 (ref 0–0.05)
IMM GRANULOCYTES NFR BLD AUTO: 0.3 % (ref 0–0.5)
LYMPHOCYTES # BLD AUTO: 2.67 10*3/MM3 (ref 0.7–3.1)
LYMPHOCYTES NFR BLD AUTO: 22.3 % (ref 19.6–45.3)
MCH RBC QN AUTO: 29.3 PG (ref 26.6–33)
MCHC RBC AUTO-ENTMCNC: 31.9 G/DL (ref 31.5–35.7)
MCV RBC AUTO: 92 FL (ref 79–97)
MONOCYTES # BLD AUTO: 0.55 10*3/MM3 (ref 0.1–0.9)
MONOCYTES NFR BLD AUTO: 4.6 % (ref 5–12)
NEUTROPHILS NFR BLD AUTO: 69.5 % (ref 42.7–76)
NEUTROPHILS NFR BLD AUTO: 8.36 10*3/MM3 (ref 1.7–7)
NRBC BLD AUTO-RTO: 0 /100 WBC (ref 0–0.2)
PLATELET # BLD AUTO: 213 10*3/MM3 (ref 140–450)
PMV BLD AUTO: 9.8 FL (ref 6–12)
POTASSIUM SERPL-SCNC: 4 MMOL/L (ref 3.5–5.2)
RBC # BLD AUTO: 4.26 10*6/MM3 (ref 3.77–5.28)
SODIUM SERPL-SCNC: 136 MMOL/L (ref 136–145)
WBC NRBC COR # BLD: 12 10*3/MM3 (ref 3.4–10.8)

## 2023-02-21 PROCEDURE — 85025 COMPLETE CBC W/AUTO DIFF WBC: CPT | Performed by: INTERNAL MEDICINE

## 2023-02-21 PROCEDURE — 80048 BASIC METABOLIC PNL TOTAL CA: CPT | Performed by: INTERNAL MEDICINE

## 2023-02-21 PROCEDURE — 99239 HOSP IP/OBS DSCHRG MGMT >30: CPT | Performed by: INTERNAL MEDICINE

## 2023-02-21 PROCEDURE — 25010000002 HEPARIN (PORCINE) PER 1000 UNITS: Performed by: HOSPITALIST

## 2023-02-21 PROCEDURE — 82962 GLUCOSE BLOOD TEST: CPT

## 2023-02-21 PROCEDURE — 94761 N-INVAS EAR/PLS OXIMETRY MLT: CPT

## 2023-02-21 PROCEDURE — 94799 UNLISTED PULMONARY SVC/PX: CPT

## 2023-02-21 PROCEDURE — 63710000001 INSULIN ASPART PER 5 UNITS: Performed by: HOSPITALIST

## 2023-02-21 RX ORDER — OXYCODONE HYDROCHLORIDE 5 MG/1
5 TABLET ORAL ONCE
Status: COMPLETED | OUTPATIENT
Start: 2023-02-21 | End: 2023-02-21

## 2023-02-21 RX ORDER — POTASSIUM CHLORIDE 20 MEQ/1
20 TABLET, EXTENDED RELEASE ORAL 3 TIMES WEEKLY
Status: DISCONTINUED | OUTPATIENT
Start: 2023-02-22 | End: 2023-02-21 | Stop reason: HOSPADM

## 2023-02-21 RX ORDER — DILTIAZEM HYDROCHLORIDE 240 MG/1
240 CAPSULE, COATED, EXTENDED RELEASE ORAL
Status: DISCONTINUED | OUTPATIENT
Start: 2023-02-21 | End: 2023-02-21 | Stop reason: HOSPADM

## 2023-02-21 RX ORDER — CEFDINIR 300 MG/1
300 CAPSULE ORAL EVERY 12 HOURS SCHEDULED
Status: DISCONTINUED | OUTPATIENT
Start: 2023-02-21 | End: 2023-02-21 | Stop reason: HOSPADM

## 2023-02-21 RX ORDER — CEFDINIR 300 MG/1
300 CAPSULE ORAL EVERY 12 HOURS SCHEDULED
Qty: 6 CAPSULE | Refills: 0 | Status: SHIPPED | OUTPATIENT
Start: 2023-02-21 | End: 2023-02-24

## 2023-02-21 RX ORDER — NYSTATIN 100000 [USP'U]/G
POWDER TOPICAL EVERY 8 HOURS SCHEDULED
Start: 2023-02-21

## 2023-02-21 RX ORDER — METOPROLOL TARTRATE 50 MG/1
50 TABLET, FILM COATED ORAL 2 TIMES DAILY
Status: DISCONTINUED | OUTPATIENT
Start: 2023-02-21 | End: 2023-02-21 | Stop reason: HOSPADM

## 2023-02-21 RX ORDER — OXYCODONE AND ACETAMINOPHEN 10; 325 MG/1; MG/1
1 TABLET ORAL ONCE AS NEEDED
Status: COMPLETED | OUTPATIENT
Start: 2023-02-21 | End: 2023-02-21

## 2023-02-21 RX ORDER — GABAPENTIN 400 MG/1
400 CAPSULE ORAL 2 TIMES DAILY
Status: DISCONTINUED | OUTPATIENT
Start: 2023-02-21 | End: 2023-02-21 | Stop reason: HOSPADM

## 2023-02-21 RX ADMIN — CEFDINIR 300 MG: 300 CAPSULE ORAL at 11:44

## 2023-02-21 RX ADMIN — INSULIN ASPART 2 UNITS: 100 INJECTION, SOLUTION INTRAVENOUS; SUBCUTANEOUS at 11:44

## 2023-02-21 RX ADMIN — DILTIAZEM HYDROCHLORIDE 240 MG: 240 CAPSULE, COATED, EXTENDED RELEASE ORAL at 11:44

## 2023-02-21 RX ADMIN — NYSTATIN: 100000 POWDER TOPICAL at 11:45

## 2023-02-21 RX ADMIN — OXYCODONE AND ACETAMINOPHEN 1 TABLET: 10; 325 TABLET ORAL at 12:51

## 2023-02-21 RX ADMIN — HEPARIN SODIUM 5000 UNITS: 5000 INJECTION, SOLUTION INTRAVENOUS; SUBCUTANEOUS at 08:35

## 2023-02-21 RX ADMIN — FAMOTIDINE 20 MG: 20 TABLET, FILM COATED ORAL at 08:35

## 2023-02-21 RX ADMIN — VITAMINS A AND D OINTMENT 1 APPLICATION: 15.5; 53.4 OINTMENT TOPICAL at 08:35

## 2023-02-21 RX ADMIN — METOPROLOL TARTRATE 50 MG: 50 TABLET, FILM COATED ORAL at 11:44

## 2023-02-21 RX ADMIN — OXYCODONE HYDROCHLORIDE 5 MG: 5 TABLET ORAL at 00:52

## 2023-02-21 RX ADMIN — Medication 10 ML: at 08:35

## 2023-02-21 RX ADMIN — GABAPENTIN 400 MG: 400 CAPSULE ORAL at 11:44

## 2023-02-21 RX ADMIN — NYSTATIN: 100000 POWDER TOPICAL at 05:27

## 2023-02-22 NOTE — OUTREACH NOTE
Prep Survey    Flowsheet Row Responses   Nondenominational facility patient discharged from? [place]   Is LACE score < 7 ? No   Eligibility Readm Mgmt   Discharge diagnosis Sepsis    Does the patient have one of the following disease processes/diagnoses(primary or secondary)? Sepsis   Does the patient have Home health ordered? Yes   What is the Home health agency?  [facility]   Is there a DME ordered? No   Prep survey completed? Yes          Kae BABB - Registered Nurse

## 2023-02-23 LAB
BACTERIA SPEC AEROBE CULT: NORMAL
BACTERIA SPEC AEROBE CULT: NORMAL

## 2023-02-24 ENCOUNTER — READMISSION MANAGEMENT (OUTPATIENT)
Dept: CALL CENTER | Facility: HOSPITAL | Age: 73
End: 2023-02-24
Payer: MEDICARE

## 2023-02-24 NOTE — OUTREACH NOTE
Sepsis Week 1 Survey    Flowsheet Row Responses   [facility]azidism facility patient discharged from? [place]   Does the patient have one of the following disease processes/diagnoses(primary or secondary)? Sepsis   Week 1 attempt successful? No   Unsuccessful attempts Attempt 1          Judith HOWELL - Registered Nurse

## 2023-02-26 ENCOUNTER — HOSPITAL ENCOUNTER (EMERGENCY)
Facility: HOSPITAL | Age: 73
Discharge: HOME | End: 2023-02-27
Attending: EMERGENCY MEDICINE | Admitting: EMERGENCY MEDICINE
Payer: COMMERCIAL

## 2023-02-26 DIAGNOSIS — Z46.6 URINARY CATHETER (FOLEY) CHANGE REQUIRED: Primary | ICD-10-CM

## 2023-02-26 PROCEDURE — 99283 EMERGENCY DEPT VISIT LOW MDM: CPT

## 2023-02-27 VITALS
OXYGEN SATURATION: 95 % | DIASTOLIC BLOOD PRESSURE: 69 MMHG | TEMPERATURE: 98 F | HEART RATE: 91 BPM | HEIGHT: 61 IN | RESPIRATION RATE: 16 BRPM | BODY MASS INDEX: 41.54 KG/M2 | WEIGHT: 220 LBS | SYSTOLIC BLOOD PRESSURE: 139 MMHG

## 2023-02-28 ENCOUNTER — READMISSION MANAGEMENT (OUTPATIENT)
Dept: CALL CENTER | Facility: HOSPITAL | Age: 73
End: 2023-02-28
Payer: MEDICARE

## 2023-02-28 NOTE — OUTREACH NOTE
Sepsis Week 1 Survey    Flowsheet Row Responses   [facility] patient discharged from? [place]   Does the patient have one of the following disease processes/diagnoses(primary or secondary)? Sepsis   Week 1 attempt successful? Yes   Call start time 0818   Call end time 0820   Discharge diagnosis Sepsis    Meds reviewed with patient/caregiver? Yes   Is the patient having any side effects they believe may be caused by any medication additions or changes? No   Does the patient have all medications related to this admission filled (includes all antibiotics, inhalers, nebulizers,steroids,etc.) Yes   Is the patient taking all medications as directed (includes completed medication regime)? Yes   Does the patient have a primary care provider?  Yes   Does the patient have an appointment with their PCP within 7 days of discharge? No   What is preventing the patient from scheduling follow up appointments within 7 days of discharge? Haven't had time   Nursing Interventions Educated patient on importance of making appointment, Advised patient to make appointment   What is the Home health agency?  [facility]   Has home health visited the patient within 72 hours of discharge? Call prior to 72 hours   Home health comments Pt reports that HH will wbe out this week   Psychosocial issues? No   Did the patient receive a copy of their discharge instructions? Yes   Nursing interventions Reviewed instructions with patient   What is the patient's perception of their health status since discharge? Improving   Nursing interventions Nurse provided patient education   Is the patient/caregiver able to teach back TIME? M ental Decline - confused, sleepy, difficult to arouse, I nfection - may have signs and symptoms of an infection, T emperature - higher or lower than normal, E xtremely Ill - severe pain, discomfort, shortness of breath   Is patient/caregiver able to teach back steps to recovery at home? Eat a balanced diet, Rest and regain  strength, Set small, achievable goals for return to baseline health   Is the patient/caregiver able to teach back signs and symptoms of worsening condition: Fever, Rapid heart rate (>90), Altered mental status(confusion/coma), Hyperthermia   Is the patient/caregiver able to teach back the hierarchy of who to call/visit for symptoms/problems? PCP, Specialist, Home health nurse, Urgent Care, ED, 911 Yes   Week 1 call completed? Yes   Wrap up additional comments Pt reports that she is doing better, denies needs. Encouraged pt to make PCP appt.           NAEL BETANCOURT - Registered Nurse

## 2023-03-03 ENCOUNTER — HOSPITAL ENCOUNTER (EMERGENCY)
Facility: HOSPITAL | Age: 73
Discharge: HOME | End: 2023-03-04
Attending: EMERGENCY MEDICINE | Admitting: EMERGENCY MEDICINE
Payer: COMMERCIAL

## 2023-03-03 DIAGNOSIS — N30.01 ACUTE CYSTITIS WITH HEMATURIA: Primary | ICD-10-CM

## 2023-03-03 DIAGNOSIS — T83.9XXA PROBLEM WITH FOLEY CATHETER, INITIAL ENCOUNTER: ICD-10-CM

## 2023-03-03 LAB
BACTERIA UR QL AUTO: ABNORMAL /HPF
BILIRUB UR QL STRIP: NEGATIVE
CLARITY UR: ABNORMAL
COLOR UR: YELLOW
GLUCOSE UR STRIP-MCNC: NEGATIVE MG/DL
HGB UR QL STRIP.AUTO: ABNORMAL
HYALINE CASTS UR QL AUTO: ABNORMAL /LPF
KETONES UR QL STRIP: NEGATIVE
LEUKOCYTE ESTERASE UR QL STRIP.AUTO: ABNORMAL
NITRITE UR QL STRIP: POSITIVE
PH UR STRIP.AUTO: 5.5 [PH] (ref 5–8)
PROT UR QL STRIP: ABNORMAL
RBC # UR STRIP: ABNORMAL /HPF
REF LAB TEST METHOD: ABNORMAL
SP GR UR STRIP: 1.02 (ref 1–1.03)
SQUAMOUS #/AREA URNS HPF: ABNORMAL /HPF
UROBILINOGEN UR QL STRIP: ABNORMAL
WBC # UR STRIP: ABNORMAL /HPF

## 2023-03-03 PROCEDURE — 87086 URINE CULTURE/COLONY COUNT: CPT | Performed by: PHYSICIAN ASSISTANT

## 2023-03-03 PROCEDURE — 51702 INSERT TEMP BLADDER CATH: CPT

## 2023-03-03 PROCEDURE — 99284 EMERGENCY DEPT VISIT MOD MDM: CPT

## 2023-03-03 PROCEDURE — 99283 EMERGENCY DEPT VISIT LOW MDM: CPT

## 2023-03-03 PROCEDURE — 81001 URINALYSIS AUTO W/SCOPE: CPT | Performed by: PHYSICIAN ASSISTANT

## 2023-03-03 PROCEDURE — 87186 SC STD MICRODIL/AGAR DIL: CPT | Performed by: PHYSICIAN ASSISTANT

## 2023-03-03 PROCEDURE — 25010000002 CEFTRIAXONE PER 250 MG: Performed by: PHYSICIAN ASSISTANT

## 2023-03-03 PROCEDURE — 96372 THER/PROPH/DIAG INJ SC/IM: CPT

## 2023-03-03 RX ORDER — CEFDINIR 300 MG/1
300 CAPSULE ORAL 2 TIMES DAILY
Qty: 20 CAPSULE | Refills: 0 | Status: SHIPPED | OUTPATIENT
Start: 2023-03-03 | End: 2023-03-13

## 2023-03-03 RX ADMIN — LIDOCAINE HYDROCHLORIDE 1 G: 10 INJECTION, SOLUTION EPIDURAL; INFILTRATION; INTRACAUDAL; PERINEURAL at 17:06

## 2023-03-03 NOTE — DISCHARGE INSTRUCTIONS
Please take your omnicef and follow up with your PCP in 2 days or return to ER if symptoms worsen.

## 2023-03-03 NOTE — ED NOTES
Called [facility] at this time for transportation and they have 6 on the list ahead of us. Still waiting to hear back from them at this time

## 2023-03-03 NOTE — ED PROVIDER NOTES
Subjective   History of Present Illness  This is a 72 year old female patient who presents to the ER with chief complaint of leaking barajas catheter. Patient has a had a catheter for many years but just had it changed yesterday and it has been leaking ever since. She denies any urinary symptoms or fever.         Review of Systems   Constitutional: Negative.  Negative for fever.   HENT: Negative.    Respiratory: Negative.    Cardiovascular: Negative.  Negative for chest pain.   Gastrointestinal: Negative.  Negative for abdominal pain.   Endocrine: Negative.    Genitourinary: Negative.  Negative for dysuria.   Skin: Negative.    Neurological: Negative.    Psychiatric/Behavioral: Negative.    All other systems reviewed and are negative.      Past Medical History:   Diagnosis Date   • Arthritis    • Atrial fibrillation (HCC)     Not on anticoagulation due to postmenopausal bleeding   • Diabetes mellitus (HCC)    • Gout    • History of CVA (cerebrovascular accident)     residual right-sided weakness   • Hyperlipidemia    • Hypertension    • Stage IV pressure ulcer of sacral region (HCC)        Allergies   Allergen Reactions   • Contrast Dye (Echo Or Unknown Ct/Mr) Itching       Past Surgical History:   Procedure Laterality Date   • CHOLECYSTECTOMY         Family History   Problem Relation Age of Onset   • Diabetes Mother    • Hypertension Father        Social History     Socioeconomic History   • Marital status:    Tobacco Use   • Smoking status: Never   • Smokeless tobacco: Never   Vaping Use   • Vaping Use: Never used   Substance and Sexual Activity   • Alcohol use: No   • Drug use: No   • Sexual activity: Defer           Objective   Physical Exam  Vitals and nursing note reviewed.   Constitutional:       General: She is not in acute distress.     Appearance: She is well-developed. She is not diaphoretic.   HENT:      Head: Normocephalic and atraumatic.      Right Ear: External ear normal.      Left Ear: External  ear normal.      Nose: Nose normal.   Eyes:      Conjunctiva/sclera: Conjunctivae normal.      Pupils: Pupils are equal, round, and reactive to light.   Neck:      Vascular: No JVD.      Trachea: No tracheal deviation.   Cardiovascular:      Rate and Rhythm: Normal rate and regular rhythm.      Heart sounds: Normal heart sounds. No murmur heard.  Pulmonary:      Effort: Pulmonary effort is normal. No respiratory distress.      Breath sounds: Normal breath sounds. No wheezing.   Abdominal:      General: Bowel sounds are normal.      Palpations: Abdomen is soft.      Tenderness: There is no abdominal tenderness. There is no right CVA tenderness, left CVA tenderness, guarding or rebound.   Musculoskeletal:         General: No deformity. Normal range of motion.      Cervical back: Normal range of motion and neck supple.   Skin:     General: Skin is warm and dry.      Coloration: Skin is not pale.      Findings: No erythema or rash.   Neurological:      Mental Status: She is alert and oriented to person, place, and time.      Cranial Nerves: No cranial nerve deficit.   Psychiatric:         Behavior: Behavior normal.         Thought Content: Thought content normal.         Procedures        Results for orders placed or performed during the hospital encounter of 03/03/23   Urinalysis With Culture If Indicated - Urine, Catheter    Specimen: Urine, Catheter   Result Value Ref Range    Color, UA Yellow Yellow, Straw    Appearance, UA Cloudy (A) Clear    pH, UA 5.5 5.0 - 8.0    Specific Gravity, UA 1.018 1.005 - 1.030    Glucose, UA Negative Negative    Ketones, UA Negative Negative    Bilirubin, UA Negative Negative    Blood, UA Small (1+) (A) Negative    Protein,  mg/dL (2+) (A) Negative    Leuk Esterase, UA Moderate (2+) (A) Negative    Nitrite, UA Positive (A) Negative    Urobilinogen, UA 1.0 E.U./dL 0.2 - 1.0 E.U./dL   Urinalysis, Microscopic Only - Urine, Catheter    Specimen: Urine, Catheter   Result Value Ref  Range    RBC, UA 3-5 (A) None Seen, 0-2 /HPF    WBC, UA Too Numerous to Count (A) None Seen, 0-2 /HPF    Bacteria, UA None Seen None Seen /HPF    Squamous Epithelial Cells, UA 3-6 (A) None Seen, 0-2 /HPF    Hyaline Casts, UA 3-6 None Seen /LPF    Methodology Automated Microscopy          ED Course  ED Course as of 03/03/23 1629   Fri Mar 03, 2023   1626 Patient diagnosed with catheter issue and UTI. Will be d/c home with rx for omnicef. Please follow up with your PCP in 2 days or return to ER if symptoms worsen.  [MM]      ED Course User Index  [MM] Holly Shafer PA                                           Medical Decision Making    This is a 72 year old female patient who presents to the ER with chief complaint of leaking barajas catheter. Patient has a had a catheter for many years but just had it changed yesterday and it has been leaking ever since. She denies any urinary symptoms or fever.         Acute cystitis with hematuria: acute illness or injury  Problem with Barajas catheter, initial encounter (HCC): acute illness or injury  Amount and/or Complexity of Data Reviewed  Labs: ordered. Decision-making details documented in ED Course.      Risk  Prescription drug management.          Final diagnoses:   Acute cystitis with hematuria   Problem with Barajas catheter, initial encounter ([facility])       ED Disposition  ED Disposition     ED Disposition   Discharge    Condition   Stable    Comment   --             Barbara Gaona, APRN  [address] [place] [address]  [phone]    In 2 days           Medication List      New Prescriptions    cefdinir 300 MG capsule  Commonly known as: OMNICEF  Take 1 capsule by mouth 2 (Two) Times a Day for 10 days.           Where to Get Your Medications      These medications were sent to [id number] - [place], [place] - [address] AT SEC OF [address] - [phone]  - [phone]   [address]  [address]    Phone: [phone]   · cefdinir 300 MG capsule          Holly Shafer PA  03/03/23 7930

## 2023-03-04 VITALS
HEIGHT: 61 IN | BODY MASS INDEX: 41.54 KG/M2 | WEIGHT: 220 LBS | HEART RATE: 90 BPM | OXYGEN SATURATION: 93 % | DIASTOLIC BLOOD PRESSURE: 77 MMHG | RESPIRATION RATE: 16 BRPM | SYSTOLIC BLOOD PRESSURE: 130 MMHG | TEMPERATURE: 98.3 F

## 2023-03-04 NOTE — ED NOTES
"Spoke with dispatch for [facility], states \"I should have a truck and a crew ready to get her in 30-45 minutes.\" Patient update with information.     "

## 2023-03-04 NOTE — ED NOTES
Patient provided clean gown and fresh bed linens. Patient assisted with turning to left side. Wound care provided at this time. MILLER Busby made aware of characteristics of wound. Patient advised to follow up with primary care provider regarding further wound treatment per Maddie Fam. Patient agreeable. Call light within reach, patient cell phone placed on charge and given to patient per patient's request. Patient denies any further needs/complaints at this time. No acute distress noted. Patient tolerated activity.

## 2023-03-05 LAB — BACTERIA SPEC AEROBE CULT: ABNORMAL

## 2023-03-06 ENCOUNTER — HOSPITAL ENCOUNTER (EMERGENCY)
Facility: HOSPITAL | Age: 73
Discharge: HOME | End: 2023-03-07
Attending: EMERGENCY MEDICINE | Admitting: EMERGENCY MEDICINE
Payer: COMMERCIAL

## 2023-03-06 DIAGNOSIS — N39.0 CHRONIC UTI: ICD-10-CM

## 2023-03-06 DIAGNOSIS — T50.901A ACCIDENTAL OVERDOSE, INITIAL ENCOUNTER: Primary | ICD-10-CM

## 2023-03-06 PROCEDURE — 99283 EMERGENCY DEPT VISIT LOW MDM: CPT

## 2023-03-06 PROCEDURE — 93005 ELECTROCARDIOGRAM TRACING: CPT | Performed by: PHYSICIAN ASSISTANT

## 2023-03-06 RX ORDER — SODIUM CHLORIDE 0.9 % (FLUSH) 0.9 %
10 SYRINGE (ML) INJECTION AS NEEDED
Status: DISCONTINUED | OUTPATIENT
Start: 2023-03-06 | End: 2023-03-07 | Stop reason: HOSPADM

## 2023-03-07 VITALS
BODY MASS INDEX: 47.2 KG/M2 | HEIGHT: 61 IN | RESPIRATION RATE: 18 BRPM | SYSTOLIC BLOOD PRESSURE: 139 MMHG | HEART RATE: 89 BPM | DIASTOLIC BLOOD PRESSURE: 87 MMHG | WEIGHT: 250 LBS | TEMPERATURE: 97.4 F | OXYGEN SATURATION: 94 %

## 2023-03-07 LAB
ALBUMIN SERPL-MCNC: 3.4 G/DL (ref 3.5–5.2)
ALBUMIN/GLOB SERPL: 0.8 G/DL
ALP SERPL-CCNC: 126 U/L (ref 39–117)
ALT SERPL W P-5'-P-CCNC: 13 U/L (ref 1–33)
AMPHET+METHAMPHET UR QL: NEGATIVE
AMPHETAMINES UR QL: NEGATIVE
ANION GAP SERPL CALCULATED.3IONS-SCNC: 13 MMOL/L (ref 5–15)
APAP SERPL-MCNC: <5 MCG/ML (ref 0–30)
AST SERPL-CCNC: 19 U/L (ref 1–32)
BACTERIA UR QL AUTO: ABNORMAL /HPF
BARBITURATES UR QL SCN: NEGATIVE
BASOPHILS # BLD AUTO: 0.06 10*3/MM3 (ref 0–0.2)
BASOPHILS NFR BLD AUTO: 0.4 % (ref 0–1.5)
BENZODIAZ UR QL SCN: NEGATIVE
BILIRUB SERPL-MCNC: 0.2 MG/DL (ref 0–1.2)
BILIRUB UR QL STRIP: NEGATIVE
BUN SERPL-MCNC: 21 MG/DL (ref 8–23)
BUN/CREAT SERPL: 22.3 (ref 7–25)
BUPRENORPHINE SERPL-MCNC: NEGATIVE NG/ML
CALCIUM SPEC-SCNC: 9.5 MG/DL (ref 8.6–10.5)
CANNABINOIDS SERPL QL: NEGATIVE
CHLORIDE SERPL-SCNC: 106 MMOL/L (ref 98–107)
CK SERPL-CCNC: 31 U/L (ref 20–180)
CLARITY UR: ABNORMAL
CO2 SERPL-SCNC: 22 MMOL/L (ref 22–29)
COCAINE UR QL: NEGATIVE
COLOR UR: YELLOW
CREAT SERPL-MCNC: 0.94 MG/DL (ref 0.57–1)
CRP SERPL-MCNC: 1.69 MG/DL (ref 0–0.5)
DEPRECATED RDW RBC AUTO: 46.2 FL (ref 37–54)
EGFRCR SERPLBLD CKD-EPI 2021: 64.2 ML/MIN/1.73
EOSINOPHIL # BLD AUTO: 0.17 10*3/MM3 (ref 0–0.4)
EOSINOPHIL NFR BLD AUTO: 1.2 % (ref 0.3–6.2)
ERYTHROCYTE [DISTWIDTH] IN BLOOD BY AUTOMATED COUNT: 13.7 % (ref 12.3–15.4)
ETHANOL BLD-MCNC: <10 MG/DL (ref 0–10)
ETHANOL UR QL: <0.01 %
GLOBULIN UR ELPH-MCNC: 4.5 GM/DL
GLUCOSE SERPL-MCNC: 243 MG/DL (ref 65–99)
GLUCOSE UR STRIP-MCNC: NEGATIVE MG/DL
HCT VFR BLD AUTO: 39.7 % (ref 34–46.6)
HGB BLD-MCNC: 12.8 G/DL (ref 12–15.9)
HGB UR QL STRIP.AUTO: ABNORMAL
HOLD SPECIMEN: NORMAL
HOLD SPECIMEN: NORMAL
HYALINE CASTS UR QL AUTO: ABNORMAL /LPF
IMM GRANULOCYTES # BLD AUTO: 0.07 10*3/MM3 (ref 0–0.05)
IMM GRANULOCYTES NFR BLD AUTO: 0.5 % (ref 0–0.5)
KETONES UR QL STRIP: NEGATIVE
LEUKOCYTE ESTERASE UR QL STRIP.AUTO: ABNORMAL
LYMPHOCYTES # BLD AUTO: 1.44 10*3/MM3 (ref 0.7–3.1)
LYMPHOCYTES NFR BLD AUTO: 9.8 % (ref 19.6–45.3)
MAGNESIUM SERPL-MCNC: 1.5 MG/DL (ref 1.6–2.4)
MCH RBC QN AUTO: 29.6 PG (ref 26.6–33)
MCHC RBC AUTO-ENTMCNC: 32.2 G/DL (ref 31.5–35.7)
MCV RBC AUTO: 91.7 FL (ref 79–97)
METHADONE UR QL SCN: NEGATIVE
MONOCYTES # BLD AUTO: 0.6 10*3/MM3 (ref 0.1–0.9)
MONOCYTES NFR BLD AUTO: 4.1 % (ref 5–12)
MUCOUS THREADS URNS QL MICRO: ABNORMAL /HPF
NEUTROPHILS NFR BLD AUTO: 12.4 10*3/MM3 (ref 1.7–7)
NEUTROPHILS NFR BLD AUTO: 84 % (ref 42.7–76)
NITRITE UR QL STRIP: POSITIVE
NRBC BLD AUTO-RTO: 0 /100 WBC (ref 0–0.2)
OPIATES UR QL: POSITIVE
OXYCODONE UR QL SCN: NEGATIVE
PCP UR QL SCN: NEGATIVE
PH UR STRIP.AUTO: 6 [PH] (ref 5–8)
PLATELET # BLD AUTO: 221 10*3/MM3 (ref 140–450)
PMV BLD AUTO: 9.8 FL (ref 6–12)
POTASSIUM SERPL-SCNC: 4.6 MMOL/L (ref 3.5–5.2)
PROPOXYPH UR QL: NEGATIVE
PROT SERPL-MCNC: 7.9 G/DL (ref 6–8.5)
PROT UR QL STRIP: ABNORMAL
QT INTERVAL: 412 MS
QTC INTERVAL: 501 MS
RBC # BLD AUTO: 4.33 10*6/MM3 (ref 3.77–5.28)
RBC # UR STRIP: ABNORMAL /HPF
REF LAB TEST METHOD: ABNORMAL
SALICYLATES SERPL-MCNC: <0.3 MG/DL
SODIUM SERPL-SCNC: 141 MMOL/L (ref 136–145)
SP GR UR STRIP: 1.02 (ref 1–1.03)
SQUAMOUS #/AREA URNS HPF: ABNORMAL /HPF
TRANS CELLS #/AREA URNS HPF: ABNORMAL /HPF
TRICYCLICS UR QL SCN: NEGATIVE
UROBILINOGEN UR QL STRIP: ABNORMAL
WBC # UR STRIP: ABNORMAL /HPF
WBC NRBC COR # BLD: 14.74 10*3/MM3 (ref 3.4–10.8)
WHOLE BLOOD HOLD COAG: NORMAL
WHOLE BLOOD HOLD SPECIMEN: NORMAL
YEAST URNS QL MICRO: ABNORMAL /HPF

## 2023-03-07 PROCEDURE — 86140 C-REACTIVE PROTEIN: CPT | Performed by: PHYSICIAN ASSISTANT

## 2023-03-07 PROCEDURE — 87086 URINE CULTURE/COLONY COUNT: CPT | Performed by: PHYSICIAN ASSISTANT

## 2023-03-07 PROCEDURE — 96365 THER/PROPH/DIAG IV INF INIT: CPT

## 2023-03-07 PROCEDURE — 80306 DRUG TEST PRSMV INSTRMNT: CPT | Performed by: PHYSICIAN ASSISTANT

## 2023-03-07 PROCEDURE — 25010000002 METOCLOPRAMIDE PER 10 MG: Performed by: PHYSICIAN ASSISTANT

## 2023-03-07 PROCEDURE — 25010000002 ONDANSETRON PER 1 MG: Performed by: PHYSICIAN ASSISTANT

## 2023-03-07 PROCEDURE — 87077 CULTURE AEROBIC IDENTIFY: CPT | Performed by: PHYSICIAN ASSISTANT

## 2023-03-07 PROCEDURE — 81001 URINALYSIS AUTO W/SCOPE: CPT | Performed by: PHYSICIAN ASSISTANT

## 2023-03-07 PROCEDURE — 80179 DRUG ASSAY SALICYLATE: CPT | Performed by: PHYSICIAN ASSISTANT

## 2023-03-07 PROCEDURE — 80053 COMPREHEN METABOLIC PANEL: CPT | Performed by: PHYSICIAN ASSISTANT

## 2023-03-07 PROCEDURE — 96375 TX/PRO/DX INJ NEW DRUG ADDON: CPT

## 2023-03-07 PROCEDURE — 25010000002 CEFTRIAXONE PER 250 MG: Performed by: PHYSICIAN ASSISTANT

## 2023-03-07 PROCEDURE — P9612 CATHETERIZE FOR URINE SPEC: HCPCS

## 2023-03-07 PROCEDURE — 85025 COMPLETE CBC W/AUTO DIFF WBC: CPT | Performed by: PHYSICIAN ASSISTANT

## 2023-03-07 PROCEDURE — 80143 DRUG ASSAY ACETAMINOPHEN: CPT | Performed by: PHYSICIAN ASSISTANT

## 2023-03-07 PROCEDURE — 87186 SC STD MICRODIL/AGAR DIL: CPT | Performed by: PHYSICIAN ASSISTANT

## 2023-03-07 PROCEDURE — 36415 COLL VENOUS BLD VENIPUNCTURE: CPT

## 2023-03-07 PROCEDURE — 93010 ELECTROCARDIOGRAM REPORT: CPT | Performed by: INTERNAL MEDICINE

## 2023-03-07 PROCEDURE — 82550 ASSAY OF CK (CPK): CPT | Performed by: PHYSICIAN ASSISTANT

## 2023-03-07 PROCEDURE — 82077 ASSAY SPEC XCP UR&BREATH IA: CPT | Performed by: PHYSICIAN ASSISTANT

## 2023-03-07 PROCEDURE — 83735 ASSAY OF MAGNESIUM: CPT | Performed by: PHYSICIAN ASSISTANT

## 2023-03-07 RX ORDER — ONDANSETRON 2 MG/ML
4 INJECTION INTRAMUSCULAR; INTRAVENOUS ONCE
Status: COMPLETED | OUTPATIENT
Start: 2023-03-07 | End: 2023-03-07

## 2023-03-07 RX ORDER — METOCLOPRAMIDE HYDROCHLORIDE 5 MG/ML
10 INJECTION INTRAMUSCULAR; INTRAVENOUS ONCE
Status: COMPLETED | OUTPATIENT
Start: 2023-03-07 | End: 2023-03-07

## 2023-03-07 RX ADMIN — METOCLOPRAMIDE 10 MG: 5 INJECTION, SOLUTION INTRAMUSCULAR; INTRAVENOUS at 01:57

## 2023-03-07 RX ADMIN — ONDANSETRON 4 MG: 2 INJECTION INTRAMUSCULAR; INTRAVENOUS at 00:28

## 2023-03-07 RX ADMIN — CEFTRIAXONE 2 G: 2 INJECTION, POWDER, FOR SOLUTION INTRAMUSCULAR; INTRAVENOUS at 01:57

## 2023-03-07 NOTE — ED NOTES
Attempted to draw PT labs PT started to vomit. Will attempt to draw again once medication has had time to work.

## 2023-03-07 NOTE — ED PROVIDER NOTES
Subjective   History of Present Illness  72-year-old female presents to the ER with chief complaint of overdose.  Source of history is EMS.  EMS reports that they found the patient unresponsive and administered 4 mg of Narcan in which the patient did arouse and started vomiting.  Patient does suffer from numerous comorbidities.  Patient states that she has not abused any opioid medications.        Review of Systems   Constitutional: Negative.  Negative for fever.   HENT: Negative.    Respiratory: Negative.    Cardiovascular: Negative.  Negative for chest pain.   Gastrointestinal: Positive for vomiting. Negative for abdominal pain.   Endocrine: Negative.    Genitourinary: Positive for difficulty urinating. Negative for dysuria.   Skin: Positive for wound.   Neurological: Negative.    Psychiatric/Behavioral: Positive for confusion.   All other systems reviewed and are negative.      Past Medical History:   Diagnosis Date   • Arthritis    • Atrial fibrillation (HCC)     Not on anticoagulation due to postmenopausal bleeding   • Diabetes mellitus (HCC)    • Gout    • History of CVA (cerebrovascular accident)     residual right-sided weakness   • Hyperlipidemia    • Hypertension    • Stage IV pressure ulcer of sacral region (HCC)        Allergies   Allergen Reactions   • Contrast Dye (Echo Or Unknown Ct/Mr) Itching       Past Surgical History:   Procedure Laterality Date   • CHOLECYSTECTOMY         Family History   Problem Relation Age of Onset   • Diabetes Mother    • Hypertension Father        Social History     Socioeconomic History   • Marital status:    Tobacco Use   • Smoking status: Never   • Smokeless tobacco: Never   Vaping Use   • Vaping Use: Never used   Substance and Sexual Activity   • Alcohol use: No   • Drug use: No   • Sexual activity: Defer           Objective   Physical Exam  Vitals and nursing note reviewed.   Constitutional:       General: She is not in acute distress.     Appearance: She is  well-developed. She is not diaphoretic.   HENT:      Head: Normocephalic and atraumatic.      Right Ear: External ear normal.      Left Ear: External ear normal.      Nose: Nose normal.   Eyes:      Conjunctiva/sclera: Conjunctivae normal.   Neck:      Vascular: No JVD.      Trachea: No tracheal deviation.   Cardiovascular:      Rate and Rhythm: Normal rate and regular rhythm.      Heart sounds: Normal heart sounds. No murmur heard.  Pulmonary:      Effort: Pulmonary effort is normal. No respiratory distress.      Breath sounds: Normal breath sounds. No wheezing.   Abdominal:      Palpations: Abdomen is soft.      Tenderness: There is no abdominal tenderness.   Genitourinary:     Comments: Flanagan catheter in place  Musculoskeletal:         General: No deformity. Normal range of motion.      Cervical back: Normal range of motion and neck supple.   Skin:     General: Skin is warm and dry.      Coloration: Skin is not pale.      Findings: No erythema or rash.   Neurological:      Mental Status: She is alert and oriented to person, place, and time. Mental status is at baseline.      Cranial Nerves: No cranial nerve deficit.   Psychiatric:         Behavior: Behavior normal.         Thought Content: Thought content normal.         Procedures           ED Course  ED Course as of 03/07/23 0231   Tue Mar 07, 2023   0009 ECG 12 Lead Other; overdose  Vent. Rate :  89 BPM     Atrial Rate : 340 BPM     P-R Int :   * ms          QRS Dur : 138 ms      QT Int : 412 ms       P-R-T Axes :   *  -2  82 degrees     QTc Int : 501 ms     ** Suspect unspecified pacemaker failure  Ventricular-paced rhythm  Abnormal ECG  When compared with ECG of 18-FEB-2023 20:37,  No significant change was found [ES]   0017 Patient adamantly denies taking any type of narcotic pain medicine.  I told her in order for Narcan to work she had ingested some point opioids.  Patient does not remember to receiving Narcan. [RB]      ED Course User Index  [ES]  Sy Ferrell MD  [RB] Patel Zepeda II, PA                                           Medical Decision Making  72-year-old female came in EMS with accidental overdose    Accidental overdose, initial encounter: acute illness or injury     Details: Patient's vomiting was able to be controlled with Zofran and Reglan.  Patient was here on March 3 diagnosed with urinary tract infection placed on antibiotics.  Patient does have chronic history of UTIs.  Patient does remain on antibiotic that was written from previous ER visit.  Chronic UTI: complicated acute illness or injury  Amount and/or Complexity of Data Reviewed  External Data Reviewed: notes.  Labs: ordered.  ECG/medicine tests: ordered. Decision-making details documented in ED Course.      Risk  Prescription drug management.          Final diagnoses:   Accidental overdose, initial encounter   Chronic UTI       ED Disposition  ED Disposition     ED Disposition   Discharge    Condition   Stable    Comment   --             Barbara Gaona, APRN  [address] [facility] [address]  [phone]    Schedule an appointment as soon as possible for a visit            Medication List      No changes were made to your prescriptions during this visit.          Patel Zepeda II, PA  03/07/23 0233

## 2023-03-08 ENCOUNTER — READMISSION MANAGEMENT (OUTPATIENT)
Dept: CALL CENTER | Facility: HOSPITAL | Age: 73
End: 2023-03-08
Payer: MEDICARE

## 2023-03-08 NOTE — OUTREACH NOTE
Sepsis Week 2 Survey    Flowsheet Row Responses   [facility] patient discharged from? [facility]   Does the patient have one of the following disease processes/diagnoses(primary or secondary)? Sepsis   Week 2 attempt successful? Yes   Call start time 1520   Call end time 1522   Discharge diagnosis Sepsis    Meds reviewed with patient/caregiver? Yes   Is the patient having any side effects they believe may be caused by any medication additions or changes? No   Does the patient have all medications related to this admission filled (includes all antibiotics, inhalers, nebulizers,steroids,etc.) Yes   Is the patient taking all medications as directed (includes completed medication regime)? Yes   Does the patient have a primary care provider?  Yes   Does the patient have an appointment with their PCP within 7 days of discharge? No   What is preventing the patient from scheduling follow up appointments within 7 days of discharge? Haven't had time   Nursing Interventions Educated patient on importance of making appointment, Advised patient to make appointment   Has the patient kept scheduled appointments due by today? N/A   What is the Home health agency?  [facility]   Psychosocial issues? No   Did the patient receive a copy of their discharge instructions? Yes   Nursing interventions Reviewed instructions with patient   What is the patient's perception of their health status since discharge? Improving   Nursing interventions Nurse provided patient education   Is the patient/caregiver able to teach back TIME? T emperature - higher or lower than normal, I nfection - may have signs and symptoms of an infection, M ental Decline - confused, sleepy, difficult to arouse, E xtremely Ill - severe pain, discomfort, shortness of breath   Nursing interventions Nurse provided reassurance to patient   Is patient/caregiver able to teach back steps to recovery at home? Rest and regain strength, Set small, achievable goals for return to  baseline health, Make a list of questions for PCP appoinment   Is the patient/caregiver able to teach back signs and symptoms of worsening condition: Fever, Hyperthermia, Rapid heart rate (>90), Shortness of breath/rapid respiratory rate, Altered mental status(confusion/coma), Edema, High blood glucose without diabetes   If the patient is a current smoker, are they able to teach back resources for cessation? Not a smoker   Is the patient/caregiver able to teach back the hierarchy of who to call/visit for symptoms/problems? PCP, Specialist, Home health nurse, Urgent Care, ED, 911 Yes   Week 2 call completed? Yes          Tere BABB - Licensed Nurse

## 2023-03-11 LAB
BACTERIA SPEC AEROBE CULT: ABNORMAL
BACTERIA SPEC AEROBE CULT: ABNORMAL

## 2023-03-21 ENCOUNTER — READMISSION MANAGEMENT (OUTPATIENT)
Dept: CALL CENTER | Facility: HOSPITAL | Age: 73
End: 2023-03-21
Payer: MEDICARE

## 2023-03-21 NOTE — OUTREACH NOTE
Sepsis Week 3 Survey    Flowsheet Row Responses   [facility] patient discharged from? [place]   Does the patient have one of the following disease processes/diagnoses(primary or secondary)? Sepsis   Week 3 attempt successful? No   Unsuccessful attempts Attempt 1   Discharge diagnosis Sepsis           Rain WALKER - Registered Nurse

## 2023-03-23 ENCOUNTER — HOSPITAL ENCOUNTER (EMERGENCY)
Facility: HOSPITAL | Age: 73
Discharge: HOME | End: 2023-03-23
Attending: STUDENT IN AN ORGANIZED HEALTH CARE EDUCATION/TRAINING PROGRAM | Admitting: STUDENT IN AN ORGANIZED HEALTH CARE EDUCATION/TRAINING PROGRAM
Payer: MEDICARE

## 2023-03-23 VITALS
HEIGHT: 61 IN | RESPIRATION RATE: 16 BRPM | DIASTOLIC BLOOD PRESSURE: 77 MMHG | HEART RATE: 89 BPM | OXYGEN SATURATION: 99 % | WEIGHT: 250 LBS | TEMPERATURE: 98.6 F | BODY MASS INDEX: 47.2 KG/M2 | SYSTOLIC BLOOD PRESSURE: 150 MMHG

## 2023-03-23 DIAGNOSIS — Z46.6 URINARY CATHETER CHANGE REQUIRED: Primary | ICD-10-CM

## 2023-03-23 PROCEDURE — 99283 EMERGENCY DEPT VISIT LOW MDM: CPT

## 2023-03-23 PROCEDURE — 51702 INSERT TEMP BLADDER CATH: CPT

## 2023-03-23 NOTE — DISCHARGE INSTRUCTIONS
Follow up with your primary care provider in 1-2 days    Return to th emergency room for worsening symptoms.

## 2023-03-23 NOTE — ED PROVIDER NOTES
Subjective   History of Present Illness  Patient is a 71-year-old female reports to the emergency room today and wants her catheter change.  Patient reports is not draining well states she is currently on antibiotic for UTI.  Patient denies any other complaints and states that she does not want a catheter change and request nothing else.  She denies fever or abdominal pain.  Patient denies shortness of breath.  Patient denies any complaints.        Review of Systems   Constitutional: Negative.    HENT: Negative.    Eyes: Negative.    Respiratory: Negative.    Cardiovascular: Negative.    Gastrointestinal: Negative.    Endocrine: Negative.    Genitourinary: Negative.    Musculoskeletal: Negative.    Skin: Negative.    Allergic/Immunologic: Negative.    Neurological: Negative.    Hematological: Negative.    Psychiatric/Behavioral: Negative.        Past Medical History:   Diagnosis Date   • Arthritis    • Atrial fibrillation (HCC)     Not on anticoagulation due to postmenopausal bleeding   • Diabetes mellitus (HCC)    • Gout    • History of CVA (cerebrovascular accident)     residual right-sided weakness   • Hyperlipidemia    • Hypertension    • Stage IV pressure ulcer of sacral region (HCC)        Allergies   Allergen Reactions   • Contrast Dye (Echo Or Unknown Ct/Mr) Itching       Past Surgical History:   Procedure Laterality Date   • CHOLECYSTECTOMY         Family History   Problem Relation Age of Onset   • Diabetes Mother    • Hypertension Father        Social History     Socioeconomic History   • Marital status:    Tobacco Use   • Smoking status: Never   • Smokeless tobacco: Never   Vaping Use   • Vaping Use: Never used   Substance and Sexual Activity   • Alcohol use: No   • Drug use: No   • Sexual activity: Defer           Objective   Physical Exam  Vitals and nursing note reviewed.   Constitutional:       Appearance: She is well-developed.   HENT:      Head: Normocephalic.      Right Ear: External ear  normal.      Left Ear: External ear normal.   Eyes:      Conjunctiva/sclera: Conjunctivae normal.      Pupils: Pupils are equal, round, and reactive to light.   Cardiovascular:      Rate and Rhythm: Normal rate and regular rhythm.      Heart sounds: Normal heart sounds.   Pulmonary:      Effort: Pulmonary effort is normal.      Breath sounds: Normal breath sounds.   Abdominal:      General: Bowel sounds are normal.      Palpations: Abdomen is soft.   Musculoskeletal:         General: Normal range of motion.      Cervical back: Normal range of motion and neck supple.   Skin:     General: Skin is warm and dry.      Capillary Refill: Capillary refill takes less than 2 seconds.   Neurological:      Mental Status: She is alert and oriented to person, place, and time.   Psychiatric:         Behavior: Behavior normal.         Thought Content: Thought content normal.         Procedures           ED Course                                           Medical Decision Making  Patient is a 71-year-old female reports to the emergency room today and wants her catheter change.  Patient reports is not draining well states she is currently on antibiotic for UTI.  Patient denies any other complaints and states that she does not want a catheter change and request nothing else.  She denies fever or abdominal pain.  Patient denies shortness of breath.  Patient denies any complaints.      Patient catheter changed per nursing without difficulty. Flowing well. Patient to be discharged.     Urinary catheter change required: acute illness or injury      Final diagnoses:   Urinary catheter change required       ED Disposition  ED Disposition     ED Disposition   Discharge    Condition   Stable    Comment   --             Barbara Gaona, APRN  [address] [facility] [address]  [phone]    Schedule an appointment as soon as possible for a visit   For further evaluation         Medication List      No changes were made to your prescriptions  during this visit.          Kwesi Zepeda, APRN  03/23/23 1955

## 2023-03-31 ENCOUNTER — HOSPITAL ENCOUNTER (EMERGENCY)
Facility: HOSPITAL | Age: 73
Discharge: HOME | End: 2023-03-31
Attending: STUDENT IN AN ORGANIZED HEALTH CARE EDUCATION/TRAINING PROGRAM | Admitting: STUDENT IN AN ORGANIZED HEALTH CARE EDUCATION/TRAINING PROGRAM
Payer: MEDICARE

## 2023-03-31 VITALS
OXYGEN SATURATION: 95 % | HEART RATE: 89 BPM | TEMPERATURE: 97.9 F | RESPIRATION RATE: 18 BRPM | BODY MASS INDEX: 47.2 KG/M2 | DIASTOLIC BLOOD PRESSURE: 89 MMHG | SYSTOLIC BLOOD PRESSURE: 160 MMHG | HEIGHT: 61 IN | WEIGHT: 250 LBS

## 2023-03-31 DIAGNOSIS — T83.9XXA FOLEY CATHETER PROBLEM, INITIAL ENCOUNTER: Primary | ICD-10-CM

## 2023-03-31 PROCEDURE — 99283 EMERGENCY DEPT VISIT LOW MDM: CPT

## 2023-03-31 NOTE — ED NOTES
Called [facility] about returning pt home, was told the [facility], Nilay, would give me a call back

## 2023-03-31 NOTE — ED PROVIDER NOTES
Subjective   History of Present Illness  Patient is a 72-year-old female with history significant for atrial fibrillation, hypertension and chronic Flanagan catheter who presents to the ER with complaints of leakage around the Flanagan insertion site.  She denies any fevers or chills or other systemic symptoms.  She states this happens occasionally and Flanagan catheter has to be replaced.  She denies any trauma to the Flanagan or any other concerns today.        Review of Systems   Constitutional: Negative for chills, fatigue and fever.   HENT: Negative for ear pain, sinus pain and sore throat.    Respiratory: Negative for cough, chest tightness, shortness of breath and wheezing.    Cardiovascular: Negative for chest pain, palpitations and leg swelling.   Gastrointestinal: Negative for abdominal pain, constipation, diarrhea, nausea and vomiting.   Genitourinary: Negative for dysuria, hematuria and urgency.   Musculoskeletal: Negative for arthralgias and myalgias.   Neurological: Negative for dizziness, syncope and light-headedness.   Psychiatric/Behavioral: Negative for confusion.       Past Medical History:   Diagnosis Date   • Arthritis    • Atrial fibrillation (HCC)     Not on anticoagulation due to postmenopausal bleeding   • Diabetes mellitus (HCC)    • Gout    • History of CVA (cerebrovascular accident)     residual right-sided weakness   • Hyperlipidemia    • Hypertension    • Stage IV pressure ulcer of sacral region (HCC)        Allergies   Allergen Reactions   • Contrast Dye (Echo Or Unknown Ct/Mr) Itching       Past Surgical History:   Procedure Laterality Date   • CHOLECYSTECTOMY         Family History   Problem Relation Age of Onset   • Diabetes Mother    • Hypertension Father        Social History     Socioeconomic History   • Marital status:    Tobacco Use   • Smoking status: Never   • Smokeless tobacco: Never   Vaping Use   • Vaping Use: Never used   Substance and Sexual Activity   • Alcohol use: No   •  Drug use: No   • Sexual activity: Defer           Objective   Physical Exam  Vitals and nursing note reviewed. Exam conducted with a chaperone present.   Constitutional:       Appearance: Normal appearance. She is obese.   HENT:      Head: Normocephalic.      Nose: Nose normal.      Mouth/Throat:      Mouth: Mucous membranes are dry.      Pharynx: Oropharynx is clear.   Eyes:      Extraocular Movements: Extraocular movements intact.      Pupils: Pupils are equal, round, and reactive to light.   Cardiovascular:      Rate and Rhythm: Normal rate and regular rhythm.      Pulses: Normal pulses.      Heart sounds: Normal heart sounds.   Pulmonary:      Effort: Pulmonary effort is normal.      Breath sounds: Normal breath sounds.   Abdominal:      General: Abdomen is flat. Bowel sounds are normal.      Palpations: Abdomen is soft.   Musculoskeletal:         General: Normal range of motion.      Cervical back: Normal range of motion and neck supple.   Skin:     General: Skin is warm and dry.      Capillary Refill: Capillary refill takes 2 to 3 seconds.   Neurological:      General: No focal deficit present.      Mental Status: She is alert. Mental status is at baseline.   Psychiatric:         Mood and Affect: Mood normal.         Behavior: Behavior normal.         Procedures           ED Course                                           Medical Decision Making  Patient is hemodynamically stable, Flanagan catheter replaced. No further needs identified. Follow-up as needed outpatient.        Final diagnoses:   Flanagan catheter problem, initial encounter (HCC)       ED Disposition  ED Disposition     ED Disposition   Discharge    Condition   Stable    Comment   --             Barbara Gaona, APRN  [address] [facility] [address] [phone]      As needed         Medication List      No changes were made to your prescriptions during this visit.          Dominick Becker DO  03/31/23 7610

## 2023-04-27 ENCOUNTER — NURSE TRIAGE (OUTPATIENT)
Dept: CALL CENTER | Facility: HOSPITAL | Age: 73
End: 2023-04-27
Payer: MEDICARE

## 2023-04-27 ENCOUNTER — HOSPITAL ENCOUNTER (EMERGENCY)
Facility: HOSPITAL | Age: 73
Discharge: HOME | End: 2023-04-27
Attending: EMERGENCY MEDICINE
Payer: MEDICARE

## 2023-04-27 VITALS
BODY MASS INDEX: 47.2 KG/M2 | OXYGEN SATURATION: 96 % | SYSTOLIC BLOOD PRESSURE: 134 MMHG | DIASTOLIC BLOOD PRESSURE: 85 MMHG | TEMPERATURE: 95.5 F | HEIGHT: 61 IN | HEART RATE: 90 BPM | RESPIRATION RATE: 18 BRPM | WEIGHT: 250 LBS

## 2023-04-27 DIAGNOSIS — N39.0 URINARY TRACT INFECTION ASSOCIATED WITH INDWELLING URETHRAL CATHETER, INITIAL ENCOUNTER: Primary | ICD-10-CM

## 2023-04-27 DIAGNOSIS — T83.511A URINARY TRACT INFECTION ASSOCIATED WITH INDWELLING URETHRAL CATHETER, INITIAL ENCOUNTER: Primary | ICD-10-CM

## 2023-04-27 LAB
BACTERIA UR QL AUTO: ABNORMAL /HPF
BILIRUB UR QL STRIP: NEGATIVE
CLARITY UR: ABNORMAL
COLOR UR: ABNORMAL
GLUCOSE UR STRIP-MCNC: ABNORMAL MG/DL
HGB UR QL STRIP.AUTO: ABNORMAL
HYALINE CASTS UR QL AUTO: ABNORMAL /LPF
KETONES UR QL STRIP: NEGATIVE
LEUKOCYTE ESTERASE UR QL STRIP.AUTO: ABNORMAL
NITRITE UR QL STRIP: POSITIVE
PH UR STRIP.AUTO: 5.5 [PH] (ref 5–8)
PROT UR QL STRIP: ABNORMAL
RBC # UR STRIP: ABNORMAL /HPF
REF LAB TEST METHOD: ABNORMAL
SP GR UR STRIP: 1.02 (ref 1–1.03)
SQUAMOUS #/AREA URNS HPF: ABNORMAL /HPF
UROBILINOGEN UR QL STRIP: ABNORMAL
WBC # UR STRIP: ABNORMAL /HPF

## 2023-04-27 PROCEDURE — 87186 SC STD MICRODIL/AGAR DIL: CPT | Performed by: PHYSICIAN ASSISTANT

## 2023-04-27 PROCEDURE — 87086 URINE CULTURE/COLONY COUNT: CPT | Performed by: PHYSICIAN ASSISTANT

## 2023-04-27 PROCEDURE — 51702 INSERT TEMP BLADDER CATH: CPT

## 2023-04-27 PROCEDURE — 99283 EMERGENCY DEPT VISIT LOW MDM: CPT

## 2023-04-27 PROCEDURE — 81001 URINALYSIS AUTO W/SCOPE: CPT | Performed by: PHYSICIAN ASSISTANT

## 2023-04-27 PROCEDURE — 87077 CULTURE AEROBIC IDENTIFY: CPT | Performed by: PHYSICIAN ASSISTANT

## 2023-04-27 NOTE — ED NOTES
Pt had bowel movement. Pt cleaned and dried . New sheets and chucks placed under pt. New gown and blankets placed on pt. Rn parvez assisted

## 2023-04-27 NOTE — TELEPHONE ENCOUNTER
"Caller called concerned that her barajas catheter is coming out and it is very painful and causing a lot of abdominal and back pain at this time.  Pt states she can feel it every time she moves or tries to sit down.  I have advised her to go to the ED and have someone drive her to be evaluated.  She states her SUSY is calling EMS now.  Reviewed protocols and guidelines and assured her that she is making the correct decision.  She sounds like she is in a lot of pain on the phone.      Reason for Disposition  • SEVERE abdominal pain    Additional Information  • Negative: Shock suspected (e.g., cold/pale/clammy skin, too weak to stand, low BP, rapid pulse)  • Negative: Sounds like a life-threatening emergency to the triager  • Negative: Urinary catheter and spinal cord injury, questions about  • Negative: Urinary catheter in a hospice patient  • Negative: [1] Catheter was accidentally pulled-out AND [2] bright red continuous bleeding (or trickling)    Answer Assessment - Initial Assessment Questions  1. SYMPTOMS: \"What symptoms are you concerned about?\"      Barajas is coming out she thinks   2. ONSET:  \"When did the symptoms start?\"      Today   3. FEVER: \"Is there a fever?\" If Yes, ask: \"What is the temperature, how was it measured, and when did it start?\"      She doesn't know   4. ABDOMEN PAIN: \"Is there any abdomen pain?\" (e.g., Scale 1-10; or mild, moderate, severe)      Yes 10  5. URINE COLOR: \"What color is the urine?\"  \"Is there blood present in the urine?\" (e.g., clear, yellow, cloudy, tea-colored, blood streaks, bright red)      Dark   6. URINE AMOUNT: \"When did you last empty the urine from the collection bag?\" \"How much urine was in the bag at that time?\" How much urine is in the collection bag now?\"      I dont know   7. INSERTION: \"How long have you (they) had the catheter?\"     Not long   8. OTHER SYMPTOMS: \"Are there any other symptoms?\" (e.g., abdomen swelling, back pain, bladder spasms, constipation, foul " "smelling urine, leaking of urine)      Pain, bladder spasms, back pain  9. MEDICINES: \"Are you taking any medicines to treat urinary problems?\" (e.g., antibiotics for a urinary tract infection, medicines to treat bladder spasms)       Lots of pain and spasms and can't sit down   10. PREGNANCY: \"Is there any chance you are pregnant?\" \"When was your last menstrual period?\"        no    Protocols used: URINARY CATHETER (E.G., SHARMA) SYMPTOMS AND QUESTIONS-ADULT-AH    "

## 2023-04-27 NOTE — ED PROVIDER NOTES
Subjective   History of Present Illness  Patient has a indwelling Barajas catheter that is in place and is leaking around per the patient.  Nursing staff was able to remove the cath and insert another Barajas.    History provided by:  Patient   used: No    Urinary Retention  Location:  Pt has barajas cath in place and is leaking around   Severity:  Mild  Onset quality:  Sudden  Duration:  1 day  Progression:  Unchanged  Chronicity:  New  Relieved by:  Nothing  Risk factors:  Hx of freq UTIS      Review of Systems    Past Medical History:   Diagnosis Date   • Arthritis    • Atrial fibrillation     Not on anticoagulation due to postmenopausal bleeding   • Diabetes mellitus    • Gout    • History of CVA (cerebrovascular accident)     residual right-sided weakness   • Hyperlipidemia    • Hypertension    • Stage IV pressure ulcer of sacral region        Allergies   Allergen Reactions   • Contrast Dye (Echo Or Unknown Ct/Mr) Itching       Past Surgical History:   Procedure Laterality Date   • CHOLECYSTECTOMY         Family History   Problem Relation Age of Onset   • Diabetes Mother    • Hypertension Father        Social History     Socioeconomic History   • Marital status:    Tobacco Use   • Smoking status: Never   • Smokeless tobacco: Never   Vaping Use   • Vaping Use: Never used   Substance and Sexual Activity   • Alcohol use: No   • Drug use: No   • Sexual activity: Defer           Objective   Physical Exam  Vitals and nursing note reviewed.   Constitutional:       Appearance: She is well-developed.   HENT:      Head: Normocephalic.   Cardiovascular:      Rate and Rhythm: Normal rate and regular rhythm.   Pulmonary:      Effort: Pulmonary effort is normal.      Breath sounds: Normal breath sounds.   Abdominal:      General: Bowel sounds are normal.      Palpations: Abdomen is soft.      Tenderness: There is no abdominal tenderness.   Musculoskeletal:      Cervical back: Neck supple.   Skin:      General: Skin is warm and dry.   Neurological:      Mental Status: She is alert and oriented to person, place, and time.   Psychiatric:         Behavior: Behavior normal.         Thought Content: Thought content normal.         Judgment: Judgment normal.         Procedures           ED Course      Results for orders placed or performed during the hospital encounter of 04/27/23   Urine Culture - Urine, Indwelling Urethral Catheter    Specimen: Indwelling Urethral Catheter; Urine   Result Value Ref Range    Urine Culture >100,000 CFU/mL Escherichia coli (A)        Susceptibility    Escherichia coli - CHRISTOPHE     Ampicillin  Resistant ug/ml     Ampicillin + Sulbactam  Intermediate ug/ml     Cefazolin  Susceptible ug/ml     Cefepime  Susceptible ug/ml     Ceftazidime  Susceptible ug/ml     Ceftriaxone  Susceptible ug/ml     Gentamicin  Susceptible ug/ml     Levofloxacin  Susceptible ug/ml     Nitrofurantoin  Susceptible ug/ml     Piperacillin + Tazobactam  Susceptible ug/ml     Trimethoprim + Sulfamethoxazole  Susceptible ug/ml   Urinalysis With Culture If Indicated - Indwelling Urethral Catheter    Specimen: Indwelling Urethral Catheter; Urine   Result Value Ref Range    Color, UA Dark Yellow (A) Yellow, Straw    Appearance, UA Turbid (A) Clear    pH, UA 5.5 5.0 - 8.0    Specific Gravity, UA 1.023 1.005 - 1.030    Glucose,  mg/dL (Trace) (A) Negative    Ketones, UA Negative Negative    Bilirubin, UA Negative Negative    Blood, UA Large (3+) (A) Negative    Protein,  mg/dL (2+) (A) Negative    Leuk Esterase, UA Moderate (2+) (A) Negative    Nitrite, UA Positive (A) Negative    Urobilinogen, UA 1.0 E.U./dL 0.2 - 1.0 E.U./dL   Urinalysis, Microscopic Only - Indwelling Urethral Catheter    Specimen: Indwelling Urethral Catheter; Urine   Result Value Ref Range    RBC, UA Too Numerous to Count (A) None Seen, 0-2 /HPF    WBC, UA Too Numerous to Count (A) None Seen, 0-2 /HPF    Bacteria, UA 4+ (A) None Seen /HPF     Squamous Epithelial Cells, UA 3-6 (A) None Seen, 0-2 /HPF    Hyaline Casts, UA 3-6 None Seen /LPF    Methodology Automated Microscopy               No orders to display                                Medical Decision Making  Patient has a indwelling Flanagan catheter that is in place and is leaking around per the patient.  Nursing staff was able to remove the cath and insert another Flanagan.    Urinary tract infection associated with indwelling urethral catheter, initial encounter: acute illness or injury  Amount and/or Complexity of Data Reviewed  Labs:  Decision-making details documented in ED Course.  Radiology:  Decision-making details documented in ED Course.      Risk  Risk Details: Jessie Floyd    Patient is stable.  Patient is medically cleared.  No EMC exist.  Patient is discharged home.  Patient's diagnostic results were discussed with him and they demonstrated understanding of diagnosis and treatment plan.  Patient was advised to return to the ER or follow-up with PCP if symptoms worsen or fail to improve as expected.        Final diagnoses:   Urinary tract infection associated with indwelling urethral catheter, initial encounter       ED Disposition  ED Disposition     ED Disposition   Discharge    Condition   Stable    Comment   --             Barbara Gaona, APRN  [address] [id number] [phone]    In 2 days           Medication List      No changes were made to your prescriptions during this visit.

## 2023-04-27 NOTE — ED NOTES
[facility] contacted for return to home. Pcs faxed to [facility]. Advise would pass along to [facility]

## 2023-04-28 NOTE — ED NOTES
Contacted [facility] to check eta, advised trucks still on runs and unknown eta still. Rn aware

## 2023-04-28 NOTE — ED NOTES
Pt arrived with dried feces and urine soaked sheets from home. Pt was cleaned up with soiled clothes and linens changed. Pt reports that she lives at home with her children who take care of her. Pt states that her urine catheter is leaking. Provider made aware.

## 2023-04-30 LAB — BACTERIA SPEC AEROBE CULT: ABNORMAL

## 2023-05-01 ENCOUNTER — TELEPHONE (OUTPATIENT)
Dept: EMERGENCY DEPT | Facility: HOSPITAL | Age: 73
End: 2023-05-01
Payer: MEDICARE

## 2023-05-05 ENCOUNTER — HOSPITAL ENCOUNTER (EMERGENCY)
Facility: HOSPITAL | Age: 73
Discharge: HOME | End: 2023-05-05
Attending: STUDENT IN AN ORGANIZED HEALTH CARE EDUCATION/TRAINING PROGRAM
Payer: MEDICARE

## 2023-05-05 VITALS
HEIGHT: 61 IN | TEMPERATURE: 98.7 F | SYSTOLIC BLOOD PRESSURE: 163 MMHG | BODY MASS INDEX: 47.2 KG/M2 | HEART RATE: 89 BPM | RESPIRATION RATE: 16 BRPM | OXYGEN SATURATION: 97 % | WEIGHT: 250 LBS | DIASTOLIC BLOOD PRESSURE: 88 MMHG

## 2023-05-05 DIAGNOSIS — Z46.6 URINARY CATHETER (FOLEY) CHANGE REQUIRED: Primary | ICD-10-CM

## 2023-05-05 PROCEDURE — 51702 INSERT TEMP BLADDER CATH: CPT

## 2023-05-05 PROCEDURE — 99283 EMERGENCY DEPT VISIT LOW MDM: CPT

## 2023-05-05 NOTE — ED NOTES
Pt resting in ER stretcher with no complaints. No acute distress noted. Will continue to monitor.

## 2023-05-05 NOTE — ED PROVIDER NOTES
Subjective   History of Present Illness  72-year-old female who presents to the emergency department with complaint urinary catheter issue.  Patient reports that she coughed and her urinary catheter fell out.  Patient has history of diabetes, gout, arthritis    History provided by:  Patient   used: No    Blood in Urine  This is a new problem. The current episode started today. The problem is unchanged. The hematuria occurs during the initial portion of her urinary stream. She reports no clotting in her urine stream. Her pain is at a severity of 6/10. The pain is moderate. She describes her urine color as clear. Obstructive symptoms do not include dribbling. Pertinent negatives include no abdominal pain, bladder pain, bone pain, chills, flank pain or genital pain. She is not sexually active. There is no history of BPH or hypertension.       Review of Systems   Constitutional: Negative.  Negative for chills.   HENT: Negative.    Eyes: Negative.  Negative for photophobia, pain, redness and itching.   Respiratory: Negative.  Negative for cough and shortness of breath.    Cardiovascular: Negative.    Gastrointestinal: Negative.  Negative for abdominal pain.   Endocrine: Negative.    Genitourinary: Positive for difficulty urinating. Negative for flank pain and hematuria.   Musculoskeletal: Negative.  Negative for back pain, gait problem, joint swelling and myalgias.   Skin: Negative.  Negative for color change, pallor, rash and wound.   Neurological: Negative.  Negative for seizures, syncope, speech difficulty, light-headedness, numbness and headaches.   Hematological: Negative.    Psychiatric/Behavioral: Negative.    All other systems reviewed and are negative.      Past Medical History:   Diagnosis Date   • Arthritis    • Atrial fibrillation     Not on anticoagulation due to postmenopausal bleeding   • Diabetes mellitus    • Gout    • History of CVA (cerebrovascular accident)     residual right-sided  weakness   • Hyperlipidemia    • Hypertension    • Stage IV pressure ulcer of sacral region        Allergies   Allergen Reactions   • Contrast Dye (Echo Or Unknown Ct/Mr) Itching       Past Surgical History:   Procedure Laterality Date   • CHOLECYSTECTOMY         Family History   Problem Relation Age of Onset   • Diabetes Mother    • Hypertension Father        Social History     Socioeconomic History   • Marital status:    Tobacco Use   • Smoking status: Never   • Smokeless tobacco: Never   Vaping Use   • Vaping Use: Never used   Substance and Sexual Activity   • Alcohol use: No   • Drug use: No   • Sexual activity: Defer           Objective   Physical Exam  Vitals and nursing note reviewed.   Constitutional:       General: She is not in acute distress.     Appearance: Normal appearance. She is normal weight. She is not ill-appearing or toxic-appearing.   HENT:      Head: Normocephalic and atraumatic.      Right Ear: Tympanic membrane, ear canal and external ear normal. There is no impacted cerumen.      Left Ear: Tympanic membrane, ear canal and external ear normal. There is no impacted cerumen.      Nose: Nose normal. No congestion or rhinorrhea.      Mouth/Throat:      Mouth: Mucous membranes are moist.      Pharynx: Oropharynx is clear. No oropharyngeal exudate or posterior oropharyngeal erythema.   Eyes:      General: No scleral icterus.        Right eye: No discharge.         Left eye: No discharge.      Extraocular Movements: Extraocular movements intact.      Conjunctiva/sclera: Conjunctivae normal.      Pupils: Pupils are equal, round, and reactive to light.   Cardiovascular:      Rate and Rhythm: Normal rate and regular rhythm.      Pulses: Normal pulses.      Heart sounds: Normal heart sounds. No murmur heard.    No gallop.   Pulmonary:      Effort: Pulmonary effort is normal. No respiratory distress.      Breath sounds: Normal breath sounds. No stridor. No wheezing, rhonchi or rales.   Abdominal:       General: Abdomen is flat. Bowel sounds are normal. There is no distension.      Palpations: Abdomen is soft. There is no mass.      Tenderness: There is no abdominal tenderness. There is no right CVA tenderness, left CVA tenderness, guarding or rebound.      Hernia: No hernia is present.   Musculoskeletal:         General: No swelling, tenderness, deformity or signs of injury. Normal range of motion.      Cervical back: Normal range of motion and neck supple. No rigidity or tenderness.      Right lower leg: No edema.   Lymphadenopathy:      Cervical: No cervical adenopathy.   Skin:     General: Skin is warm and dry.      Capillary Refill: Capillary refill takes less than 2 seconds.      Coloration: Skin is not jaundiced or pale.      Findings: No bruising, erythema or lesion.   Neurological:      General: No focal deficit present.      Mental Status: She is alert and oriented to person, place, and time. Mental status is at baseline.      Cranial Nerves: No cranial nerve deficit.      Sensory: No sensory deficit.      Motor: No weakness.      Coordination: Coordination normal.      Gait: Gait normal.      Deep Tendon Reflexes: Reflexes normal.   Psychiatric:         Mood and Affect: Mood normal.         Behavior: Behavior normal.         Thought Content: Thought content normal.         Judgment: Judgment normal.         Procedures           ED Course                                           MDM    Final diagnoses:   Urinary catheter (Flanagan) change required       ED Disposition  ED Disposition     ED Disposition   Discharge    Condition   Stable    Comment   --             Alexis Mahoney, APRN  [address] [id number] [phone]    Call today           Medication List      No changes were made to your prescriptions during this visit.          Isaac Heaton PA-C  05/05/23 1417

## 2023-05-05 NOTE — ED NOTES
Called [employer] [facility] for our truck to transport pt home. States they are going to  and will take pt when truck returns.    Clear

## 2023-05-05 NOTE — ED NOTES
Called [facility] for pt transport back home at this time. States all truck are out on calls and for us to call back later

## 2023-05-05 NOTE — ED NOTES
Called [facility] again for transport at this time. States the truck is on another run and to call back later for night shift.

## 2023-05-11 ENCOUNTER — HOSPITAL ENCOUNTER (EMERGENCY)
Facility: HOSPITAL | Age: 73
Discharge: HOME | End: 2023-05-12
Attending: EMERGENCY MEDICINE
Payer: MEDICARE

## 2023-05-11 ENCOUNTER — APPOINTMENT (OUTPATIENT)
Dept: CT IMAGING | Facility: HOSPITAL | Age: 73
End: 2023-05-11
Payer: MEDICARE

## 2023-05-11 DIAGNOSIS — L89.152 PRESSURE INJURY OF SACRAL REGION, STAGE 2: Primary | ICD-10-CM

## 2023-05-11 LAB
ALBUMIN SERPL-MCNC: 3.6 G/DL (ref 3.5–5.2)
ALBUMIN/GLOB SERPL: 0.9 G/DL
ALP SERPL-CCNC: 127 U/L (ref 39–117)
ALT SERPL W P-5'-P-CCNC: 19 U/L (ref 1–33)
ANION GAP SERPL CALCULATED.3IONS-SCNC: 11.7 MMOL/L (ref 5–15)
AST SERPL-CCNC: 25 U/L (ref 1–32)
BASOPHILS # BLD AUTO: 0.06 10*3/MM3 (ref 0–0.2)
BASOPHILS NFR BLD AUTO: 0.4 % (ref 0–1.5)
BILIRUB SERPL-MCNC: 0.3 MG/DL (ref 0–1.2)
BUN SERPL-MCNC: 23 MG/DL (ref 8–23)
BUN/CREAT SERPL: 24.7 (ref 7–25)
CALCIUM SPEC-SCNC: 9.5 MG/DL (ref 8.6–10.5)
CHLORIDE SERPL-SCNC: 106 MMOL/L (ref 98–107)
CO2 SERPL-SCNC: 24.3 MMOL/L (ref 22–29)
CREAT SERPL-MCNC: 0.93 MG/DL (ref 0.57–1)
CRP SERPL-MCNC: 4.04 MG/DL (ref 0–0.5)
D-LACTATE SERPL-SCNC: 2 MMOL/L (ref 0.5–2)
DEPRECATED RDW RBC AUTO: 46.4 FL (ref 37–54)
EGFRCR SERPLBLD CKD-EPI 2021: 65 ML/MIN/1.73
EOSINOPHIL # BLD AUTO: 0.4 10*3/MM3 (ref 0–0.4)
EOSINOPHIL NFR BLD AUTO: 2.9 % (ref 0.3–6.2)
ERYTHROCYTE [DISTWIDTH] IN BLOOD BY AUTOMATED COUNT: 13.6 % (ref 12.3–15.4)
ERYTHROCYTE [SEDIMENTATION RATE] IN BLOOD: 69 MM/HR (ref 0–30)
FLUAV RNA RESP QL NAA+PROBE: NOT DETECTED
FLUBV RNA RESP QL NAA+PROBE: NOT DETECTED
GLOBULIN UR ELPH-MCNC: 4 GM/DL
GLUCOSE SERPL-MCNC: 267 MG/DL (ref 65–99)
HCT VFR BLD AUTO: 40.6 % (ref 34–46.6)
HGB BLD-MCNC: 13.2 G/DL (ref 12–15.9)
IMM GRANULOCYTES # BLD AUTO: 0.05 10*3/MM3 (ref 0–0.05)
IMM GRANULOCYTES NFR BLD AUTO: 0.4 % (ref 0–0.5)
LYMPHOCYTES # BLD AUTO: 2.57 10*3/MM3 (ref 0.7–3.1)
LYMPHOCYTES NFR BLD AUTO: 18.6 % (ref 19.6–45.3)
MCH RBC QN AUTO: 30.4 PG (ref 26.6–33)
MCHC RBC AUTO-ENTMCNC: 32.5 G/DL (ref 31.5–35.7)
MCV RBC AUTO: 93.5 FL (ref 79–97)
MONOCYTES # BLD AUTO: 0.59 10*3/MM3 (ref 0.1–0.9)
MONOCYTES NFR BLD AUTO: 4.3 % (ref 5–12)
NEUTROPHILS NFR BLD AUTO: 10.14 10*3/MM3 (ref 1.7–7)
NEUTROPHILS NFR BLD AUTO: 73.4 % (ref 42.7–76)
NRBC BLD AUTO-RTO: 0 /100 WBC (ref 0–0.2)
PLATELET # BLD AUTO: 196 10*3/MM3 (ref 140–450)
PMV BLD AUTO: 9.8 FL (ref 6–12)
POTASSIUM SERPL-SCNC: 4.3 MMOL/L (ref 3.5–5.2)
PROT SERPL-MCNC: 7.6 G/DL (ref 6–8.5)
RBC # BLD AUTO: 4.34 10*6/MM3 (ref 3.77–5.28)
SARS-COV-2 RNA RESP QL NAA+PROBE: NOT DETECTED
SODIUM SERPL-SCNC: 142 MMOL/L (ref 136–145)
WBC NRBC COR # BLD: 13.81 10*3/MM3 (ref 3.4–10.8)

## 2023-05-11 PROCEDURE — 87186 SC STD MICRODIL/AGAR DIL: CPT | Performed by: PHYSICIAN ASSISTANT

## 2023-05-11 PROCEDURE — 85025 COMPLETE CBC W/AUTO DIFF WBC: CPT | Performed by: PHYSICIAN ASSISTANT

## 2023-05-11 PROCEDURE — 87636 SARSCOV2 & INF A&B AMP PRB: CPT | Performed by: PHYSICIAN ASSISTANT

## 2023-05-11 PROCEDURE — 80053 COMPREHEN METABOLIC PANEL: CPT | Performed by: PHYSICIAN ASSISTANT

## 2023-05-11 PROCEDURE — 86140 C-REACTIVE PROTEIN: CPT | Performed by: PHYSICIAN ASSISTANT

## 2023-05-11 PROCEDURE — 87070 CULTURE OTHR SPECIMN AEROBIC: CPT | Performed by: PHYSICIAN ASSISTANT

## 2023-05-11 PROCEDURE — 74176 CT ABD & PELVIS W/O CONTRAST: CPT

## 2023-05-11 PROCEDURE — 87205 SMEAR GRAM STAIN: CPT | Performed by: PHYSICIAN ASSISTANT

## 2023-05-11 PROCEDURE — 85652 RBC SED RATE AUTOMATED: CPT | Performed by: PHYSICIAN ASSISTANT

## 2023-05-11 PROCEDURE — 99284 EMERGENCY DEPT VISIT MOD MDM: CPT

## 2023-05-11 PROCEDURE — 87040 BLOOD CULTURE FOR BACTERIA: CPT | Performed by: PHYSICIAN ASSISTANT

## 2023-05-11 PROCEDURE — 87147 CULTURE TYPE IMMUNOLOGIC: CPT | Performed by: PHYSICIAN ASSISTANT

## 2023-05-11 PROCEDURE — 74176 CT ABD & PELVIS W/O CONTRAST: CPT | Performed by: RADIOLOGY

## 2023-05-11 PROCEDURE — 36415 COLL VENOUS BLD VENIPUNCTURE: CPT

## 2023-05-11 PROCEDURE — 83605 ASSAY OF LACTIC ACID: CPT | Performed by: PHYSICIAN ASSISTANT

## 2023-05-11 RX ORDER — AMOXICILLIN AND CLAVULANATE POTASSIUM 875; 125 MG/1; MG/1
1 TABLET, FILM COATED ORAL ONCE
Status: COMPLETED | OUTPATIENT
Start: 2023-05-11 | End: 2023-05-11

## 2023-05-11 RX ORDER — METHYLPREDNISOLONE SODIUM SUCCINATE 40 MG/ML
40 INJECTION, POWDER, LYOPHILIZED, FOR SOLUTION INTRAMUSCULAR; INTRAVENOUS EVERY 4 HOURS
Status: DISCONTINUED | OUTPATIENT
Start: 2023-05-11 | End: 2023-05-11

## 2023-05-11 RX ORDER — AMOXICILLIN AND CLAVULANATE POTASSIUM 875; 125 MG/1; MG/1
1 TABLET, FILM COATED ORAL 2 TIMES DAILY
Qty: 14 TABLET | Refills: 0 | Status: SHIPPED | OUTPATIENT
Start: 2023-05-11

## 2023-05-11 RX ORDER — DOXYCYCLINE 100 MG/1
100 CAPSULE ORAL ONCE
Status: COMPLETED | OUTPATIENT
Start: 2023-05-11 | End: 2023-05-11

## 2023-05-11 RX ORDER — SODIUM CHLORIDE 0.9 % (FLUSH) 0.9 %
10 SYRINGE (ML) INJECTION AS NEEDED
Status: DISCONTINUED | OUTPATIENT
Start: 2023-05-11 | End: 2023-05-12 | Stop reason: HOSPADM

## 2023-05-11 RX ORDER — DIPHENHYDRAMINE HYDROCHLORIDE 50 MG/ML
50 INJECTION INTRAMUSCULAR; INTRAVENOUS
Status: DISCONTINUED | OUTPATIENT
Start: 2023-05-11 | End: 2023-05-11

## 2023-05-11 RX ORDER — DOXYCYCLINE 100 MG/1
100 CAPSULE ORAL 2 TIMES DAILY
Qty: 14 CAPSULE | Refills: 0 | Status: SHIPPED | OUTPATIENT
Start: 2023-05-11 | End: 2023-05-18

## 2023-05-11 RX ORDER — OXYCODONE AND ACETAMINOPHEN 10; 325 MG/1; MG/1
1 TABLET ORAL ONCE
Status: COMPLETED | OUTPATIENT
Start: 2023-05-11 | End: 2023-05-11

## 2023-05-11 RX ADMIN — AMOXICILLIN AND CLAVULANATE POTASSIUM 1 TABLET: 875; 125 TABLET, FILM COATED ORAL at 23:34

## 2023-05-11 RX ADMIN — OXYCODONE HYDROCHLORIDE AND ACETAMINOPHEN 1 TABLET: 10; 325 TABLET ORAL at 23:35

## 2023-05-11 RX ADMIN — DOXYCYCLINE 100 MG: 100 CAPSULE ORAL at 23:34

## 2023-05-11 NOTE — ED PROVIDER NOTES
Subjective   History of Present Illness  This is a 72 year old female patient who presents to the ER with chief complaint of wound check. PMH significant for DM requiring insulin, gout, HTN, HLD. She lives at home alone but her children care for her. The patient has had an ulcer on her bottom for several months and her children have been doing wet to dry dressing changes on it. Today, they noticed that it looked infected and was bleeding. Denies fever.         Review of Systems   Constitutional: Negative.  Negative for fever.   HENT: Negative.    Respiratory: Negative.    Cardiovascular: Negative.  Negative for chest pain.   Gastrointestinal: Negative.  Negative for abdominal pain.   Endocrine: Negative.    Genitourinary: Negative.  Negative for dysuria.   Skin: Positive for wound. Negative for color change, pallor and rash.   Neurological: Negative.    Psychiatric/Behavioral: Negative.    All other systems reviewed and are negative.      Past Medical History:   Diagnosis Date   • Arthritis    • Atrial fibrillation     Not on anticoagulation due to postmenopausal bleeding   • Diabetes mellitus    • Gout    • History of CVA (cerebrovascular accident)     residual right-sided weakness   • Hyperlipidemia    • Hypertension    • Stage IV pressure ulcer of sacral region        Allergies   Allergen Reactions   • Contrast Dye (Echo Or Unknown Ct/Mr) Itching       Past Surgical History:   Procedure Laterality Date   • CHOLECYSTECTOMY         Family History   Problem Relation Age of Onset   • Diabetes Mother    • Hypertension Father        Social History     Socioeconomic History   • Marital status:    Tobacco Use   • Smoking status: Never   • Smokeless tobacco: Never   Vaping Use   • Vaping Use: Never used   Substance and Sexual Activity   • Alcohol use: No   • Drug use: No   • Sexual activity: Defer           Objective   Physical Exam  Vitals and nursing note reviewed.   Constitutional:       General: She is not in  acute distress.     Appearance: She is well-developed. She is not diaphoretic.   HENT:      Head: Normocephalic and atraumatic.      Right Ear: External ear normal.      Left Ear: External ear normal.      Nose: Nose normal.   Eyes:      Conjunctiva/sclera: Conjunctivae normal.      Pupils: Pupils are equal, round, and reactive to light.   Neck:      Vascular: No JVD.      Trachea: No tracheal deviation.   Cardiovascular:      Rate and Rhythm: Normal rate and regular rhythm.      Heart sounds: Normal heart sounds. No murmur heard.  Pulmonary:      Effort: Pulmonary effort is normal. No respiratory distress.      Breath sounds: Normal breath sounds. No wheezing.   Abdominal:      General: Bowel sounds are normal.      Palpations: Abdomen is soft.      Tenderness: There is no abdominal tenderness.   Musculoskeletal:         General: No deformity. Normal range of motion.      Cervical back: Normal range of motion and neck supple.   Skin:     General: Skin is warm and dry.      Coloration: Skin is not pale.      Findings: No erythema or rash.      Comments: Decubitus ulcer noted midline buttock region    Neurological:      Mental Status: She is alert and oriented to person, place, and time.      Cranial Nerves: No cranial nerve deficit.   Psychiatric:         Behavior: Behavior normal.         Thought Content: Thought content normal.         Procedures           ED Course  ED Course as of 05/11/23 2323   Thu May 11, 2023   1954 Signed out to Dr. Becker at shift change.  [MM]      ED Course User Index  [MM] Holly Shafer PA                                           Medical Decision Making  Amount and/or Complexity of Data Reviewed  Labs: ordered.  Radiology: ordered.      Risk  Prescription drug management.          Final diagnoses:   Pressure injury of sacral region, stage 2       ED Disposition  ED Disposition     ED Disposition   Discharge    Condition   Stable    Comment   --             Barbara Gaona,  APRN  [address] [facility] [address]  [phone]    Call in 1 day      [facility]  [address] [facility] [address]  [phone]  Call in 1 day           Medication List      New Prescriptions    amoxicillin-clavulanate 875-125 MG per tablet  Commonly known as: Augmentin  Take 1 tablet by mouth 2 (Two) Times a Day.     doxycycline 100 MG capsule  Commonly known as: MONODOX  Take 1 capsule by mouth 2 (Two) Times a Day for 7 days.           Where to Get Your Medications      These medications were sent to [facility] [address] [place] [address], [place] - [address] [facility] - [phone]  - [phone]   [address] [facility] [address]    Phone: [phone]   · amoxicillin-clavulanate 875-125 MG per tablet  · doxycycline 100 MG capsule          Dominick Becker DO  05/11/23 1642

## 2023-05-11 NOTE — ED NOTES
Spoke with patient regarding rectal tube insertion to prevent wound infection and reduce patient discomfort from bed changes and bathing, patient not comfortable with rectal tube at this time. Provider aware, order in place in case patient requests.

## 2023-05-12 ENCOUNTER — TRANSCRIBE ORDERS (OUTPATIENT)
Dept: WOUND CARE | Facility: HOSPITAL | Age: 73
End: 2023-05-12
Payer: MEDICARE

## 2023-05-12 VITALS
OXYGEN SATURATION: 97 % | BODY MASS INDEX: 41.54 KG/M2 | TEMPERATURE: 97.8 F | WEIGHT: 220 LBS | HEIGHT: 61 IN | HEART RATE: 90 BPM | SYSTOLIC BLOOD PRESSURE: 125 MMHG | RESPIRATION RATE: 18 BRPM | DIASTOLIC BLOOD PRESSURE: 72 MMHG

## 2023-05-12 DIAGNOSIS — L89.152 PRESSURE INJURY OF SACRAL REGION, STAGE 2: Primary | ICD-10-CM

## 2023-05-12 PROCEDURE — 63710000001 ONDANSETRON ODT 4 MG TABLET DISPERSIBLE: Performed by: STUDENT IN AN ORGANIZED HEALTH CARE EDUCATION/TRAINING PROGRAM

## 2023-05-12 RX ORDER — ONDANSETRON 4 MG/1
4 TABLET, ORALLY DISINTEGRATING ORAL ONCE
Status: COMPLETED | OUTPATIENT
Start: 2023-05-12 | End: 2023-05-12

## 2023-05-12 RX ADMIN — ONDANSETRON 4 MG: 4 TABLET, ORALLY DISINTEGRATING ORAL at 00:34

## 2023-05-12 NOTE — ED NOTES
Called EMS for transport back to residence, spoke with Olesya, she is going to let the [facility] know and have them give me a call back.

## 2023-05-12 NOTE — ED NOTES
"Pt refusing CT with contrast. I explained to patient we need this scan for further diagnosis. She refused premed treatment because she does not want the scan, saying \"It makes me feel like I'm dying.\" I told her the premed treatment helps but she refused.   "

## 2023-05-16 LAB
BACTERIA SPEC AEROBE CULT: ABNORMAL
BACTERIA SPEC AEROBE CULT: ABNORMAL
BACTERIA SPEC AEROBE CULT: NORMAL
BACTERIA SPEC AEROBE CULT: NORMAL
GRAM STN SPEC: ABNORMAL

## 2023-06-05 ENCOUNTER — HOSPITAL ENCOUNTER (EMERGENCY)
Facility: HOSPITAL | Age: 73
Discharge: HOME | End: 2023-06-05
Attending: EMERGENCY MEDICINE | Admitting: EMERGENCY MEDICINE
Payer: MEDICARE

## 2023-06-05 VITALS
HEART RATE: 91 BPM | BODY MASS INDEX: 44.37 KG/M2 | WEIGHT: 235 LBS | DIASTOLIC BLOOD PRESSURE: 90 MMHG | RESPIRATION RATE: 18 BRPM | SYSTOLIC BLOOD PRESSURE: 162 MMHG | HEIGHT: 61 IN | TEMPERATURE: 98.3 F | OXYGEN SATURATION: 95 %

## 2023-06-05 DIAGNOSIS — L89.154 PRESSURE INJURY OF SACRAL REGION, STAGE 4: ICD-10-CM

## 2023-06-05 DIAGNOSIS — G89.29 OTHER CHRONIC PAIN: ICD-10-CM

## 2023-06-05 DIAGNOSIS — T83.9XXA PROBLEM WITH FOLEY CATHETER, INITIAL ENCOUNTER: Primary | ICD-10-CM

## 2023-06-05 DIAGNOSIS — N39.0 UTI (URINARY TRACT INFECTION), BACTERIAL: ICD-10-CM

## 2023-06-05 DIAGNOSIS — A49.9 UTI (URINARY TRACT INFECTION), BACTERIAL: ICD-10-CM

## 2023-06-05 LAB
BACTERIA UR QL AUTO: ABNORMAL /HPF
BILIRUB UR QL STRIP: NEGATIVE
CLARITY UR: ABNORMAL
COLOR UR: YELLOW
GLUCOSE UR STRIP-MCNC: ABNORMAL MG/DL
HGB UR QL STRIP.AUTO: ABNORMAL
HYALINE CASTS UR QL AUTO: ABNORMAL /LPF
KETONES UR QL STRIP: NEGATIVE
LEUKOCYTE ESTERASE UR QL STRIP.AUTO: ABNORMAL
NITRITE UR QL STRIP: POSITIVE
PH UR STRIP.AUTO: 6 [PH] (ref 5–8)
PROT UR QL STRIP: ABNORMAL
RBC # UR STRIP: ABNORMAL /HPF
REF LAB TEST METHOD: ABNORMAL
SP GR UR STRIP: 1.03 (ref 1–1.03)
SQUAMOUS #/AREA URNS HPF: ABNORMAL /HPF
UROBILINOGEN UR QL STRIP: ABNORMAL
WBC # UR STRIP: ABNORMAL /HPF

## 2023-06-05 PROCEDURE — 51702 INSERT TEMP BLADDER CATH: CPT

## 2023-06-05 PROCEDURE — 99284 EMERGENCY DEPT VISIT MOD MDM: CPT

## 2023-06-05 PROCEDURE — 87186 SC STD MICRODIL/AGAR DIL: CPT | Performed by: EMERGENCY MEDICINE

## 2023-06-05 PROCEDURE — 87086 URINE CULTURE/COLONY COUNT: CPT | Performed by: EMERGENCY MEDICINE

## 2023-06-05 PROCEDURE — 87077 CULTURE AEROBIC IDENTIFY: CPT | Performed by: EMERGENCY MEDICINE

## 2023-06-05 PROCEDURE — 87088 URINE BACTERIA CULTURE: CPT | Performed by: EMERGENCY MEDICINE

## 2023-06-05 PROCEDURE — 81001 URINALYSIS AUTO W/SCOPE: CPT | Performed by: EMERGENCY MEDICINE

## 2023-06-05 RX ORDER — OXYCODONE AND ACETAMINOPHEN 10; 325 MG/1; MG/1
1 TABLET ORAL ONCE
Status: COMPLETED | OUTPATIENT
Start: 2023-06-05 | End: 2023-06-05

## 2023-06-05 RX ORDER — OXYCODONE AND ACETAMINOPHEN 10; 325 MG/1; MG/1
1 TABLET ORAL EVERY 6 HOURS PRN
Qty: 12 TABLET | Refills: 0 | Status: SHIPPED | OUTPATIENT
Start: 2023-06-05

## 2023-06-05 RX ORDER — CEFDINIR 300 MG/1
300 CAPSULE ORAL ONCE
Status: COMPLETED | OUTPATIENT
Start: 2023-06-05 | End: 2023-06-05

## 2023-06-05 RX ORDER — CEFDINIR 300 MG/1
300 CAPSULE ORAL 2 TIMES DAILY
Qty: 19 CAPSULE | Refills: 0 | Status: SHIPPED | OUTPATIENT
Start: 2023-06-05

## 2023-06-05 RX ORDER — NALOXONE HYDROCHLORIDE 4 MG/.1ML
SPRAY NASAL
Qty: 2 EACH | Refills: 0 | Status: SHIPPED | OUTPATIENT
Start: 2023-06-05

## 2023-06-05 RX ORDER — LOPERAMIDE HYDROCHLORIDE 2 MG/1
4 CAPSULE ORAL ONCE
Status: COMPLETED | OUTPATIENT
Start: 2023-06-05 | End: 2023-06-05

## 2023-06-05 RX ADMIN — CEFDINIR 300 MG: 300 CAPSULE ORAL at 16:03

## 2023-06-05 RX ADMIN — LOPERAMIDE HYDROCHLORIDE 4 MG: 2 CAPSULE ORAL at 14:50

## 2023-06-05 RX ADMIN — OXYCODONE AND ACETAMINOPHEN 1 TABLET: 10; 325 TABLET ORAL at 14:51

## 2023-06-05 NOTE — ED NOTES
Called [facility] talked to [facility] daisy stated they come get here no eta at this time

## 2023-06-05 NOTE — ED PROVIDER NOTES
"Subjective   History of Present Illness  Patient is a 72-year-old bedfast female who arrives from home via ambulance with a chief complaint of \"my catheter is leaking and it needs changed\".  She states that this particular Flanagan catheter has been in place for about 3 weeks, it has now been leaking the last 3 days.  Patient also reports that she has been having watery diarrhea approximately 4 stools a day for the last 2 days, after she took her last Percocet 10 three days ago.  She states that she had no way to get to her pain clinic appointment and therefore was unable to get her prescription refilled for Percocet 10 mg 4 times daily.  She also reports that her family wants me to check her sacral decubitus.  Patient had a urine culture on April 27 that grew greater than 100,000 CFU's of E. coli, sensitive to everything except ampicillin, and intermediate to Unasyn.  It was sensitive to cefazolin, Rocephin, all other drugs tested.  The chart indicates that she had been prescribed Omnicef but that this was changed to Macrobid based upon culture results.  However, the patient states that she never got an antibiotic prescription, states that she got an unspecified antibiotic shot and did not get a course of oral antibiotics.  Patient lives at home where she is cared for by family.  She is bedfast, occasionally gets up to a wheelchair using a Estefania lift.  Patient will only allow to change her Flanagan catheter and send a urine specimen to lab for urinalysis.  She refuses any blood work, any radiological studies or stool studies.  She is insistent that she only gets her catheter changed and gets a prescription for antibiotics if needed, will not allow me to do any further work-up.  She does request that I give her medication for her diarrhea.  Records indicate that the patient has a chronic stage IV pressure ulcer in her sacral region.  She reports that the family does a good job of keeping this clean and dressed with " wet-to-dry dressings.  She has no complaints regarding the sacral decubitus.    Review of Systems   All other systems reviewed and are negative.    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   Constitutional:       General: She is not in acute distress.     Appearance: She is well-developed. She is obese. She is not toxic-appearing or diaphoretic.      Comments: Obese female, alert and well-oriented, in no apparent acute distress.  She is pleasant and cooperative.   HENT:      Head: Normocephalic and atraumatic.   Eyes:      General: No scleral icterus.     Pupils: Pupils are equal, round, and reactive to light.   Neck:      Trachea: No tracheal deviation.   Cardiovascular:      Rate and Rhythm: Normal rate and regular rhythm.   Pulmonary:      Effort: Pulmonary effort is normal. No respiratory distress.      Breath sounds: Normal breath sounds.   Chest:      Chest wall: No tenderness.   Abdominal:      General: Bowel sounds are normal.      Palpations: Abdomen is soft.      Tenderness: There is no abdominal tenderness. There is no guarding or  rebound.   Musculoskeletal:         General: No tenderness. Normal range of motion.      Cervical back: Normal range of motion and neck supple. No rigidity or tenderness.      Right lower leg: No edema.      Left lower leg: No edema.   Skin:     General: Skin is warm and dry.      Capillary Refill: Capillary refill takes less than 2 seconds.      Coloration: Skin is not pale.      Comments: Stage IV sacral decubitus with wet-to-dry dressing in place.  This is removed for examination.  Tissue appears pink and healthy, clinically no signs of infection.   Neurological:      General: No focal deficit present.      Mental Status: She is alert and oriented to person, place, and time.      GCS: GCS eye subscore is 4. GCS verbal subscore is 5. GCS motor subscore is 6.      Motor: No abnormal muscle tone.   Psychiatric:         Mood and Affect: Mood normal.         Behavior: Behavior normal.       Procedures  Results for orders placed or performed during the hospital encounter of 06/05/23   Urinalysis With Culture If Indicated - Indwelling Urethral Catheter    Specimen: Indwelling Urethral Catheter; Urine   Result Value Ref Range    Color, UA Yellow Yellow, Straw    Appearance, UA Cloudy (A) Clear    pH, UA 6.0 5.0 - 8.0    Specific Gravity, UA 1.026 1.005 - 1.030    Glucose, UA >=1000 mg/dL (3+) (A) Negative    Ketones, UA Negative Negative    Bilirubin, UA Negative Negative    Blood, UA Large (3+) (A) Negative    Protein, UA >=300 mg/dL (3+) (A) Negative    Leuk Esterase, UA Trace (A) Negative    Nitrite, UA Positive (A) Negative    Urobilinogen, UA 1.0 E.U./dL 0.2 - 1.0 E.U./dL   Urinalysis, Microscopic Only - Indwelling Urethral Catheter    Specimen: Indwelling Urethral Catheter; Urine   Result Value Ref Range    RBC, UA Too Numerous to Count (A) None Seen, 0-2 /HPF    WBC, UA 31-50 (A) None Seen, 0-2 /HPF    Bacteria, UA 3+ (A) None Seen /HPF    Squamous Epithelial Cells, UA 0-2 None Seen, 0-2 /HPF    Hyaline Casts, UA  7-12 None Seen /LPF    Methodology Automated Microscopy               ED Course  ED Course as of 06/05/23 1601   Mon Jun 05, 2023   1551 Patient's emergency department stay has been uneventful.  Nursing redressed her sacral decubitus, wet-to-dry.  She has shown no signs of distress.  We discussed her urine results and her plan of care.  She voices understanding and agreement.  She will do her best to follow-up closely with her pain clinic and her PCP.  She will return to the emergency department right away for any problems. [CM]      ED Course User Index  [CM] Agustin Hyatt MD                                   [facility] reviewed by Agustin Hyatt MD       Medical Decision Making      Final diagnoses:   Problem with Flanagan catheter, initial encounter   UTI (urinary tract infection), bacterial   Other chronic pain   Pressure injury of sacral region, stage 4       ED Disposition  ED Disposition       ED Disposition   Discharge    Condition   Stable    Comment   --               Barbara Gaona, APRN  [address] [id number] [phone]    Go in 2 days      [facility]  [address] [place] [id number] [phone]  Go to   If symptoms worsen         Medication List        New Prescriptions      cefdinir 300 MG capsule  Commonly known as: OMNICEF  Take 1 capsule by mouth 2 (Two) Times a Day.     naloxone 4 MG/0.1ML nasal spray  Commonly known as: NARCAN  Call 911. Don't prime. [place] in 1 nostril for overdose. Repeat in 2-3 minutes in other nostril if no or minimal breathing/responsiveness.     oxyCODONE-acetaminophen  MG per tablet  Commonly known as: PERCOCET  Take 1 tablet by mouth Every 6 (Six) Hours As Needed for Severe Pain.               Where to Get Your Medications        These medications were sent to [facility] [address] [place] [address], [place] - [facility] - [phone]  - [phone]   [address] [facility] [address]      Phone:  [phone]   cefdinir 300 MG capsule  naloxone 4 MG/0.1ML nasal spray  oxyCODONE-acetaminophen  MG per tablet       Please note that portions of this note were completed with a voice recognition program.        Agustin Hyatt MD  06/05/23 6571

## 2023-06-05 NOTE — ED NOTES
Called  Karissa regarding patient requesting home health referral, stated she would come speak to patient as soon as possible.

## 2023-06-05 NOTE — DISCHARGE INSTRUCTIONS
Home in care of family.  Drink plenty of fluids.  Take your medications as prescribed.  See your primary care provider in the next couple days, see your pain clinic doctor soon as possible.  Return to the emergency department immediately if symptoms worsen/any problems.

## 2023-06-05 NOTE — CASE MANAGEMENT/SOCIAL WORK
Case Management/Social Work    Patient Name:  Anika Neri  YOB: 1950  MRN: [id number]  Admit Date:  6/5/2023      SS received call from RN who states Pt would benefit from home health or DME services at home. SS spoke with Pt at bedside on this date. Pt voices she lives in a mobile home with her daughter, Esther and son-in-law providing care. Pt does not utilize home health services. Pt has an indwelling catheter.Pt has a bed pan and  wheelchair via unknown provider. Pt's PCP is MILLER Bell.     Pt reports she has not been able to go to her PCP in several months due to transportation. SS to contact [facility] on 06/06/23 to check price for transportation for PCP appts. [facility] office has closed for the day. Pt requests Home health serivces, hospital bed, bedisde commode and a wheelchair that is equipped to fit in her mobile home door. Pt reports her wheelchair will not fit in and out her door at home, which makes leaving her home very difficult. SS explained that MD has requested she have a follow up PCP appt in 2 days.     SS to follow up with Pt, [facility] and PCP office on 06/05/23. SS notified RN.      Electronically signed by:  AZEEM George  06/05/23 17:07 EDT

## 2023-06-06 NOTE — CASE MANAGEMENT/SOCIAL WORK
Case Management/Social Work    Patient Name:  Anika Neri  YOB: 1950  MRN: [id number]  Admit Date:  6/5/2023        SS contacted  Lea DawesMILLER [phone]'s office. SS spoke with  Gayla who states Lea Gaona is out the rest of week. Follow up appointment was scheduled for MILLER Perez on June 9, 2023 at 1:30pm. SS left message for provider making her aware of need for home health services, wheelchair, hospital bed and bedside commode will be needed. If Pt does not make it for this appointment, asked PCP office to make SS aware if Pt does not make it to appointment.       SS notified [facility] [phone] and scheduled trip, cost will be 12.00. SS contacted Pt at home and notified her of appt,c cost of [facility]. Pt voiced understanding.       Electronically signed by:  AZEEM George  06/06/23 13:11 EDT

## 2023-06-08 LAB
BACTERIA SPEC AEROBE CULT: ABNORMAL
BACTERIA SPEC AEROBE CULT: ABNORMAL

## 2023-06-17 ENCOUNTER — HOSPITAL ENCOUNTER (EMERGENCY)
Facility: HOSPITAL | Age: 73
Discharge: HOME | End: 2023-06-17
Attending: STUDENT IN AN ORGANIZED HEALTH CARE EDUCATION/TRAINING PROGRAM
Payer: MEDICARE

## 2023-06-17 VITALS
RESPIRATION RATE: 20 BRPM | HEIGHT: 61 IN | SYSTOLIC BLOOD PRESSURE: 142 MMHG | DIASTOLIC BLOOD PRESSURE: 82 MMHG | OXYGEN SATURATION: 96 % | TEMPERATURE: 98.2 F | HEART RATE: 90 BPM | WEIGHT: 235 LBS | BODY MASS INDEX: 44.37 KG/M2

## 2023-06-17 DIAGNOSIS — T83.9XXA PROBLEM WITH FOLEY CATHETER, INITIAL ENCOUNTER: Primary | ICD-10-CM

## 2023-06-17 NOTE — ED PROVIDER NOTES
Subjective   History of Present Illness  73 yo female patient with hx of DM, A fib, HLD, HTN presents to the ED with complaints of a leaking barajas catheter. Pt is bed bound and debilitated s/p stroke. Pt states she had her barajas changed 2 weeks ago. Pt denies any other symptoms or concerns.      History provided by:  Patient   used: No      Review of Systems   Constitutional: Negative.    HENT: Negative.     Eyes: Negative.    Respiratory: Negative.     Cardiovascular: Negative.    Gastrointestinal: Negative.    Endocrine: Negative.    Genitourinary: Negative.    Musculoskeletal: Negative.    Allergic/Immunologic: Negative.    Neurological: Negative.    Hematological: Negative.    Psychiatric/Behavioral: Negative.     All other systems reviewed and are negative.    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   Constitutional:       Appearance: Normal appearance. She is normal weight.   HENT:      Head: Normocephalic and atraumatic.      Right Ear: External ear normal.      Left Ear: External ear normal.      Nose: Nose normal.       Mouth/Throat:      Mouth: Mucous membranes are moist.      Pharynx: Oropharynx is clear.   Eyes:      Extraocular Movements: Extraocular movements intact.      Conjunctiva/sclera: Conjunctivae normal.      Pupils: Pupils are equal, round, and reactive to light.   Cardiovascular:      Rate and Rhythm: Normal rate and regular rhythm.      Pulses: Normal pulses.      Heart sounds: Normal heart sounds.   Pulmonary:      Effort: Pulmonary effort is normal.      Breath sounds: Normal breath sounds.   Abdominal:      General: Abdomen is flat. Bowel sounds are normal.      Palpations: Abdomen is soft.   Musculoskeletal:         General: Normal range of motion.      Cervical back: Normal range of motion and neck supple.   Skin:     General: Skin is warm and dry.      Capillary Refill: Capillary refill takes less than 2 seconds.   Neurological:      General: No focal deficit present.      Mental Status: She is alert and oriented to person, place, and time. Mental status is at baseline.   Psychiatric:         Mood and Affect: Mood normal.         Behavior: Behavior normal.         Thought Content: Thought content normal.         Judgment: Judgment normal.       Procedures           ED Course  ED Course as of 06/17/23 1505   Sat Jun 17, 2023   1504 Barajas catheter changed per Madie ALLEN. Pt tolerated well. She will f/u with PCP as needed. Discussed sx and red flags that would warrant return to the ED.  [ML]      ED Course User Index  [ML] Oksana Pace PA                                           Medical Decision Making  71 yo female patient with hx of DM, A fib, HLD, HTN presents to the ED with complaints of a leaking barajas catheter. Pt is bed bound and debilitated s/p stroke. Pt states she had her barajas changed 2 weeks ago. Pt denies any other symptoms or concerns.  Barajas catheter changed per Madie ALLEN. Pt tolerated well. She will f/u with PCP as needed. Discussed sx and red flags that would warrant return to the  ED.     Problems Addressed:  Problem with Flanagan catheter, initial encounter: acute illness or injury        Final diagnoses:   Problem with Flanagan catheter, initial encounter       ED Disposition  ED Disposition       ED Disposition   Discharge    Condition   Stable    Comment   --               Barbara Gaona, APRN  [address] [id number]  [phone]    Schedule an appointment as soon as possible for a visit in 1 day           Medication List      No changes were made to your prescriptions during this visit.            Oksana Pace PA  06/17/23 1509

## 2023-07-21 ENCOUNTER — HOSPITAL ENCOUNTER (EMERGENCY)
Facility: HOSPITAL | Age: 73
Discharge: HOME | End: 2023-07-21
Attending: EMERGENCY MEDICINE | Admitting: EMERGENCY MEDICINE
Payer: MEDICARE

## 2023-07-21 VITALS
DIASTOLIC BLOOD PRESSURE: 98 MMHG | WEIGHT: 235 LBS | HEIGHT: 61 IN | SYSTOLIC BLOOD PRESSURE: 152 MMHG | BODY MASS INDEX: 44.37 KG/M2 | RESPIRATION RATE: 20 BRPM | TEMPERATURE: 99.4 F | OXYGEN SATURATION: 97 % | HEART RATE: 89 BPM

## 2023-07-21 DIAGNOSIS — L89.154 SACRAL DECUBITUS ULCER, STAGE IV: ICD-10-CM

## 2023-07-21 DIAGNOSIS — T83.511D URINARY TRACT INFECTION ASSOCIATED WITH INDWELLING URETHRAL CATHETER, SUBSEQUENT ENCOUNTER: ICD-10-CM

## 2023-07-21 DIAGNOSIS — N39.0 URINARY TRACT INFECTION ASSOCIATED WITH INDWELLING URETHRAL CATHETER, SUBSEQUENT ENCOUNTER: ICD-10-CM

## 2023-07-21 DIAGNOSIS — R32 URINARY INCONTINENCE, UNSPECIFIED TYPE: Primary | ICD-10-CM

## 2023-07-21 PROCEDURE — 51702 INSERT TEMP BLADDER CATH: CPT

## 2023-07-21 PROCEDURE — 99283 EMERGENCY DEPT VISIT LOW MDM: CPT

## 2023-07-21 NOTE — ED PROVIDER NOTES
"Subjective   History of Present Illness  72-year-old white female presents for catheter placement.  Patient was recently admitted to [facility] for UTI, and is currently receiving IV antibiotics per home health to complete a course of treatment for that UTI.  She states that she had a Pure Wick catheter during that time in the hospital, she had previously had indwelling catheter.  After discharge they did not replace her catheter.  She reports that she is not able to get up to go to the bathroom and has been \"laying wet\" at home, and requests replacing her catheter.  She also reports having a decubitus ulcer as one of the reasons that she had the indwelling catheter.    Review of Systems   All other systems reviewed and are negative.    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 Exam conducted with a chaperone present.   Constitutional:       Appearance: Normal appearance. She is normal weight.   HENT:      Head: Normocephalic and atraumatic.   Cardiovascular:      Rate and Rhythm: Normal rate and regular rhythm.      Heart sounds: Normal " heart sounds. No murmur heard.    No friction rub. No gallop.   Pulmonary:      Effort: Pulmonary effort is normal. No respiratory distress.      Breath sounds: Normal breath sounds. No wheezing, rhonchi or rales.   Chest:      Chest wall: No tenderness.   Abdominal:      General: Abdomen is flat. Bowel sounds are normal. There is no distension.      Palpations: Abdomen is soft.      Tenderness: There is no abdominal tenderness.   Genitourinary:     Comments: Urinary incontinence with wet brief noted.  Musculoskeletal:         General: Normal range of motion.      Right lower leg: No edema.      Left lower leg: No edema.   Skin:     General: Skin is warm and dry.      Comments: Sacral decubitus ulcer.   Neurological:      General: No focal deficit present.      Mental Status: She is alert and oriented to person, place, and time.   Psychiatric:         Mood and Affect: Mood normal.         Behavior: Behavior normal.       Procedures           ED Course  ED Course as of 07/21/23 1639   Fri Jul 21, 2023   1633 Discussed with patient risk and benefits of Flanagan catheter.  She does have a large stage IV sacral decubitus ulcer and history of UTI.  The patient is nonambulatory and requests Flanagan catheter.  She voiced understanding of risk and benefits. [BC]      ED Course User Index  [BC] Nehemias Reyes MD                                           Medical Decision Making  Problems Addressed:  Sacral decubitus ulcer, stage IV: acute illness or injury  Urinary incontinence, unspecified type: acute illness or injury  Urinary tract infection associated with indwelling urethral catheter, subsequent encounter: acute illness or injury        Final diagnoses:   Urinary incontinence, unspecified type   Sacral decubitus ulcer, stage IV   Urinary tract infection associated with indwelling urethral catheter, subsequent encounter       ED Disposition  ED Disposition       ED Disposition   Discharge    Condition   Stable    Comment   --                Barbara Gaona, APRN  [address] [facility] [address]  [phone]    In 1 week           Medication List        Stop      cefdinir 300 MG capsule  Commonly known as: Nehemias Ramos MD  07/21/23 9646

## 2023-07-21 NOTE — ED NOTES
Called Ankit Barrientos to arrange transportation for pt. Back to residence. Floyd advised our truck should be back in a few minutes.

## 2023-08-06 ENCOUNTER — HOSPITAL ENCOUNTER (EMERGENCY)
Facility: HOSPITAL | Age: 73
Discharge: HOME | End: 2023-08-06
Attending: EMERGENCY MEDICINE | Admitting: EMERGENCY MEDICINE
Payer: MEDICARE

## 2023-08-06 VITALS
OXYGEN SATURATION: 96 % | SYSTOLIC BLOOD PRESSURE: 137 MMHG | RESPIRATION RATE: 16 BRPM | DIASTOLIC BLOOD PRESSURE: 84 MMHG | TEMPERATURE: 97.9 F | HEART RATE: 62 BPM | BODY MASS INDEX: 43.43 KG/M2 | HEIGHT: 61 IN | WEIGHT: 230 LBS

## 2023-08-06 DIAGNOSIS — T83.021A DISPLACEMENT OF FOLEY CATHETER, INITIAL ENCOUNTER: Primary | ICD-10-CM

## 2023-08-06 PROCEDURE — 51702 INSERT TEMP BLADDER CATH: CPT

## 2023-08-06 PROCEDURE — 99283 EMERGENCY DEPT VISIT LOW MDM: CPT

## 2023-08-06 RX ORDER — OXYCODONE AND ACETAMINOPHEN 10; 325 MG/1; MG/1
1 TABLET ORAL ONCE
Status: COMPLETED | OUTPATIENT
Start: 2023-08-06 | End: 2023-08-06

## 2023-08-06 RX ADMIN — OXYCODONE AND ACETAMINOPHEN 1 TABLET: 10; 325 TABLET ORAL at 10:20

## 2023-08-06 NOTE — ED NOTES
Called HCPM, Floyd, about out truck taking pt back to residence, was told that he would let them know

## 2023-08-06 NOTE — ED PROVIDER NOTES
Subjective   History of Present Illness  71 yo patient reports that her Flanagan catheter fell out with the balloon inflated once again.  It is noted that she has been here numerous times.  She has a past medical history of stroke, hypertension, hyperlipidemia.  She has a pressure ulcer on her sacrum that is chronic.  She has a history of A-fib and arthritis.  She states that she does not see a urologist.  She will be referred to 1.  She denies any fever.  No other medical complaints at this time other than pain    Review of Systems   Constitutional: Negative.  Negative for fever.   HENT: Negative.     Respiratory: Negative.     Cardiovascular: Negative.  Negative for chest pain.   Gastrointestinal: Negative.  Negative for abdominal pain.   Endocrine: Negative.    Genitourinary: Negative.  Negative for dysuria.   Skin: Negative.    Neurological: Negative.    Psychiatric/Behavioral: Negative.     All other systems reviewed and are negative.    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   Constitutional:       General: She is not in acute distress.      Appearance: She is well-developed. She is not diaphoretic.   HENT:      Head: Normocephalic and atraumatic.      Right Ear: External ear normal.      Left Ear: External ear normal.      Nose: Nose normal.   Eyes:      Conjunctiva/sclera: Conjunctivae normal.      Pupils: Pupils are equal, round, and reactive to light.   Neck:      Vascular: No JVD.      Trachea: No tracheal deviation.   Cardiovascular:      Rate and Rhythm: Normal rate and regular rhythm.      Heart sounds: Normal heart sounds. No murmur heard.  Pulmonary:      Effort: Pulmonary effort is normal. No respiratory distress.      Breath sounds: Normal breath sounds. No wheezing.   Abdominal:      General: Bowel sounds are normal.      Palpations: Abdomen is soft.      Tenderness: There is no abdominal tenderness.   Musculoskeletal:         General: No deformity. Normal range of motion.      Cervical back: Normal range of motion and neck supple.   Skin:     General: Skin is warm and dry.      Coloration: Skin is not pale.      Findings: No erythema or rash.   Neurological:      Mental Status: She is alert and oriented to person, place, and time.      Cranial Nerves: No cranial nerve deficit.   Psychiatric:         Behavior: Behavior normal.         Thought Content: Thought content normal.       Procedures           ED Course                                           Medical Decision Making  71 yo patient reports that her Flanagan catheter fell out with the balloon inflated once again.  It is noted that she has been here numerous times.  She has a past medical history of stroke, hypertension, hyperlipidemia.  She has a pressure ulcer on her sacrum that is chronic.  She has a history of A-fib and arthritis.  She states that she does not see a urologist.  She will be referred to 1.  She denies any fever.  No other medical complaints at this time other than pain    Problems Addressed:  Displacement of Flanagan catheter, initial encounter: complicated acute illness  or injury    Risk  Prescription drug management.  Risk Details: Catheter was replaced.  No other symptoms.  Patient was referred to urology.  She was counseled at the signs and symptoms worsening on appropriate follow-up.        Final diagnoses:   Displacement of Flanagan catheter, initial encounter       ED Disposition  ED Disposition       ED Disposition   Discharge    Condition   Stable    Comment   --               Barbara Gaona, APRN  [address] [id number]  [phone]    Schedule an appointment as soon as possible for a visit       Ronald Hargrove MD  [address] [id number]  [phone]    Schedule an appointment as soon as possible for a visit            Medication List      No changes were made to your prescriptions during this visit.            Garrett Alcocer PA  08/06/23 1171

## 2023-08-19 ENCOUNTER — APPOINTMENT (OUTPATIENT)
Dept: CT IMAGING | Facility: HOSPITAL | Age: 73
End: 2023-08-19
Payer: MEDICARE

## 2023-08-19 ENCOUNTER — HOSPITAL ENCOUNTER (EMERGENCY)
Facility: HOSPITAL | Age: 73
Discharge: HOME | End: 2023-08-20
Attending: STUDENT IN AN ORGANIZED HEALTH CARE EDUCATION/TRAINING PROGRAM
Payer: MEDICARE

## 2023-08-19 DIAGNOSIS — N39.0 URINARY TRACT INFECTION ASSOCIATED WITH INDWELLING URETHRAL CATHETER, INITIAL ENCOUNTER: Primary | ICD-10-CM

## 2023-08-19 DIAGNOSIS — T83.511A URINARY TRACT INFECTION ASSOCIATED WITH INDWELLING URETHRAL CATHETER, INITIAL ENCOUNTER: Primary | ICD-10-CM

## 2023-08-19 LAB
ALBUMIN SERPL-MCNC: 3.3 G/DL (ref 3.5–5.2)
ALBUMIN/GLOB SERPL: 0.8 G/DL
ALP SERPL-CCNC: 96 U/L (ref 39–117)
ALT SERPL W P-5'-P-CCNC: 16 U/L (ref 1–33)
ANION GAP SERPL CALCULATED.3IONS-SCNC: 12.5 MMOL/L (ref 5–15)
AST SERPL-CCNC: 28 U/L (ref 1–32)
BACTERIA UR QL AUTO: ABNORMAL /HPF
BASOPHILS # BLD AUTO: 0.05 10*3/MM3 (ref 0–0.2)
BASOPHILS NFR BLD AUTO: 0.5 % (ref 0–1.5)
BILIRUB SERPL-MCNC: 0.4 MG/DL (ref 0–1.2)
BILIRUB UR QL STRIP: NEGATIVE
BUN SERPL-MCNC: 17 MG/DL (ref 8–23)
BUN/CREAT SERPL: 19.5 (ref 7–25)
CALCIUM SPEC-SCNC: 9.3 MG/DL (ref 8.6–10.5)
CHLORIDE SERPL-SCNC: 108 MMOL/L (ref 98–107)
CLARITY UR: CLEAR
CO2 SERPL-SCNC: 22.5 MMOL/L (ref 22–29)
COLOR UR: ABNORMAL
CREAT SERPL-MCNC: 0.87 MG/DL (ref 0.57–1)
D-LACTATE SERPL-SCNC: 0.9 MMOL/L (ref 0.5–2)
D-LACTATE SERPL-SCNC: 2.3 MMOL/L (ref 0.5–2)
DEPRECATED RDW RBC AUTO: 46.8 FL (ref 37–54)
EGFRCR SERPLBLD CKD-EPI 2021: 70.5 ML/MIN/1.73
EOSINOPHIL # BLD AUTO: 0.38 10*3/MM3 (ref 0–0.4)
EOSINOPHIL NFR BLD AUTO: 3.5 % (ref 0.3–6.2)
ERYTHROCYTE [DISTWIDTH] IN BLOOD BY AUTOMATED COUNT: 13.8 % (ref 12.3–15.4)
GLOBULIN UR ELPH-MCNC: 4.3 GM/DL
GLUCOSE SERPL-MCNC: 250 MG/DL (ref 65–99)
GLUCOSE UR STRIP-MCNC: ABNORMAL MG/DL
HCT VFR BLD AUTO: 39.5 % (ref 34–46.6)
HGB BLD-MCNC: 12.7 G/DL (ref 12–15.9)
HGB UR QL STRIP.AUTO: ABNORMAL
HYALINE CASTS UR QL AUTO: ABNORMAL /LPF
IMM GRANULOCYTES # BLD AUTO: 0.05 10*3/MM3 (ref 0–0.05)
IMM GRANULOCYTES NFR BLD AUTO: 0.5 % (ref 0–0.5)
INR PPP: 1.01 (ref 0.9–1.1)
KETONES UR QL STRIP: NEGATIVE
LEUKOCYTE ESTERASE UR QL STRIP.AUTO: ABNORMAL
LYMPHOCYTES # BLD AUTO: 2.81 10*3/MM3 (ref 0.7–3.1)
LYMPHOCYTES NFR BLD AUTO: 25.8 % (ref 19.6–45.3)
MCH RBC QN AUTO: 29.4 PG (ref 26.6–33)
MCHC RBC AUTO-ENTMCNC: 32.2 G/DL (ref 31.5–35.7)
MCV RBC AUTO: 91.4 FL (ref 79–97)
MONOCYTES # BLD AUTO: 0.45 10*3/MM3 (ref 0.1–0.9)
MONOCYTES NFR BLD AUTO: 4.1 % (ref 5–12)
NEUTROPHILS NFR BLD AUTO: 65.6 % (ref 42.7–76)
NEUTROPHILS NFR BLD AUTO: 7.14 10*3/MM3 (ref 1.7–7)
NITRITE UR QL STRIP: POSITIVE
NRBC BLD AUTO-RTO: 0 /100 WBC (ref 0–0.2)
PH UR STRIP.AUTO: 5.5 [PH] (ref 5–8)
PLATELET # BLD AUTO: 227 10*3/MM3 (ref 140–450)
PMV BLD AUTO: 10 FL (ref 6–12)
POTASSIUM SERPL-SCNC: 4 MMOL/L (ref 3.5–5.2)
PROT SERPL-MCNC: 7.6 G/DL (ref 6–8.5)
PROT UR QL STRIP: ABNORMAL
PROTHROMBIN TIME: 13.8 SECONDS (ref 12.1–14.7)
RBC # BLD AUTO: 4.32 10*6/MM3 (ref 3.77–5.28)
RBC # UR STRIP: ABNORMAL /HPF
REF LAB TEST METHOD: ABNORMAL
SODIUM SERPL-SCNC: 143 MMOL/L (ref 136–145)
SP GR UR STRIP: 1.02 (ref 1–1.03)
SQUAMOUS #/AREA URNS HPF: ABNORMAL /HPF
UROBILINOGEN UR QL STRIP: ABNORMAL
WBC # UR STRIP: ABNORMAL /HPF
WBC NRBC COR # BLD: 10.88 10*3/MM3 (ref 3.4–10.8)

## 2023-08-19 PROCEDURE — 83605 ASSAY OF LACTIC ACID: CPT | Performed by: STUDENT IN AN ORGANIZED HEALTH CARE EDUCATION/TRAINING PROGRAM

## 2023-08-19 PROCEDURE — 99284 EMERGENCY DEPT VISIT MOD MDM: CPT

## 2023-08-19 PROCEDURE — 36415 COLL VENOUS BLD VENIPUNCTURE: CPT

## 2023-08-19 PROCEDURE — 96375 TX/PRO/DX INJ NEW DRUG ADDON: CPT

## 2023-08-19 PROCEDURE — 74176 CT ABD & PELVIS W/O CONTRAST: CPT | Performed by: RADIOLOGY

## 2023-08-19 PROCEDURE — 96365 THER/PROPH/DIAG IV INF INIT: CPT

## 2023-08-19 PROCEDURE — 81001 URINALYSIS AUTO W/SCOPE: CPT | Performed by: STUDENT IN AN ORGANIZED HEALTH CARE EDUCATION/TRAINING PROGRAM

## 2023-08-19 PROCEDURE — 74176 CT ABD & PELVIS W/O CONTRAST: CPT

## 2023-08-19 PROCEDURE — 25010000002 CEFTRIAXONE PER 250 MG: Performed by: STUDENT IN AN ORGANIZED HEALTH CARE EDUCATION/TRAINING PROGRAM

## 2023-08-19 PROCEDURE — 25010000002 ONDANSETRON PER 1 MG: Performed by: STUDENT IN AN ORGANIZED HEALTH CARE EDUCATION/TRAINING PROGRAM

## 2023-08-19 PROCEDURE — 87040 BLOOD CULTURE FOR BACTERIA: CPT | Performed by: STUDENT IN AN ORGANIZED HEALTH CARE EDUCATION/TRAINING PROGRAM

## 2023-08-19 PROCEDURE — 87086 URINE CULTURE/COLONY COUNT: CPT | Performed by: STUDENT IN AN ORGANIZED HEALTH CARE EDUCATION/TRAINING PROGRAM

## 2023-08-19 PROCEDURE — 85025 COMPLETE CBC W/AUTO DIFF WBC: CPT | Performed by: STUDENT IN AN ORGANIZED HEALTH CARE EDUCATION/TRAINING PROGRAM

## 2023-08-19 PROCEDURE — 87077 CULTURE AEROBIC IDENTIFY: CPT | Performed by: STUDENT IN AN ORGANIZED HEALTH CARE EDUCATION/TRAINING PROGRAM

## 2023-08-19 PROCEDURE — 87186 SC STD MICRODIL/AGAR DIL: CPT | Performed by: STUDENT IN AN ORGANIZED HEALTH CARE EDUCATION/TRAINING PROGRAM

## 2023-08-19 PROCEDURE — 80053 COMPREHEN METABOLIC PANEL: CPT | Performed by: STUDENT IN AN ORGANIZED HEALTH CARE EDUCATION/TRAINING PROGRAM

## 2023-08-19 PROCEDURE — 85610 PROTHROMBIN TIME: CPT | Performed by: STUDENT IN AN ORGANIZED HEALTH CARE EDUCATION/TRAINING PROGRAM

## 2023-08-19 PROCEDURE — 25010000002 MORPHINE PER 10 MG: Performed by: STUDENT IN AN ORGANIZED HEALTH CARE EDUCATION/TRAINING PROGRAM

## 2023-08-19 RX ORDER — MORPHINE SULFATE 2 MG/ML
2 INJECTION, SOLUTION INTRAMUSCULAR; INTRAVENOUS ONCE
Status: COMPLETED | OUTPATIENT
Start: 2023-08-19 | End: 2023-08-19

## 2023-08-19 RX ORDER — CEFDINIR 300 MG/1
300 CAPSULE ORAL 2 TIMES DAILY
Qty: 14 CAPSULE | Refills: 0 | Status: SHIPPED | OUTPATIENT
Start: 2023-08-19 | End: 2023-08-26

## 2023-08-19 RX ORDER — ONDANSETRON 2 MG/ML
4 INJECTION INTRAMUSCULAR; INTRAVENOUS ONCE
Status: COMPLETED | OUTPATIENT
Start: 2023-08-19 | End: 2023-08-19

## 2023-08-19 RX ORDER — TRAMADOL HYDROCHLORIDE 50 MG/1
50 TABLET ORAL EVERY 6 HOURS PRN
Qty: 12 TABLET | Refills: 0 | Status: SHIPPED | OUTPATIENT
Start: 2023-08-19

## 2023-08-19 RX ADMIN — SODIUM CHLORIDE 2000 MG: 9 INJECTION, SOLUTION INTRAVENOUS at 17:54

## 2023-08-19 RX ADMIN — ONDANSETRON 4 MG: 2 INJECTION INTRAMUSCULAR; INTRAVENOUS at 17:51

## 2023-08-19 RX ADMIN — SODIUM CHLORIDE 2000 ML: 9 INJECTION, SOLUTION INTRAVENOUS at 17:52

## 2023-08-19 RX ADMIN — MORPHINE SULFATE 2 MG: 2 INJECTION, SOLUTION INTRAMUSCULAR; INTRAVENOUS at 17:59

## 2023-08-19 NOTE — ED PROVIDER NOTES
Subjective   History of Present Illness  Patient is a 72-year-old female with history significant for atrial fibrillation, type 2 diabetes mellitus, known sacral wound who comes to the ER with complaints of suprapubic pain and dysuria.  She states the symptoms have been ongoing for the last several days and have worsened resulting in her presentation today.  She denies any known fevers but reports chills.  History of recurrent urinary tract infections and chronic indwelling Flanagan.  She notes the significant amount of sediment and dark discoloration of her urine.  Denies any nausea or vomiting or other symptoms.    Review of Systems   Constitutional:  Positive for chills. Negative for fatigue and fever.   HENT:  Negative for ear pain, sinus pain and sore throat.    Respiratory:  Negative for cough, chest tightness, shortness of breath and wheezing.    Cardiovascular:  Negative for chest pain, palpitations and leg swelling.   Gastrointestinal:  Positive for abdominal pain. Negative for constipation, diarrhea, nausea and vomiting.   Genitourinary:  Positive for dysuria. Negative for hematuria and urgency.   Musculoskeletal:  Negative for arthralgias and myalgias.   Neurological:  Negative for dizziness, syncope and light-headedness.   Psychiatric/Behavioral:  Negative for confusion.      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 Exam conducted with a chaperone present.   Constitutional:       Appearance: Normal appearance. She is obese.   HENT:      Head: Normocephalic and atraumatic.      Nose: Nose normal.      Mouth/Throat:      Mouth: Mucous membranes are moist.      Pharynx: Oropharynx is clear.   Eyes:      Extraocular Movements: Extraocular movements intact.      Conjunctiva/sclera: Conjunctivae normal.      Pupils: Pupils are equal, round, and reactive to light.   Cardiovascular:      Rate and Rhythm: Normal rate and regular rhythm.      Pulses: Normal pulses.      Heart sounds: Normal heart sounds.   Pulmonary:      Effort: Pulmonary effort is normal.      Breath sounds: Normal breath sounds.   Abdominal:      General: Bowel sounds are normal.      Palpations: Abdomen is soft.   Musculoskeletal:         General: Normal range of motion.      Cervical back: Normal range of motion and neck supple.   Skin:     General: Skin is warm and dry.      Capillary Refill: Capillary refill takes less than 2 seconds.   Neurological:      General: No focal deficit present.      Mental Status: She is alert and oriented to person, place, and time.   Psychiatric:         Mood and Affect: Mood normal.         Behavior: Behavior normal.       Procedures           ED Course                                           Medical Decision Making  --Patient hemodynamically stable, significant suprapubic tenderness on exam.  Significant amount of urine sediment, dark discoloration in Barajas bag-barajas exchanged  --Labs overall unremarkable  --UA 1+ bacteriuria  --CT abdomen/pelvis 3 mm nonobstructing stone  --Previous urine cultures noted E. coli and Proteus   --Empiric IV Rocephin,  fluids, supportive care  --patient was discharged home on empiric antibiotics, follow-up outpatient    Problems Addressed:  Urinary tract infection associated with indwelling urethral catheter, initial encounter: complicated acute illness or injury    Amount and/or Complexity of Data Reviewed  Labs: ordered.  Radiology: ordered.    Risk  Prescription drug management.        Final diagnoses:   Urinary tract infection associated with indwelling urethral catheter, initial encounter       ED Disposition  ED Disposition       ED Disposition   Discharge    Condition   Stable    Comment   --               Barbara Gaona, APRN  [address] [id number]  [phone]    In 1 week           Medication List        New Prescriptions      cefdinir 300 MG capsule  Commonly known as: OMNICEF  Take 1 capsule by mouth 2 (Two) Times a Day for 7 days.     traMADol 50 MG tablet  Commonly known as: ULTRAM  Take 1 tablet by mouth Every 6 (Six) Hours As Needed for Moderate Pain.               Where to Get Your Medications        These medications were sent to [facility] [address] [place] [address] - [phone]  - [phone] [address] [address]      Phone: [phone]   cefdinir 300 MG capsule  traMADol 50 MG tablet            Dominick Becker DO  08/20/23 0008

## 2023-08-20 VITALS
OXYGEN SATURATION: 99 % | TEMPERATURE: 98.1 F | SYSTOLIC BLOOD PRESSURE: 134 MMHG | HEART RATE: 63 BPM | HEIGHT: 61 IN | WEIGHT: 230 LBS | DIASTOLIC BLOOD PRESSURE: 82 MMHG | BODY MASS INDEX: 43.43 KG/M2 | RESPIRATION RATE: 17 BRPM

## 2023-08-20 RX ORDER — OXYCODONE HYDROCHLORIDE AND ACETAMINOPHEN 5; 325 MG/1; MG/1
1 TABLET ORAL ONCE
Status: COMPLETED | OUTPATIENT
Start: 2023-08-20 | End: 2023-08-20

## 2023-08-20 RX ADMIN — OXYCODONE HYDROCHLORIDE AND ACETAMINOPHEN 1 TABLET: 5; 325 TABLET ORAL at 02:50

## 2023-08-20 NOTE — ED NOTES
Patient resting on stretcher. Patient aware of no availability of ambulances to transport home. Patient voices understanding. Patient denies pain or any needs or concerns at this time. MINDY.

## 2023-08-20 NOTE — ED NOTES
"Pt daughter called and screamed about pt being in hallway for over 24 hours.  Pt daughter began cussing at staff. I informed pt we called ems and they will be able to bring her home when they clear up runs. Also explained to pt daughter we did order pt a tray we were waiting on the tray to be delivered and we just checked on the status of the tray. Pt family became increasingly aggressive. Stating \" what am I suppose to tell my fucking mother? That she will get a tray when you decided that you're damn good an ready to get off your lazy ass?\"   I asked pt to politely stop screaming and cussing at me when pt states \" Is this how you treat all your fucking patients?\"    "

## 2023-08-20 NOTE — ED NOTES
Patient awaiting ride prior to discharge. Patient states that there is no one that can come and get her and that she is bed bound. Patient denies pain/discomfort at this time. Patient provided with water at this time. Patient voices no needs at this time. MINDY.

## 2023-08-22 LAB
BACTERIA SPEC AEROBE CULT: ABNORMAL
BACTERIA SPEC AEROBE CULT: ABNORMAL

## 2023-08-24 LAB
BACTERIA SPEC AEROBE CULT: NORMAL
BACTERIA SPEC AEROBE CULT: NORMAL

## 2023-09-16 ENCOUNTER — HOSPITAL ENCOUNTER (EMERGENCY)
Facility: HOSPITAL | Age: 73
Discharge: HOME | End: 2023-09-16
Attending: EMERGENCY MEDICINE
Payer: MEDICARE

## 2023-09-16 ENCOUNTER — APPOINTMENT (OUTPATIENT)
Dept: CT IMAGING | Facility: HOSPITAL | Age: 73
End: 2023-09-16
Payer: MEDICARE

## 2023-09-16 VITALS
BODY MASS INDEX: 47.2 KG/M2 | HEART RATE: 89 BPM | SYSTOLIC BLOOD PRESSURE: 140 MMHG | DIASTOLIC BLOOD PRESSURE: 81 MMHG | TEMPERATURE: 98.8 F | RESPIRATION RATE: 17 BRPM | HEIGHT: 61 IN | OXYGEN SATURATION: 95 % | WEIGHT: 250 LBS

## 2023-09-16 DIAGNOSIS — N30.01 ACUTE CYSTITIS WITH HEMATURIA: Primary | ICD-10-CM

## 2023-09-16 DIAGNOSIS — T83.9XXA FOLEY CATHETER PROBLEM, INITIAL ENCOUNTER: ICD-10-CM

## 2023-09-16 LAB
ALBUMIN SERPL-MCNC: 3.6 G/DL (ref 3.5–5.2)
ALBUMIN/GLOB SERPL: 0.8 G/DL
ALP SERPL-CCNC: 115 U/L (ref 39–117)
ALT SERPL W P-5'-P-CCNC: 24 U/L (ref 1–33)
ANION GAP SERPL CALCULATED.3IONS-SCNC: 11.2 MMOL/L (ref 5–15)
AST SERPL-CCNC: 39 U/L (ref 1–32)
BACTERIA UR QL AUTO: ABNORMAL /HPF
BASOPHILS # BLD AUTO: 0.07 10*3/MM3 (ref 0–0.2)
BASOPHILS NFR BLD AUTO: 0.5 % (ref 0–1.5)
BILIRUB SERPL-MCNC: 0.6 MG/DL (ref 0–1.2)
BILIRUB UR QL STRIP: NEGATIVE
BUN SERPL-MCNC: 18 MG/DL (ref 8–23)
BUN/CREAT SERPL: 21.7 (ref 7–25)
CALCIUM SPEC-SCNC: 9.4 MG/DL (ref 8.6–10.5)
CHLORIDE SERPL-SCNC: 104 MMOL/L (ref 98–107)
CLARITY UR: ABNORMAL
CO2 SERPL-SCNC: 25.8 MMOL/L (ref 22–29)
COLOR UR: YELLOW
CREAT SERPL-MCNC: 0.83 MG/DL (ref 0.57–1)
D-LACTATE SERPL-SCNC: 1.9 MMOL/L (ref 0.5–2)
D-LACTATE SERPL-SCNC: 2.6 MMOL/L (ref 0.5–2)
DEPRECATED RDW RBC AUTO: 45.5 FL (ref 37–54)
EGFRCR SERPLBLD CKD-EPI 2021: 74.1 ML/MIN/1.73
EOSINOPHIL # BLD AUTO: 0.33 10*3/MM3 (ref 0–0.4)
EOSINOPHIL NFR BLD AUTO: 2.4 % (ref 0.3–6.2)
ERYTHROCYTE [DISTWIDTH] IN BLOOD BY AUTOMATED COUNT: 13.7 % (ref 12.3–15.4)
GLOBULIN UR ELPH-MCNC: 4.5 GM/DL
GLUCOSE SERPL-MCNC: 269 MG/DL (ref 65–99)
GLUCOSE UR STRIP-MCNC: ABNORMAL MG/DL
HCT VFR BLD AUTO: 41.5 % (ref 34–46.6)
HGB BLD-MCNC: 13.4 G/DL (ref 12–15.9)
HGB UR QL STRIP.AUTO: ABNORMAL
HOLD SPECIMEN: NORMAL
HOLD SPECIMEN: NORMAL
HYALINE CASTS UR QL AUTO: ABNORMAL /LPF
IMM GRANULOCYTES # BLD AUTO: 0.04 10*3/MM3 (ref 0–0.05)
IMM GRANULOCYTES NFR BLD AUTO: 0.3 % (ref 0–0.5)
KETONES UR QL STRIP: NEGATIVE
LEUKOCYTE ESTERASE UR QL STRIP.AUTO: ABNORMAL
LIPASE SERPL-CCNC: 15 U/L (ref 13–60)
LYMPHOCYTES # BLD AUTO: 2.55 10*3/MM3 (ref 0.7–3.1)
LYMPHOCYTES NFR BLD AUTO: 18.7 % (ref 19.6–45.3)
MCH RBC QN AUTO: 29.3 PG (ref 26.6–33)
MCHC RBC AUTO-ENTMCNC: 32.3 G/DL (ref 31.5–35.7)
MCV RBC AUTO: 90.6 FL (ref 79–97)
MONOCYTES # BLD AUTO: 0.46 10*3/MM3 (ref 0.1–0.9)
MONOCYTES NFR BLD AUTO: 3.4 % (ref 5–12)
NEUTROPHILS NFR BLD AUTO: 10.2 10*3/MM3 (ref 1.7–7)
NEUTROPHILS NFR BLD AUTO: 74.7 % (ref 42.7–76)
NITRITE UR QL STRIP: NEGATIVE
NRBC BLD AUTO-RTO: 0 /100 WBC (ref 0–0.2)
PH UR STRIP.AUTO: 5.5 [PH] (ref 5–8)
PLATELET # BLD AUTO: 224 10*3/MM3 (ref 140–450)
PMV BLD AUTO: 10 FL (ref 6–12)
POTASSIUM SERPL-SCNC: 3.8 MMOL/L (ref 3.5–5.2)
PROT SERPL-MCNC: 8.1 G/DL (ref 6–8.5)
PROT UR QL STRIP: ABNORMAL
RBC # BLD AUTO: 4.58 10*6/MM3 (ref 3.77–5.28)
RBC # UR STRIP: ABNORMAL /HPF
REF LAB TEST METHOD: ABNORMAL
SODIUM SERPL-SCNC: 141 MMOL/L (ref 136–145)
SP GR UR STRIP: 1.02 (ref 1–1.03)
SQUAMOUS #/AREA URNS HPF: ABNORMAL /HPF
UROBILINOGEN UR QL STRIP: ABNORMAL
WBC # UR STRIP: ABNORMAL /HPF
WBC NRBC COR # BLD: 13.65 10*3/MM3 (ref 3.4–10.8)
WHOLE BLOOD HOLD COAG: NORMAL
WHOLE BLOOD HOLD SPECIMEN: NORMAL

## 2023-09-16 PROCEDURE — 87150 DNA/RNA AMPLIFIED PROBE: CPT | Performed by: EMERGENCY MEDICINE

## 2023-09-16 PROCEDURE — 51702 INSERT TEMP BLADDER CATH: CPT

## 2023-09-16 PROCEDURE — 36415 COLL VENOUS BLD VENIPUNCTURE: CPT

## 2023-09-16 PROCEDURE — 51798 US URINE CAPACITY MEASURE: CPT

## 2023-09-16 PROCEDURE — 80053 COMPREHEN METABOLIC PANEL: CPT | Performed by: EMERGENCY MEDICINE

## 2023-09-16 PROCEDURE — 99284 EMERGENCY DEPT VISIT MOD MDM: CPT

## 2023-09-16 PROCEDURE — 74176 CT ABD & PELVIS W/O CONTRAST: CPT

## 2023-09-16 PROCEDURE — 87040 BLOOD CULTURE FOR BACTERIA: CPT | Performed by: EMERGENCY MEDICINE

## 2023-09-16 PROCEDURE — 87086 URINE CULTURE/COLONY COUNT: CPT | Performed by: EMERGENCY MEDICINE

## 2023-09-16 PROCEDURE — 96365 THER/PROPH/DIAG IV INF INIT: CPT

## 2023-09-16 PROCEDURE — 83690 ASSAY OF LIPASE: CPT | Performed by: EMERGENCY MEDICINE

## 2023-09-16 PROCEDURE — 25010000002 CEFTRIAXONE PER 250 MG: Performed by: EMERGENCY MEDICINE

## 2023-09-16 PROCEDURE — 85025 COMPLETE CBC W/AUTO DIFF WBC: CPT | Performed by: EMERGENCY MEDICINE

## 2023-09-16 PROCEDURE — 81001 URINALYSIS AUTO W/SCOPE: CPT | Performed by: EMERGENCY MEDICINE

## 2023-09-16 PROCEDURE — 83605 ASSAY OF LACTIC ACID: CPT | Performed by: EMERGENCY MEDICINE

## 2023-09-16 RX ORDER — HYDROCODONE BITARTRATE AND ACETAMINOPHEN 7.5; 325 MG/1; MG/1
1 TABLET ORAL ONCE
Status: COMPLETED | OUTPATIENT
Start: 2023-09-16 | End: 2023-09-16

## 2023-09-16 RX ADMIN — HYDROCODONE BITARTRATE AND ACETAMINOPHEN 1 TABLET: 7.5; 325 TABLET ORAL at 18:48

## 2023-09-16 RX ADMIN — SODIUM CHLORIDE 2000 MG: 9 INJECTION, SOLUTION INTRAVENOUS at 17:30

## 2023-09-16 RX ADMIN — SODIUM CHLORIDE 500 ML: 9 INJECTION, SOLUTION INTRAVENOUS at 15:55

## 2023-09-16 NOTE — ED PROVIDER NOTES
Subjective   History of Present Illness  Patient is a 73-year-old female who presents complaining of pain from her Flanagan catheter.  She states it may be dislodged, she does not know, she has been leaking urine.  She complains of suprapubic abdominal pain.  She denies fever, chills, chest pain, shortness of breath, other abdominal pain, vomiting, syncope or near syncope, any new focal numbness or weakness, other symptoms or other complaints.    Review of Systems   All other systems reviewed and are negative.    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   Constitutional:       General: She is not in acute distress.     Appearance: She is well-developed. She is not toxic-appearing or diaphoretic.      Comments: Chronically ill-appearing female, awake and alert, in no apparent acute distress.  She smells very strongly of urine.   HENT:      Head: Normocephalic and atraumatic.   Eyes:      General: No scleral icterus.     Pupils: Pupils are equal, round, and reactive to light.   Neck:      Trachea: No tracheal deviation.   Cardiovascular:      Rate  and Rhythm: Normal rate and regular rhythm.   Pulmonary:      Effort: Pulmonary effort is normal. No respiratory distress.      Breath sounds: Normal breath sounds.   Chest:      Chest wall: No tenderness.   Abdominal:      General: Bowel sounds are normal.      Palpations: Abdomen is soft.      Tenderness: There is abdominal tenderness (Mild suprapubic tenderness.  No other tenderness.  No acute peritoneal signs.). There is no guarding or rebound.   Musculoskeletal:         General: No tenderness. Normal range of motion.      Cervical back: Normal range of motion and neck supple. No rigidity or tenderness.      Right lower leg: No edema.      Left lower leg: No edema.   Skin:     General: Skin is warm and dry.      Capillary Refill: Capillary refill takes less than 2 seconds.      Coloration: Skin is not pale.   Neurological:      Mental Status: She is alert and oriented to person, place, and time.      GCS: GCS eye subscore is 4. GCS verbal subscore is 5. GCS motor subscore is 6.   Psychiatric:         Mood and Affect: Mood normal.         Behavior: Behavior normal.       Procedures  CT Abdomen Pelvis Without Contrast   Final Result   No acute process in the abdomen or pelvis. 3 mm nonobstructing right   renal calculus.       This report was finalized on 9/16/2023 4:23 PM by Alex Pallas, DO.            Results for orders placed or performed during the hospital encounter of 09/16/23   Urinalysis With Culture If Indicated - Urine, Catheter    Specimen: Urine, Catheter   Result Value Ref Range    Color, UA Yellow Yellow, Straw    Appearance, UA Cloudy (A) Clear    pH, UA 5.5 5.0 - 8.0    Specific Gravity, UA 1.025 1.005 - 1.030    Glucose,  mg/dL (2+) (A) Negative    Ketones, UA Negative Negative    Bilirubin, UA Negative Negative    Blood, UA Trace (A) Negative    Protein,  mg/dL (2+) (A) Negative    Leuk Esterase, UA Small (1+) (A) Negative    Nitrite, UA Negative Negative    Urobilinogen, UA 1.0  E.U./dL 0.2 - 1.0 E.U./dL   Comprehensive Metabolic Panel    Specimen: Blood   Result Value Ref Range    Glucose 269 (H) 65 - 99 mg/dL    BUN 18 8 - 23 mg/dL    Creatinine 0.83 0.57 - 1.00 mg/dL    Sodium 141 136 - 145 mmol/L    Potassium 3.8 3.5 - 5.2 mmol/L    Chloride 104 98 - 107 mmol/L    CO2 25.8 22.0 - 29.0 mmol/L    Calcium 9.4 8.6 - 10.5 mg/dL    Total Protein 8.1 6.0 - 8.5 g/dL    Albumin 3.6 3.5 - 5.2 g/dL    ALT (SGPT) 24 1 - 33 U/L    AST (SGOT) 39 (H) 1 - 32 U/L    Alkaline Phosphatase 115 39 - 117 U/L    Total Bilirubin 0.6 0.0 - 1.2 mg/dL    Globulin 4.5 gm/dL    A/G Ratio 0.8 g/dL    BUN/Creatinine Ratio 21.7 7.0 - 25.0    Anion Gap 11.2 5.0 - 15.0 mmol/L    eGFR 74.1 >60.0 mL/min/1.73   Lipase    Specimen: Blood   Result Value Ref Range    Lipase 15 13 - 60 U/L   Lactic Acid, Plasma    Specimen: Blood   Result Value Ref Range    Lactate 2.6 (C) 0.5 - 2.0 mmol/L   CBC Auto Differential    Specimen: Blood   Result Value Ref Range    WBC 13.65 (H) 3.40 - 10.80 10*3/mm3    RBC 4.58 3.77 - 5.28 10*6/mm3    Hemoglobin 13.4 12.0 - 15.9 g/dL    Hematocrit 41.5 34.0 - 46.6 %    MCV 90.6 79.0 - 97.0 fL    MCH 29.3 26.6 - 33.0 pg    MCHC 32.3 31.5 - 35.7 g/dL    RDW 13.7 12.3 - 15.4 %    RDW-SD 45.5 37.0 - 54.0 fl    MPV 10.0 6.0 - 12.0 fL    Platelets 224 140 - 450 10*3/mm3    Neutrophil % 74.7 42.7 - 76.0 %    Lymphocyte % 18.7 (L) 19.6 - 45.3 %    Monocyte % 3.4 (L) 5.0 - 12.0 %    Eosinophil % 2.4 0.3 - 6.2 %    Basophil % 0.5 0.0 - 1.5 %    Immature Grans % 0.3 0.0 - 0.5 %    Neutrophils, Absolute 10.20 (H) 1.70 - 7.00 10*3/mm3    Lymphocytes, Absolute 2.55 0.70 - 3.10 10*3/mm3    Monocytes, Absolute 0.46 0.10 - 0.90 10*3/mm3    Eosinophils, Absolute 0.33 0.00 - 0.40 10*3/mm3    Basophils, Absolute 0.07 0.00 - 0.20 10*3/mm3    Immature Grans, Absolute 0.04 0.00 - 0.05 10*3/mm3    nRBC 0.0 0.0 - 0.2 /100 WBC   STAT Lactic Acid, Reflex    Specimen: Blood   Result Value Ref Range    Lactate 1.9 0.5 - 2.0  "mmol/L   Urinalysis, Microscopic Only - Urine, Catheter    Specimen: Urine, Catheter   Result Value Ref Range    RBC, UA 3-5 (A) None Seen, 0-2 /HPF    WBC, UA 21-30 (A) None Seen, 0-2 /HPF    Bacteria, UA 1+ (A) None Seen /HPF    Squamous Epithelial Cells, UA None Seen None Seen, 0-2 /HPF    Hyaline Casts, UA 3-6 None Seen /LPF    Methodology Automated Microscopy    Green Top (Gel)   Result Value Ref Range    Extra Tube Hold for add-ons.    Lavender Top   Result Value Ref Range    Extra Tube hold for add-on    Gold Top - SST   Result Value Ref Range    Extra Tube Hold for add-ons.    Light Blue Top   Result Value Ref Range    Extra Tube Hold for add-ons.               ED Course  ED Course as of 09/17/23 0546   Sat Sep 16, 2023   1650 Nursing change the patient's Flanagan, bladder was empty, unable to obtain urine at that time.  I have ordered Rocephin.  Nursing has clamped the tube and awaiting enough urine for urinalysis. [CM]   1842 Patient complains of pain all over, request pain medication.  She states that her doctor stopped prescribing her pain medicine \"a while ago\".  Last prescription for Percocet 10 was filled in June.  Patient filled a prescription for tramadol 50 mg on August 19. [CM]   1856 Case discussed with and care endorsed to Dr. Vyas at shift change [CM]      ED Course User Index  [CM] Agustin Hyatt MD   Patient received IV Rocephin while in the ER and treated with pain medications as well as fluids.  Antibiotics been sent to the pharmacy to empirically cover for possible developing UTI.  Patient does have a new Flanagan catheter in place and was changed in ED today  Electronically signed by Gerri Vyas DO, 09/16/23, 9:56 PM EDT.[facility] reviewed by Agustin Hyatt MD, Gerri Vyas DO       Medical Decision Making  Problems Addressed:  Acute cystitis with hematuria: complicated acute illness or injury  Flanagan catheter problem, initial encounter: complicated " acute illness or injury    Amount and/or Complexity of Data Reviewed  Labs: ordered.  Radiology: ordered.    Risk  Prescription drug management.        Final diagnoses:   Flanagan catheter problem, initial encounter   Acute cystitis with hematuria       ED Disposition  ED Disposition       ED Disposition   Discharge    Condition   Stable    Comment   --               Barbara Gaona, APRN  [address] [facility] [address]  [phone]               Medication List      No changes were made to your prescriptions during this visit.            Gerri Vyas DO  09/17/23 0546

## 2023-09-16 NOTE — ED NOTES
Patient gown and linens changed. Patient given bed bath and skin barrier clean applied to skin folds. Pressure ulcer noted to coccyx. Dr. Hyatt aware.

## 2023-09-16 NOTE — ED NOTES
Urine return noted with barajas insertion. Insufficient amount of urine return for urine sample.     Bladder scan: 0mL.     Dr. Hyatt aware

## 2023-09-17 LAB
BACTERIA BLD CULT: ABNORMAL
BACTERIA SPEC AEROBE CULT: ABNORMAL
BACTERIA SPEC AEROBE CULT: ABNORMAL
BOTTLE TYPE: ABNORMAL
GRAM STN SPEC: ABNORMAL

## 2023-09-20 LAB
BACTERIA SPEC AEROBE CULT: ABNORMAL
GRAM STN SPEC: ABNORMAL
ISOLATED FROM: ABNORMAL

## 2023-09-21 LAB — BACTERIA SPEC AEROBE CULT: NORMAL

## 2023-09-28 ENCOUNTER — APPOINTMENT (OUTPATIENT)
Dept: CT IMAGING | Facility: HOSPITAL | Age: 73
End: 2023-09-28
Payer: MEDICARE

## 2023-09-28 ENCOUNTER — HOSPITAL ENCOUNTER (EMERGENCY)
Facility: HOSPITAL | Age: 73
Discharge: HOME | End: 2023-09-28
Attending: STUDENT IN AN ORGANIZED HEALTH CARE EDUCATION/TRAINING PROGRAM
Payer: MEDICARE

## 2023-09-28 VITALS
SYSTOLIC BLOOD PRESSURE: 155 MMHG | HEIGHT: 61 IN | BODY MASS INDEX: 47.2 KG/M2 | RESPIRATION RATE: 22 BRPM | TEMPERATURE: 98 F | DIASTOLIC BLOOD PRESSURE: 116 MMHG | WEIGHT: 250 LBS | OXYGEN SATURATION: 98 % | HEART RATE: 90 BPM

## 2023-09-28 DIAGNOSIS — N30.00 ACUTE CYSTITIS WITHOUT HEMATURIA: Primary | ICD-10-CM

## 2023-09-28 LAB
ALBUMIN SERPL-MCNC: 3.4 G/DL (ref 3.5–5.2)
ALBUMIN/GLOB SERPL: 0.8 G/DL
ALP SERPL-CCNC: 113 U/L (ref 39–117)
ALT SERPL W P-5'-P-CCNC: 16 U/L (ref 1–33)
ANION GAP SERPL CALCULATED.3IONS-SCNC: 12.6 MMOL/L (ref 5–15)
AST SERPL-CCNC: 22 U/L (ref 1–32)
BACTERIA UR QL AUTO: ABNORMAL /HPF
BASOPHILS # BLD AUTO: 0.05 10*3/MM3 (ref 0–0.2)
BASOPHILS NFR BLD AUTO: 0.5 % (ref 0–1.5)
BILIRUB SERPL-MCNC: 0.4 MG/DL (ref 0–1.2)
BILIRUB UR QL STRIP: NEGATIVE
BUN SERPL-MCNC: 21 MG/DL (ref 8–23)
BUN/CREAT SERPL: 25.3 (ref 7–25)
CALCIUM SPEC-SCNC: 9.3 MG/DL (ref 8.6–10.5)
CHLORIDE SERPL-SCNC: 102 MMOL/L (ref 98–107)
CLARITY UR: ABNORMAL
CO2 SERPL-SCNC: 23.4 MMOL/L (ref 22–29)
COLOR UR: YELLOW
CREAT SERPL-MCNC: 0.83 MG/DL (ref 0.57–1)
D-LACTATE SERPL-SCNC: 1.8 MMOL/L (ref 0.5–2)
D-LACTATE SERPL-SCNC: 3.1 MMOL/L (ref 0.5–2)
DEPRECATED RDW RBC AUTO: 46.5 FL (ref 37–54)
EGFRCR SERPLBLD CKD-EPI 2021: 74.1 ML/MIN/1.73
EOSINOPHIL # BLD AUTO: 0.27 10*3/MM3 (ref 0–0.4)
EOSINOPHIL NFR BLD AUTO: 2.7 % (ref 0.3–6.2)
ERYTHROCYTE [DISTWIDTH] IN BLOOD BY AUTOMATED COUNT: 13.7 % (ref 12.3–15.4)
GLOBULIN UR ELPH-MCNC: 4.1 GM/DL
GLUCOSE SERPL-MCNC: 361 MG/DL (ref 65–99)
GLUCOSE UR STRIP-MCNC: ABNORMAL MG/DL
HCT VFR BLD AUTO: 41.1 % (ref 34–46.6)
HGB BLD-MCNC: 13.1 G/DL (ref 12–15.9)
HGB UR QL STRIP.AUTO: ABNORMAL
HOLD SPECIMEN: NORMAL
HOLD SPECIMEN: NORMAL
HYALINE CASTS UR QL AUTO: ABNORMAL /LPF
IMM GRANULOCYTES # BLD AUTO: 0.04 10*3/MM3 (ref 0–0.05)
IMM GRANULOCYTES NFR BLD AUTO: 0.4 % (ref 0–0.5)
KETONES UR QL STRIP: NEGATIVE
LEUKOCYTE ESTERASE UR QL STRIP.AUTO: ABNORMAL
LIPASE SERPL-CCNC: 15 U/L (ref 13–60)
LYMPHOCYTES # BLD AUTO: 2.32 10*3/MM3 (ref 0.7–3.1)
LYMPHOCYTES NFR BLD AUTO: 23.1 % (ref 19.6–45.3)
MCH RBC QN AUTO: 29.3 PG (ref 26.6–33)
MCHC RBC AUTO-ENTMCNC: 31.9 G/DL (ref 31.5–35.7)
MCV RBC AUTO: 91.9 FL (ref 79–97)
MONOCYTES # BLD AUTO: 0.45 10*3/MM3 (ref 0.1–0.9)
MONOCYTES NFR BLD AUTO: 4.5 % (ref 5–12)
NEUTROPHILS NFR BLD AUTO: 6.93 10*3/MM3 (ref 1.7–7)
NEUTROPHILS NFR BLD AUTO: 68.8 % (ref 42.7–76)
NITRITE UR QL STRIP: NEGATIVE
NRBC BLD AUTO-RTO: 0 /100 WBC (ref 0–0.2)
PH UR STRIP.AUTO: 6 [PH] (ref 5–8)
PLATELET # BLD AUTO: 201 10*3/MM3 (ref 140–450)
PMV BLD AUTO: 10.4 FL (ref 6–12)
POTASSIUM SERPL-SCNC: 4.2 MMOL/L (ref 3.5–5.2)
PROT SERPL-MCNC: 7.5 G/DL (ref 6–8.5)
PROT UR QL STRIP: ABNORMAL
RBC # BLD AUTO: 4.47 10*6/MM3 (ref 3.77–5.28)
RBC # UR STRIP: ABNORMAL /HPF
REF LAB TEST METHOD: ABNORMAL
SODIUM SERPL-SCNC: 138 MMOL/L (ref 136–145)
SP GR UR STRIP: 1.02 (ref 1–1.03)
SQUAMOUS #/AREA URNS HPF: ABNORMAL /HPF
UROBILINOGEN UR QL STRIP: ABNORMAL
WBC # UR STRIP: ABNORMAL /HPF
WBC NRBC COR # BLD: 10.06 10*3/MM3 (ref 3.4–10.8)
WHOLE BLOOD HOLD COAG: NORMAL
WHOLE BLOOD HOLD SPECIMEN: NORMAL

## 2023-09-28 PROCEDURE — 74176 CT ABD & PELVIS W/O CONTRAST: CPT

## 2023-09-28 PROCEDURE — 36415 COLL VENOUS BLD VENIPUNCTURE: CPT

## 2023-09-28 PROCEDURE — 83605 ASSAY OF LACTIC ACID: CPT | Performed by: STUDENT IN AN ORGANIZED HEALTH CARE EDUCATION/TRAINING PROGRAM

## 2023-09-28 PROCEDURE — 81001 URINALYSIS AUTO W/SCOPE: CPT | Performed by: STUDENT IN AN ORGANIZED HEALTH CARE EDUCATION/TRAINING PROGRAM

## 2023-09-28 PROCEDURE — 25010000002 KETOROLAC TROMETHAMINE PER 15 MG: Performed by: STUDENT IN AN ORGANIZED HEALTH CARE EDUCATION/TRAINING PROGRAM

## 2023-09-28 PROCEDURE — 80053 COMPREHEN METABOLIC PANEL: CPT | Performed by: STUDENT IN AN ORGANIZED HEALTH CARE EDUCATION/TRAINING PROGRAM

## 2023-09-28 PROCEDURE — 85025 COMPLETE CBC W/AUTO DIFF WBC: CPT | Performed by: STUDENT IN AN ORGANIZED HEALTH CARE EDUCATION/TRAINING PROGRAM

## 2023-09-28 PROCEDURE — 25010000002 CEFTRIAXONE PER 250 MG: Performed by: STUDENT IN AN ORGANIZED HEALTH CARE EDUCATION/TRAINING PROGRAM

## 2023-09-28 PROCEDURE — 99284 EMERGENCY DEPT VISIT MOD MDM: CPT

## 2023-09-28 PROCEDURE — 74176 CT ABD & PELVIS W/O CONTRAST: CPT | Performed by: RADIOLOGY

## 2023-09-28 PROCEDURE — 96365 THER/PROPH/DIAG IV INF INIT: CPT

## 2023-09-28 PROCEDURE — 51702 INSERT TEMP BLADDER CATH: CPT

## 2023-09-28 PROCEDURE — 83690 ASSAY OF LIPASE: CPT | Performed by: STUDENT IN AN ORGANIZED HEALTH CARE EDUCATION/TRAINING PROGRAM

## 2023-09-28 PROCEDURE — 96375 TX/PRO/DX INJ NEW DRUG ADDON: CPT

## 2023-09-28 RX ORDER — KETOROLAC TROMETHAMINE 30 MG/ML
15 INJECTION, SOLUTION INTRAMUSCULAR; INTRAVENOUS ONCE
Status: COMPLETED | OUTPATIENT
Start: 2023-09-28 | End: 2023-09-28

## 2023-09-28 RX ORDER — CEFUROXIME AXETIL 500 MG/1
500 TABLET ORAL 2 TIMES DAILY
Qty: 13 TABLET | Refills: 0 | Status: SHIPPED | OUTPATIENT
Start: 2023-09-28

## 2023-09-28 RX ORDER — SODIUM CHLORIDE 0.9 % (FLUSH) 0.9 %
10 SYRINGE (ML) INJECTION AS NEEDED
Status: DISCONTINUED | OUTPATIENT
Start: 2023-09-28 | End: 2023-09-28 | Stop reason: HOSPADM

## 2023-09-28 RX ADMIN — KETOROLAC TROMETHAMINE 15 MG: 30 INJECTION, SOLUTION INTRAMUSCULAR; INTRAVENOUS at 17:43

## 2023-09-28 RX ADMIN — CEFTRIAXONE 1000 MG: 1 INJECTION, POWDER, FOR SOLUTION INTRAMUSCULAR; INTRAVENOUS at 15:06

## 2023-09-28 RX ADMIN — SODIUM CHLORIDE 1000 ML: 9 INJECTION, SOLUTION INTRAVENOUS at 15:06

## 2023-09-28 NOTE — ED PROVIDER NOTES
Subjective   History of Present Illness  73-year-old female with past medical history of chronic indwelling Flanagan, atrial fibrillation, diabetes, and hypertension presents to the ER due to concerns for increasing abdominal pain and difficulty/pain with Flanagan catheter.  No fever.  No chills.  No changes in bowel or urinary habits.  No obvious signs of hematuria.  No aggravating or alleviating factors.  Vital signs stable.    Review of Systems   Gastrointestinal:  Positive for abdominal pain.   Genitourinary:  Positive for dysuria.   All other systems reviewed and are negative.    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        Allergies   Allergen Reactions    Contrast Dye (Echo Or Unknown Ct/Mr) Itching       Past Surgical History:   Procedure Laterality Date    CHOLECYSTECTOMY         Family History   Problem Relation Age of Onset    Diabetes Mother     Hypertension Father        Social History     Socioeconomic History    Marital status:    Tobacco Use    Smoking status: Never    Smokeless tobacco: Never   Vaping Use    Vaping Use: Never used   Substance and Sexual Activity    Alcohol use: No    Drug use: No    Sexual activity: Defer           Objective   Physical Exam  Constitutional:       General: She is not in acute distress.     Appearance: Normal appearance. She is not ill-appearing.   HENT:      Head: Normocephalic and atraumatic.      Right Ear: External ear normal.      Left Ear: External ear normal.      Nose: Nose normal.      Mouth/Throat:      Mouth: Mucous membranes are moist.   Eyes:      Extraocular Movements: Extraocular movements intact.      Pupils: Pupils are equal, round, and reactive to light.    Cardiovascular:      Rate and Rhythm: Normal rate and regular rhythm.      Heart sounds: No murmur heard.  Pulmonary:      Effort: Pulmonary effort is normal. No respiratory distress.      Breath sounds: Normal breath sounds. No wheezing.   Abdominal:      General: Bowel sounds are normal.      Palpations: Abdomen is soft.      Tenderness: There is abdominal tenderness in the suprapubic area.   Musculoskeletal:         General: No deformity or signs of injury. Normal range of motion.      Cervical back: Normal range of motion and neck supple.   Skin:     General: Skin is warm and dry.      Findings: No erythema.   Neurological:      General: No focal deficit present.      Mental Status: She is alert and oriented to person, place, and time. Mental status is at baseline.      Cranial Nerves: No cranial nerve deficit.   Psychiatric:         Mood and Affect: Mood normal.         Behavior: Behavior normal.         Thought Content: Thought content normal.       Procedures           ED Course      CT Abdomen Pelvis Without Contrast    Result Date: 9/28/2023  1.  Scattered diverticula in the colon.  No diverticulitis. 2.  Cardiomegaly.   This report was finalized on 9/28/2023 2:40 PM by Dr. Jesus Chavez MD.       Results for orders placed or performed during the hospital encounter of 09/28/23   Comprehensive Metabolic Panel    Specimen: Blood   Result Value Ref Range    Glucose 361 (H) 65 - 99 mg/dL    BUN 21 8 - 23 mg/dL    Creatinine 0.83 0.57 - 1.00 mg/dL    Sodium 138 136 - 145 mmol/L    Potassium 4.2 3.5 - 5.2 mmol/L    Chloride 102 98 - 107 mmol/L    CO2 23.4 22.0 - 29.0 mmol/L    Calcium 9.3 8.6 - 10.5 mg/dL    Total Protein 7.5 6.0 - 8.5 g/dL    Albumin 3.4 (L) 3.5 - 5.2 g/dL    ALT (SGPT) 16 1 - 33 U/L    AST (SGOT) 22 1 - 32 U/L    Alkaline Phosphatase 113 39 - 117 U/L    Total Bilirubin 0.4 0.0 - 1.2 mg/dL    Globulin 4.1 gm/dL    A/G Ratio 0.8 g/dL    BUN/Creatinine Ratio 25.3 (H) 7.0 - 25.0    Anion Gap 12.6  5.0 - 15.0 mmol/L    eGFR 74.1 >60.0 mL/min/1.73   Lipase    Specimen: Blood   Result Value Ref Range    Lipase 15 13 - 60 U/L   Urinalysis With Microscopic If Indicated (No Culture) - Indwelling Urethral Catheter    Specimen: Indwelling Urethral Catheter; Urine   Result Value Ref Range    Color, UA Yellow Yellow, Straw    Appearance, UA Cloudy (A) Clear    pH, UA 6.0 5.0 - 8.0    Specific Gravity, UA 1.024 1.005 - 1.030    Glucose, UA >=1000 mg/dL (3+) (A) Negative    Ketones, UA Negative Negative    Bilirubin, UA Negative Negative    Blood, UA Small (1+) (A) Negative    Protein,  mg/dL (2+) (A) Negative    Leuk Esterase, UA Small (1+) (A) Negative    Nitrite, UA Negative Negative    Urobilinogen, UA 1.0 E.U./dL 0.2 - 1.0 E.U./dL   Lactic Acid, Plasma    Specimen: Blood   Result Value Ref Range    Lactate 3.1 (C) 0.5 - 2.0 mmol/L   CBC Auto Differential    Specimen: Blood   Result Value Ref Range    WBC 10.06 3.40 - 10.80 10*3/mm3    RBC 4.47 3.77 - 5.28 10*6/mm3    Hemoglobin 13.1 12.0 - 15.9 g/dL    Hematocrit 41.1 34.0 - 46.6 %    MCV 91.9 79.0 - 97.0 fL    MCH 29.3 26.6 - 33.0 pg    MCHC 31.9 31.5 - 35.7 g/dL    RDW 13.7 12.3 - 15.4 %    RDW-SD 46.5 37.0 - 54.0 fl    MPV 10.4 6.0 - 12.0 fL    Platelets 201 140 - 450 10*3/mm3    Neutrophil % 68.8 42.7 - 76.0 %    Lymphocyte % 23.1 19.6 - 45.3 %    Monocyte % 4.5 (L) 5.0 - 12.0 %    Eosinophil % 2.7 0.3 - 6.2 %    Basophil % 0.5 0.0 - 1.5 %    Immature Grans % 0.4 0.0 - 0.5 %    Neutrophils, Absolute 6.93 1.70 - 7.00 10*3/mm3    Lymphocytes, Absolute 2.32 0.70 - 3.10 10*3/mm3    Monocytes, Absolute 0.45 0.10 - 0.90 10*3/mm3    Eosinophils, Absolute 0.27 0.00 - 0.40 10*3/mm3    Basophils, Absolute 0.05 0.00 - 0.20 10*3/mm3    Immature Grans, Absolute 0.04 0.00 - 0.05 10*3/mm3    nRBC 0.0 0.0 - 0.2 /100 WBC   STAT Lactic Acid, Reflex    Specimen: Blood   Result Value Ref Range    Lactate 1.8 0.5 - 2.0 mmol/L   Urinalysis, Microscopic Only - Indwelling  Urethral Catheter    Specimen: Indwelling Urethral Catheter; Urine   Result Value Ref Range    RBC, UA 6-12 (A) None Seen, 0-2 /HPF    WBC, UA 31-50 (A) None Seen, 0-2 /HPF    Bacteria, UA None Seen None Seen /HPF    Squamous Epithelial Cells, UA None Seen None Seen, 0-2 /HPF    Hyaline Casts, UA None Seen None Seen /LPF    Methodology Automated Microscopy    Green Top (Gel)   Result Value Ref Range    Extra Tube Hold for add-ons.    Lavender Top   Result Value Ref Range    Extra Tube hold for add-on    Gold Top - SST   Result Value Ref Range    Extra Tube Hold for add-ons.    Light Blue Top   Result Value Ref Range    Extra Tube Hold for add-ons.                                           Medical Decision Making  CBC and CMP unremarkable.  Slightly elevated lactic acid noted.  IV fluids and IV antibiotics given.  Lactic acid returned to normal.  CT imaging of the abdomen and pelvis noted no acute abnormality.  Urinalysis concerning for UTI.  Patient will continue antibiotics at discharge.  Cystitis likely source of pain.  Work up and results were discussed throughly with the patient.  The patient will be discharged for further monitoring and management with their PCP.  Red flags, warning signs, worsening symptoms, and when to return to the ER discussed with and understood by the patient.  Patient will follow up with their PCP in a timely manner.  Vitals stable at discharge.    Amount and/or Complexity of Data Reviewed  Labs: ordered. Decision-making details documented in ED Course.  Radiology: ordered. Decision-making details documented in ED Course.    Risk  Prescription drug management.        Final diagnoses:   Acute cystitis without hematuria       ED Disposition  ED Disposition       ED Disposition   Discharge    Condition   Stable    Comment   --               Barbara Gaona, APRN  [address] [facility] [address]  [phone]    In 1 week      [facility]  [address] [place] [address]  [facility]  [facility] [address]  [phone]    If symptoms worsen         Medication List        New Prescriptions      cefuroxime 500 MG tablet  Commonly known as: CEFTIN  Take 1 tablet by mouth 2 (Two) Times a Day.               Where to Get Your Medications        These medications were sent to [facility] - [place], [place] - [address] [facility] - [phone]  - [phone] [address] [address]      Phone: [phone]   cefuroxime 500 MG tablet            Geoffrey Rodrigez,   09/28/23 1713

## 2023-10-05 ENCOUNTER — HOSPITAL ENCOUNTER (EMERGENCY)
Facility: HOSPITAL | Age: 73
Discharge: HOME | End: 2023-10-05
Attending: STUDENT IN AN ORGANIZED HEALTH CARE EDUCATION/TRAINING PROGRAM
Payer: MEDICARE

## 2023-10-05 VITALS
SYSTOLIC BLOOD PRESSURE: 170 MMHG | TEMPERATURE: 97.7 F | OXYGEN SATURATION: 97 % | RESPIRATION RATE: 18 BRPM | HEIGHT: 61 IN | DIASTOLIC BLOOD PRESSURE: 60 MMHG | HEART RATE: 91 BPM | WEIGHT: 220 LBS | BODY MASS INDEX: 41.54 KG/M2

## 2023-10-05 DIAGNOSIS — T85.9XXA COMPLICATION OF CATHETER, INITIAL ENCOUNTER: Primary | ICD-10-CM

## 2023-10-05 LAB — GLUCOSE BLDC GLUCOMTR-MCNC: 302 MG/DL (ref 70–130)

## 2023-10-05 PROCEDURE — 51702 INSERT TEMP BLADDER CATH: CPT

## 2023-10-05 PROCEDURE — 99283 EMERGENCY DEPT VISIT LOW MDM: CPT

## 2023-10-05 PROCEDURE — 82948 REAGENT STRIP/BLOOD GLUCOSE: CPT

## 2023-10-05 NOTE — ED PROVIDER NOTES
Subjective   History of Present Illness  73-year-old female who presents to the ED today because her Flanagan catheter fell out.  She states it either fell out last night or this morning.  She states she is not sure how it fell out but reports that the balloon was still inflated when it came out.  She states that she has to have a Flanagan catheter because she is bedridden from a stroke.  She denies any other complaints at this time.    History provided by:  Patient  Illness  Severity:  Mild  Onset quality:  Sudden  Duration: possibly since last night.  Timing:  Constant  Progression:  Unchanged  Chronicity:  New  Associated symptoms: no abdominal pain, no chest pain, no diarrhea, no fatigue, no fever, no shortness of breath and no vomiting      Review of Systems   Constitutional: Negative.  Negative for fatigue and fever.   HENT: Negative.     Eyes: Negative.    Respiratory: Negative.  Negative for shortness of breath.    Cardiovascular:  Negative for chest pain.   Gastrointestinal:  Negative for abdominal pain, diarrhea and vomiting.   Genitourinary:  Positive for difficulty urinating.   Musculoskeletal: Negative.    Skin: Negative.    Neurological: Negative.    Psychiatric/Behavioral: Negative.     All other systems reviewed and are negative.    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        Allergies   Allergen Reactions    Contrast Dye (Echo Or Unknown Ct/Mr) Itching       Past Surgical History:   Procedure Laterality Date    CHOLECYSTECTOMY         Family History   Problem Relation Age of Onset    Diabetes Mother     Hypertension Father        Social History     Socioeconomic History    Marital status:    Tobacco Use    Smoking  status: Never    Smokeless tobacco: Never   Vaping Use    Vaping Use: Never used   Substance and Sexual Activity    Alcohol use: No    Drug use: No    Sexual activity: Defer           Objective   Physical Exam  Vitals and nursing note reviewed.   Constitutional:       General: She is not in acute distress.     Appearance: She is obese. She is ill-appearing (chronically). She is not toxic-appearing or diaphoretic.   HENT:      Head: Normocephalic and atraumatic.      Right Ear: External ear normal.      Left Ear: External ear normal.      Nose: Nose normal.   Eyes:      Conjunctiva/sclera: Conjunctivae normal.      Pupils: Pupils are equal, round, and reactive to light.   Cardiovascular:      Rate and Rhythm: Normal rate and regular rhythm.      Pulses: Normal pulses.      Heart sounds: Normal heart sounds.   Pulmonary:      Effort: Pulmonary effort is normal.      Breath sounds: Normal breath sounds.   Abdominal:      General: Bowel sounds are normal.      Palpations: Abdomen is soft.      Tenderness: There is no abdominal tenderness.   Musculoskeletal:      Cervical back: Normal range of motion and neck supple.   Skin:     General: Skin is warm and dry.      Capillary Refill: Capillary refill takes less than 2 seconds.   Neurological:      Mental Status: She is alert and oriented to person, place, and time.   Psychiatric:         Mood and Affect: Mood normal.       Procedures           ED Course  ED Course as of 10/05/23 1506   Thu Oct 05, 2023   1353 Endorsed to Oksana Pace [AH]   1423 Catheter placed successfully per nurse. Pt will f/u with PCP as needed. Discussed sx and red flags that would warrant return to the ED.  [ML]      ED Course User Index  [AH] Karissa Santillan PA  [ML] Oksana Pace PA                                           Medical Decision Making  73-year-old female who presents to the ED today because her Flanagan catheter fell out.  She states it either fell out last night or this morning.   She states she is not sure how it fell out but reports that the balloon was still inflated when it came out.  She states that she has to have a Flanagan catheter because she is bedridden from a stroke.  She denies any other complaints at this time. 22 French catheter placed. Pt tolerated well. She will f/u with PCP as needed. Discussed sx and red flags that would warrant return to the ED.     Problems Addressed:  Complication of catheter, initial encounter: complicated acute illness or injury    Amount and/or Complexity of Data Reviewed  Labs: ordered.        Final diagnoses:   Complication of catheter, initial encounter   Electronically signed by SIVAKUMAR Sanchez, 10/05/23, 1:53 PM EDT.     ED Disposition  ED Disposition       ED Disposition   Discharge    Condition   Stable    Comment   --               Barbara Gaona, APRN  [address] [facility] [address] [phone]    Schedule an appointment as soon as possible for a visit in 1 day           Medication List      No changes were made to your prescriptions during this visit.            Oksana Pace PA  10/05/23 1715

## 2023-10-26 ENCOUNTER — HOSPITAL ENCOUNTER (EMERGENCY)
Facility: HOSPITAL | Age: 73
Discharge: HOME | End: 2023-10-26
Attending: STUDENT IN AN ORGANIZED HEALTH CARE EDUCATION/TRAINING PROGRAM
Payer: MEDICARE

## 2023-10-26 VITALS
BODY MASS INDEX: 43.43 KG/M2 | HEART RATE: 90 BPM | SYSTOLIC BLOOD PRESSURE: 183 MMHG | WEIGHT: 230 LBS | HEIGHT: 61 IN | DIASTOLIC BLOOD PRESSURE: 98 MMHG | OXYGEN SATURATION: 96 % | TEMPERATURE: 98.8 F | RESPIRATION RATE: 19 BRPM

## 2023-10-26 DIAGNOSIS — T83.9XXA FOLEY CATHETER PROBLEM, INITIAL ENCOUNTER: Primary | ICD-10-CM

## 2023-10-26 PROCEDURE — 51702 INSERT TEMP BLADDER CATH: CPT

## 2023-10-26 PROCEDURE — 99283 EMERGENCY DEPT VISIT LOW MDM: CPT

## 2023-10-27 NOTE — ED PROVIDER NOTES
Subjective   History of Present Illness  Patient presents to the ED via [facility] from home for catheter problems. Patient states that her barajas catheter has been leaking and hurting for a couple of days now.  Pt requests catheter be changed     History provided by:  Patient   used: No    Difficulty Urinating  Pain quality:  Sharp  Pain severity:  Moderate  Onset quality:  Sudden  Duration:  1 day  Timing:  Constant  Progression:  Unchanged  Chronicity:  New  Relieved by:  Nothing  Worsened by:  Nothing  Ineffective treatments:  None tried  Risk factors: urinary catheter use        Review of Systems   Genitourinary:  Positive for difficulty urinating.       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   Constitutional:       Appearance: She is well-developed.   HENT:      Head: Normocephalic.   Cardiovascular:      Rate and Rhythm: Normal rate and regular rhythm.   Pulmonary:      Effort: Pulmonary effort is normal.      Breath sounds: Normal breath sounds.   Abdominal:      General: Bowel sounds are normal.      Palpations:  Abdomen is soft.      Tenderness: There is no abdominal tenderness.   Musculoskeletal:         General: Normal range of motion.      Cervical back: Neck supple.   Skin:     General: Skin is warm and dry.   Neurological:      Mental Status: She is alert and oriented to person, place, and time.   Psychiatric:         Behavior: Behavior normal.         Thought Content: Thought content normal.         Judgment: Judgment normal.         Procedures           ED Course                                           Medical Decision Making      Final diagnoses:   Flanagan catheter problem, initial encounter       ED Disposition  ED Disposition       ED Disposition   Discharge    Condition   Stable    Comment   --               No follow-up provider specified.       Medication List      No changes were made to your prescriptions during this visit.            Jessie Floyd PA  10/26/23 4363

## 2023-10-28 ENCOUNTER — HOSPITAL ENCOUNTER (EMERGENCY)
Facility: HOSPITAL | Age: 73
Discharge: HOME | End: 2023-10-28
Attending: STUDENT IN AN ORGANIZED HEALTH CARE EDUCATION/TRAINING PROGRAM
Payer: MEDICARE

## 2023-10-28 VITALS
WEIGHT: 220 LBS | DIASTOLIC BLOOD PRESSURE: 96 MMHG | BODY MASS INDEX: 41.54 KG/M2 | HEART RATE: 91 BPM | SYSTOLIC BLOOD PRESSURE: 143 MMHG | OXYGEN SATURATION: 96 % | HEIGHT: 61 IN | RESPIRATION RATE: 20 BRPM | TEMPERATURE: 98.5 F

## 2023-10-28 DIAGNOSIS — N39.0 URINARY TRACT INFECTION WITHOUT HEMATURIA, SITE UNSPECIFIED: Primary | ICD-10-CM

## 2023-10-28 LAB
ALBUMIN SERPL-MCNC: 3.3 G/DL (ref 3.5–5.2)
ALBUMIN/GLOB SERPL: 0.9 G/DL
ALP SERPL-CCNC: 107 U/L (ref 39–117)
ALT SERPL W P-5'-P-CCNC: 15 U/L (ref 1–33)
ANION GAP SERPL CALCULATED.3IONS-SCNC: 12.1 MMOL/L (ref 5–15)
AST SERPL-CCNC: 22 U/L (ref 1–32)
BACTERIA UR QL AUTO: ABNORMAL /HPF
BASOPHILS # BLD AUTO: 0.06 10*3/MM3 (ref 0–0.2)
BASOPHILS NFR BLD AUTO: 0.5 % (ref 0–1.5)
BILIRUB SERPL-MCNC: 0.6 MG/DL (ref 0–1.2)
BILIRUB UR QL STRIP: NEGATIVE
BUN SERPL-MCNC: 22 MG/DL (ref 8–23)
BUN/CREAT SERPL: 27.5 (ref 7–25)
CALCIUM SPEC-SCNC: 9 MG/DL (ref 8.6–10.5)
CHLORIDE SERPL-SCNC: 102 MMOL/L (ref 98–107)
CLARITY UR: ABNORMAL
CO2 SERPL-SCNC: 22.9 MMOL/L (ref 22–29)
COLOR UR: ABNORMAL
CREAT SERPL-MCNC: 0.8 MG/DL (ref 0.57–1)
DEPRECATED RDW RBC AUTO: 44.9 FL (ref 37–54)
EGFRCR SERPLBLD CKD-EPI 2021: 77.4 ML/MIN/1.73
EOSINOPHIL # BLD AUTO: 0.4 10*3/MM3 (ref 0–0.4)
EOSINOPHIL NFR BLD AUTO: 3.3 % (ref 0.3–6.2)
ERYTHROCYTE [DISTWIDTH] IN BLOOD BY AUTOMATED COUNT: 13.6 % (ref 12.3–15.4)
GLOBULIN UR ELPH-MCNC: 3.8 GM/DL
GLUCOSE SERPL-MCNC: 283 MG/DL (ref 65–99)
GLUCOSE UR STRIP-MCNC: ABNORMAL MG/DL
HCT VFR BLD AUTO: 39.3 % (ref 34–46.6)
HGB BLD-MCNC: 13.1 G/DL (ref 12–15.9)
HGB UR QL STRIP.AUTO: ABNORMAL
HYALINE CASTS UR QL AUTO: ABNORMAL /LPF
IMM GRANULOCYTES # BLD AUTO: 0.03 10*3/MM3 (ref 0–0.05)
IMM GRANULOCYTES NFR BLD AUTO: 0.2 % (ref 0–0.5)
KETONES UR QL STRIP: NEGATIVE
LEUKOCYTE ESTERASE UR QL STRIP.AUTO: ABNORMAL
LYMPHOCYTES # BLD AUTO: 2.39 10*3/MM3 (ref 0.7–3.1)
LYMPHOCYTES NFR BLD AUTO: 19.7 % (ref 19.6–45.3)
MCH RBC QN AUTO: 29.9 PG (ref 26.6–33)
MCHC RBC AUTO-ENTMCNC: 33.3 G/DL (ref 31.5–35.7)
MCV RBC AUTO: 89.7 FL (ref 79–97)
MONOCYTES # BLD AUTO: 0.5 10*3/MM3 (ref 0.1–0.9)
MONOCYTES NFR BLD AUTO: 4.1 % (ref 5–12)
NEUTROPHILS NFR BLD AUTO: 72.2 % (ref 42.7–76)
NEUTROPHILS NFR BLD AUTO: 8.75 10*3/MM3 (ref 1.7–7)
NITRITE UR QL STRIP: NEGATIVE
NRBC BLD AUTO-RTO: 0 /100 WBC (ref 0–0.2)
PH UR STRIP.AUTO: 6.5 [PH] (ref 5–8)
PLATELET # BLD AUTO: 183 10*3/MM3 (ref 140–450)
PMV BLD AUTO: 10.1 FL (ref 6–12)
POTASSIUM SERPL-SCNC: 4.3 MMOL/L (ref 3.5–5.2)
PROT SERPL-MCNC: 7.1 G/DL (ref 6–8.5)
PROT UR QL STRIP: ABNORMAL
RBC # BLD AUTO: 4.38 10*6/MM3 (ref 3.77–5.28)
RBC # UR STRIP: ABNORMAL /HPF
REF LAB TEST METHOD: ABNORMAL
SODIUM SERPL-SCNC: 137 MMOL/L (ref 136–145)
SP GR UR STRIP: 1.02 (ref 1–1.03)
SQUAMOUS #/AREA URNS HPF: ABNORMAL /HPF
UROBILINOGEN UR QL STRIP: ABNORMAL
WBC # UR STRIP: ABNORMAL /HPF
WBC NRBC COR # BLD: 12.13 10*3/MM3 (ref 3.4–10.8)

## 2023-10-28 PROCEDURE — 87086 URINE CULTURE/COLONY COUNT: CPT | Performed by: PHYSICIAN ASSISTANT

## 2023-10-28 PROCEDURE — 81001 URINALYSIS AUTO W/SCOPE: CPT | Performed by: PHYSICIAN ASSISTANT

## 2023-10-28 PROCEDURE — 36415 COLL VENOUS BLD VENIPUNCTURE: CPT

## 2023-10-28 PROCEDURE — 87186 SC STD MICRODIL/AGAR DIL: CPT | Performed by: PHYSICIAN ASSISTANT

## 2023-10-28 PROCEDURE — P9612 CATHETERIZE FOR URINE SPEC: HCPCS

## 2023-10-28 PROCEDURE — 99283 EMERGENCY DEPT VISIT LOW MDM: CPT

## 2023-10-28 PROCEDURE — 80053 COMPREHEN METABOLIC PANEL: CPT | Performed by: PHYSICIAN ASSISTANT

## 2023-10-28 PROCEDURE — 87088 URINE BACTERIA CULTURE: CPT | Performed by: PHYSICIAN ASSISTANT

## 2023-10-28 PROCEDURE — 85025 COMPLETE CBC W/AUTO DIFF WBC: CPT | Performed by: PHYSICIAN ASSISTANT

## 2023-10-28 RX ORDER — CEFDINIR 300 MG/1
300 CAPSULE ORAL 2 TIMES DAILY
Qty: 20 CAPSULE | Refills: 0 | Status: SHIPPED | OUTPATIENT
Start: 2023-10-28

## 2023-10-28 NOTE — ED PROVIDER NOTES
Subjective   History of Present Illness  73-year-old female presents secondary to Flanagan catheter problems.  Patient states that her Flanagan is been leaking.  She is not sure if it has come out.  This was just placed 2 days ago.  She has a chronic Flanagan catheter secondary to immobility status post stroke.  She denies any fever.  She denies any abdominal pain.  She denies any chest pain pressure tightness or squeezing.  No shortness of breath.  She voices no other complaints this time.  Patient has a past medical history of stroke, A-fib, osteoarthritis, diabetes, hypertension, hyperlipidemia.  She presents by ambulance.        Review of Systems   Constitutional: Negative.  Negative for fever.   HENT: Negative.     Respiratory: Negative.     Cardiovascular: Negative.  Negative for chest pain.   Gastrointestinal: Negative.  Negative for abdominal pain.   Endocrine: Negative.    Genitourinary: Negative.  Negative for dysuria.   Skin: Negative.    Neurological: Negative.    Psychiatric/Behavioral: Negative.     All other systems reviewed and are negative.      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        Allergies   Allergen Reactions    Contrast Dye (Echo Or Unknown Ct/Mr) Itching       Past Surgical History:   Procedure Laterality Date    CHOLECYSTECTOMY         Family History   Problem Relation Age of Onset    Diabetes Mother     Hypertension Father        Social History     Socioeconomic History    Marital status:    Tobacco Use    Smoking status: Never    Smokeless tobacco: Never   Vaping Use    Vaping Use: Never used   Substance and Sexual Activity    Alcohol use: No    Drug use: No    Sexual activity: Defer           Objective    Physical Exam  Vitals and nursing note reviewed.   Constitutional:       General: She is not in acute distress.     Appearance: She is well-developed. She is not diaphoretic.   HENT:      Head: Normocephalic and atraumatic.      Right Ear: External ear normal.      Left Ear: External ear normal.      Nose: Nose normal.   Eyes:      Conjunctiva/sclera: Conjunctivae normal.      Pupils: Pupils are equal, round, and reactive to light.   Neck:      Vascular: No JVD.      Trachea: No tracheal deviation.   Cardiovascular:      Rate and Rhythm: Normal rate and regular rhythm.      Heart sounds: Normal heart sounds. No murmur heard.  Pulmonary:      Effort: Pulmonary effort is normal. No respiratory distress.      Breath sounds: Normal breath sounds. No wheezing.   Abdominal:      General: Bowel sounds are normal.      Palpations: Abdomen is soft.      Tenderness: There is no abdominal tenderness.   Musculoskeletal:         General: No deformity. Normal range of motion.      Cervical back: Normal range of motion and neck supple.   Skin:     General: Skin is warm and dry.      Coloration: Skin is not pale.      Findings: No erythema or rash.   Neurological:      Mental Status: She is alert and oriented to person, place, and time.      Cranial Nerves: No cranial nerve deficit.   Psychiatric:         Behavior: Behavior normal.         Thought Content: Thought content normal.         Procedures           ED Course           Results for orders placed or performed during the hospital encounter of 10/28/23   Comprehensive Metabolic Panel    Specimen: Blood   Result Value Ref Range    Glucose 283 (H) 65 - 99 mg/dL    BUN 22 8 - 23 mg/dL    Creatinine 0.80 0.57 - 1.00 mg/dL    Sodium 137 136 - 145 mmol/L    Potassium 4.3 3.5 - 5.2 mmol/L    Chloride 102 98 - 107 mmol/L    CO2 22.9 22.0 - 29.0 mmol/L    Calcium 9.0 8.6 - 10.5 mg/dL    Total Protein 7.1 6.0 - 8.5 g/dL    Albumin 3.3 (L) 3.5 - 5.2 g/dL    ALT (SGPT) 15 1 - 33 U/L     AST (SGOT) 22 1 - 32 U/L    Alkaline Phosphatase 107 39 - 117 U/L    Total Bilirubin 0.6 0.0 - 1.2 mg/dL    Globulin 3.8 gm/dL    A/G Ratio 0.9 g/dL    BUN/Creatinine Ratio 27.5 (H) 7.0 - 25.0    Anion Gap 12.1 5.0 - 15.0 mmol/L    eGFR 77.4 >60.0 mL/min/1.73   Urinalysis With Culture If Indicated - Urine, Catheter    Specimen: Urine, Catheter   Result Value Ref Range    Color, UA Dark Yellow (A) Yellow, Straw    Appearance, UA Cloudy (A) Clear    pH, UA 6.5 5.0 - 8.0    Specific Gravity, UA 1.023 1.005 - 1.030    Glucose,  mg/dL (2+) (A) Negative    Ketones, UA Negative Negative    Bilirubin, UA Negative Negative    Blood, UA Large (3+) (A) Negative    Protein, UA >=300 mg/dL (3+) (A) Negative    Leuk Esterase, UA Moderate (2+) (A) Negative    Nitrite, UA Negative Negative    Urobilinogen, UA 1.0 E.U./dL 0.2 - 1.0 E.U./dL   CBC Auto Differential    Specimen: Blood   Result Value Ref Range    WBC 12.13 (H) 3.40 - 10.80 10*3/mm3    RBC 4.38 3.77 - 5.28 10*6/mm3    Hemoglobin 13.1 12.0 - 15.9 g/dL    Hematocrit 39.3 34.0 - 46.6 %    MCV 89.7 79.0 - 97.0 fL    MCH 29.9 26.6 - 33.0 pg    MCHC 33.3 31.5 - 35.7 g/dL    RDW 13.6 12.3 - 15.4 %    RDW-SD 44.9 37.0 - 54.0 fl    MPV 10.1 6.0 - 12.0 fL    Platelets 183 140 - 450 10*3/mm3    Neutrophil % 72.2 42.7 - 76.0 %    Lymphocyte % 19.7 19.6 - 45.3 %    Monocyte % 4.1 (L) 5.0 - 12.0 %    Eosinophil % 3.3 0.3 - 6.2 %    Basophil % 0.5 0.0 - 1.5 %    Immature Grans % 0.2 0.0 - 0.5 %    Neutrophils, Absolute 8.75 (H) 1.70 - 7.00 10*3/mm3    Lymphocytes, Absolute 2.39 0.70 - 3.10 10*3/mm3    Monocytes, Absolute 0.50 0.10 - 0.90 10*3/mm3    Eosinophils, Absolute 0.40 0.00 - 0.40 10*3/mm3    Basophils, Absolute 0.06 0.00 - 0.20 10*3/mm3    Immature Grans, Absolute 0.03 0.00 - 0.05 10*3/mm3    nRBC 0.0 0.0 - 0.2 /100 WBC   Urinalysis, Microscopic Only - Urine, Catheter    Specimen: Urine, Catheter   Result Value Ref Range    RBC, UA Too Numerous to Count (A) None Seen, 0-2  /HPF    WBC, UA Too Numerous to Count (A) None Seen, 0-2 /HPF    Bacteria, UA 2+ (A) None Seen /HPF    Squamous Epithelial Cells, UA 7-12 (A) None Seen, 0-2 /HPF    Hyaline Casts, UA None Seen None Seen /LPF    Methodology Manual Light Microscopy                                      Medical Decision Making  73-year-old female presents secondary to Flanagan catheter problems.  Patient states that her Flanagan is been leaking.  She is not sure if it has come out.  This was just placed 2 days ago.  She has a chronic Flanagan catheter secondary to immobility status post stroke.  She denies any fever.  She denies any abdominal pain.  She denies any chest pain pressure tightness or squeezing.  No shortness of breath.  She voices no other complaints this time.  Patient has a past medical history of stroke, A-fib, osteoarthritis, diabetes, hypertension, hyperlipidemia.  She presents by ambulance.    Problems Addressed:  Urinary tract infection without hematuria, site unspecified: complicated acute illness or injury    Amount and/or Complexity of Data Reviewed  Labs: ordered. Decision-making details documented in ED Course.  Radiology:  Decision-making details documented in ED Course.    Risk  Prescription drug management.        Final diagnoses:   Urinary tract infection without hematuria, site unspecified       ED Disposition  ED Disposition       ED Disposition   Discharge    Condition   Stable    Comment   --               Barbara Gaona, APRN  [address] [id number] [phone]    In 3 days  if persists         Medication List        New Prescriptions      cefdinir 300 MG capsule  Commonly known as: OMNICEF  Take 1 capsule by mouth 2 (Two) Times a Day.            Stop      cefuroxime 500 MG tablet  Commonly known as: CEFTIN               Where to Get Your Medications        You can get these medications from any pharmacy    Bring a paper prescription for each of these medications  cefdinir 300 MG capsule             Garrett Alcocer PA  10/28/23 1603

## 2023-10-30 LAB — BACTERIA SPEC AEROBE CULT: ABNORMAL

## 2025-02-22 ENCOUNTER — APPOINTMENT (OUTPATIENT)
Dept: CT IMAGING | Facility: HOSPITAL | Age: 75
End: 2025-02-22
Payer: MEDICARE

## 2025-02-22 ENCOUNTER — APPOINTMENT (OUTPATIENT)
Dept: GENERAL RADIOLOGY | Facility: HOSPITAL | Age: 75
End: 2025-02-22
Payer: MEDICARE

## 2025-02-22 ENCOUNTER — HOSPITAL ENCOUNTER (EMERGENCY)
Facility: HOSPITAL | Age: 75
Discharge: HOME | End: 2025-02-22
Payer: MEDICARE

## 2025-02-22 VITALS
TEMPERATURE: 98.5 F | HEART RATE: 89 BPM | BODY MASS INDEX: 48.82 KG/M2 | RESPIRATION RATE: 12 BRPM | OXYGEN SATURATION: 97 % | WEIGHT: 293 LBS | SYSTOLIC BLOOD PRESSURE: 135 MMHG | HEIGHT: 65 IN | DIASTOLIC BLOOD PRESSURE: 66 MMHG

## 2025-02-22 DIAGNOSIS — M79.604 PAIN IN BOTH LOWER EXTREMITIES: Primary | ICD-10-CM

## 2025-02-22 DIAGNOSIS — S82.142A TIBIAL PLATEAU FRACTURE, LEFT, CLOSED, INITIAL ENCOUNTER: ICD-10-CM

## 2025-02-22 DIAGNOSIS — M79.605 PAIN IN BOTH LOWER EXTREMITIES: Primary | ICD-10-CM

## 2025-02-22 LAB
ALBUMIN SERPL-MCNC: 3.3 G/DL (ref 3.5–5.2)
ALBUMIN/GLOB SERPL: 0.7 G/DL
ALP SERPL-CCNC: 110 U/L (ref 39–117)
ALT SERPL W P-5'-P-CCNC: 16 U/L (ref 1–33)
ANION GAP SERPL CALCULATED.3IONS-SCNC: 8.5 MMOL/L (ref 5–15)
AST SERPL-CCNC: 30 U/L (ref 1–32)
BACTERIA UR QL AUTO: ABNORMAL /HPF
BASOPHILS # BLD AUTO: 0.04 10*3/MM3 (ref 0–0.2)
BASOPHILS NFR BLD AUTO: 0.3 % (ref 0–1.5)
BILIRUB SERPL-MCNC: 0.6 MG/DL (ref 0–1.2)
BILIRUB UR QL STRIP: NEGATIVE
BUN SERPL-MCNC: 33 MG/DL (ref 8–23)
BUN/CREAT SERPL: 29.5 (ref 7–25)
CALCIUM SPEC-SCNC: 9.6 MG/DL (ref 8.6–10.5)
CHLORIDE SERPL-SCNC: 104 MMOL/L (ref 98–107)
CLARITY UR: ABNORMAL
CO2 SERPL-SCNC: 23.5 MMOL/L (ref 22–29)
COLOR UR: YELLOW
CREAT SERPL-MCNC: 1.12 MG/DL (ref 0.57–1)
CRP SERPL-MCNC: 9.77 MG/DL (ref 0–0.5)
D-LACTATE SERPL-SCNC: 1.7 MMOL/L (ref 0.5–2)
DEPRECATED RDW RBC AUTO: 54.8 FL (ref 37–54)
EGFRCR SERPLBLD CKD-EPI 2021: 51.4 ML/MIN/1.73
EOSINOPHIL # BLD AUTO: 0.02 10*3/MM3 (ref 0–0.4)
EOSINOPHIL NFR BLD AUTO: 0.1 % (ref 0.3–6.2)
ERYTHROCYTE [DISTWIDTH] IN BLOOD BY AUTOMATED COUNT: 15.7 % (ref 12.3–15.4)
ERYTHROCYTE [SEDIMENTATION RATE] IN BLOOD: 56 MM/HR (ref 0–30)
FLUAV RNA RESP QL NAA+PROBE: NOT DETECTED
FLUBV RNA RESP QL NAA+PROBE: NOT DETECTED
GLOBULIN UR ELPH-MCNC: 4.6 GM/DL
GLUCOSE BLDC GLUCOMTR-MCNC: 132 MG/DL (ref 70–130)
GLUCOSE SERPL-MCNC: 187 MG/DL (ref 65–99)
GLUCOSE UR STRIP-MCNC: ABNORMAL MG/DL
HCT VFR BLD AUTO: 42.9 % (ref 34–46.6)
HGB BLD-MCNC: 12.7 G/DL (ref 12–15.9)
HGB UR QL STRIP.AUTO: ABNORMAL
HOLD SPECIMEN: NORMAL
HOLD SPECIMEN: NORMAL
HYALINE CASTS UR QL AUTO: ABNORMAL /LPF
IMM GRANULOCYTES # BLD AUTO: 0.08 10*3/MM3 (ref 0–0.05)
IMM GRANULOCYTES NFR BLD AUTO: 0.5 % (ref 0–0.5)
KETONES UR QL STRIP: ABNORMAL
LEUKOCYTE ESTERASE UR QL STRIP.AUTO: NEGATIVE
LYMPHOCYTES # BLD AUTO: 1.47 10*3/MM3 (ref 0.7–3.1)
LYMPHOCYTES NFR BLD AUTO: 9.7 % (ref 19.6–45.3)
MCH RBC QN AUTO: 28.1 PG (ref 26.6–33)
MCHC RBC AUTO-ENTMCNC: 29.6 G/DL (ref 31.5–35.7)
MCV RBC AUTO: 94.9 FL (ref 79–97)
MONOCYTES # BLD AUTO: 0.8 10*3/MM3 (ref 0.1–0.9)
MONOCYTES NFR BLD AUTO: 5.3 % (ref 5–12)
NEUTROPHILS NFR BLD AUTO: 12.72 10*3/MM3 (ref 1.7–7)
NEUTROPHILS NFR BLD AUTO: 84.1 % (ref 42.7–76)
NITRITE UR QL STRIP: NEGATIVE
NRBC BLD AUTO-RTO: 0 /100 WBC (ref 0–0.2)
PH UR STRIP.AUTO: <=5 [PH] (ref 5–8)
PLATELET # BLD AUTO: 173 10*3/MM3 (ref 140–450)
PMV BLD AUTO: 10 FL (ref 6–12)
POTASSIUM SERPL-SCNC: 5 MMOL/L (ref 3.5–5.2)
PROT SERPL-MCNC: 7.9 G/DL (ref 6–8.5)
PROT UR QL STRIP: ABNORMAL
RBC # BLD AUTO: 4.52 10*6/MM3 (ref 3.77–5.28)
RBC # UR STRIP: ABNORMAL /HPF
REF LAB TEST METHOD: ABNORMAL
SARS-COV-2 RNA RESP QL NAA+PROBE: NOT DETECTED
SODIUM SERPL-SCNC: 136 MMOL/L (ref 136–145)
SP GR UR STRIP: 1.02 (ref 1–1.03)
SQUAMOUS #/AREA URNS HPF: ABNORMAL /HPF
UROBILINOGEN UR QL STRIP: ABNORMAL
WBC # UR STRIP: ABNORMAL /HPF
WBC NRBC COR # BLD AUTO: 15.13 10*3/MM3 (ref 3.4–10.8)
WHOLE BLOOD HOLD COAG: NORMAL
WHOLE BLOOD HOLD SPECIMEN: NORMAL
YEAST URNS QL MICRO: ABNORMAL /HPF

## 2025-02-22 PROCEDURE — 51702 INSERT TEMP BLADDER CATH: CPT

## 2025-02-22 PROCEDURE — 73700 CT LOWER EXTREMITY W/O DYE: CPT | Performed by: RADIOLOGY

## 2025-02-22 PROCEDURE — 73620 X-RAY EXAM OF FOOT: CPT

## 2025-02-22 PROCEDURE — 73600 X-RAY EXAM OF ANKLE: CPT | Performed by: RADIOLOGY

## 2025-02-22 PROCEDURE — 87040 BLOOD CULTURE FOR BACTERIA: CPT | Performed by: PHYSICIAN ASSISTANT

## 2025-02-22 PROCEDURE — 87636 SARSCOV2 & INF A&B AMP PRB: CPT | Performed by: PHYSICIAN ASSISTANT

## 2025-02-22 PROCEDURE — 73590 X-RAY EXAM OF LOWER LEG: CPT

## 2025-02-22 PROCEDURE — 85652 RBC SED RATE AUTOMATED: CPT | Performed by: PHYSICIAN ASSISTANT

## 2025-02-22 PROCEDURE — 96365 THER/PROPH/DIAG IV INF INIT: CPT

## 2025-02-22 PROCEDURE — 83605 ASSAY OF LACTIC ACID: CPT | Performed by: PHYSICIAN ASSISTANT

## 2025-02-22 PROCEDURE — 80053 COMPREHEN METABOLIC PANEL: CPT | Performed by: PHYSICIAN ASSISTANT

## 2025-02-22 PROCEDURE — 99284 EMERGENCY DEPT VISIT MOD MDM: CPT

## 2025-02-22 PROCEDURE — 73700 CT LOWER EXTREMITY W/O DYE: CPT

## 2025-02-22 PROCEDURE — 73620 X-RAY EXAM OF FOOT: CPT | Performed by: RADIOLOGY

## 2025-02-22 PROCEDURE — 81001 URINALYSIS AUTO W/SCOPE: CPT | Performed by: PHYSICIAN ASSISTANT

## 2025-02-22 PROCEDURE — 82948 REAGENT STRIP/BLOOD GLUCOSE: CPT

## 2025-02-22 PROCEDURE — 73600 X-RAY EXAM OF ANKLE: CPT

## 2025-02-22 PROCEDURE — 25010000002 CEFTRIAXONE PER 250 MG: Performed by: PHYSICIAN ASSISTANT

## 2025-02-22 PROCEDURE — 86140 C-REACTIVE PROTEIN: CPT | Performed by: PHYSICIAN ASSISTANT

## 2025-02-22 PROCEDURE — 36415 COLL VENOUS BLD VENIPUNCTURE: CPT

## 2025-02-22 PROCEDURE — 85025 COMPLETE CBC W/AUTO DIFF WBC: CPT | Performed by: PHYSICIAN ASSISTANT

## 2025-02-22 PROCEDURE — 73590 X-RAY EXAM OF LOWER LEG: CPT | Performed by: RADIOLOGY

## 2025-02-22 PROCEDURE — C1751 CATH, INF, PER/CENT/MIDLINE: HCPCS

## 2025-02-22 RX ORDER — SODIUM CHLORIDE 0.9 % (FLUSH) 0.9 %
10 SYRINGE (ML) INJECTION AS NEEDED
Status: DISCONTINUED | OUTPATIENT
Start: 2025-02-22 | End: 2025-02-23 | Stop reason: HOSPADM

## 2025-02-22 RX ORDER — FLUCONAZOLE 100 MG/1
200 TABLET ORAL ONCE
Status: COMPLETED | OUTPATIENT
Start: 2025-02-22 | End: 2025-02-22

## 2025-02-22 RX ADMIN — FLUCONAZOLE 200 MG: 100 TABLET ORAL at 13:23

## 2025-02-22 RX ADMIN — CEFTRIAXONE SODIUM 2000 MG: 2 INJECTION, POWDER, FOR SOLUTION INTRAMUSCULAR; INTRAVENOUS at 12:58

## 2025-02-22 NOTE — ED PROVIDER NOTES
Subjective   History of Present Illness  74-year-old female patient with a history of A-fib, diabetes, hyperlipidemia, hypertension and a known stage IV pressure ulcer of the sacral region presents to the emergency room today by EMS.  EMS was called by the fire department.  They were originally called to help move the patient into a hotel.  Patient is from [place].  Her family drove her here last night as her caretaker in [place] was going to be incarcerated.  They did not have any place to stay with her so they were getting a hotel until they could get that worked out.  When the fire department seen the condition of the patient they decided to call EMS to have her brought here.  Patient was complaining of lower leg pain as her legs have been up underneath the car seat during the entire trip.  Patient also has a known pressure ulcer that she states she has had for the past 5 years.    History provided by:  Patient   used: No        Review of Systems   Constitutional: Negative.    HENT: Negative.     Eyes: Negative.    Respiratory: Negative.     Cardiovascular: Negative.    Gastrointestinal: Negative.    Endocrine: Negative.    Genitourinary: Negative.    Musculoskeletal:         Bilateral lower leg pain   Skin:  Positive for wound.   Allergic/Immunologic: Negative.    Neurological: Negative.    Hematological: Negative.    Psychiatric/Behavioral: Negative.     All other systems reviewed and are negative.      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        Allergies   Allergen Reactions    Contrast Dye (Echo Or Unknown Ct/Mr) Itching       Past Surgical History:   Procedure Laterality Date    CHOLECYSTECTOMY          Family History   Problem Relation Age of Onset    Diabetes Mother     Hypertension Father        Social History     Socioeconomic History    Marital status:    Tobacco Use    Smoking status: Never    Smokeless tobacco: Never   Vaping Use    Vaping status: Never Used   Substance and Sexual Activity    Alcohol use: No    Drug use: No    Sexual activity: Defer           Objective   Physical Exam  Vitals and nursing note reviewed.   Constitutional:       Appearance: Normal appearance. She is normal weight.   HENT:      Head: Normocephalic and atraumatic.      Right Ear: External ear normal.      Left Ear: External ear normal.      Nose: Nose normal.      Mouth/Throat:      Mouth: Mucous membranes are moist.      Pharynx: Oropharynx is clear.   Eyes:      Extraocular Movements: Extraocular movements intact.      Conjunctiva/sclera: Conjunctivae normal.      Pupils: Pupils are equal, round, and reactive to light.   Cardiovascular:      Rate and Rhythm: Normal rate and regular rhythm.      Pulses: Normal pulses.      Heart sounds: Normal heart sounds.   Pulmonary:      Effort: Pulmonary effort is normal.      Breath sounds: Normal breath sounds.   Abdominal:      General: Abdomen is flat. Bowel sounds are normal.      Palpations: Abdomen is soft.   Genitourinary:     Comments: Large pressure ulcer to the sacrum. Wound bed is pink.    Musculoskeletal:         General: Normal range of motion.      Cervical back: Normal range of motion and neck supple.   Skin:     General: Skin is warm and dry.      Capillary Refill: Capillary refill takes less than 2 seconds.   Neurological:      General: No focal deficit present.      Mental Status: She is alert and oriented to person, place, and time. Mental status is at baseline.   Psychiatric:         Mood and Affect: Mood normal.         Behavior: Behavior normal.         Thought Content: Thought content normal.         Judgment: Judgment normal.         Procedures            ED Course  ED Course as of 02/22/25 1313   Sat Feb 22, 2025   1148 C-Reactive Protein(!): 9.77 [ML]   1148 Lactate: 1.7 [ML]   1215 Patient family requested NH placement.  [ML]   1312 Sign out to Jessie at shift change  [ML]      ED Course User Index  [ML] Oksana Pace PA                                             Results for orders placed or performed during the hospital encounter of 02/22/25   COVID-19 and FLU A/B PCR, 1 HR TAT - Swab, Nasopharynx    Collection Time: 02/22/25  9:54 AM    Specimen: Nasopharynx; Swab   Result Value Ref Range    COVID19 Not Detected Not Detected - Ref. Range    Influenza A PCR Not Detected Not Detected    Influenza B PCR Not Detected Not Detected   Urinalysis With Microscopic If Indicated (No Culture) - Indwelling Urethral Catheter    Collection Time: 02/22/25 10:09 AM    Specimen: Indwelling Urethral Catheter; Urine   Result Value Ref Range    Color, UA Yellow Yellow, Straw    Appearance, UA Cloudy (A) Clear    pH, UA <=5.0 5.0 - 8.0    Specific Gravity, UA 1.024 1.005 - 1.030    Glucose, UA >=1000 mg/dL (3+) (A) Negative    Ketones, UA Trace (A) Negative    Bilirubin, UA Negative Negative    Blood, UA Moderate (2+) (A) Negative    Protein, UA 30 mg/dL (1+) (A) Negative    Leuk Esterase, UA Negative Negative    Nitrite, UA Negative Negative    Urobilinogen, UA 1.0 E.U./dL 0.2 - 1.0 E.U./dL   Urinalysis, Microscopic Only - Indwelling Urethral Catheter    Collection Time: 02/22/25 10:09 AM    Specimen: Indwelling Urethral Catheter; Urine   Result Value Ref Range    RBC, UA 21-50 (A) None Seen, 0-2 /HPF    WBC, UA 0-2 None Seen, 0-2 /HPF    Bacteria, UA 3+ (A) None Seen /HPF    Squamous Epithelial Cells, UA 0-2 None Seen, 0-2 /HPF    Yeast, UA Small/1+ Yeast (A) None Seen /HPF    Hyaline Casts, UA None Seen None Seen /LPF    Methodology Automated Microscopy    Green Top (Gel)    Collection Time: 02/22/25 10:09 AM   Result Value Ref Range    Extra Tube Hold for add-ons.     Lavender Top    Collection Time: 02/22/25 10:09 AM   Result Value Ref Range    Extra Tube hold for add-on    Gold Top - SST    Collection Time: 02/22/25 10:09 AM   Result Value Ref Range    Extra Tube Hold for add-ons.    Comprehensive Metabolic Panel    Collection Time: 02/22/25 10:53 AM    Specimen: Blood   Result Value Ref Range    Glucose 187 (H) 65 - 99 mg/dL    BUN 33 (H) 8 - 23 mg/dL    Creatinine 1.12 (H) 0.57 - 1.00 mg/dL    Sodium 136 136 - 145 mmol/L    Potassium 5.0 3.5 - 5.2 mmol/L    Chloride 104 98 - 107 mmol/L    CO2 23.5 22.0 - 29.0 mmol/L    Calcium 9.6 8.6 - 10.5 mg/dL    Total Protein 7.9 6.0 - 8.5 g/dL    Albumin 3.3 (L) 3.5 - 5.2 g/dL    ALT (SGPT) 16 1 - 33 U/L    AST (SGOT) 30 1 - 32 U/L    Alkaline Phosphatase 110 39 - 117 U/L    Total Bilirubin 0.6 0.0 - 1.2 mg/dL    Globulin 4.6 gm/dL    A/G Ratio 0.7 g/dL    BUN/Creatinine Ratio 29.5 (H) 7.0 - 25.0    Anion Gap 8.5 5.0 - 15.0 mmol/L    eGFR 51.4 (L) >60.0 mL/min/1.73   Lactic Acid, Plasma    Collection Time: 02/22/25 10:53 AM    Specimen: Blood   Result Value Ref Range    Lactate 1.7 0.5 - 2.0 mmol/L   C-reactive Protein    Collection Time: 02/22/25 10:53 AM    Specimen: Blood   Result Value Ref Range    C-Reactive Protein 9.77 (H) 0.00 - 0.50 mg/dL   Sedimentation Rate    Collection Time: 02/22/25 10:53 AM    Specimen: Blood   Result Value Ref Range    Sed Rate 56 (H) 0 - 30 mm/hr   CBC Auto Differential    Collection Time: 02/22/25 10:53 AM    Specimen: Blood   Result Value Ref Range    WBC 15.13 (H) 3.40 - 10.80 10*3/mm3    RBC 4.52 3.77 - 5.28 10*6/mm3    Hemoglobin 12.7 12.0 - 15.9 g/dL    Hematocrit 42.9 34.0 - 46.6 %    MCV 94.9 79.0 - 97.0 fL    MCH 28.1 26.6 - 33.0 pg    MCHC 29.6 (L) 31.5 - 35.7 g/dL    RDW 15.7 (H) 12.3 - 15.4 %    RDW-SD 54.8 (H) 37.0 - 54.0 fl    MPV 10.0 6.0 - 12.0 fL    Platelets 173 140 - 450 10*3/mm3    Neutrophil % 84.1 (H) 42.7 - 76.0 %    Lymphocyte % 9.7 (L) 19.6 - 45.3 %    Monocyte % 5.3 5.0 -  12.0 %    Eosinophil % 0.1 (L) 0.3 - 6.2 %    Basophil % 0.3 0.0 - 1.5 %    Immature Grans % 0.5 0.0 - 0.5 %    Neutrophils, Absolute 12.72 (H) 1.70 - 7.00 10*3/mm3    Lymphocytes, Absolute 1.47 0.70 - 3.10 10*3/mm3    Monocytes, Absolute 0.80 0.10 - 0.90 10*3/mm3    Eosinophils, Absolute 0.02 0.00 - 0.40 10*3/mm3    Basophils, Absolute 0.04 0.00 - 0.20 10*3/mm3    Immature Grans, Absolute 0.08 (H) 0.00 - 0.05 10*3/mm3    nRBC 0.0 0.0 - 0.2 /100 WBC   Light Blue Top    Collection Time: 02/22/25 10:53 AM   Result Value Ref Range    Extra Tube Hold for add-ons.                Medical Decision Making  Amount and/or Complexity of Data Reviewed  Labs: ordered. Decision-making details documented in ED Course.  Radiology: ordered.    Risk  Prescription drug management.        Final diagnoses:   None     Electronically signed by SIVAKUMAR Paulino, 02/22/25, 1:13 PM EST.    ED Disposition  ED Disposition       None            No follow-up provider specified.       Medication List      No changes were made to your prescriptions during this visit.          SIVAKUMAR Anderson  02/25/25 0836

## 2025-02-22 NOTE — CASE MANAGEMENT/SOCIAL WORK
Discharge Planning Assessment  [facility]     Patient Name: Anika Neri  MRN: [id number]  Today's Date: 2/22/2025    Admit Date: 2/22/2025    Plan: Spoke with pt and her daughter Esther Parker by phone. Pt is from [place] and her other daughter was put in care home and her daughter Esther is in the process of preparing her home for pt with a ramp. Pt does not want NH placement and her daughter will be coming to get her when she is discharged. ER provider made aware of plan.   Discharge Needs Assessment       Row Name 02/22/25 1344       Living Environment    People in Home child(mak), adult                   Discharge Plan       Row Name 02/22/25 1344       Plan    Plan Spoke with pt and her daughter Esther Parker by phone. Pt is from [place] and her other daughter was put in care home and her daughter Esther is in the process of preparing her home for pt with a ramp. Pt does not want NH placement and her daughter will be coming to get her when she is discharged. ER provider made aware of plan.                  Continued Care and Services - Admitted Since 2/22/2025    No active coordination exists for this encounter.          Demographic Summary       Row Name 02/22/25 1345       General Information    Arrived From home;other (see comments)    Referral Source emergency department    Reason for Consult discharge planning                  Renetta Wong RN

## 2025-02-23 NOTE — ED NOTES
Called [facility] dispatch requesting possible return to residence. Awaiting return call from [facility].

## 2025-02-23 NOTE — ED NOTES
Received call from [facility]a villanue[facility] Berkley Huggins. Will send a truck once runs are caught up.

## 2025-02-27 LAB
BACTERIA SPEC AEROBE CULT: NORMAL
BACTERIA SPEC AEROBE CULT: NORMAL

## 2025-03-07 ENCOUNTER — HOSPITAL ENCOUNTER (EMERGENCY)
Facility: HOSPITAL | Age: 75
Discharge: HOME | End: 2025-03-07
Payer: MEDICARE

## 2025-03-07 VITALS
SYSTOLIC BLOOD PRESSURE: 148 MMHG | DIASTOLIC BLOOD PRESSURE: 86 MMHG | RESPIRATION RATE: 16 BRPM | TEMPERATURE: 98.4 F | OXYGEN SATURATION: 96 % | HEIGHT: 65 IN | BODY MASS INDEX: 48.82 KG/M2 | WEIGHT: 293 LBS | HEART RATE: 89 BPM

## 2025-03-07 DIAGNOSIS — S82.142D CLOSED FRACTURE OF LEFT TIBIAL PLATEAU WITH ROUTINE HEALING, SUBSEQUENT ENCOUNTER: ICD-10-CM

## 2025-03-07 DIAGNOSIS — N39.0 UTI (URINARY TRACT INFECTION), UNCOMPLICATED: ICD-10-CM

## 2025-03-07 DIAGNOSIS — L89.154 PRESSURE INJURY OF SACRAL REGION, STAGE 4: Primary | ICD-10-CM

## 2025-03-07 LAB
BACTERIA UR QL AUTO: ABNORMAL /HPF
BASOPHILS # BLD AUTO: 0.05 10*3/MM3 (ref 0–0.2)
BASOPHILS NFR BLD AUTO: 0.5 % (ref 0–1.5)
BILIRUB UR QL STRIP: NEGATIVE
CLARITY UR: ABNORMAL
COLOR UR: YELLOW
CRP SERPL-MCNC: 3.66 MG/DL (ref 0–0.5)
DEPRECATED RDW RBC AUTO: 51.6 FL (ref 37–54)
EOSINOPHIL # BLD AUTO: 0.29 10*3/MM3 (ref 0–0.4)
EOSINOPHIL NFR BLD AUTO: 3 % (ref 0.3–6.2)
ERYTHROCYTE [DISTWIDTH] IN BLOOD BY AUTOMATED COUNT: 15.5 % (ref 12.3–15.4)
GLUCOSE UR STRIP-MCNC: ABNORMAL MG/DL
HCT VFR BLD AUTO: 35 % (ref 34–46.6)
HGB BLD-MCNC: 10.5 G/DL (ref 12–15.9)
HGB UR QL STRIP.AUTO: ABNORMAL
HOLD SPECIMEN: NORMAL
HYALINE CASTS UR QL AUTO: ABNORMAL /LPF
IMM GRANULOCYTES # BLD AUTO: 0.04 10*3/MM3 (ref 0–0.05)
IMM GRANULOCYTES NFR BLD AUTO: 0.4 % (ref 0–0.5)
KETONES UR QL STRIP: NEGATIVE
LEUKOCYTE ESTERASE UR QL STRIP.AUTO: ABNORMAL
LYMPHOCYTES # BLD AUTO: 1.97 10*3/MM3 (ref 0.7–3.1)
LYMPHOCYTES NFR BLD AUTO: 20.1 % (ref 19.6–45.3)
MCH RBC QN AUTO: 27.3 PG (ref 26.6–33)
MCHC RBC AUTO-ENTMCNC: 30 G/DL (ref 31.5–35.7)
MCV RBC AUTO: 90.9 FL (ref 79–97)
MONOCYTES # BLD AUTO: 0.36 10*3/MM3 (ref 0.1–0.9)
MONOCYTES NFR BLD AUTO: 3.7 % (ref 5–12)
NEUTROPHILS NFR BLD AUTO: 7.08 10*3/MM3 (ref 1.7–7)
NEUTROPHILS NFR BLD AUTO: 72.3 % (ref 42.7–76)
NITRITE UR QL STRIP: NEGATIVE
NRBC BLD AUTO-RTO: 0 /100 WBC (ref 0–0.2)
PH UR STRIP.AUTO: 8 [PH] (ref 5–8)
PLATELET # BLD AUTO: 256 10*3/MM3 (ref 140–450)
PMV BLD AUTO: 9.5 FL (ref 6–12)
PROT UR QL STRIP: ABNORMAL
RBC # BLD AUTO: 3.85 10*6/MM3 (ref 3.77–5.28)
RBC # UR STRIP: ABNORMAL /HPF
REF LAB TEST METHOD: ABNORMAL
SP GR UR STRIP: 1.02 (ref 1–1.03)
SQUAMOUS #/AREA URNS HPF: ABNORMAL /HPF
UROBILINOGEN UR QL STRIP: ABNORMAL
WBC # UR STRIP: ABNORMAL /HPF
WBC NRBC COR # BLD AUTO: 9.79 10*3/MM3 (ref 3.4–10.8)
WHOLE BLOOD HOLD COAG: NORMAL
WHOLE BLOOD HOLD SPECIMEN: NORMAL
YEAST URNS QL MICRO: ABNORMAL /HPF

## 2025-03-07 PROCEDURE — 51702 INSERT TEMP BLADDER CATH: CPT

## 2025-03-07 PROCEDURE — 99283 EMERGENCY DEPT VISIT LOW MDM: CPT

## 2025-03-07 PROCEDURE — 96365 THER/PROPH/DIAG IV INF INIT: CPT

## 2025-03-07 PROCEDURE — 86140 C-REACTIVE PROTEIN: CPT | Performed by: PHYSICIAN ASSISTANT

## 2025-03-07 PROCEDURE — 87086 URINE CULTURE/COLONY COUNT: CPT | Performed by: PHYSICIAN ASSISTANT

## 2025-03-07 PROCEDURE — 85025 COMPLETE CBC W/AUTO DIFF WBC: CPT | Performed by: PHYSICIAN ASSISTANT

## 2025-03-07 PROCEDURE — 81001 URINALYSIS AUTO W/SCOPE: CPT | Performed by: PHYSICIAN ASSISTANT

## 2025-03-07 PROCEDURE — 25010000002 CEFTRIAXONE PER 250 MG: Performed by: PHYSICIAN ASSISTANT

## 2025-03-07 RX ORDER — CEFDINIR 300 MG/1
300 CAPSULE ORAL 2 TIMES DAILY
Qty: 14 CAPSULE | Refills: 0 | Status: SHIPPED | OUTPATIENT
Start: 2025-03-07

## 2025-03-07 RX ORDER — HYDROCODONE BITARTRATE AND ACETAMINOPHEN 7.5; 325 MG/1; MG/1
1 TABLET ORAL ONCE
Status: COMPLETED | OUTPATIENT
Start: 2025-03-07 | End: 2025-03-07

## 2025-03-07 RX ORDER — SODIUM CHLORIDE 0.9 % (FLUSH) 0.9 %
10 SYRINGE (ML) INJECTION AS NEEDED
Status: DISCONTINUED | OUTPATIENT
Start: 2025-03-07 | End: 2025-03-08 | Stop reason: HOSPADM

## 2025-03-07 RX ADMIN — CEFTRIAXONE 1000 MG: 1 INJECTION, POWDER, FOR SOLUTION INTRAMUSCULAR; INTRAVENOUS at 20:42

## 2025-03-07 RX ADMIN — HYDROCODONE BITARTRATE AND ACETAMINOPHEN 1 TABLET: 7.5; 325 TABLET ORAL at 19:27

## 2025-03-07 NOTE — ED PROVIDER NOTES
Subjective   History of Present Illness  74-year-old female patient with medical history of gout, A-fib, diabetes, hyperlipidemia, hypertension and a known stage IV pressure ulcer of the sacral region x 4 years presents to the emergency room for bilateral lower extremity pain and a wound check.  Patient tells me approximately 3 weeks ago she fell and broke her leg in [place] and has recently moved back.  She states that she has a rosemarie in 1 leg and broke the other.  She states that since that time she has continued to have pain and at this time would like something for her pain and to have her sacral wound checked.  She states that she has not had any wound care of this wound other than her family.  Denies any new recent injuries.  Denies any other complaints or concerns at this time.    History provided by:  Patient   used: No        Review of Systems   Constitutional: Negative.  Negative for fever.   HENT: Negative.     Respiratory: Negative.     Cardiovascular: Negative.  Negative for chest pain.   Gastrointestinal: Negative.  Negative for abdominal pain.   Endocrine: Negative.    Genitourinary: Negative.  Negative for dysuria.   Musculoskeletal:         (+) bilateral leg pain   Skin: Negative.  Positive for wound.   Neurological: Negative.    Psychiatric/Behavioral: Negative.     All other systems reviewed and are negative.      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        Allergies   Allergen Reactions    Contrast Dye (Echo Or Unknown Ct/Mr) Itching       Past Surgical History:   Procedure Laterality Date    CHOLECYSTECTOMY         Family History   Problem Relation Age of Onset    Diabetes Mother     Hypertension  Father        Social History     Socioeconomic History    Marital status:    Tobacco Use    Smoking status: Never    Smokeless tobacco: Never   Vaping Use    Vaping status: Never Used   Substance and Sexual Activity    Alcohol use: No    Drug use: No    Sexual activity: Defer           Objective   Physical Exam  Vitals and nursing note reviewed.   Constitutional:       General: She is not in acute distress.     Appearance: She is well-developed. She is not diaphoretic.   HENT:      Head: Normocephalic and atraumatic.      Right Ear: External ear normal.      Left Ear: External ear normal.      Nose: Nose normal.   Eyes:      Conjunctiva/sclera: Conjunctivae normal.      Pupils: Pupils are equal, round, and reactive to light.   Neck:      Vascular: No JVD.      Trachea: No tracheal deviation.   Cardiovascular:      Rate and Rhythm: Normal rate and regular rhythm.      Heart sounds: Normal heart sounds. No murmur heard.  Pulmonary:      Effort: Pulmonary effort is normal. No respiratory distress.      Breath sounds: Normal breath sounds. No wheezing.   Abdominal:      General: Bowel sounds are normal.      Palpations: Abdomen is soft.      Tenderness: There is no abdominal tenderness.   Musculoskeletal:         General: No deformity. Normal range of motion.      Cervical back: Normal range of motion and neck supple.      Right lower leg: Normal.      Left lower leg: Normal.   Skin:     General: Skin is warm and dry.      Coloration: Skin is not pale.      Findings: No erythema or rash.          Neurological:      Mental Status: She is alert and oriented to person, place, and time.      Cranial Nerves: No cranial nerve deficit.   Psychiatric:         Behavior: Behavior normal.         Thought Content: Thought content normal.         Procedures           ED Course  ED Course as of 03/07/25 2002   Fri Mar 07, 2025   2001 Patient noted to have a tibial plateau fracture on the left from prior encounter. [TK]      ED  Course User Index  [TK] Toñito Mayer PA-C                                           Results for orders placed or performed during the hospital encounter of 03/07/25   C-reactive Protein    Collection Time: 03/07/25  6:20 PM    Specimen: Blood   Result Value Ref Range    C-Reactive Protein 3.66 (H) 0.00 - 0.50 mg/dL   CBC Auto Differential    Collection Time: 03/07/25  6:20 PM    Specimen: Blood   Result Value Ref Range    WBC 9.79 3.40 - 10.80 10*3/mm3    RBC 3.85 3.77 - 5.28 10*6/mm3    Hemoglobin 10.5 (L) 12.0 - 15.9 g/dL    Hematocrit 35.0 34.0 - 46.6 %    MCV 90.9 79.0 - 97.0 fL    MCH 27.3 26.6 - 33.0 pg    MCHC 30.0 (L) 31.5 - 35.7 g/dL    RDW 15.5 (H) 12.3 - 15.4 %    RDW-SD 51.6 37.0 - 54.0 fl    MPV 9.5 6.0 - 12.0 fL    Platelets 256 140 - 450 10*3/mm3    Neutrophil % 72.3 42.7 - 76.0 %    Lymphocyte % 20.1 19.6 - 45.3 %    Monocyte % 3.7 (L) 5.0 - 12.0 %    Eosinophil % 3.0 0.3 - 6.2 %    Basophil % 0.5 0.0 - 1.5 %    Immature Grans % 0.4 0.0 - 0.5 %    Neutrophils, Absolute 7.08 (H) 1.70 - 7.00 10*3/mm3    Lymphocytes, Absolute 1.97 0.70 - 3.10 10*3/mm3    Monocytes, Absolute 0.36 0.10 - 0.90 10*3/mm3    Eosinophils, Absolute 0.29 0.00 - 0.40 10*3/mm3    Basophils, Absolute 0.05 0.00 - 0.20 10*3/mm3    Immature Grans, Absolute 0.04 0.00 - 0.05 10*3/mm3    nRBC 0.0 0.0 - 0.2 /100 WBC   Green Top (Gel)    Collection Time: 03/07/25  6:20 PM   Result Value Ref Range    Extra Tube Hold for add-ons.    Lavender Top    Collection Time: 03/07/25  6:20 PM   Result Value Ref Range    Extra Tube hold for add-on    Gold Top - SST    Collection Time: 03/07/25  6:20 PM   Result Value Ref Range    Extra Tube Hold for add-ons.    Light Blue Top    Collection Time: 03/07/25  6:20 PM   Result Value Ref Range    Extra Tube Hold for add-ons.    Urinalysis With Microscopic If Indicated (No Culture) - Urine, Catheter    Collection Time: 03/07/25  7:34 PM    Specimen: Urine, Catheter   Result Value Ref Range    Color,  UA Yellow Yellow, Straw    Appearance, UA Turbid (A) Clear    pH, UA 8.0 5.0 - 8.0    Specific Gravity, UA 1.019 1.005 - 1.030    Glucose,  mg/dL (2+) (A) Negative    Ketones, UA Negative Negative    Bilirubin, UA Negative Negative    Blood, UA Moderate (2+) (A) Negative    Protein, UA 30 mg/dL (1+) (A) Negative    Leuk Esterase, UA Large (3+) (A) Negative    Nitrite, UA Negative Negative    Urobilinogen, UA 2.0 E.U./dL (A) 0.2 - 1.0 E.U./dL   Urinalysis, Microscopic Only - Urine, Catheter    Collection Time: 03/07/25  7:34 PM    Specimen: Urine, Catheter   Result Value Ref Range    RBC, UA 11-20 (A) None Seen, 0-2 /HPF    WBC, UA 11-20 (A) None Seen, 0-2 /HPF    Bacteria, UA Trace (A) None Seen /HPF    Squamous Epithelial Cells, UA 0-2 None Seen, 0-2 /HPF    Yeast, UA Large/3+ Budding Yeast (A) None Seen /HPF    Hyaline Casts, UA None Seen None Seen /LPF    Methodology Manual Light Microscopy    [facility] Urine Culture Tube - Urine, Catheter    Collection Time: 03/07/25  7:34 PM    Specimen: Urine, Catheter   Result Value Ref Range    Extra Tube Hold for add-ons.        No orders to display                   Medical Decision Making  Problems Addressed:  Pressure injury of sacral region, stage 4: complicated acute illness or injury  UTI (urinary tract infection), uncomplicated: complicated acute illness or injury    Amount and/or Complexity of Data Reviewed  Labs: ordered.    Risk  Prescription drug management.        Final diagnoses:   Pressure injury of sacral region, stage 4   UTI (urinary tract infection), uncomplicated   Closed fracture of left tibial plateau with routine healing, subsequent encounter       ED Disposition  ED Disposition       ED Disposition   Discharge    Condition   Stable    Comment   --               Maddie Curtis, APRN  [address] [facility] [id number] [phone]    In 2 days  wound care         Medication List        Changed      * cefdinir 300 MG capsule  Commonly known  as: OMNICEF  Take 1 capsule by mouth 2 (Two) Times a Day.  What changed: Another medication with the same name was added. Make sure you understand how and when to take each.     * cefdinir 300 MG capsule  Commonly known as: OMNICEF  Take 1 capsule by mouth 2 (Two) Times a Day.  What changed: You were already taking a medication with the same name, and this prescription was added. Make sure you understand how and when to take each.           * This list has 2 medication(s) that are the same as other medications prescribed for you. Read the directions carefully, and ask your doctor or other care provider to review them with you.                   Where to Get Your Medications        These medications were sent to [facility] - [place] - [address] - [phone]  - [phone]   [address] [facility] [address]      Phone: [phone]   cefdinir 300 MG capsule            Toñito Mayer PA-C  03/07/25 2003

## 2025-03-09 LAB — BACTERIA SPEC AEROBE CULT: NORMAL

## 2025-04-18 ENCOUNTER — HOSPITAL ENCOUNTER (EMERGENCY)
Facility: HOSPITAL | Age: 75
Discharge: HOME | End: 2025-04-18
Attending: EMERGENCY MEDICINE
Payer: MEDICARE

## 2025-04-18 VITALS
BODY MASS INDEX: 52.87 KG/M2 | HEART RATE: 80 BPM | OXYGEN SATURATION: 96 % | RESPIRATION RATE: 18 BRPM | HEIGHT: 61 IN | TEMPERATURE: 98.3 F | DIASTOLIC BLOOD PRESSURE: 70 MMHG | SYSTOLIC BLOOD PRESSURE: 120 MMHG | WEIGHT: 280 LBS

## 2025-04-18 DIAGNOSIS — T83.9XXA PROBLEM WITH FOLEY CATHETER, INITIAL ENCOUNTER: Primary | ICD-10-CM

## 2025-04-18 DIAGNOSIS — S31.000A WOUND OF SACRAL REGION, INITIAL ENCOUNTER: ICD-10-CM

## 2025-04-18 LAB
ALBUMIN SERPL-MCNC: 3.2 G/DL (ref 3.5–5.2)
ALBUMIN/GLOB SERPL: 0.8 G/DL
ALP SERPL-CCNC: 106 U/L (ref 39–117)
ALT SERPL W P-5'-P-CCNC: 14 U/L (ref 1–33)
ANION GAP SERPL CALCULATED.3IONS-SCNC: 9.1 MMOL/L (ref 5–15)
AST SERPL-CCNC: 22 U/L (ref 1–32)
BACTERIA UR QL AUTO: ABNORMAL /HPF
BASOPHILS # BLD AUTO: 0.05 10*3/MM3 (ref 0–0.2)
BASOPHILS NFR BLD AUTO: 0.6 % (ref 0–1.5)
BILIRUB SERPL-MCNC: 0.2 MG/DL (ref 0–1.2)
BILIRUB UR QL STRIP: NEGATIVE
BUN SERPL-MCNC: 21 MG/DL (ref 8–23)
BUN/CREAT SERPL: 25.6 (ref 7–25)
CALCIUM SPEC-SCNC: 9.1 MG/DL (ref 8.6–10.5)
CHLORIDE SERPL-SCNC: 104 MMOL/L (ref 98–107)
CLARITY UR: CLEAR
CO2 SERPL-SCNC: 26.9 MMOL/L (ref 22–29)
COLOR UR: YELLOW
CREAT SERPL-MCNC: 0.82 MG/DL (ref 0.57–1)
DEPRECATED RDW RBC AUTO: 53.1 FL (ref 37–54)
EGFRCR SERPLBLD CKD-EPI 2021: 74.7 ML/MIN/1.73
EOSINOPHIL # BLD AUTO: 0.33 10*3/MM3 (ref 0–0.4)
EOSINOPHIL NFR BLD AUTO: 4.3 % (ref 0.3–6.2)
ERYTHROCYTE [DISTWIDTH] IN BLOOD BY AUTOMATED COUNT: 16.2 % (ref 12.3–15.4)
GLOBULIN UR ELPH-MCNC: 4.2 GM/DL
GLUCOSE SERPL-MCNC: 157 MG/DL (ref 65–99)
GLUCOSE UR STRIP-MCNC: ABNORMAL MG/DL
HCT VFR BLD AUTO: 36 % (ref 34–46.6)
HGB BLD-MCNC: 11 G/DL (ref 12–15.9)
HGB UR QL STRIP.AUTO: ABNORMAL
HYALINE CASTS UR QL AUTO: ABNORMAL /LPF
IMM GRANULOCYTES # BLD AUTO: 0.03 10*3/MM3 (ref 0–0.05)
IMM GRANULOCYTES NFR BLD AUTO: 0.4 % (ref 0–0.5)
KETONES UR QL STRIP: NEGATIVE
LEUKOCYTE ESTERASE UR QL STRIP.AUTO: ABNORMAL
LYMPHOCYTES # BLD AUTO: 2.11 10*3/MM3 (ref 0.7–3.1)
LYMPHOCYTES NFR BLD AUTO: 27.2 % (ref 19.6–45.3)
MCH RBC QN AUTO: 27.1 PG (ref 26.6–33)
MCHC RBC AUTO-ENTMCNC: 30.6 G/DL (ref 31.5–35.7)
MCV RBC AUTO: 88.7 FL (ref 79–97)
MONOCYTES # BLD AUTO: 0.47 10*3/MM3 (ref 0.1–0.9)
MONOCYTES NFR BLD AUTO: 6.1 % (ref 5–12)
NEUTROPHILS NFR BLD AUTO: 4.76 10*3/MM3 (ref 1.7–7)
NEUTROPHILS NFR BLD AUTO: 61.4 % (ref 42.7–76)
NITRITE UR QL STRIP: NEGATIVE
NRBC BLD AUTO-RTO: 0 /100 WBC (ref 0–0.2)
PH UR STRIP.AUTO: 6.5 [PH] (ref 5–8)
PLATELET # BLD AUTO: 193 10*3/MM3 (ref 140–450)
PMV BLD AUTO: 10.1 FL (ref 6–12)
POTASSIUM SERPL-SCNC: 4.3 MMOL/L (ref 3.5–5.2)
PROT SERPL-MCNC: 7.4 G/DL (ref 6–8.5)
PROT UR QL STRIP: ABNORMAL
RBC # BLD AUTO: 4.06 10*6/MM3 (ref 3.77–5.28)
RBC # UR STRIP: ABNORMAL /HPF
REF LAB TEST METHOD: ABNORMAL
SODIUM SERPL-SCNC: 140 MMOL/L (ref 136–145)
SP GR UR STRIP: 1.02 (ref 1–1.03)
SQUAMOUS #/AREA URNS HPF: ABNORMAL /HPF
UROBILINOGEN UR QL STRIP: ABNORMAL
WBC # UR STRIP: ABNORMAL /HPF
WBC NRBC COR # BLD AUTO: 7.75 10*3/MM3 (ref 3.4–10.8)

## 2025-04-18 PROCEDURE — 81001 URINALYSIS AUTO W/SCOPE: CPT | Performed by: PHYSICIAN ASSISTANT

## 2025-04-18 PROCEDURE — 36415 COLL VENOUS BLD VENIPUNCTURE: CPT

## 2025-04-18 PROCEDURE — 99283 EMERGENCY DEPT VISIT LOW MDM: CPT

## 2025-04-18 PROCEDURE — 85025 COMPLETE CBC W/AUTO DIFF WBC: CPT | Performed by: PHYSICIAN ASSISTANT

## 2025-04-18 PROCEDURE — 80053 COMPREHEN METABOLIC PANEL: CPT | Performed by: PHYSICIAN ASSISTANT

## 2025-04-18 PROCEDURE — 51702 INSERT TEMP BLADDER CATH: CPT

## 2025-04-18 NOTE — ED PROVIDER NOTES
10/8/21     [facility]    : 1949   Sex: male    Age: 67 y.o. Patient's PCP/Provider is:  Lynette Metzger DO    Subjective:  Patient is seen today for evaluation regarding new onset abrasion he sustained to the plantar right midfoot region. Patient stated the therapist to have been performing the lymphedema therapy noticed the area 2 days ago. He presented today for evaluation regarding increased pain and discomfort to the area. Patient is in no acute distress. He denies any nausea, vomiting, fever, chills. No other additional abnormalities noted. Chief Complaint   Patient presents with    Wound Check     right foot        ROS:  Const: Positives and pertinent negatives as per HPI. Musculo: Denies symptoms other than stated above. Neuro: Denies symptoms other than stated above. Skin: Denies symptoms other than stated above.     Current Medications:    Current Outpatient Medications:     doxycycline hyclate (VIBRA-TABS) 100 MG tablet, Take 1 tablet by mouth 2 times daily for 14 days, Disp: 28 tablet, Rfl: 0    pantoprazole (PROTONIX) 40 MG tablet, Take 1 tablet by mouth every morning (before breakfast), Disp: 90 tablet, Rfl: 1    dilTIAZem (CARDIZEM 12 HR) 120 MG extended release capsule, Take 1 capsule by mouth daily, Disp: 90 capsule, Rfl: 1    predniSONE (DELTASONE) 10 MG tablet, 3 tabs once daily for 3 days, 2 tabs once daily for 3 days, 1 tab once daily for 3 days, Disp: 18 tablet, Rfl: 0    furosemide (LASIX) 20 MG tablet, Take 1 tablet by mouth daily as needed (leg swelling), Disp: 30 tablet, Rfl: 5    triamterene-hydroCHLOROthiazide (DYAZIDE) 37.5-25 MG per capsule, Take 1 capsule by mouth daily, Disp: 90 capsule, Rfl: 1    metFORMIN (GLUCOPHAGE) 500 MG tablet, Take 2 tablets by mouth 2 times daily Indications: 2 tab qam 2 tab qhs, Disp: 360 tablet, Rfl: 1    ferrous sulfate (IRON 325) 325 (65 Fe) MG tablet, Take 1 tablet by mouth daily (with breakfast), Disp: 90 tablet, Subjective   History of Present Illness  74-year-old female presents secondary to leaky Flanagan catheter.  Patient states this started about 2 or 3 days ago.  Patient reports she has had a Flanagan catheter for about 5 years.  She states this morning was changed approximately 2 weeks ago.  Patient denies any abdominal pain.  She denies any fever.  She reports having a chronic wound to her buttock for approximately 2 years.  She states that she is currently not seeing wound care.      Review of Systems   Constitutional: Negative.  Negative for fever.   HENT: Negative.     Respiratory: Negative.     Cardiovascular: Negative.  Negative for chest pain.   Gastrointestinal: Negative.  Negative for abdominal pain.   Endocrine: Negative.    Genitourinary: Negative.  Negative for dysuria.   Skin: Negative.    Neurological: Negative.    Psychiatric/Behavioral: Negative.     All other systems reviewed and are negative.      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        Allergies   Allergen Reactions    Contrast Dye (Echo Or Unknown Ct/Mr) Itching       Past Surgical History:   Procedure Laterality Date    CHOLECYSTECTOMY         Family History   Problem Relation Age of Onset    Diabetes Mother     Hypertension Father        Social History     Socioeconomic History    Marital status:    Tobacco Use    Smoking status: Never    Smokeless tobacco: Never   Vaping Use    Vaping status: Never Used   Substance and Sexual Activity    Alcohol use: No    Drug use: No    Sexual activity: Defer           Objective   Physical Exam  Vitals and nursing note reviewed.   Constitutional:       General: She is not in acute distress.     Appearance: She is  Rfl: 1    sucralfate (CARAFATE) 1 GM tablet, Take 1 g by mouth 4 times daily, Disp: , Rfl:     fluticasone (FLONASE) 50 MCG/ACT nasal spray, 1 spray by Nasal route 2 times daily, Disp: 3 Bottle, Rfl: 5    telmisartan (MICARDIS) 80 MG tablet, Take 1 tablet by mouth daily, Disp: 90 tablet, Rfl: 1    levothyroxine (SYNTHROID) 112 MCG tablet, Take 1 tablet by mouth Daily, Disp: 90 tablet, Rfl: 1    meclizine (ANTIVERT) 12.5 MG tablet, Take 1 tablet by mouth 3 times daily, Disp: 270 tablet, Rfl: 1    pravastatin (PRAVACHOL) 40 MG tablet, Take 1 tablet by mouth every morning, Disp: 90 tablet, Rfl: 1    Multiple Vitamins-Minerals (THERAPEUTIC MULTIVITAMIN-MINERALS) tablet, Take 1 tablet by mouth daily, Disp: , Rfl:     chlorpheniramine (SB CHLORPHENIRAMINE) 4 MG tablet, Take 4 mg by mouth every 6 hours as needed for Allergies, Disp: , Rfl:     Omega-3 Fatty Acids (FISH OIL) 1200 MG CAPS, Take by mouth, Disp: , Rfl:     Glucosamine-Chondroit-Vit C-Mn (GLUCOSAMINE CHONDR 1500 COMPLX) CAPS, Take by mouth, Disp: , Rfl:     albuterol sulfate  (90 Base) MCG/ACT inhaler, Inhale 2 puffs into the lungs 4 times daily as needed for Wheezing, Disp: 1 Inhaler, Rfl: 2    oxyCODONE-acetaminophen (PERCOCET)  MG per tablet, , Disp: , Rfl:     Cinnamon 500 MG CAPS, , Disp: , Rfl:     insulin aspart (NOVOLOG) 100 UNIT/ML injection vial, Inject 17 Units into the skin Daily with supper , Disp: , Rfl:     insulin glargine (LANTUS) 100 UNIT/ML injection vial, Inject 37 Units into the skin nightly , Disp: , Rfl:     aspirin 81 MG tablet, Take 81 mg by mouth every morning , Disp: , Rfl:     L-methylfolate-B6-B12 (METANX) 1-72.575-0-28 well-developed. She is not diaphoretic.   HENT:      Head: Normocephalic and atraumatic.      Right Ear: External ear normal.      Left Ear: External ear normal.      Nose: Nose normal.   Eyes:      Conjunctiva/sclera: Conjunctivae normal.      Pupils: Pupils are equal, round, and reactive to light.   Neck:      Vascular: No JVD.      Trachea: No tracheal deviation.   Cardiovascular:      Rate and Rhythm: Normal rate and regular rhythm.      Heart sounds: Normal heart sounds. No murmur heard.  Pulmonary:      Effort: Pulmonary effort is normal. No respiratory distress.      Breath sounds: Normal breath sounds. No wheezing.   Abdominal:      General: Bowel sounds are normal.      Palpations: Abdomen is soft.      Tenderness: There is no abdominal tenderness.   Musculoskeletal:         General: No deformity. Normal range of motion.      Cervical back: Normal range of motion and neck supple.   Skin:     General: Skin is warm and dry.      Coloration: Skin is not pale.      Findings: No erythema or rash.      Comments: Small stage III wound to right buttock.  Large sacral noninfected stage IV decubitus.  No bone exposed   Neurological:      Mental Status: She is alert and oriented to person, place, and time.      Cranial Nerves: No cranial nerve deficit.   Psychiatric:         Behavior: Behavior normal.         Thought Content: Thought content normal.         Procedures           ED Course                                           Results for orders placed or performed during the hospital encounter of 04/18/25   Urinalysis With Culture If Indicated - Urine, Catheter    Collection Time: 04/18/25  4:05 PM    Specimen: Urine, Catheter   Result Value Ref Range    Color, UA Yellow Yellow, Straw    Appearance, UA Clear Clear    pH, UA 6.5 5.0 - 8.0    Specific Gravity, UA 1.017 1.005 - 1.030    Glucose, UA >=1000 mg/dL (3+) (A) Negative    Ketones, UA Negative Negative    Bilirubin, UA Negative Negative    Blood, UA Large  (3+) (A) Negative    Protein, UA Trace (A) Negative    Leuk Esterase, UA Trace (A) Negative    Nitrite, UA Negative Negative    Urobilinogen, UA 1.0 E.U./dL 0.2 - 1.0 E.U./dL   Urinalysis, Microscopic Only - Urine, Catheter    Collection Time: 04/18/25  4:05 PM    Specimen: Urine, Catheter   Result Value Ref Range    RBC, UA 21-50 (A) None Seen, 0-2 /HPF    WBC, UA 3-5 (A) None Seen, 0-2 /HPF    Bacteria, UA None Seen None Seen /HPF    Squamous Epithelial Cells, UA 0-2 None Seen, 0-2 /HPF    Hyaline Casts, UA None Seen None Seen /LPF    Methodology Manual Light Microscopy    Comprehensive Metabolic Panel    Collection Time: 04/18/25  4:32 PM    Specimen: Blood   Result Value Ref Range    Glucose 157 (H) 65 - 99 mg/dL    BUN 21 8 - 23 mg/dL    Creatinine 0.82 0.57 - 1.00 mg/dL    Sodium 140 136 - 145 mmol/L    Potassium 4.3 3.5 - 5.2 mmol/L    Chloride 104 98 - 107 mmol/L    CO2 26.9 22.0 - 29.0 mmol/L    Calcium 9.1 8.6 - 10.5 mg/dL    Total Protein 7.4 6.0 - 8.5 g/dL    Albumin 3.2 (L) 3.5 - 5.2 g/dL    ALT (SGPT) 14 1 - 33 U/L    AST (SGOT) 22 1 - 32 U/L    Alkaline Phosphatase 106 39 - 117 U/L    Total Bilirubin 0.2 0.0 - 1.2 mg/dL    Globulin 4.2 gm/dL    A/G Ratio 0.8 g/dL    BUN/Creatinine Ratio 25.6 (H) 7.0 - 25.0    Anion Gap 9.1 5.0 - 15.0 mmol/L    eGFR 74.7 >60.0 mL/min/1.73   CBC Auto Differential    Collection Time: 04/18/25  4:32 PM    Specimen: Blood   Result Value Ref Range    WBC 7.75 3.40 - 10.80 10*3/mm3    RBC 4.06 3.77 - 5.28 10*6/mm3    Hemoglobin 11.0 (L) 12.0 - 15.9 g/dL    Hematocrit 36.0 34.0 - 46.6 %    MCV 88.7 79.0 - 97.0 fL    MCH 27.1 26.6 - 33.0 pg    MCHC 30.6 (L) 31.5 - 35.7 g/dL    RDW 16.2 (H) 12.3 - 15.4 %    RDW-SD 53.1 37.0 - 54.0 fl    MPV 10.1 6.0 - 12.0 fL    Platelets 193 140 - 450 10*3/mm3    Neutrophil % 61.4 42.7 - 76.0 %    Lymphocyte % 27.2 19.6 - 45.3 %    Monocyte % 6.1 5.0 - 12.0 %    Eosinophil % 4.3 0.3 - 6.2 %    Basophil % 0.6 0.0 - 1.5 %    Immature Grans % 0.4  [facility] [address]      Mor Jaramillo DPM    Electronically signed by Mor Jaramillo 0.0 - 0.5 %    Neutrophils, Absolute 4.76 1.70 - 7.00 10*3/mm3    Lymphocytes, Absolute 2.11 0.70 - 3.10 10*3/mm3    Monocytes, Absolute 0.47 0.10 - 0.90 10*3/mm3    Eosinophils, Absolute 0.33 0.00 - 0.40 10*3/mm3    Basophils, Absolute 0.05 0.00 - 0.20 10*3/mm3    Immature Grans, Absolute 0.03 0.00 - 0.05 10*3/mm3    nRBC 0.0 0.0 - 0.2 /100 WBC                     Medical Decision Making  74-year-old female presents secondary to leaky Flanagan catheter.  Patient states this started about 2 or 3 days ago.  Patient reports she has had a Flanagan catheter for about 5 years.  She states this morning was changed approximately 2 weeks ago.  Patient denies any abdominal pain.  She denies any fever.  She reports having a chronic wound to her buttock for approximately 2 years.  She states that she is currently not seeing wound care.    Problems Addressed:  Problem with Flanagan catheter, initial encounter: complicated acute illness or injury  Wound of sacral region, initial encounter: complicated acute illness or injury    Amount and/or Complexity of Data Reviewed  Labs: ordered. Decision-making details documented in ED Course.        Final diagnoses:   Problem with Flanagan catheter, initial encounter   Wound of sacral region, initial encounter       ED Disposition  ED Disposition       ED Disposition   Discharge    Condition   Stable    Comment   --               Barbara Gaona, APRJAE  [address] [address]  [phone]    Schedule an appointment as soon as possible for a visit       [facility]  [address] [place] [id number]  [phone]  Schedule an appointment as soon as possible for a visit            Medication List      No changes were made to your prescriptions during this visit.            Garrett Alcocer PA  04/18/25 0519